# Patient Record
Sex: MALE | Race: WHITE | NOT HISPANIC OR LATINO | Employment: OTHER | URBAN - METROPOLITAN AREA
[De-identification: names, ages, dates, MRNs, and addresses within clinical notes are randomized per-mention and may not be internally consistent; named-entity substitution may affect disease eponyms.]

---

## 2017-01-02 ENCOUNTER — OFFICE VISIT (OUTPATIENT)
Dept: FAMILY MEDICINE | Facility: CLINIC | Age: 67
End: 2017-01-02
Payer: COMMERCIAL

## 2017-01-02 VITALS
TEMPERATURE: 98.1 F | BODY MASS INDEX: 29.3 KG/M2 | SYSTOLIC BLOOD PRESSURE: 122 MMHG | HEIGHT: 69 IN | HEART RATE: 80 BPM | WEIGHT: 197.8 LBS | DIASTOLIC BLOOD PRESSURE: 80 MMHG

## 2017-01-02 DIAGNOSIS — M25.562 ACUTE PAIN OF LEFT KNEE: Primary | ICD-10-CM

## 2017-01-02 PROCEDURE — 20610 DRAIN/INJ JOINT/BURSA W/O US: CPT | Mod: LT | Performed by: FAMILY MEDICINE

## 2017-01-02 RX ORDER — TRIAMCINOLONE ACETONIDE 40 MG/ML
40 INJECTION, SUSPENSION INTRA-ARTICULAR; INTRAMUSCULAR ONCE
Qty: 1 ML | Refills: 0 | COMMUNITY
Start: 2017-01-02 | End: 2017-10-18

## 2017-01-02 NOTE — PROGRESS NOTES
"  SUBJECTIVE:                                                    Pawan Bullard is a 66 year old male who presents to clinic today for the following health issues:  Chief Complaint   Patient presents with     Knee Pain        Musculoskeletal problem/pain      Duration: since last night    Description  Location: left knee    Intensity:  severe, 8/10    Accompanying signs and symptoms: swelling    History  Previous similar problem: YES, cortisone injection helped at that time  Previous evaluation:  x-ray 3 years ago    Precipitating or alleviating factors:  Trauma or overuse: no     Aggravating factors include: walking    Therapies tried and outcome: ice and acetaminophen       He had \"caldium deposits\" in his right knee in the past which seemed similar and came on suddenly.  A cortisone shot help a lot.    He has had cortisone at other times which did not help as much.   He has seen ortho and Rheumatology in the past.  Rheumatology recommended shots for knee flares.   He has had a couple surgeries on left knee.   No injury known.   No change in activity.   No swelling noticed.   No fever.   Location: medial knee.   Aggravating factors: walking.  Using cane to walk.   No history of gout.   No other joints are affected.     Patient Active Problem List   Diagnosis     Psoriatic arthropathy (H)     Esophageal reflux     Hypertension goal BP (blood pressure) < 140/90     FAMILY HISTORY OF GI NEOPLASM     FAMILY HISTORY OF DIABETES MELLITUS     Impotence of organic origin     Chondrocalcinosis     HYPERLIPIDEMIA LDL GOAL <130     Osteoarthritis     Advanced directives, counseling/discussion     High risk medications (not anticoagulants) long-term use      OBJECTIVE:Blood pressure 122/80, pulse 80, temperature 98.1  F (36.7  C), temperature source Tympanic, height 5' 9\" (1.753 m), weight 197 lb 12.8 oz (89.721 kg). BMI=Body mass index is 29.2 kg/(m^2).  GENERAL APPEARANCE ADULT: alert, uncomfortable.   MS: knee exam " He has normal knee appearance.  NO deformity, swelling or erythema.  There is tenderness medial knee and joint space.  He has full extension and limitation of flexion to moderate degree.  Negatoive Prema's test.      ASSESSMENT:   (M25.562) Acute pain of left knee  (primary encounter diagnosis)  Comment: He has had degenerative changes on knee xrays in the past and two previous surgeries.  Also chondrocalcinosis on xray in the past.   Plan: DRAIN/INJECT LARGE JOINT/BURSA          Possible risks of joint injection include; damage to tendons and ligaments, skin atrophy (thinning of skin and lighter skin color) and infection of the joint.     The benefit of injecting the joint is to relieve pain.   Procedure:  The joint was prepped with Betadyne and injected with 1 cc Kenalog (40 mg) and 2 cc 1% xylocaine.  I used sterile technique and anteromedial approach. No complications.    Bandaid dressing was applied.      Sometimes the injected joint feels worse in the first 24 hours, but if the injection is successful, the pain should be better within the next 3 days or so.  Go easy on the joint for the next several days after injection.    If injection is helpful, it could be repeated 3-4 times a year (at least 3 months apart).   Let me know if injection not helping, we can consider other options for treatment.       Ibuprofen 200mg OTC may be taken up to 4 pills 4 times a day to the maximum of 3200mg or 16 pills daily as needed for pain.   Naproxen (Aleve) OTC may be taken up to 2 pills 2 times a day to the maximum of 4 pills daily as needed for pain.   Aspirin may be taken up to 2-3 pills every 4 yours as needed for pain.   Take only one type of these at a time and take with food.   Acetaminophen (Tylenol) may be taken up to 4000mg daily which would be 350mg, 2 pills every 4 hours or 500mg (extra strength) 2 pills 4 times a day.  This could be taken with the antiinflammatory medications mentioned above.

## 2017-01-02 NOTE — MR AVS SNAPSHOT
After Visit Summary   1/2/2017    Pawan Bullard    MRN: [id number]           Patient Information     Date Of Birth          1950        Visit Information        Provider Department      1/2/2017 5:40 PM Armando Finley MD [facility]        Today's Diagnoses     Acute pain of left knee    -  1       Care Instructions    ASSESSMENT:   (M25.200) Acute pain of left knee  (primary encounter diagnosis)  Comment: He has had degenerative changes on knee xrays in the past and two previous surgeries.  Also chondrocalcinosis on xray in the past.   Plan: DRAIN/INJECT LARGE JOINT/BURSA          Possible risks of joint injection include; damage to tendons and ligaments, skin atrophy (thinning of skin and lighter skin color) and infection of the joint.     The benefit of injecting the joint is to relieve pain.   Procedure:  The joint was prepped with Betadyne and injected with 1 cc Kenalog (40 mg) and 2 cc 1% xylocaine.  I used sterile technique and anteromedial approach. No complications.    Bandaid dressing was applied.      Sometimes the injected joint feels worse in the first 24 hours, but if the injection is successful, the pain should be better within the next 3 days or so.  Go easy on the joint for the next several days after injection.    If injection is helpful, it could be repeated 3-4 times a year (at least 3 months apart).   Let me know if injection not helping, we can consider other options for treatment.       Ibuprofen 200mg OTC may be taken up to 4 pills 4 times a day to the maximum of 3200mg or 16 pills daily as needed for pain.   Naproxen (Aleve) OTC may be taken up to 2 pills 2 times a day to the maximum of 4 pills daily as needed for pain.   Aspirin may be taken up to 2-3 pills every 4 yours as needed for pain.   Take only one type of these at a time and take with food.   Acetaminophen (Tylenol) may be taken up to 4000mg daily which would be 350mg, 2 pills every 4  "hours or 500mg (extra strength) 2 pills 4 times a day.  This could be taken with the antiinflammatory medications mentioned above.         Follow-ups after your visit        Who to contact     If you have questions or need follow up information about today's clinic visit or your schedule please contact [facility] directly at [phone].  Normal or non-critical lab and imaging results will be communicated to you by Vyoptahart, letter or phone within 4 business days after the clinic has received the results. If you do not hear from us within 7 days, please contact the clinic through Vyoptahart or phone. If you have a critical or abnormal lab result, we will notify you by phone as soon as possible.  Submit refill requests through WrapMail or call your pharmacy and they will forward the refill request to us. Please allow 3 business days for your refill to be completed.          Additional Information About Your Visit        MyChart Information     WrapMail lets you send messages to your doctor, view your test results, renew your prescriptions, schedule appointments and more. To sign up, go to [email or web address] . Click on \"Log in\" on the left side of the screen, which will take you to the Welcome page. Then click on \"Sign up Now\" on the right side of the page.     You will be asked to enter the access code listed below, as well as some personal information. Please follow the directions to create your username and password.     Your access code is: [email or web address]  Expires: 2017  6:15 PM     Your access code will  in 90 days. If you need help or a new code, please call your [facility] or [phone].        Your Vitals Were     Pulse Temperature Height BMI (Body Mass Index)          80 98.1  F (36.7  C) (Tympanic) 5' 9\" (1.753 m) 29.20 kg/m2         Blood Pressure from Last 3 Encounters:   17 122/80   16 130/95   16 124/74    Weight from Last 3 Encounters:   17 197 " lb 12.8 oz (89.721 kg)   11/21/16 195 lb (88.451 kg)   08/26/16 196 lb 6.4 oz (89.086 kg)              We Performed the Following     DRAIN/INJECT LARGE JOINT/BURSA        Primary Care Provider Office Phone # Fax #    Armando Finley [phone]       [facility] [address] [facility] [address]        Thank you!     Thank you for choosing [facility]  for your care. Our goal is always to provide you with excellent care. Hearing back from our patients is one way we can continue to improve our services. Please take a few minutes to complete the written survey that you may receive in the mail after your visit with us. Thank you!             Your Updated Medication List - Protect others around you: Learn how to safely use, store and throw away your medicines at www.disposemymeds.org.          This list is accurate as of: 1/2/17  6:15 PM.  Always use your most recent med list.                   Brand Name Dispense Instructions for use    folic acid 1 MG tablet    FOLVITE    90 tablet    Take 1 tablet (1 mg) by mouth daily       GLUCOSAMINE CHONDR 1500 COMPLX PO      Take  by mouth.       IBU-200 PO      Take  by mouth. 400mg as needed       lisinopril 2.5 MG tablet    PRINIVIL/Zestril    90 tablet    Take 1 tablet (2.5 mg) by mouth daily       methotrexate 2.5 MG tablet CHEMO     105 tablet    Take 8 tablets (20 mg) by mouth once a week Overdue for lab test monitoring while on this medication       Multi-vitamin Tabs tablet   Generic drug:  multivitamin, therapeutic with minerals      1 tab daily       NUTRITIONAL SUPPLEMENTS PO      Super beta prostate       simvastatin 80 MG tablet    ZOCOR    45 tablet    Take 0.5 tablets (40 mg) by mouth At Bedtime       TYLENOL ARTHRITIS PAIN 650 MG CR tablet   Generic drug:  acetaminophen     60    2 TABLETS EVERY 8 HOURS AS NEEDED

## 2017-01-02 NOTE — NURSING NOTE
"Chief Complaint   Patient presents with     Knee Pain     /80 mmHg  Pulse 80  Temp(Src) 98.1  F (36.7  C) (Tympanic)  Ht 5' 9\" (1.753 m)  Wt 197 lb 12.8 oz (89.721 kg)  BMI 29.20 kg/m2 Estimated body mass index is 29.2 kg/(m^2) as calculated from the following:    Height as of this encounter: 5' 9\" (1.753 m).    Weight as of this encounter: 197 lb 12.8 oz (89.721 kg).  bp completed using cuff size: large            "

## 2017-01-03 NOTE — PATIENT INSTRUCTIONS
ASSESSMENT:   (M25.562) Acute pain of left knee  (primary encounter diagnosis)  Comment: He has had degenerative changes on knee xrays in the past and two previous surgeries.  Also chondrocalcinosis on xray in the past.   Plan: DRAIN/INJECT LARGE JOINT/BURSA          Possible risks of joint injection include; damage to tendons and ligaments, skin atrophy (thinning of skin and lighter skin color) and infection of the joint.     The benefit of injecting the joint is to relieve pain.   Procedure:  The joint was prepped with Betadyne and injected with 1 cc Kenalog (40 mg) and 2 cc 1% xylocaine.  I used sterile technique and anteromedial approach. No complications.    Bandaid dressing was applied.      Sometimes the injected joint feels worse in the first 24 hours, but if the injection is successful, the pain should be better within the next 3 days or so.  Go easy on the joint for the next several days after injection.    If injection is helpful, it could be repeated 3-4 times a year (at least 3 months apart).   Let me know if injection not helping, we can consider other options for treatment.       Ibuprofen 200mg OTC may be taken up to 4 pills 4 times a day to the maximum of 3200mg or 16 pills daily as needed for pain.   Naproxen (Aleve) OTC may be taken up to 2 pills 2 times a day to the maximum of 4 pills daily as needed for pain.   Aspirin may be taken up to 2-3 pills every 4 yours as needed for pain.   Take only one type of these at a time and take with food.   Acetaminophen (Tylenol) may be taken up to 4000mg daily which would be 350mg, 2 pills every 4 hours or 500mg (extra strength) 2 pills 4 times a day.  This could be taken with the antiinflammatory medications mentioned above.

## 2017-01-30 ENCOUNTER — OFFICE VISIT (OUTPATIENT)
Dept: RHEUMATOLOGY | Facility: CLINIC | Age: 67
End: 2017-01-30
Payer: COMMERCIAL

## 2017-01-30 VITALS
SYSTOLIC BLOOD PRESSURE: 135 MMHG | HEART RATE: 79 BPM | DIASTOLIC BLOOD PRESSURE: 82 MMHG | RESPIRATION RATE: 16 BRPM | WEIGHT: 198 LBS | HEIGHT: 69 IN | BODY MASS INDEX: 29.33 KG/M2

## 2017-01-30 DIAGNOSIS — Z79.899 HIGH RISK MEDICATIONS (NOT ANTICOAGULANTS) LONG-TERM USE: ICD-10-CM

## 2017-01-30 DIAGNOSIS — L40.50 PSORIATIC ARTHROPATHY (H): Primary | ICD-10-CM

## 2017-01-30 LAB
ALBUMIN SERPL-MCNC: 3.9 G/DL (ref 3.4–5)
ALP SERPL-CCNC: 56 U/L (ref 40–150)
ALT SERPL W P-5'-P-CCNC: 33 U/L (ref 0–70)
ANION GAP SERPL CALCULATED.3IONS-SCNC: 7 MMOL/L (ref 3–14)
AST SERPL W P-5'-P-CCNC: 25 U/L (ref 0–45)
BASOPHILS # BLD AUTO: 0 10E9/L (ref 0–0.2)
BASOPHILS NFR BLD AUTO: 0.4 %
BILIRUB SERPL-MCNC: 0.8 MG/DL (ref 0.2–1.3)
BUN SERPL-MCNC: 13 MG/DL (ref 7–30)
CALCIUM SERPL-MCNC: 9.3 MG/DL (ref 8.5–10.1)
CHLORIDE SERPL-SCNC: 101 MMOL/L (ref 94–109)
CO2 SERPL-SCNC: 29 MMOL/L (ref 20–32)
CREAT SERPL-MCNC: 0.95 MG/DL (ref 0.66–1.25)
DIFFERENTIAL METHOD BLD: NORMAL
EOSINOPHIL # BLD AUTO: 0.1 10E9/L (ref 0–0.7)
EOSINOPHIL NFR BLD AUTO: 2 %
ERYTHROCYTE [DISTWIDTH] IN BLOOD BY AUTOMATED COUNT: 12.7 % (ref 10–15)
GFR SERPL CREATININE-BSD FRML MDRD: 79 ML/MIN/1.7M2
GLUCOSE SERPL-MCNC: 90 MG/DL (ref 70–99)
HCT VFR BLD AUTO: 45.6 % (ref 40–53)
HGB BLD-MCNC: 15.6 G/DL (ref 13.3–17.7)
LYMPHOCYTES # BLD AUTO: 1.5 10E9/L (ref 0.8–5.3)
LYMPHOCYTES NFR BLD AUTO: 31 %
MCH RBC QN AUTO: 32.3 PG (ref 26.5–33)
MCHC RBC AUTO-ENTMCNC: 34.2 G/DL (ref 31.5–36.5)
MCV RBC AUTO: 94 FL (ref 78–100)
MONOCYTES # BLD AUTO: 0.5 10E9/L (ref 0–1.3)
MONOCYTES NFR BLD AUTO: 10.3 %
NEUTROPHILS # BLD AUTO: 2.8 10E9/L (ref 1.6–8.3)
NEUTROPHILS NFR BLD AUTO: 56.3 %
PLATELET # BLD AUTO: 160 10E9/L (ref 150–450)
POTASSIUM SERPL-SCNC: 3.9 MMOL/L (ref 3.4–5.3)
PROT SERPL-MCNC: 6.8 G/DL (ref 6.8–8.8)
RBC # BLD AUTO: 4.83 10E12/L (ref 4.4–5.9)
SODIUM SERPL-SCNC: 137 MMOL/L (ref 133–144)
WBC # BLD AUTO: 4.9 10E9/L (ref 4–11)

## 2017-01-30 PROCEDURE — 80053 COMPREHEN METABOLIC PANEL: CPT | Performed by: INTERNAL MEDICINE

## 2017-01-30 PROCEDURE — 99213 OFFICE O/P EST LOW 20 MIN: CPT | Performed by: INTERNAL MEDICINE

## 2017-01-30 PROCEDURE — 36415 COLL VENOUS BLD VENIPUNCTURE: CPT | Performed by: INTERNAL MEDICINE

## 2017-01-30 PROCEDURE — 85025 COMPLETE CBC W/AUTO DIFF WBC: CPT | Performed by: INTERNAL MEDICINE

## 2017-01-30 NOTE — PROGRESS NOTES
SUBJECTIVE:  Mr. Pawan Bullard is seen back after a long absence for psoriatic arthritis.  He is followed by Dr. Garcia, has been on methotrexate for years, generally takes between 15-20 mg weekly.  He has not taken anything for 3 weeks because he had run out.  He recently was seen by his primary, had an injection in his left knee because it was swollen and painful.  He has known osteoarthritis, chondromalacia and chondrocalcinosis in both knees.  The right knee is still somewhat painful.  He is wondering about an injection today.  Psoriasis is controlled.  He has no major outbreaks.  He does not have any other significant joint pain, swelling, or stiffness.  He denies any side effects of methotrexate such as nausea, vomiting, diarrhea, stomatitis.  He has not had any progressive dyspnea or chronic cough.      He does ask me about being on Otezla.  I discussed that it certainly has less monitoring than the methotrexate but is much more expensive and generally is not a first line agent or even second line agent per [employer] policy.      PHYSICAL EXAMINATION:   GENERAL:  Pleasant, no apparent distress.   VITAL SIGNS:  Blood pressure is 135/82, pulse 79, weight 198.   NECK:  Supple, without lymphadenopathy.   LUNGS:  Clear.   HEART:  Regular.   JOINT EXAM:  There is no obvious synovitis of the wrists, MCPs, or PIPs nor the elbows, shoulders, knees or ankles.   SKIN:  Without lesions.      IMPRESSION:   1.  Psoriatic arthritis.   2.  Osteoarthritis of the knees.      RECOMMENDATIONS:   1.  Continue methotrexate 15-20 mg weekly.   2.  Continue folic acid.   3.  Inject the right knee today.   4.  Reminded him he needs labs every 3 months.   5.  Follow up with me in 1 year or sooner if there are problems.      PROCEDURE NOTE:  After informed verbal consent was obtained, under sterile technique, after the use of ethyl chloride for anesthetic, I injected 40 mg of Kenalog with 2 cc of lidocaine into the right  knee without complications.  The patient tolerated the procedure well.         VASHTI BENNETT MD             D: 2017 12:40   T: 2017 14:38   MT:       Name:     LEROY SAN   MRN:      [id number]        Account:      [id number]   :      1950           Visit Date:   2017      Document: [id number]

## 2017-01-30 NOTE — NURSING NOTE
"Chief Complaint   Patient presents with     Consult     Psoriatic Arthritis        Initial /82 mmHg  Pulse 79  Resp 16  Ht 5' 9\" (1.753 m)  Wt 198 lb (89.812 kg)  BMI 29.23 kg/m2 Estimated body mass index is 29.23 kg/(m^2) as calculated from the following:    Height as of this encounter: 5' 9\" (1.753 m).    Weight as of this encounter: 198 lb (89.812 kg).  BP completed using cuff size: jasmin Bullard CMA     "

## 2017-01-30 NOTE — Clinical Note
[facility]  [address] [address]  [phone]      February 2, 2017      Pawan Bullard  [address] [address]        Dear Dr. Suri Villalta has reviewed the results of your labs of 1/30/17, and has made the following observations:      Labs are normal    Please don't hesitate to contact our office with any additional questions or concerns.           Sincerely,    CHARLIE GalvanA  For Frank Mccord MD

## 2017-02-13 ENCOUNTER — APPOINTMENT (OUTPATIENT)
Dept: GENERAL RADIOLOGY | Facility: CLINIC | Age: 67
End: 2017-02-13
Attending: PHYSICIAN ASSISTANT
Payer: COMMERCIAL

## 2017-02-13 ENCOUNTER — HOSPITAL ENCOUNTER (EMERGENCY)
Facility: CLINIC | Age: 67
Discharge: HOME | End: 2017-02-13
Attending: PHYSICIAN ASSISTANT | Admitting: PHYSICIAN ASSISTANT
Payer: COMMERCIAL

## 2017-02-13 VITALS — TEMPERATURE: 100.6 F | OXYGEN SATURATION: 96 % | SYSTOLIC BLOOD PRESSURE: 136 MMHG | DIASTOLIC BLOOD PRESSURE: 83 MMHG

## 2017-02-13 DIAGNOSIS — J11.1 INFLUENZA-LIKE ILLNESS: ICD-10-CM

## 2017-02-13 LAB
FLUAV+FLUBV AG SPEC QL: ABNORMAL
FLUAV+FLUBV AG SPEC QL: ABNORMAL
SPECIMEN SOURCE: ABNORMAL

## 2017-02-13 PROCEDURE — 71020 XR CHEST 2 VW: CPT

## 2017-02-13 PROCEDURE — 99213 OFFICE O/P EST LOW 20 MIN: CPT | Performed by: PHYSICIAN ASSISTANT

## 2017-02-13 PROCEDURE — 87804 INFLUENZA ASSAY W/OPTIC: CPT | Performed by: PHYSICIAN ASSISTANT

## 2017-02-13 PROCEDURE — 99214 OFFICE O/P EST MOD 30 MIN: CPT

## 2017-02-13 RX ORDER — CODEINE PHOSPHATE AND GUAIFENESIN 10; 100 MG/5ML; MG/5ML
1 SOLUTION ORAL EVERY 4 HOURS PRN
Qty: 120 ML | Refills: 0 | Status: SHIPPED | OUTPATIENT
Start: 2017-02-13 | End: 2017-10-18

## 2017-02-13 RX ORDER — OSELTAMIVIR PHOSPHATE 75 MG/1
75 CAPSULE ORAL 2 TIMES DAILY
Qty: 10 CAPSULE | Refills: 0 | Status: SHIPPED | OUTPATIENT
Start: 2017-02-13 | End: 2017-02-18

## 2017-02-13 NOTE — ED AVS SNAPSHOT
[facility]    [address] [address]    Phone:  [phone]    Fax:  [phone]                                       Pawan Bullard   MRN: [id number]    Department:  [facility]   Date of Visit:  2/13/2017           After Visit Summary Signature Page     I have received my discharge instructions, and my questions have been answered. I have discussed any challenges I see with this plan with the nurse or doctor.    ..........................................................................................................................................  Patient/Patient Representative Signature      ..........................................................................................................................................  Patient Representative Print Name and Relationship to Patient    ..................................................               ................................................  Date                                            Time    ..........................................................................................................................................  Reviewed by Signature/Title    ...................................................              ..............................................  Date                                                            Time

## 2017-02-13 NOTE — ED PROVIDER NOTES
History     Chief Complaint   Patient presents with     Influenza     started yesterday, fever/cough started more towards night, just not feeling well      HPI  Pawan Bullard is a 67 year old male  presenting with a chief complaint of cough for the last two days.  He additionally complains of fever up to 101.3 orally, chills, myalgias, rhinorrhea, mild sore throat.  He denies any shortness of breath, wheezing or vomiting, diarrhea or abdominal pain.  He has attempted to treat with an OTC cold medication, eldon seltzer, last dose of antipyretic was five hours prior to arrival.  He denies any close ill contacts with similar symptoms.  He denies any history of asthma, COPD or breathing related disorders.  He did receive a flu vaccine this year.  Patient does take methotrexate for psoriatic arthritis, however states he has not for approximately the last month due to illness.      Past Medical History   Diagnosis Date     Esophageal reflux      GERD     Hypertension      Psoriatic arthropathy (H)      Psoriatic arthritis        No current facility-administered medications on file prior to encounter.   Current Outpatient Prescriptions on File Prior to Encounter:  methotrexate 2.5 MG tablet CHEMO Take 8 tablets (20 mg) by mouth once a week Overdue for lab test monitoring while on this medication   NUTRITIONAL SUPPLEMENTS PO Super beta prostate   triamcinolone acetonide (KENALOG-40) 40 MG/ML injection 1 mL (40 mg) by INTRA-ARTICULAR route once   lisinopril (PRINIVIL,ZESTRIL) 2.5 MG tablet Take 1 tablet (2.5 mg) by mouth daily   simvastatin (ZOCOR) 80 MG tablet Take 0.5 tablets (40 mg) by mouth At Bedtime   folic acid (FOLVITE) 1 MG tablet Take 1 tablet (1 mg) by mouth daily   Ibuprofen (IBU-200 PO) Take  by mouth. 400mg as needed   Glucosamine-Chondroit-Vit C-Mn (GLUCOSAMINE CHONDR 1500 COMPLX PO) Take  by mouth.   MULTI-VITAMIN OR TABS 1 tab daily   TYLENOL ARTHRITIS PAIN 650 MG OR TBCR 2 TABLETS EVERY 8 HOURS AS  NEEDED      I have reviewed the Medications, Allergies, Past Medical and Surgical History, and Social History in the Epic system.    Review of Systems  CONSTITUTIONAL:POSITIVE  for fever up to 101.3, chills, myalgias   INTEGUMENTARY/SKIN: NEGATIVE for worrisome rashes, moles or lesions  EYES: NEGATIVE for vision changes or irritation  ENT/MOUTH: POSITIVE for rhinorrhea, sore throat and NEGATIVE for ear pain   RESP:POSITIVE for cough and NEGATIVE for SOB/dyspnea and wheezing  GI: NEGATIVE for abdominal pain, diarrhea, nausea and vomiting  Physical Exam   \/83  Temp 100.6  F (38.1  C) (Oral)  SpO2 96%  Physical Exam  GENERAL APPEARANCE: healthy, alert, non-toxic  EYES: EOMI,  PERRL, conjunctiva clear  HENT: ear canals and TM's normal.  Nasal mucosa edematous, posterior pharynx is nonerythematous that exudate.  NECK: supple, nontender, no lymphadenopathy  RESP: lungs clear to auscultation - no rales, rhonchi or wheezes  CV: regular rates and rhythm, normal S1 S2, no murmur noted  SKIN: no suspicious lesions or rashes  ED Course     ED Course     Procedures          Critical Care time:  none             Results for orders placed or performed during the hospital encounter of 02/13/17   Chest XR,  PA & LAT    Narrative    XR CHEST 2 VW 2/13/2017 2:15 PM    COMPARISON: 11/1/2011    HISTORY: Cough and fever.      Impression    IMPRESSION: Cardiac silhouette and pulmonary vasculature are within  normal limits. No focal airspace disease, pleural effusion or  pneumothorax.    BETHEL MCFARLANE   Influenza A/B antigen   Result Value Ref Range    Influenza A/B Agn Specimen Nasal     Influenza A (A) NEG     Positive   Test results must be correlated with clinical data. If necessary, results   should be confirmed by a molecular assay or viral culture.      Influenza B  NEG     Negative   Test results must be correlated with clinical data. If necessary, results   should be confirmed by a molecular assay or viral culture.        Assessments & Plan (with Medical Decision Making)     I have reviewed the nursing notes.    I have reviewed the findings, diagnosis, plan and need for follow up with the patient.  Discharge Medication List as of 2/13/2017  2:49 PM      START taking these medications    Details   oseltamivir (TAMIFLU) 75 MG capsule Take 1 capsule (75 mg) by mouth 2 times daily for 5 days, Disp-10 capsule, R-0, E-Prescribe      guaiFENesin-codeine (ROBITUSSIN AC) 100-10 MG/5ML SOLN solution Take 5 mLs by mouth every 4 hours as needed for cough, Disp-120 mL, R-0, Local Print           Final diagnoses:   Influenza-like illness     67-year-old male presents to urgent care concerns over a history of cough accompanied by fever up to 101.3, chills, myalgias, sore throat and rhinorrhea.  Patient had stable vital signs upon arrival.  Physical exam findings as described above.  As part of evaluation he did a chest x-ray which was negative for acute infiltrate, pneumothorax, pleural effusion or changing cardiopulmonary vasculature.  Patient did have testing for influenza A and B which was pending at time of discharge however patient's symptoms are classic for influenza and given concern over possible immunocompromise status secondary to methotrexate use a selected initiate Tamiflu.  After patient was discharged his influenza test did come back positive for influenza A.  Patient was contacted and instructed to begin Tamiflu as previously directed.  He is also given prescription for Robitussin with codeine to be used as needed for cough.  Follow-up with primary care provider if no resolution of fever within the next 48-72 hours.  Worrisome reasons to return to ER/UC sooner discussed.    Disclaimer: This note consists of symbols derived from keyboarding, dictation, and/or voice recognition software. As a result, there may be errors in the script that have gone undetected.  Please consider this when interpreting information found in the  chart.    2/13/2017   [facility] EMERGENCY DEPARTMENT     Mercy Reddy PA-C  02/19/17 141

## 2017-02-13 NOTE — LETTER
[facility] EMERGENCY DEPARTMENT  [address] [address]  Phone: [phone]  Fax: [phone]    February 13, 2017        Pawan Bullard  [address] [address]          To whom it may concern:    RE: Pawan Bullard was evaluated in the urgent care for an illness 2/13/17.  He should refrain from activity as long as he is febrile with a temp greater than 100.0 orally which should resolve within the next 3 days.    Please contact me for questions or concerns.      Sincerely,        Mercy Reddy PA-C

## 2017-02-13 NOTE — ED AVS SNAPSHOT
[facility] Emergency Department    [address] [address]    Phone:  [phone]    Fax:  [phone]                                       Pawan Bullard   MRN: [id number]    Department:  [facility] Emergency Department   Date of Visit:  2/13/2017           Patient Information     Date Of Birth          1950        Your diagnoses for this visit were:     Influenza-like illness        You were seen by Mercy Reddy PA-C.      Discharge References/Attachments     INFLUENZA (ADULT) (ENGLISH)      24 Hour Appointment Hotline       To make an appointment at any [facility], call [phone] ([phone]). If you don't have a family doctor or clinic, we will help you find one. [facility] clinics are conveniently located to serve the needs of you and your family.             Review of your medicines      START taking        Dose / Directions Last dose taken    guaiFENesin-codeine 100-10 MG/5ML Soln solution   Commonly known as:  ROBITUSSIN AC   Dose:  1 tsp.   Quantity:  120 mL        Take 5 mLs by mouth every 4 hours as needed for cough   Refills:  0        oseltamivir 75 MG capsule   Commonly known as:  TAMIFLU   Dose:  75 mg   Quantity:  10 capsule        Take 1 capsule (75 mg) by mouth 2 times daily for 5 days   Refills:  0          Our records show that you are taking the medicines listed below. If these are incorrect, please call your family doctor or clinic.        Dose / Directions Last dose taken    folic acid 1 MG tablet   Commonly known as:  FOLVITE   Dose:  1 mg   Quantity:  90 tablet        Take 1 tablet (1 mg) by mouth daily   Refills:  3        GLUCOSAMINE CHONDR 1500 COMPLX PO        Take  by mouth.   Refills:  0        IBU-200 PO        Take  by mouth. 400mg as needed   Refills:  0        lisinopril 2.5 MG tablet   Commonly known as:  PRINIVIL/Zestril   Dose:  2.5 mg   Quantity:  90 tablet        Take 1 tablet (2.5 mg) by mouth daily   Refills:  3         methotrexate 2.5 MG tablet CHEMO   Dose:  20 mg   Quantity:  105 tablet        Take 8 tablets (20 mg) by mouth once a week Overdue for lab test monitoring while on this medication   Refills:  1        Multi-vitamin Tabs tablet   Generic drug:  multivitamin, therapeutic with minerals        1 tab daily   Refills:  0        NUTRITIONAL SUPPLEMENTS PO        Super beta prostate   Refills:  0        simvastatin 80 MG tablet   Commonly known as:  ZOCOR   Dose:  40 mg   Quantity:  45 tablet        Take 0.5 tablets (40 mg) by mouth At Bedtime   Refills:  3        triamcinolone acetonide 40 MG/ML injection   Commonly known as:  KENALOG-40   Dose:  40 mg   Quantity:  1 mL        1 mL (40 mg) by INTRA-ARTICULAR route once   Refills:  0        TYLENOL ARTHRITIS PAIN 650 MG CR tablet   Quantity:  60   Generic drug:  acetaminophen        2 TABLETS EVERY 8 HOURS AS NEEDED   Refills:  0                Prescriptions were sent or printed at these locations (2 Prescriptions)                   [id number] [address] [address]    Telephone:  [phone]   Fax:  [phone]   Hours:  Closed 10-16-08 business to [facility]                E-Prescribed (1 of 2)         oseltamivir (TAMIFLU) 75 MG capsule                 Printed at Department/Unit printer (1 of 2)         guaiFENesin-codeine (ROBITUSSIN AC) 100-10 MG/5ML SOLN solution                Procedures and tests performed during your visit     Chest XR,  PA & LAT    Influenza A/B antigen      Orders Needing Specimen Collection     None      Pending Results     Date and Time Order Name Status Description    2/13/2017 1401 Influenza A/B antigen In process             Pending Culture Results     Date and Time Order Name Status Description    2/13/2017 1401 Influenza A/B antigen In process              Test Results from your hospital stay     2/13/2017  2:19 PM - Interface, Radiant Ib      Narrative     XR  "CHEST 2 VW 2017 2:15 PM    COMPARISON: 2011    HISTORY: Cough and fever.        Impression     IMPRESSION: Cardiac silhouette and pulmonary vasculature are within  normal limits. No focal airspace disease, pleural effusion or  pneumothorax.    BETHEL MCFARLANE         2017  2:09 PM - Interface, Flexilab Results                Thank you for choosing [facility]       Thank you for choosing [facility] for your care. Our goal is always to provide you with excellent care. Hearing back from our patients is one way we can continue to improve our services. Please take a few minutes to complete the written survey that you may receive in the mail after you visit with us. Thank you!        [employer] Information     [employer] lets you send messages to your doctor, view your test results, renew your prescriptions, schedule appointments and more. To sign up, go to [email or web address] . Click on \"Log in\" on the left side of the screen, which will take you to the Welcome page. Then click on \"Sign up Now\" on the right side of the page.     You will be asked to enter the access code listed below, as well as some personal information. Please follow the directions to create your username and password.     Your access code is: [email or web address]  Expires: 2017  6:15 PM     Your access code will  in 90 days. If you need help or a new code, please call your [facility] clinic or [phone].        Care EveryWhere ID     This is your Care EveryWhere ID. This could be used by other organizations to access your [facility] medical records  [id number]        After Visit Summary       This is your record. Keep this with you and show to your community pharmacist(s) and doctor(s) at your next visit.                  "

## 2017-05-10 DIAGNOSIS — L40.50 PSORIATIC ARTHROPATHY (H): ICD-10-CM

## 2017-05-10 LAB
ALBUMIN SERPL-MCNC: 3.9 G/DL (ref 3.4–5)
ALP SERPL-CCNC: 56 U/L (ref 40–150)
ALT SERPL W P-5'-P-CCNC: 37 U/L (ref 0–70)
ANION GAP SERPL CALCULATED.3IONS-SCNC: 10 MMOL/L (ref 3–14)
AST SERPL W P-5'-P-CCNC: 25 U/L (ref 0–45)
BASOPHILS # BLD AUTO: 0 10E9/L (ref 0–0.2)
BASOPHILS NFR BLD AUTO: 0.4 %
BILIRUB SERPL-MCNC: 1 MG/DL (ref 0.2–1.3)
BUN SERPL-MCNC: 16 MG/DL (ref 7–30)
CALCIUM SERPL-MCNC: 8.9 MG/DL (ref 8.5–10.1)
CHLORIDE SERPL-SCNC: 102 MMOL/L (ref 94–109)
CO2 SERPL-SCNC: 26 MMOL/L (ref 20–32)
CREAT SERPL-MCNC: 1 MG/DL (ref 0.66–1.25)
DIFFERENTIAL METHOD BLD: ABNORMAL
EOSINOPHIL # BLD AUTO: 0.1 10E9/L (ref 0–0.7)
EOSINOPHIL NFR BLD AUTO: 1.8 %
ERYTHROCYTE [DISTWIDTH] IN BLOOD BY AUTOMATED COUNT: 13.4 % (ref 10–15)
GFR SERPL CREATININE-BSD FRML MDRD: 75 ML/MIN/1.7M2
GLUCOSE SERPL-MCNC: 117 MG/DL (ref 70–99)
HCT VFR BLD AUTO: 43.3 % (ref 40–53)
HGB BLD-MCNC: 15.7 G/DL (ref 13.3–17.7)
LYMPHOCYTES # BLD AUTO: 1.3 10E9/L (ref 0.8–5.3)
LYMPHOCYTES NFR BLD AUTO: 23.5 %
MCH RBC QN AUTO: 35 PG (ref 26.5–33)
MCHC RBC AUTO-ENTMCNC: 36.3 G/DL (ref 31.5–36.5)
MCV RBC AUTO: 97 FL (ref 78–100)
MONOCYTES # BLD AUTO: 0.4 10E9/L (ref 0–1.3)
MONOCYTES NFR BLD AUTO: 7.7 %
NEUTROPHILS # BLD AUTO: 3.8 10E9/L (ref 1.6–8.3)
NEUTROPHILS NFR BLD AUTO: 66.6 %
PLATELET # BLD AUTO: 154 10E9/L (ref 150–450)
POTASSIUM SERPL-SCNC: 3.9 MMOL/L (ref 3.4–5.3)
PROT SERPL-MCNC: 7 G/DL (ref 6.8–8.8)
RBC # BLD AUTO: 4.48 10E12/L (ref 4.4–5.9)
SODIUM SERPL-SCNC: 138 MMOL/L (ref 133–144)
WBC # BLD AUTO: 5.7 10E9/L (ref 4–11)

## 2017-05-10 PROCEDURE — 80053 COMPREHEN METABOLIC PANEL: CPT | Performed by: INTERNAL MEDICINE

## 2017-05-10 PROCEDURE — 85025 COMPLETE CBC W/AUTO DIFF WBC: CPT | Performed by: INTERNAL MEDICINE

## 2017-05-10 PROCEDURE — 36415 COLL VENOUS BLD VENIPUNCTURE: CPT | Performed by: INTERNAL MEDICINE

## 2017-07-31 ENCOUNTER — TELEPHONE (OUTPATIENT)
Dept: FAMILY MEDICINE | Facility: CLINIC | Age: 67
End: 2017-07-31

## 2017-07-31 ENCOUNTER — HOSPITAL ENCOUNTER (EMERGENCY)
Facility: CLINIC | Age: 67
Discharge: HOME | End: 2017-07-31
Attending: NURSE PRACTITIONER | Admitting: NURSE PRACTITIONER
Payer: MEDICARE

## 2017-07-31 VITALS
WEIGHT: 200 LBS | OXYGEN SATURATION: 98 % | SYSTOLIC BLOOD PRESSURE: 164 MMHG | HEART RATE: 73 BPM | RESPIRATION RATE: 14 BRPM | DIASTOLIC BLOOD PRESSURE: 91 MMHG | BODY MASS INDEX: 29.53 KG/M2 | TEMPERATURE: 98 F

## 2017-07-31 DIAGNOSIS — S46.812A STRAIN OF TRAPEZIUS MUSCLE, LEFT, INITIAL ENCOUNTER: Primary | ICD-10-CM

## 2017-07-31 PROCEDURE — 99213 OFFICE O/P EST LOW 20 MIN: CPT | Performed by: NURSE PRACTITIONER

## 2017-07-31 PROCEDURE — 99212 OFFICE O/P EST SF 10 MIN: CPT

## 2017-07-31 NOTE — ED AVS SNAPSHOT
[facility] Emergency Department    [address] [address]    Phone:  [phone]    Fax:  [phone]                                       Pawan Bullard   MRN: [id number]    Department:  [facility] Emergency Department   Date of Visit:  7/31/2017           Patient Information     Date Of Birth          1950        Your diagnoses for this visit were:     Strain of trapezius muscle, left, initial encounter        You were seen by Anaya Baker APRN CNP.      Follow-up Information     Follow up with Armando Finley MD In 1 week.    Specialty:  Family Practice    Why:  As needed, If symptoms worsen    Contact information:    [facility]  [address] [facility] [address]  [phone]          Discharge Instructions         Consider rib head trouble -   Treating Strains and Sprains  Strains and sprains happen when muscles or other soft tissues near your bones stretch or tear. These injuries can cause bruising, swelling, and pain. To ease your discomfort and speed the healing of your strain or sprain, follow the tips below. Remember, a strain or sprain can take 6 to 8 weeks to heal.     Important Note: Do not give aspirin to children or teens without discussing it with your healthcare provider first.        Ice first, heat later    Use ice for the first 24 to 48 hours after injury. Ice helps prevent swelling and reduce pain. Ice the injury for no more than 20 minutes at a time and allow at least  20 minutes between icing sessions.    Apply heat after the first 72 hours, once the swelling has gone down. Heat relaxes muscles and increases blood flow. Soak the injured area in warm water or use a heating pad set on low for no more than 15 minutes at a time.  Wrap and elevate    Wrap an injured limb firmly with an elastic bandage. This provides support and helps prevent swelling. Don t wear an elastic bandage overnight. Watch for tingling, numbness, or  increased pain, and remove the bandage immediately if any of these occurs.    Elevate the injured area to help reduce swelling and throbbing. It s best to raise an injured limb above the level of your heart.     Medicines    Over-the-counter medicines such as acetaminophen or ibuprofen can help reduce pain. Some also help reduce swelling.    Take medicine only as directed.    Rest the area even if medicines are controlling the pain.  Rest    Rest the injured area by not using it for 24 hours.    When you re ready, return slowly to your normal activities. Rest the injured area often.    Don t use or walk on an injured limb if it hurts.  Date Last Reviewed: 9/3/2015    3506-3486 [employer]. [address] [place] [address], [place] [address]. All rights reserved. This information is not intended as a substitute for professional medical care. Always follow your healthcare professional's instructions.          24 Hour Appointment Hotline       To make an appointment at any [facility], call [phone] ([phone]). If you don't have a family doctor or clinic, we will help you find one. [facility] clinics are conveniently located to serve the needs of you and your family.             Review of your medicines      Our records show that you are taking the medicines listed below. If these are incorrect, please call your family doctor or clinic.        Dose / Directions Last dose taken    folic acid 1 MG tablet   Commonly known as:  FOLVITE   Dose:  1 mg   Quantity:  90 tablet        Take 1 tablet (1 mg) by mouth daily   Refills:  3        GLUCOSAMINE CHONDR 1500 COMPLX PO        Take  by mouth.   Refills:  0        guaiFENesin-codeine 100-10 MG/5ML Soln solution   Commonly known as:  ROBITUSSIN AC   Dose:  1 tsp.   Quantity:  120 mL        Take 5 mLs by mouth every 4 hours as needed for cough   Refills:  0        IBU-200 PO        Take  by mouth. 400mg as needed   Refills:  0        lisinopril 2.5 MG tablet    Commonly known as:  PRINIVIL/Zestril   Dose:  2.5 mg   Quantity:  90 tablet        Take 1 tablet (2.5 mg) by mouth daily   Refills:  3        methotrexate 2.5 MG tablet CHEMO   Dose:  20 mg   Quantity:  105 tablet        Take 8 tablets (20 mg) by mouth once a week Overdue for lab test monitoring while on this medication   Refills:  1        Multi-vitamin Tabs tablet   Generic drug:  multivitamin, therapeutic with minerals        1 tab daily   Refills:  0        NUTRITIONAL SUPPLEMENTS PO        Super beta prostate   Refills:  0        simvastatin 80 MG tablet   Commonly known as:  ZOCOR   Dose:  40 mg   Quantity:  45 tablet        Take 0.5 tablets (40 mg) by mouth At Bedtime   Refills:  3        triamcinolone acetonide 40 MG/ML injection   Commonly known as:  KENALOG-40   Dose:  40 mg   Quantity:  1 mL        1 mL (40 mg) by INTRA-ARTICULAR route once   Refills:  0        TYLENOL ARTHRITIS PAIN 650 MG CR tablet   Quantity:  60   Generic drug:  acetaminophen        2 TABLETS EVERY 8 HOURS AS NEEDED   Refills:  0                Orders Needing Specimen Collection     None      Pending Results     No orders found from 7/29/2017 to 8/1/2017.            Pending Culture Results     No orders found from 7/29/2017 to 8/1/2017.            Pending Results Instructions     If you had any lab results that were not finalized at the time of your Discharge, you can call the ED Lab Result RN at [phone]. You will be contacted by this team for any positive Lab results or changes in treatment. The nurses are available 7 days a week from 10A to 6:30P.  You can leave a message 24 hours per day and they will return your call.        Test Results From Your Hospital Stay               Thank you for choosing [facility]       Thank you for choosing [facility] for your care. Our goal is always to provide you with excellent care. Hearing back from our patients is one way we can continue to improve our services. Please take a few minutes to  "complete the written survey that you may receive in the mail after you visit with us. Thank you!        [employer] Information     [employer] lets you send messages to your doctor, view your test results, renew your prescriptions, schedule appointments and more. To sign up, go to [email or web address] . Click on \"Log in\" on the left side of the screen, which will take you to the Welcome page. Then click on \"Sign up Now\" on the right side of the page.     You will be asked to enter the access code listed below, as well as some personal information. Please follow the directions to create your username and password.     Your access code is: [email or web address]  Expires: 10/29/2017  1:21 PM     Your access code will  in 90 days. If you need help or a new code, please call your [facility] clinic or [phone].        Care EveryWhere ID     This is your Care EveryWhere ID. This could be used by other organizations to access your [facility] medical records  [id number]        Equal Access to Services     [facility] Center[facility] : Hadii amado barreto Somarie, waaxda luqadaha, qaybta kaalmaenio erickson, kathy finley . So [facility] [phone].    ATENCIÓN: Si habla español, tiene a haley disposición servicios gratuitos de asistencia lingüística. Llame al [phone].    We comply with applicable federal civil rights laws and [place] laws. We do not discriminate on the basis of race, color, national origin, age, disability sex, sexual orientation or gender identity.            After Visit Summary       This is your record. Keep this with you and show to your community pharmacist(s) and doctor(s) at your next visit.                  "

## 2017-07-31 NOTE — TELEPHONE ENCOUNTER
"S-(situation): chest pain in left upper back    B-(background): started last week    A-(assessment):   Patient is reporting \"tugging\" chest pain on the left upper back side that has been going on off and on for a week. Patient is stating shortness of breath, not dizzy, but is reporting a slight headache, denies weakness on one side of the body, able to speak.     R-(recommendations):Advised to seek the [place] [facility] immediately. Patient asked if he could drive, told the patient not to drive self, offered to call an ambulance, but patient will have his wife take him there now. Told will cancel his afternoon appointment today as he was scheduled to be seen today by same day provider. Patient agrees with the plan. WANDA Owen      "

## 2017-07-31 NOTE — ED AVS SNAPSHOT
[facility] Emergency Department    [address] [address]    Phone:  [phone]    Fax:  [phone]                                       Pawan Bullard   MRN: [id number]    Department:  [facility] Emergency Department   Date of Visit:  7/31/2017           After Visit Summary Signature Page     I have received my discharge instructions, and my questions have been answered. I have discussed any challenges I see with this plan with the nurse or doctor.    ..........................................................................................................................................  Patient/Patient Representative Signature      ..........................................................................................................................................  Patient Representative Print Name and Relationship to Patient    ..................................................               ................................................  Date                                            Time    ..........................................................................................................................................  Reviewed by Signature/Title    ...................................................              ..............................................  Date                                                            Time

## 2017-07-31 NOTE — ED NOTES
Left upper back pain worse with mvmt. Ongoing since last week. Comes and goes. Took tylenol once without relief. Denies cardiac hx.

## 2017-07-31 NOTE — ED PROVIDER NOTES
History     Chief Complaint   Patient presents with     Back Pain     Left upper back pain since late last week. COmes and goes. Worse with position. Tylenol without improvement. No known injury.      HPI  Pawan Bullard is a 67 year old male who presents with back/shoulder pain.  Pt reports the pain was first noticed on Friday and then resolved spontaneously and was gone Saturday and Sunday and returned this am.  Pt reports he took arthritis tylenol this past Friday and that is what seemed to help.  He reports today he has not taken anything.  He describes the pain as pressure, sharp, pulling.  Pt reports pain is 4-5 out of 10.  Pt reports twisting and lifting and walking aggravate it.  He reports rest and laying down helps relieve the symptoms.  He reports normal CMS to bilateral arms and legs.  He reports normal bowel and bladder function.      I have reviewed the Medications, Allergies, Past Medical and Surgical History, and Social History in the Epic system.    Review of Systems  10 point ROS of systems including Constitutional, Eyes, Respiratory, Cardiovascular, Gastroenterology, Genitourinary, Integumentary, Muscularskeletal, Psychiatric were all negative except for pertinent positives noted in my HPI.    Physical Exam   BP: (!) 164/91  Pulse: 73  Temp: 98  F (36.7  C)  Resp: 14  Weight: 90.7 kg (200 lb)  SpO2: 98 %  Physical Exam   Constitutional: He appears well-developed and well-nourished. No distress.   HENT:   Head: Normocephalic and atraumatic.   Eyes: Conjunctivae are normal. Right eye exhibits no discharge. Left eye exhibits no discharge.   Neck: Neck supple.   Cardiovascular: Normal rate, regular rhythm and normal heart sounds.  Exam reveals no gallop and no friction rub.    No murmur heard.  Pulmonary/Chest: Effort normal and breath sounds normal. No respiratory distress. He has no wheezes. He has no rales. He exhibits no tenderness.   Musculoskeletal:        Cervical back: He exhibits  tenderness (noted at midscapulary line at approximately T10 level without spasm) and pain (with deep palpation of Trapezius muscle). He exhibits normal range of motion, no bony tenderness, no swelling, no edema, no deformity, no laceration, no spasm and normal pulse.   Lymphadenopathy:     He has no cervical adenopathy.   Skin: He is not diaphoretic.   Nursing note and vitals reviewed.      ED Course     ED Course     Procedures    Labs Ordered and Resulted from Time of ED Arrival Up to the Time of Departure from the ED - No data to display    Assessments & Plan (with Medical Decision Making)     I have reviewed the nursing notes.    I have reviewed the findings, diagnosis, plan and need for follow up with the patient.  Pawan Bullard is a 67 year old male who presents with back/shoulder pain.  Pt reports the pain was first noticed on Friday and then resolved spontaneously and was gone Saturday and Sunday and returned this am.  Pt reports he took arthritis tylenol this past Friday and that is what seemed to help.  He reports today he has not taken anything.  He describes the pain as pressure, sharp, pulling.  Pt reports pain is 4-5 out of 10.  Pt reports twisting and lifting and walking aggravate it.  He reports rest and laying down helps relieve the symptoms.  He reports normal CMS to bilateral arms and legs.  He reports normal bowel and bladder function. Exam reveals tenderness at midscapulary line around T10 region without red flags to indicate imaging.  Conservative therapy discussed with patient of tylenol arthritis, acupuncture, massage, physical therapy, or chiropractic.  Pt to return if worsening symptoms.    DDx: acute muscle strain, muscle spasm, herniated disc, cauda equina, spinal fracture, spinal stenosis, sciatica, degenerative disease, ligamentous injury, spondylolisthesis, epidural abscess, osteomyelitis, AAA, abdominal etiologies     Discharge Medication List as of 7/31/2017  1:21 PM           Final diagnoses:   Strain of trapezius muscle, left, initial encounter       7/31/2017   [facility] EMERGENCY DEPARTMENT     Anaya Baker, RK CNP  07/31/17 1521

## 2017-07-31 NOTE — DISCHARGE INSTRUCTIONS
Consider rib head trouble -   Treating Strains and Sprains  Strains and sprains happen when muscles or other soft tissues near your bones stretch or tear. These injuries can cause bruising, swelling, and pain. To ease your discomfort and speed the healing of your strain or sprain, follow the tips below. Remember, a strain or sprain can take 6 to 8 weeks to heal.     Important Note: Do not give aspirin to children or teens without discussing it with your healthcare provider first.        Ice first, heat later    Use ice for the first 24 to 48 hours after injury. Ice helps prevent swelling and reduce pain. Ice the injury for no more than 20 minutes at a time and allow at least  20 minutes between icing sessions.    Apply heat after the first 72 hours, once the swelling has gone down. Heat relaxes muscles and increases blood flow. Soak the injured area in warm water or use a heating pad set on low for no more than 15 minutes at a time.  Wrap and elevate    Wrap an injured limb firmly with an elastic bandage. This provides support and helps prevent swelling. Don t wear an elastic bandage overnight. Watch for tingling, numbness, or increased pain, and remove the bandage immediately if any of these occurs.    Elevate the injured area to help reduce swelling and throbbing. It s best to raise an injured limb above the level of your heart.     Medicines    Over-the-counter medicines such as acetaminophen or ibuprofen can help reduce pain. Some also help reduce swelling.    Take medicine only as directed.    Rest the area even if medicines are controlling the pain.  Rest    Rest the injured area by not using it for 24 hours.    When you re ready, return slowly to your normal activities. Rest the injured area often.    Don t use or walk on an injured limb if it hurts.  Date Last Reviewed: 9/3/2015    3860-1545 [employer]. [address] [place] [address], [place] [address]. All rights reserved. This information is not  intended as a substitute for professional medical care. Always follow your healthcare professional's instructions.

## 2017-09-05 DIAGNOSIS — L40.50 PSORIATIC ARTHROPATHY (H): ICD-10-CM

## 2017-09-05 RX ORDER — FOLIC ACID 1 MG/1
TABLET ORAL
Qty: 90 TABLET | Refills: 0 | Status: SHIPPED | OUTPATIENT
Start: 2017-09-05 | End: 2017-10-18

## 2017-09-05 NOTE — TELEPHONE ENCOUNTER
Folic Acid      Last Written Prescription Date: 08/26/16  Last Fill Quantity: 90,  # refills: 3   Last Office Visit with [facility], [facility] or [facility] prescribing provider: 01/2/17

## 2017-09-06 DIAGNOSIS — L40.50 PSORIATIC ARTHROPATHY (H): ICD-10-CM

## 2017-09-06 LAB
ALBUMIN SERPL-MCNC: 4 G/DL (ref 3.4–5)
ALP SERPL-CCNC: 55 U/L (ref 40–150)
ALT SERPL W P-5'-P-CCNC: 46 U/L (ref 0–70)
ANION GAP SERPL CALCULATED.3IONS-SCNC: 3 MMOL/L (ref 3–14)
AST SERPL W P-5'-P-CCNC: 30 U/L (ref 0–45)
BASOPHILS # BLD AUTO: 0 10E9/L (ref 0–0.2)
BASOPHILS NFR BLD AUTO: 0.2 %
BILIRUB SERPL-MCNC: 1 MG/DL (ref 0.2–1.3)
BUN SERPL-MCNC: 12 MG/DL (ref 7–30)
CALCIUM SERPL-MCNC: 9.2 MG/DL (ref 8.5–10.1)
CHLORIDE SERPL-SCNC: 103 MMOL/L (ref 94–109)
CO2 SERPL-SCNC: 32 MMOL/L (ref 20–32)
CREAT SERPL-MCNC: 1.01 MG/DL (ref 0.66–1.25)
DIFFERENTIAL METHOD BLD: ABNORMAL
EOSINOPHIL # BLD AUTO: 0.1 10E9/L (ref 0–0.7)
EOSINOPHIL NFR BLD AUTO: 2.2 %
ERYTHROCYTE [DISTWIDTH] IN BLOOD BY AUTOMATED COUNT: 12.6 % (ref 10–15)
GFR SERPL CREATININE-BSD FRML MDRD: 74 ML/MIN/1.7M2
GLUCOSE SERPL-MCNC: 96 MG/DL (ref 70–99)
HCT VFR BLD AUTO: 44.2 % (ref 40–53)
HGB BLD-MCNC: 15.4 G/DL (ref 13.3–17.7)
LYMPHOCYTES # BLD AUTO: 1.8 10E9/L (ref 0.8–5.3)
LYMPHOCYTES NFR BLD AUTO: 34.3 %
MCH RBC QN AUTO: 33.1 PG (ref 26.5–33)
MCHC RBC AUTO-ENTMCNC: 34.8 G/DL (ref 31.5–36.5)
MCV RBC AUTO: 95 FL (ref 78–100)
MONOCYTES # BLD AUTO: 0.4 10E9/L (ref 0–1.3)
MONOCYTES NFR BLD AUTO: 7.6 %
NEUTROPHILS # BLD AUTO: 3 10E9/L (ref 1.6–8.3)
NEUTROPHILS NFR BLD AUTO: 55.7 %
PLATELET # BLD AUTO: 156 10E9/L (ref 150–450)
POTASSIUM SERPL-SCNC: 4.4 MMOL/L (ref 3.4–5.3)
PROT SERPL-MCNC: 7 G/DL (ref 6.8–8.8)
RBC # BLD AUTO: 4.65 10E12/L (ref 4.4–5.9)
SODIUM SERPL-SCNC: 138 MMOL/L (ref 133–144)
WBC # BLD AUTO: 5.4 10E9/L (ref 4–11)

## 2017-09-06 PROCEDURE — 85025 COMPLETE CBC W/AUTO DIFF WBC: CPT | Performed by: FAMILY MEDICINE

## 2017-09-06 PROCEDURE — 80053 COMPREHEN METABOLIC PANEL: CPT | Performed by: FAMILY MEDICINE

## 2017-09-06 PROCEDURE — 36415 COLL VENOUS BLD VENIPUNCTURE: CPT | Performed by: FAMILY MEDICINE

## 2017-09-26 DIAGNOSIS — I10 ESSENTIAL HYPERTENSION WITH GOAL BLOOD PRESSURE LESS THAN 140/90: ICD-10-CM

## 2017-09-26 DIAGNOSIS — E78.5 HYPERLIPIDEMIA LDL GOAL <130: ICD-10-CM

## 2017-09-26 NOTE — TELEPHONE ENCOUNTER
Simvastatin     Last Written Prescription Date: 08/26/16  Last Fill Quantity: 45, # refills: 3  Last Office Visit with [facility], [facility] or [facility] prescribing provider: 11/02/17       Lab Results   Component Value Date    CHOL 182 08/26/2016     Lab Results   Component Value Date    HDL 55 08/26/2016     Lab Results   Component Value Date    LDL 90 08/26/2016     Lab Results   Component Value Date    TRIG 185 08/26/2016     Lab Results   Component Value Date    CHOLHDLRATIO 3.1 05/29/2015     Lisinopril      Last Written Prescription Date: 08/26/16  Last Fill Quantity: 90,  # refills: 3   Last Office Visit with [facility], [facility] or [facility] prescribing provider: 11/02/17

## 2017-09-27 RX ORDER — LISINOPRIL 2.5 MG/1
TABLET ORAL
Qty: 90 TABLET | Refills: 0 | Status: SHIPPED | OUTPATIENT
Start: 2017-09-27 | End: 2017-10-18

## 2017-09-27 RX ORDER — SIMVASTATIN 80 MG
TABLET ORAL
Qty: 45 TABLET | Refills: 0 | Status: SHIPPED | OUTPATIENT
Start: 2017-09-27 | End: 2017-10-18

## 2017-10-05 ENCOUNTER — TELEPHONE (OUTPATIENT)
Dept: FAMILY MEDICINE | Facility: CLINIC | Age: 67
End: 2017-10-05

## 2017-10-05 NOTE — TELEPHONE ENCOUNTER
Pawan is looking for an order for his lipids so he can get this done prior to his physical later this month with Dr. Finley.  [facility]

## 2017-10-13 DIAGNOSIS — E78.5 HYPERLIPIDEMIA LDL GOAL <130: ICD-10-CM

## 2017-10-13 LAB
CHOLEST SERPL-MCNC: 183 MG/DL
HDLC SERPL-MCNC: 59 MG/DL
LDLC SERPL CALC-MCNC: 91 MG/DL
NONHDLC SERPL-MCNC: 124 MG/DL
TRIGL SERPL-MCNC: 167 MG/DL

## 2017-10-13 PROCEDURE — 36415 COLL VENOUS BLD VENIPUNCTURE: CPT | Performed by: FAMILY MEDICINE

## 2017-10-13 PROCEDURE — 80061 LIPID PANEL: CPT | Performed by: FAMILY MEDICINE

## 2017-10-17 NOTE — PROGRESS NOTES
Please call.  His triglycerides are high, other lipids OK.   Continue the simvastatin at 40mg daily.

## 2017-10-18 ENCOUNTER — OFFICE VISIT (OUTPATIENT)
Dept: FAMILY MEDICINE | Facility: CLINIC | Age: 67
End: 2017-10-18
Payer: COMMERCIAL

## 2017-10-18 VITALS
DIASTOLIC BLOOD PRESSURE: 78 MMHG | TEMPERATURE: 97.4 F | HEART RATE: 60 BPM | RESPIRATION RATE: 16 BRPM | BODY MASS INDEX: 29.92 KG/M2 | HEIGHT: 69 IN | WEIGHT: 202 LBS | SYSTOLIC BLOOD PRESSURE: 128 MMHG

## 2017-10-18 DIAGNOSIS — L40.50 PSORIATIC ARTHROPATHY (H): ICD-10-CM

## 2017-10-18 DIAGNOSIS — Z23 NEED FOR PROPHYLACTIC VACCINATION AND INOCULATION AGAINST INFLUENZA: ICD-10-CM

## 2017-10-18 DIAGNOSIS — I10 ESSENTIAL HYPERTENSION WITH GOAL BLOOD PRESSURE LESS THAN 140/90: ICD-10-CM

## 2017-10-18 DIAGNOSIS — N40.1 BENIGN PROSTATIC HYPERPLASIA WITH NOCTURIA: ICD-10-CM

## 2017-10-18 DIAGNOSIS — Z12.5 SCREENING FOR PROSTATE CANCER: ICD-10-CM

## 2017-10-18 DIAGNOSIS — R35.1 BENIGN PROSTATIC HYPERPLASIA WITH NOCTURIA: ICD-10-CM

## 2017-10-18 DIAGNOSIS — Z00.00 MEDICARE ANNUAL WELLNESS VISIT, INITIAL: Primary | ICD-10-CM

## 2017-10-18 DIAGNOSIS — E78.5 HYPERLIPIDEMIA LDL GOAL <130: ICD-10-CM

## 2017-10-18 PROCEDURE — G0008 ADMIN INFLUENZA VIRUS VAC: HCPCS | Performed by: FAMILY MEDICINE

## 2017-10-18 PROCEDURE — 90670 PCV13 VACCINE IM: CPT | Performed by: FAMILY MEDICINE

## 2017-10-18 PROCEDURE — G0402 INITIAL PREVENTIVE EXAM: HCPCS | Performed by: FAMILY MEDICINE

## 2017-10-18 PROCEDURE — G0103 PSA SCREENING: HCPCS | Performed by: FAMILY MEDICINE

## 2017-10-18 PROCEDURE — G0009 ADMIN PNEUMOCOCCAL VACCINE: HCPCS | Performed by: FAMILY MEDICINE

## 2017-10-18 PROCEDURE — 90662 IIV NO PRSV INCREASED AG IM: CPT | Performed by: FAMILY MEDICINE

## 2017-10-18 RX ORDER — SIMVASTATIN 80 MG
40 TABLET ORAL AT BEDTIME
Qty: 45 TABLET | Refills: 3 | Status: SHIPPED | OUTPATIENT
Start: 2017-10-18 | End: 2018-09-21

## 2017-10-18 RX ORDER — LISINOPRIL 2.5 MG/1
2.5 TABLET ORAL DAILY
Qty: 90 TABLET | Refills: 3 | Status: SHIPPED | OUTPATIENT
Start: 2017-10-18 | End: 2018-09-21

## 2017-10-18 RX ORDER — TAMSULOSIN HYDROCHLORIDE 0.4 MG/1
0.4 CAPSULE ORAL DAILY
Qty: 30 CAPSULE | Refills: 11 | Status: SHIPPED | OUTPATIENT
Start: 2017-10-18 | End: 2018-09-21

## 2017-10-18 RX ORDER — FOLIC ACID 1 MG/1
1000 TABLET ORAL DAILY
Qty: 90 TABLET | Refills: 3 | Status: SHIPPED | OUTPATIENT
Start: 2017-10-18 | End: 2018-01-22

## 2017-10-18 NOTE — NURSING NOTE
"Chief Complaint   Patient presents with     Medicare Visit       Initial /78  Pulse 60  Temp 97.4  F (36.3  C) (Tympanic)  Resp 16  Ht 5' 9\" (1.753 m)  Wt 202 lb (91.6 kg)  BMI 29.83 kg/m2 Estimated body mass index is 29.83 kg/(m^2) as calculated from the following:    Height as of this encounter: 5' 9\" (1.753 m).    Weight as of this encounter: 202 lb (91.6 kg).  Medication Reconciliation: complete    Health Maintenance that is potentially due pending provider review:          Is there anyone who you would like to be able to receive your results?   If yes have patient fill out CURT    "

## 2017-10-18 NOTE — PROGRESS NOTES
SUBJECTIVE:   Pawan Bullard is a 67 year old male who presents for Preventive Visit.  Chief Complaint   Patient presents with     Medicare Visit      Left ear pain and some ringing at times.  occasional tinnitus-ringing.  Can get sore on occasion.   Shingles vaccine ? Due to taking Methotrexate?  Dark spot on his back and growth on rt arm.    ? Check testosterone level states urination questions.  NocturiaX2 is typical.  Seems a little worse.  Some difficulty initiating urine.     Are you in the first 12 months of your Medicare Part B coverage?  Yes,  Visual Acuity:  Right Eye: 20/25   Left Eye: 20/20      Healthy Habits:    Do you get at least three servings of calcium containing foods daily (dairy, green leafy vegetables, etc.)? yes    Amount of exercise or daily activities, outside of work: Stays active-yard work.  Some golf.     Problems taking medications regularly No    Medication side effects: No    Have you had an eye exam in the past two years? no    Do you see a dentist twice per year? yes    Do you have sleep apnea, excessive snoring or daytime drowsiness?no    COGNITIVE SCREEN  1) Repeat 3 items (Banana, Sunrise, Chair)    2) Clock draw: NORMAL  3) 3 item recall: Recalls 2 objects   Results: NORMAL clock, 1-2 items recalled: COGNITIVE IMPAIRMENT LESS LIKELY    Mini-CogTM Copyright S Kylie. Licensed by the author for use in [facility]; reprinted with permission (santiago@.[facility]). All rights reserved.      Social History   Substance Use Topics     Smoking status: Never Smoker     Smokeless tobacco: Never Used     Alcohol use Yes      Comment:  0-2 beer's or mixed drink's monthly     The patient does not drink >3 drinks per day nor >7 drinks per week.    Today's PHQ-2 Score:   PHQ-2 ( 1999 Pfizer) 8/26/2016 10/22/2014   Q1: Little interest or pleasure in doing things 0 0   Q2: Feeling down, depressed or hopeless 0 0   PHQ-2 Score 0 0     Do you feel safe in your environment - No    Do  you have a Health Care Directive?: No: Advance care planning reviewed with patient; information given to patient to review.    Current providers sharing in care for this patient include: Patient Care Team:  Armando Finley MD as PCP - General (Family Practice)  Rheumatology.        Hearing impairment: No    Ability to successfully perform activities of daily living: Yes, no assistance needed     Fall risk:  Fallen 2 or more times in the past year?: No  Any fall with injury in the past year?: No    Home safety:  discussed    The following health maintenance items are reviewed in Epic and correct as of today:  Health Maintenance   Topic Date Due     FALL RISK ASSESSMENT  01/23/2017     PNEUMOCOCCAL (1 of 2 - PCV13) 01/23/2017     AORTIC ANEURYSM SCREENING (SYSTEM ASSIGNED)  01/23/2017     INFLUENZA VACCINE (SYSTEM ASSIGNED)  09/01/2017     ADVANCE DIRECTIVE PLANNING Q5 YRS  03/04/2018     TETANUS IMMUNIZATION (SYSTEM ASSIGNED)  04/22/2019     COLONOSCOPY Q5 YR  11/21/2021     LIPID SCREEN Q5 YR MALE (SYSTEM ASSIGNED)  10/13/2022     HEPATITIS C SCREENING  Completed     Patient Active Problem List    Diagnosis Date Noted     High risk medications (not anticoagulants) long-term use 03/09/2015     Priority: Medium     Osteoarthritis 06/09/2011     Priority: Medium     HYPERLIPIDEMIA LDL GOAL <130 10/31/2010     Priority: Medium     Chondrocalcinosis 02/10/2010     Priority: Medium     Impotence of organic origin 12/04/2006     Priority: Medium     Esophageal reflux 07/20/2005     Priority: Medium     GERD       Hypertension goal BP (blood pressure) < 140/90 07/20/2005     Priority: Medium     Psoriatic arthropathy (H) 06/14/2005     Priority: Medium     Advanced directives, counseling/discussion 03/04/2013     Priority: Low     advanced care discussed form given to pt.           FAMILY HISTORY OF DIABETES MELLITUS 12/04/2006     Priority: Low     mother       FAMILY HISTORY OF GI NEOPLASM 11/17/2005     Priority: Low  "    father - colon CA ,  at  67          Family history:  CV disease: no  Prostate cancer: no  Colon cancer: father    Multivitamin or Vit D use: see epic    Vaccines:current     Past Colon cancer screenin    ROS:  General: POSITIVE for:, weight gain, 6# in the past year.  Sometimes difficulty getting back to sleep after getting up for bathroom.   Eyes: Negative for vision changes or eye problems  ENT: POSITIVE for:, tinnitus left  Resp: No coughing, wheezing or shortness of breath  CV: No chest pains or palpitations  GI: No nausea, vomiting,  heartburn, abdominal pain, diarrhea, constipation or change in bowel habits  : POSITIVE for:, nocturia x 2, some slowed urine stream.   Musculoskeletal: POSITIVE  for:, arthritis.  Doing OK.  Some knee pain, neck pain.   Neurologic: No headaches, numbness, tingling, weakness, problems with balance or coordination  Psychiatric: No problems with anxiety, depression or mental health  Heme/immune/allergy: No history of bleeding or clotting problems or anemia.  No allergies or immune system problems  Endocrine: No history of thyroid disease, diabetes or other endocrine disorders  Skin: POSITIVE for:, skin spots, see above.     OBJECTIVE:                                                    OBJECTIVE:Blood pressure 128/78, pulse 60, temperature 97.4  F (36.3  C), temperature source Tympanic, resp. rate 16, height 5' 9\" (1.753 m), weight 202 lb (91.6 kg). BMI=Body mass index is 29.83 kg/(m^2).  GENERAL APPEARANCE ADULT: Alert, no acute distress  EYES: PERRL, EOM normal, conjunctiva and lids normal  HENT: Ears and TMs normal, oral mucosa and posterior oropharynx normal, hearing decreased bilateral with rubbing fingers.   NECK: No adenopathy,masses or thyromegaly  RESP: lungs clear to auscultation   CV: normal rate, regular rhythm, no murmur or gallop  ABDOMEN: soft, no organomegaly, masses or tenderness   (male): penis, scrotum, testes normal, no hernias, rectal exam " normal, prostate normal  MS: extremities normal, no peripheral edema  SKIN: no suspicious lesions or rashes    ASSESSMENT/PLAN:                                                    Lifestyle recommendations:regular exercise, at least 150 minutes per week (average 30 minutes 5 times a week)  weight loss recommended (ideal BMI-body mass index is <25)  always wear seat belt  The following exams/tests were recommended and discussed for health maintenance:  Colon cancer screening recommended 5-10 years.    Prostate cancer screening is optional starting at age 50 if normal risk, age 40 or 45 for high risk men (strong family history or blacks.)  Screening can include digital rectal exam and PSA blood test.     (Z00.00) Medicare annual wellness visit, initial  (primary encounter diagnosis)    (Z23) Need for prophylactic vaccination and inoculation against influenza  Comment:   Plan: FLU VACCINE, INCREASED ANTIGEN, PRESV FREE, AGE        65+ [95369], Pneumococcal vaccine 13 valent         PCV13 IM (Prevnar) [50750], ADMIN INFLUENZA         (For MEDICARE Patients ONLY) [], ADMIN         PNEUMOVAX VACCINE (For MEDICARE Patients ONLY)         []          (E78.5) Hyperlipidemia LDL goal <130  Comment: doing well  Plan: simvastatin (ZOCOR) 80 MG tablet        No change in current treatment plan.  Refills.     (I10) Essential hypertension with goal blood pressure less than 140/90  Comment: doing well.   Plan: lisinopril (PRINIVIL/ZESTRIL) 2.5 MG tablet        REfills.     (L40.50) Psoriatic arthropathy (H)  Comment: seeing Rheumatology   Plan: folic acid (FOLVITE) 1 MG tablet        follow-up with Rheumatology as planned.     (Z12.5) Screening for prostate cancer  Comment:   Plan: PSA, screen          (N40.1,  R35.1) Benign prostatic hyperplasia with nocturia  Comment: symptoms or enlarged prostate  Plan: tamsulosin (FLOMAX) 0.4 MG capsule        Check PSA again today.   Trial tamulosin daily at bedtime for a month.  If no  "difference, we could try increasing the dose in a month.     Rough raised slightly brown skin spots are seborrheic keratoses-benign, not cancer.  They can be left alone or treated to remove if desired.        End of Life Planning:  Patient currently has an advanced directive: No.  I have verified the patient's ablity to prepare an advanced directive/make health care decisions.  Literature was provided to assist patient in preparing an advanced directive.    COUNSELING:    Estimated body mass index is 29.83 kg/(m^2) as calculated from the following:    Height as of this encounter: 5' 9\" (1.753 m).    Weight as of this encounter: 202 lb (91.6 kg).  Weight management plan: Discussed healthy diet and exercise guidelines and patient will follow up in 12 months in clinic to re-evaluate.   reports that he has never smoked. He has never used smokeless tobacco.    Appropriate preventive services were discussed with this patient, including applicable screening as appropriate for cardiovascular disease, diabetes, osteopenia/osteoporosis, and glaucoma.  As appropriate for age/gender, discussed screening for colorectal cancer, prostate cancer, breast cancer, and cervical cancer. Checklist reviewing preventive services available has been given to the patient.    Reviewed patients plan of care and provided an AVS. The Basic Care Plan (routine screening as documented in Health Maintenance) for Pawan meets the Care Plan requirement. This Care Plan has been established and reviewed with the Patient.    Counseling Resources:  ATP IV Guidelines  Pooled Cohorts Equation Calculator  Breast Cancer Risk Calculator  FRAX Risk Assessment  ICSI Preventive Guidelines  Dietary Guidelines for Americans, 2010  USDA's MyPlate  ASA Prophylaxis  Lung CA Screening    Armando Finley MD  [facility] Influenza Immunization Documentation    1.  Is the person to be vaccinated sick today?   No    2. Does the person to be " vaccinated have an allergy to a component   of the vaccine?   No    3. Has the person to be vaccinated ever had a serious reaction   to influenza vaccine in the past?   No    4. Has the person to be vaccinated ever had Guillain-Barré syndrome?   No    Form completed by

## 2017-10-18 NOTE — MR AVS SNAPSHOT
After Visit Summary   10/18/2017    Pawan Bullard    MRN: [id number]           Patient Information     Date Of Birth          1950        Visit Information        Provider Department      10/18/2017 4:00 PM Armando Finley MD [facility]        Today's Diagnoses     Medicare annual wellness visit, initial    -  1    Need for prophylactic vaccination and inoculation against influenza        Hyperlipidemia LDL goal <130        Essential hypertension with goal blood pressure less than 140/90        Psoriatic arthropathy (H)        Screening for prostate cancer        Benign prostatic hyperplasia with nocturia          Care Instructions      Preventive Health Recommendations:       Male Ages 65 and over    Yearly exam:             See your health care provider every year in order to  o   Review health changes.   o   Discuss preventive care.    o   Review your medicines if your doctor has prescribed any.    Talk with your health care provider about whether you should have a test to screen for prostate cancer (PSA).    Every 3 years, have a diabetes test (fasting glucose). If you are at risk for diabetes, you should have this test more often.    Every 5 years, have a cholesterol test. Have this test more often if you are at risk for high cholesterol or heart disease.     Every 10 years, have a colonoscopy. Or, have a yearly FIT test (stool test). These exams will check for colon cancer.    Talk to with your health care provider about screening for Abdominal Aortic Aneurysm if you have a family history of AAA or have a history of smoking.  Shots:     Get a flu shot each year.     Get a tetanus shot every 10 years.     Talk to your doctor about your pneumonia vaccines. There are now two you should receive - Pneumovax (PPSV 23) and Prevnar (PCV 13).    Talk to your doctor about a shingles vaccine.     Talk to your doctor about the hepatitis B vaccine.  Nutrition:     Eat at  least 5 servings of fruits and vegetables each day.     Eat whole-grain bread, whole-wheat pasta and brown rice instead of white grains and rice.     Talk to your doctor about Calcium and Vitamin D.   Lifestyle    Exercise for at least 150 minutes a week (30 minutes a day, 5 days a week). This will help you control your weight and prevent disease.     Limit alcohol to one drink per day.     No smoking.     Wear sunscreen to prevent skin cancer.     See your dentist every six months for an exam and cleaning.     See your eye doctor every 1 to 2 years to screen for conditions such as glaucoma, macular degeneration and cataracts.    ASSESSMENT/PLAN:                                                    Lifestyle recommendations:regular exercise, at least 150 minutes per week (average 30 minutes 5 times a week)  weight loss recommended (ideal BMI-body mass index is <25)  always wear seat belt  The following exams/tests were recommended and discussed for health maintenance:  Colon cancer screening recommended 5-10 years.    Prostate cancer screening is optional starting at age 50 if normal risk, age 40 or 45 for high risk men (strong family history or blacks.)  Screening can include digital rectal exam and PSA blood test.     (Z00.00) Medicare annual wellness visit, initial  (primary encounter diagnosis)    (Z23) Need for prophylactic vaccination and inoculation against influenza  Comment:   Plan: FLU VACCINE, INCREASED ANTIGEN, PRESV FREE, AGE        65+ [44136], Pneumococcal vaccine 13 valent         PCV13 IM (Prevnar) [16611], ADMIN INFLUENZA         (For MEDICARE Patients ONLY) [], ADMIN         PNEUMOVAX VACCINE (For MEDICARE Patients ONLY)         []          (E78.5) Hyperlipidemia LDL goal <130  Comment: doing well  Plan: simvastatin (ZOCOR) 80 MG tablet        No change in current treatment plan.  Refills.     (I10) Essential hypertension with goal blood pressure less than 140/90  Comment: doing well.  "  Plan: lisinopril (PRINIVIL/ZESTRIL) 2.5 MG tablet        REfills.     (L40.50) Psoriatic arthropathy (H)  Comment: seeing Rheumatology   Plan: folic acid (FOLVITE) 1 MG tablet        follow-up with Rheumatology as planned.     (Z12.5) Screening for prostate cancer  Comment:   Plan: PSA, screen          (N40.1,  R35.1) Benign prostatic hyperplasia with nocturia  Comment: symptoms or enlarged prostate  Plan: tamsulosin (FLOMAX) 0.4 MG capsule        Check PSA again today.   Trial tamulosin daily at bedtime for a month.  If no difference, we could try increasing the dose in a month.     Rough raised slightly brown skin spots are seborrheic keratoses-benign, not cancer.  They can be left alone or treated to remove if desired.          Follow-ups after your visit        Who to contact     If you have questions or need follow up information about today's clinic visit or your schedule please contact [facility] directly at [phone].  Normal or non-critical lab and imaging results will be communicated to you by [employer], letter or phone within 4 business days after the clinic has received the results. If you do not hear from us within 7 days, please contact the clinic through [employer] or phone. If you have a critical or abnormal lab result, we will notify you by phone as soon as possible.  Submit refill requests through [employer] or call your pharmacy and they will forward the refill request to us. Please allow 3 business days for your refill to be completed.          Additional Information About Your Visit        [employer] Information     [employer] lets you send messages to your doctor, view your test results, renew your prescriptions, schedule appointments and more. To sign up, go to [email or web address] . Click on \"Log in\" on the left side of the screen, which will take you to the Welcome page. Then click on \"Sign up Now\" on the right side of the page.     You will be asked to enter the access code " "listed below, as well as some personal information. Please follow the directions to create your username and password.     Your access code is: [email or web address]  Expires: 10/29/2017  1:21 PM     Your access code will  in 90 days. If you need help or a new code, please call your [facility] clinic or [phone].        Care EveryWhere ID     This is your Care EveryWhere ID. This could be used by other organizations to access your [facility] medical records  [id number]        Your Vitals Were     Pulse Temperature Respirations Height BMI (Body Mass Index)       60 97.4  F (36.3  C) (Tympanic) 16 5' 9\" (1.753 m) 29.83 kg/m2        Blood Pressure from Last 3 Encounters:   10/18/17 128/78   17 (!) 164/91   17 136/83    Weight from Last 3 Encounters:   10/18/17 202 lb (91.6 kg)   17 200 lb (90.7 kg)   17 198 lb (89.8 kg)              We Performed the Following     ADMIN INFLUENZA (For MEDICARE Patients ONLY) []     ADMIN PNEUMOVAX VACCINE (For MEDICARE Patients ONLY) []     FLU VACCINE, INCREASED ANTIGEN, PRESV FREE, AGE 65+ [42955]     Pneumococcal vaccine 13 valent PCV13 IM (Prevnar) [71037]     PSA, screen          Today's Medication Changes          These changes are accurate as of: 10/18/17  5:08 PM.  If you have any questions, ask your nurse or doctor.               Start taking these medicines.        Dose/Directions    tamsulosin 0.4 MG capsule   Commonly known as:  FLOMAX   Used for:  Benign prostatic hyperplasia with nocturia   Started by:  Armando Finley MD        Dose:  0.4 mg   Take 1 capsule (0.4 mg) by mouth daily   Quantity:  30 capsule   Refills:  11         These medicines have changed or have updated prescriptions.        Dose/Directions    folic acid 1 MG tablet   Commonly known as:  FOLVITE   This may have changed:  See the new instructions.   Used for:  Psoriatic arthropathy (H)   Changed by:  Armando Finley MD        Dose:  1000 mcg   Take 1 tablet (1,000 " mcg) by mouth daily   Quantity:  90 tablet   Refills:  3       lisinopril 2.5 MG tablet   Commonly known as:  PRINIVIL/Zestril   This may have changed:  See the new instructions.   Used for:  Essential hypertension with goal blood pressure less than 140/90   Changed by:  Armando Finley MD        Dose:  2.5 mg   Take 1 tablet (2.5 mg) by mouth daily   Quantity:  90 tablet   Refills:  3       simvastatin 80 MG tablet   Commonly known as:  ZOCOR   This may have changed:  See the new instructions.   Used for:  Hyperlipidemia LDL goal <130   Changed by:  Armando Finley MD        Dose:  40 mg   Take 0.5 tablets (40 mg) by mouth At Bedtime   Quantity:  45 tablet   Refills:  3            Where to get your medicines      These medications were sent to [id number] - [facility] [address]  [address] [address]    Hours:  Closed 10-16-08 business to [facility] Phone:  [phone]     folic acid 1 MG tablet    lisinopril 2.5 MG tablet    simvastatin 80 MG tablet    tamsulosin 0.4 MG capsule                Primary Care Provider Office Phone # Fax #    Armando Finley [phone] [address] [facility] [address]        Equal Access to Services     NINA OLMSTEAD AH: Hadii amado ku hadasho Soomaali, waaxda luqadaha, qaybta kaalmada adeegyada, waxay idiin hayseymour gamez. So [facility] [phone].    ATENCIÓN: Si habla español, tiene a haley disposición servicios gratuitos de asistencia lingüística. Llame al [phone].    We comply with applicable federal civil rights laws and [place] laws. We do not discriminate on the basis of race, color, national origin, age, disability, sex, sexual orientation, or gender identity.            Thank you!     Thank you for choosing [facility]  for your care. Our goal is always to provide you with excellent care. Hearing back from our patients is one way we can continue to improve our  services. Please take a few minutes to complete the written survey that you may receive in the mail after your visit with us. Thank you!             Your Updated Medication List - Protect others around you: Learn how to safely use, store and throw away your medicines at www.disposemymeds.org.          This list is accurate as of: 10/18/17  5:08 PM.  Always use your most recent med list.                   Brand Name Dispense Instructions for use Diagnosis    folic acid 1 MG tablet    FOLVITE    90 tablet    Take 1 tablet (1,000 mcg) by mouth daily    Psoriatic arthropathy (H)       IBU-200 PO      Take  by mouth. 400mg as needed        lisinopril 2.5 MG tablet    PRINIVIL/Zestril    90 tablet    Take 1 tablet (2.5 mg) by mouth daily    Essential hypertension with goal blood pressure less than 140/90       methotrexate 2.5 MG tablet CHEMO     105 tablet    Take 8 tablets (20 mg) by mouth once a week Overdue for lab test monitoring while on this medication    Psoriatic arthropathy (H), High risk medications (not anticoagulants) long-term use       Multi-vitamin Tabs tablet   Generic drug:  multivitamin, therapeutic with minerals      1 tab daily        NUTRITIONAL SUPPLEMENTS PO      Super beta prostate        simvastatin 80 MG tablet    ZOCOR    45 tablet    Take 0.5 tablets (40 mg) by mouth At Bedtime    Hyperlipidemia LDL goal <130       tamsulosin 0.4 MG capsule    FLOMAX    30 capsule    Take 1 capsule (0.4 mg) by mouth daily    Benign prostatic hyperplasia with nocturia       TYLENOL ARTHRITIS PAIN 650 MG CR tablet   Generic drug:  acetaminophen     60    2 TABLETS EVERY 8 HOURS AS NEEDED

## 2017-10-18 NOTE — PATIENT INSTRUCTIONS
Preventive Health Recommendations:       Male Ages 65 and over    Yearly exam:             See your health care provider every year in order to  o   Review health changes.   o   Discuss preventive care.    o   Review your medicines if your doctor has prescribed any.    Talk with your health care provider about whether you should have a test to screen for prostate cancer (PSA).    Every 3 years, have a diabetes test (fasting glucose). If you are at risk for diabetes, you should have this test more often.    Every 5 years, have a cholesterol test. Have this test more often if you are at risk for high cholesterol or heart disease.     Every 10 years, have a colonoscopy. Or, have a yearly FIT test (stool test). These exams will check for colon cancer.    Talk to with your health care provider about screening for Abdominal Aortic Aneurysm if you have a family history of AAA or have a history of smoking.  Shots:     Get a flu shot each year.     Get a tetanus shot every 10 years.     Talk to your doctor about your pneumonia vaccines. There are now two you should receive - Pneumovax (PPSV 23) and Prevnar (PCV 13).    Talk to your doctor about a shingles vaccine.     Talk to your doctor about the hepatitis B vaccine.  Nutrition:     Eat at least 5 servings of fruits and vegetables each day.     Eat whole-grain bread, whole-wheat pasta and brown rice instead of white grains and rice.     Talk to your doctor about Calcium and Vitamin D.   Lifestyle    Exercise for at least 150 minutes a week (30 minutes a day, 5 days a week). This will help you control your weight and prevent disease.     Limit alcohol to one drink per day.     No smoking.     Wear sunscreen to prevent skin cancer.     See your dentist every six months for an exam and cleaning.     See your eye doctor every 1 to 2 years to screen for conditions such as glaucoma, macular degeneration and cataracts.    ASSESSMENT/PLAN:                                                     Lifestyle recommendations:regular exercise, at least 150 minutes per week (average 30 minutes 5 times a week)  weight loss recommended (ideal BMI-body mass index is <25)  always wear seat belt  The following exams/tests were recommended and discussed for health maintenance:  Colon cancer screening recommended 5-10 years.    Prostate cancer screening is optional starting at age 50 if normal risk, age 40 or 45 for high risk men (strong family history or blacks.)  Screening can include digital rectal exam and PSA blood test.     (Z00.00) Medicare annual wellness visit, initial  (primary encounter diagnosis)    (Z23) Need for prophylactic vaccination and inoculation against influenza  Comment:   Plan: FLU VACCINE, INCREASED ANTIGEN, PRESV FREE, AGE        65+ [92303], Pneumococcal vaccine 13 valent         PCV13 IM (Prevnar) [52066], ADMIN INFLUENZA         (For MEDICARE Patients ONLY) [], ADMIN         PNEUMOVAX VACCINE (For MEDICARE Patients ONLY)         []          (E78.5) Hyperlipidemia LDL goal <130  Comment: doing well  Plan: simvastatin (ZOCOR) 80 MG tablet        No change in current treatment plan.  Refills.     (I10) Essential hypertension with goal blood pressure less than 140/90  Comment: doing well.   Plan: lisinopril (PRINIVIL/ZESTRIL) 2.5 MG tablet        REfills.     (L40.50) Psoriatic arthropathy (H)  Comment: seeing Rheumatology   Plan: folic acid (FOLVITE) 1 MG tablet        follow-up with Rheumatology as planned.     (Z12.5) Screening for prostate cancer  Comment:   Plan: PSA, screen          (N40.1,  R35.1) Benign prostatic hyperplasia with nocturia  Comment: symptoms or enlarged prostate  Plan: tamsulosin (FLOMAX) 0.4 MG capsule        Check PSA again today.   Trial tamulosin daily at bedtime for a month.  If no difference, we could try increasing the dose in a month.     Rough raised slightly brown skin spots are seborrheic keratoses-benign, not cancer.  They can be left alone  or treated to remove if desired.

## 2017-10-19 LAB — PSA SERPL-ACNC: 1.16 UG/L (ref 0–4)

## 2017-12-06 ENCOUNTER — HOSPITAL ENCOUNTER (OUTPATIENT)
Dept: ULTRASOUND IMAGING | Facility: CLINIC | Age: 67
Discharge: HOME | End: 2017-12-06
Attending: FAMILY MEDICINE | Admitting: FAMILY MEDICINE
Payer: MEDICARE

## 2017-12-06 ENCOUNTER — OFFICE VISIT (OUTPATIENT)
Dept: FAMILY MEDICINE | Facility: CLINIC | Age: 67
End: 2017-12-06
Payer: COMMERCIAL

## 2017-12-06 VITALS — RESPIRATION RATE: 16 BRPM | BODY MASS INDEX: 31.57 KG/M2 | WEIGHT: 213.8 LBS

## 2017-12-06 DIAGNOSIS — M79.604 PAIN OF RIGHT LOWER EXTREMITY: ICD-10-CM

## 2017-12-06 DIAGNOSIS — M79.604 PAIN OF RIGHT LOWER EXTREMITY: Primary | ICD-10-CM

## 2017-12-06 PROCEDURE — 93971 EXTREMITY STUDY: CPT | Mod: RT

## 2017-12-06 PROCEDURE — 99214 OFFICE O/P EST MOD 30 MIN: CPT | Performed by: FAMILY MEDICINE

## 2017-12-06 NOTE — MR AVS SNAPSHOT
After Visit Summary   12/6/2017    Pawan Bullard    MRN: [id number]           Patient Information     Date Of Birth          1950        Visit Information        Provider Department      12/6/2017 1:40 PM Armando Hooks MD [facility]        Today's Diagnoses     Pain of right lower extremity    -  1       Follow-ups after your visit        Your next 10 appointments already scheduled     Dec 06, 2017  2:40 PM CST   US LOWER EXTREMITY VENOUS DUPLEX BILATERAL with [id number]   [facility] ([facility])    [address] [id number] [phone]           Please bring a list of your medicines (including vitamins, minerals and over-the-counter drugs). Also, tell your doctor about any allergies you may have. Wear comfortable clothes and leave your valuables at home.  You do not need to do anything special to prepare for your exam.  Please call the Imaging Department at your exam site with any questions.              Future tests that were ordered for you today     Open Future Orders        Priority Expected Expires Ordered    US Lower Extremity Venous Duplex Bilateral STAT  12/6/2018 12/6/2017            Who to contact     If you have questions or need follow up information about today's clinic visit or your schedule please contact [facility] directly at [phone].  Normal or non-critical lab and imaging results will be communicated to you by MyChart, letter or phone within 4 business days after the clinic has received the results. If you do not hear from us within 7 days, please contact the clinic through MyChart or phone. If you have a critical or abnormal lab result, we will notify you by phone as soon as possible.  Submit refill requests through [facility] or call your pharmacy and they will forward the refill request to us. Please allow 3 business days for your refill to be completed.           "Additional Information About Your Visit        MyChart Information     NOZA lets you send messages to your doctor, view your test results, renew your prescriptions, schedule appointments and more. To sign up, go to [email or web address] . Click on \"Log in\" on the left side of the screen, which will take you to the Welcome page. Then click on \"Sign up Now\" on the right side of the page.     You will be asked to enter the access code listed below, as well as some personal information. Please follow the directions to create your username and password.     Your access code is: [email or web address]  Expires: 3/6/2018  2:11 PM     Your access code will  in 90 days. If you need help or a new code, please call your [facility] clinic or [phone].        Care EveryWhere ID     This is your Care EveryWhere ID. This could be used by other organizations to access your [facility] medical records  [id number]        Your Vitals Were     Respirations BMI (Body Mass Index)                16 31.57 kg/m2           Blood Pressure from Last 3 Encounters:   10/18/17 128/78   17 (!) 164/91   17 136/83    Weight from Last 3 Encounters:   17 213 lb 12.8 oz (97 kg)   10/18/17 202 lb (91.6 kg)   17 200 lb (90.7 kg)                 Today's Medication Changes          These changes are accurate as of: 17  2:11 PM.  If you have any questions, ask your nurse or doctor.               Stop taking these medicines if you haven't already. Please contact your care team if you have questions.     NUTRITIONAL SUPPLEMENTS PO   Stopped by:  Armando Hooks MD                    Primary Care Provider Office Phone # Fax #    Armando Finley [phone] [address] [place] [address]        Equal Access to Services     [facility] : Chanda David, wajoaquin daviesqrut, qaybta kathy randall. [facility] [phone].    ATENCIÓN: " Si habla ulysses, tiene a haley disposición servicios gratuitos de asistencia lingüística. Hilario bonilla [phone].    We comply with applicable federal civil rights laws and [place] laws. We do not discriminate on the basis of race, color, national origin, age, disability, sex, sexual orientation, or gender identity.            Thank you!     Thank you for choosing [facility]  for your care. Our goal is always to provide you with excellent care. Hearing back from our patients is one way we can continue to improve our services. Please take a few minutes to complete the written survey that you may receive in the mail after your visit with us. Thank you!             Your Updated Medication List - Protect others around you: Learn how to safely use, store and throw away your medicines at www.disposemymeds.org.          This list is accurate as of: 12/6/17  2:11 PM.  Always use your most recent med list.                   Brand Name Dispense Instructions for use Diagnosis    folic acid 1 MG tablet    FOLVITE    90 tablet    Take 1 tablet (1,000 mcg) by mouth daily    Psoriatic arthropathy (H)       IBU-200 PO      Take  by mouth. 400mg as needed        lisinopril 2.5 MG tablet    PRINIVIL/Zestril    90 tablet    Take 1 tablet (2.5 mg) by mouth daily    Essential hypertension with goal blood pressure less than 140/90       methotrexate 2.5 MG tablet CHEMO     105 tablet    Take 8 tablets (20 mg) by mouth once a week Overdue for lab test monitoring while on this medication    Psoriatic arthropathy (H), High risk medications (not anticoagulants) long-term use       Multi-vitamin Tabs tablet   Generic drug:  multivitamin, therapeutic with minerals      1 tab daily        simvastatin 80 MG tablet    ZOCOR    45 tablet    Take 0.5 tablets (40 mg) by mouth At Bedtime    Hyperlipidemia LDL goal <130       tamsulosin 0.4 MG capsule    FLOMAX    30 capsule    Take 1 capsule (0.4 mg) by mouth daily    Benign  prostatic hyperplasia with nocturia       TYLENOL ARTHRITIS PAIN 650 MG CR tablet   Generic drug:  acetaminophen     60    2 TABLETS EVERY 8 HOURS AS NEEDED

## 2017-12-06 NOTE — PROGRESS NOTES
SUBJECTIVE:   Pawan Bullard is a 67 year old male who presents to clinic today for the following health issues:  Comes with calf pain on the right side.  Thinks it might be swollen.  No history of either trauma or DVT.     Musculoskeletal problem/pain      Duration: Has been ongoing since 12/4/17    Description  Location: Right knee and right calf of leg    Intensity:  moderate    Accompanying signs and symptoms: none    History  Previous similar problem: no   Previous evaluation:  none    Precipitating or alleviating factors:  Trauma or overuse: no   Aggravating factors include: Sitting for long periods then getting up to walk    Therapies tried and outcome: ice and tylenol arthritis            Problem list and histories reviewed & adjusted, as indicated.  Additional history: as documented    Patient Active Problem List   Diagnosis     Psoriatic arthropathy (H)     Esophageal reflux     Hypertension goal BP (blood pressure) < 140/90     FAMILY HISTORY OF GI NEOPLASM     FAMILY HISTORY OF DIABETES MELLITUS     Impotence of organic origin     Chondrocalcinosis     HYPERLIPIDEMIA LDL GOAL <130     Osteoarthritis     Advanced directives, counseling/discussion     High risk medications (not anticoagulants) long-term use     Past Surgical History:   Procedure Laterality Date     ARTHROSCOPY KNEE RT/LT  April 2009    left knee     ARTHROSCOPY KNEE WITH MEDIAL MENISCECTOMY  7/9/2013    Procedure: ARTHROSCOPY KNEE WITH MEDIAL MENISCECTOMY;  Left Knee Arthroscopic and Open Repair of Patellar Tendon with Platelet Rich Plasma;  Surgeon: Ley, Jeffrey Duane, MD;  Location: [facility]     COLONOSCOPY  Jan. 2006    diverticuli. Father had colon CA     COLONOSCOPY  2001     COLONOSCOPY  4/11/2011    Procedure:COLONOSCOPY; Surgeon:JENNIFER JEAN; Location:[facility]     COLONOSCOPY N/A 11/21/2016    Procedure: COLONOSCOPY;  Surgeon: Elias Santamaria MD;  Location: [facility]     REPAIR TENDON PATELLA  7/9/2013    Procedure: REPAIR  TENDON PATELLA;;  Surgeon: Ley, Jeffrey Duane, MD;  Location: [facility]     SURGICAL HISTORY OF -   1999    (L) Herniorrhaphy     SURGICAL HISTORY OF -   2003    (R) Herniorrhaphy     SURGICAL HISTORY OF -   1981    Hemorrohid       Social History   Substance Use Topics     Smoking status: Never Smoker     Smokeless tobacco: Never Used     Alcohol use Yes      Comment:  0-2 beer's or mixed drink's monthly     Family History   Problem Relation Age of Onset     Hypertension Mother      DIABETES Mother      CEREBROVASCULAR DISEASE Mother      in her 70s     Arthritis Mother      Cancer - colorectal Father 67     Arthritis Paternal Grandmother      Prostate Cancer No family hx of      Coronary Artery Disease No family hx of          Current Outpatient Prescriptions   Medication Sig Dispense Refill     simvastatin (ZOCOR) 80 MG tablet Take 0.5 tablets (40 mg) by mouth At Bedtime 45 tablet 3     lisinopril (PRINIVIL/ZESTRIL) 2.5 MG tablet Take 1 tablet (2.5 mg) by mouth daily 90 tablet 3     folic acid (FOLVITE) 1 MG tablet Take 1 tablet (1,000 mcg) by mouth daily 90 tablet 3     tamsulosin (FLOMAX) 0.4 MG capsule Take 1 capsule (0.4 mg) by mouth daily 30 capsule 11     methotrexate 2.5 MG tablet CHEMO Take 8 tablets (20 mg) by mouth once a week Overdue for lab test monitoring while on this medication 105 tablet 1     Ibuprofen (IBU-200 PO) Take  by mouth. 400mg as needed       MULTI-VITAMIN OR TABS 1 tab daily       TYLENOL ARTHRITIS PAIN 650 MG OR TBCR 2 TABLETS EVERY 8 HOURS AS NEEDED 60 0         Reviewed and updated as needed this visit by clinical staffTobacco  Allergies  Meds  Med Hx  Surg Hx  Fam Hx  Soc Hx      Reviewed and updated as needed this visit by Provider         ROS:  C: NEGATIVE for fever, chills, change in weight  E/M: NEGATIVE for ear, mouth and throat problems  R: NEGATIVE for significant cough or SOB  CV: NEGATIVE for chest pain, palpitations or peripheral edema    OBJECTIVE:     Resp 16  Wt 213  lb 12.8 oz (97 kg)  BMI 31.57 kg/m2  Body mass index is 31.57 kg/(m^2).  GENERAL: healthy, alert and no distress  NECK: no adenopathy, no asymmetry, masses, or scars and thyroid normal to palpation  RESP: lungs clear to auscultation - no rales, rhonchi or wheezes  CV: regular rate and rhythm, normal S1 S2, no S3 or S4, no murmur, click or rub, no peripheral edema and peripheral pulses strong  ABDOMEN: soft, nontender, no hepatosplenomegaly, no masses and bowel sounds normal  MS: RIGHT LEG IS 1 CM LARGER AND HAS SOME DEEP TENDERNESS OVER MID CALF.          ASSESSMENT/PLAN:             1. Pain of right lower extremity  RULE OUT DVT.   - US Lower Extremity Venous Duplex Bilateral; Future    PLAN: DUPLEX ULTRASOUND AND CALL.     Armando Hooks MD  [facility]

## 2017-12-06 NOTE — NURSING NOTE
"Chief Complaint   Patient presents with     Musculoskeletal Problem     Leg pain started 12/4/17, getting worse with time. right calf area, and the knee       Initial Resp 16  Wt 213 lb 12.8 oz (97 kg)  BMI 31.57 kg/m2 Estimated body mass index is 31.57 kg/(m^2) as calculated from the following:    Height as of 10/18/17: 5' 9\" (1.753 m).    Weight as of this encounter: 213 lb 12.8 oz (97 kg).  Medication Reconciliation: complete    "

## 2018-01-05 DIAGNOSIS — L40.50 PSORIATIC ARTHROPATHY (H): ICD-10-CM

## 2018-01-05 LAB
ALBUMIN SERPL-MCNC: 4.1 G/DL (ref 3.4–5)
ALP SERPL-CCNC: 55 U/L (ref 40–150)
ALT SERPL W P-5'-P-CCNC: 41 U/L (ref 0–70)
ANION GAP SERPL CALCULATED.3IONS-SCNC: 6 MMOL/L (ref 3–14)
AST SERPL W P-5'-P-CCNC: 35 U/L (ref 0–45)
BASOPHILS # BLD AUTO: 0 10E9/L (ref 0–0.2)
BASOPHILS NFR BLD AUTO: 0.2 %
BILIRUB SERPL-MCNC: 0.9 MG/DL (ref 0.2–1.3)
BUN SERPL-MCNC: 13 MG/DL (ref 7–30)
CALCIUM SERPL-MCNC: 9.3 MG/DL (ref 8.5–10.1)
CHLORIDE SERPL-SCNC: 103 MMOL/L (ref 94–109)
CO2 SERPL-SCNC: 26 MMOL/L (ref 20–32)
CREAT SERPL-MCNC: 0.92 MG/DL (ref 0.66–1.25)
DIFFERENTIAL METHOD BLD: ABNORMAL
EOSINOPHIL # BLD AUTO: 0.1 10E9/L (ref 0–0.7)
EOSINOPHIL NFR BLD AUTO: 1 %
ERYTHROCYTE [DISTWIDTH] IN BLOOD BY AUTOMATED COUNT: 13.1 % (ref 10–15)
GFR SERPL CREATININE-BSD FRML MDRD: 82 ML/MIN/1.7M2
GLUCOSE SERPL-MCNC: 94 MG/DL (ref 70–99)
HCT VFR BLD AUTO: 44.9 % (ref 40–53)
HGB BLD-MCNC: 15.7 G/DL (ref 13.3–17.7)
LYMPHOCYTES # BLD AUTO: 1.1 10E9/L (ref 0.8–5.3)
LYMPHOCYTES NFR BLD AUTO: 21 %
MCH RBC QN AUTO: 33.8 PG (ref 26.5–33)
MCHC RBC AUTO-ENTMCNC: 35 G/DL (ref 31.5–36.5)
MCV RBC AUTO: 97 FL (ref 78–100)
MONOCYTES # BLD AUTO: 0.4 10E9/L (ref 0–1.3)
MONOCYTES NFR BLD AUTO: 7.2 %
NEUTROPHILS # BLD AUTO: 3.5 10E9/L (ref 1.6–8.3)
NEUTROPHILS NFR BLD AUTO: 70.6 %
PLATELET # BLD AUTO: 150 10E9/L (ref 150–450)
POTASSIUM SERPL-SCNC: 4.2 MMOL/L (ref 3.4–5.3)
PROT SERPL-MCNC: 7.1 G/DL (ref 6.8–8.8)
RBC # BLD AUTO: 4.65 10E12/L (ref 4.4–5.9)
SODIUM SERPL-SCNC: 135 MMOL/L (ref 133–144)
WBC # BLD AUTO: 5 10E9/L (ref 4–11)

## 2018-01-05 PROCEDURE — 80053 COMPREHEN METABOLIC PANEL: CPT | Performed by: INTERNAL MEDICINE

## 2018-01-05 PROCEDURE — 36415 COLL VENOUS BLD VENIPUNCTURE: CPT | Performed by: INTERNAL MEDICINE

## 2018-01-05 PROCEDURE — 85025 COMPLETE CBC W/AUTO DIFF WBC: CPT | Performed by: INTERNAL MEDICINE

## 2018-01-08 DIAGNOSIS — Z79.899 HIGH RISK MEDICATIONS (NOT ANTICOAGULANTS) LONG-TERM USE: ICD-10-CM

## 2018-01-08 DIAGNOSIS — L40.50 PSORIATIC ARTHROPATHY (H): ICD-10-CM

## 2018-01-08 NOTE — TELEPHONE ENCOUNTER
Spoke with patient notified of medication refill and directions to the clinic.   Roxy Chambers CMA 1/8/2018 1:32 PM

## 2018-01-08 NOTE — TELEPHONE ENCOUNTER
Rheumatology team: Please call to notify Mr. Bullard that methotrexate has been refilled.     Adria Lopez MD  1/8/2018 12:39 PM

## 2018-01-08 NOTE — PROGRESS NOTES
Rheumatology team: Please call to notify Mr. Bullard that his labs were normal.  Also notify him that Dr. Mccord is no longer at [facility] and that if he would like he can schedule f/u with me or another rheumatologist for continued care.  If f/u with me, okay to schedule a 20 minute appointment.      Adria Lopez MD  1/7/2018 11:04 PM

## 2018-01-22 ENCOUNTER — OFFICE VISIT (OUTPATIENT)
Dept: RHEUMATOLOGY | Facility: CLINIC | Age: 68
End: 2018-01-22
Payer: COMMERCIAL

## 2018-01-22 VITALS
BODY MASS INDEX: 30.57 KG/M2 | HEART RATE: 73 BPM | HEIGHT: 69 IN | OXYGEN SATURATION: 97 % | DIASTOLIC BLOOD PRESSURE: 86 MMHG | SYSTOLIC BLOOD PRESSURE: 142 MMHG | WEIGHT: 206.4 LBS

## 2018-01-22 DIAGNOSIS — Z79.899 HIGH RISK MEDICATION USE: ICD-10-CM

## 2018-01-22 DIAGNOSIS — Z23 NEED FOR PROPHYLACTIC VACCINATION AGAINST STREPTOCOCCUS PNEUMONIAE (PNEUMOCOCCUS): ICD-10-CM

## 2018-01-22 DIAGNOSIS — L40.50 PSORIATIC ARTHROPATHY (H): Primary | ICD-10-CM

## 2018-01-22 PROCEDURE — 99213 OFFICE O/P EST LOW 20 MIN: CPT | Mod: 25 | Performed by: INTERNAL MEDICINE

## 2018-01-22 PROCEDURE — 90732 PPSV23 VACC 2 YRS+ SUBQ/IM: CPT | Performed by: INTERNAL MEDICINE

## 2018-01-22 PROCEDURE — G0009 ADMIN PNEUMOCOCCAL VACCINE: HCPCS | Performed by: INTERNAL MEDICINE

## 2018-01-22 RX ORDER — FOLIC ACID 1 MG/1
1 TABLET ORAL DAILY
Qty: 100 TABLET | Refills: 3 | Status: SHIPPED | OUTPATIENT
Start: 2018-01-22 | End: 2018-11-26

## 2018-01-22 RX ORDER — METHOTREXATE 2.5 MG/1
17.5 TABLET ORAL WEEKLY
Qty: 84 TABLET | Refills: 0 | Status: SHIPPED | OUTPATIENT
Start: 2018-01-22 | End: 2018-05-21

## 2018-01-22 NOTE — NURSING NOTE
Blood pressure rechecked after visit      142/86    Screening Questionnaire for Adult Immunization    Are you sick today?   No   Do you have allergies to medications, food, a vaccine component or latex?   No   Have you ever had a serious reaction after receiving a vaccination?   No   Do you have a long-term health problem with heart disease, lung disease, asthma, kidney disease, metabolic disease (e.g. diabetes), anemia, or other blood disorder?   No   Do you have cancer, leukemia, HIV/AIDS, or any other immune system problem?   No   In the past 3 months, have you taken medications that affect  your immune system, such as prednisone, other steroids, or anticancer drugs; drugs for the treatment of rheumatoid arthritis, Crohn s disease, or psoriasis; or have you had radiation treatments?   No   Have you had a seizure, or a brain or other nervous system problem?   No   During the past year, have you received a transfusion of blood or blood     products, or been given immune (gamma) globulin or antiviral drug?   No   For women: Are you pregnant or is there a chance you could become        pregnant during the next month?   No   Have you received any vaccinations in the past 4 weeks?   No     Immunization questionnaire answers were all negative.        Per orders of Dr. Lopez, injection of Pneumovax 23 given by Francie Cuellar. Patient instructed to remain in clinic for 15 minutes afterwards, and to report any adverse reaction to me immediately.       Screening performed by Francie Cuellar on 1/22/2018 at 4:24 PM.

## 2018-01-22 NOTE — PROGRESS NOTES
Rheumatology Clinic Visit      Pawan Bullard MRN# [id number]   YOB: 1950 Age: 67 year old      Date of visit: 1/22/18   PCP: Dr. Armando Finley    Chief Complaint   Patient presents with:  Consult: Patient states stiffness in hands, sore in knees      Assessment and Plan     1.  Psoriatic arthritis: Previously doing well with methotrexate 15 mg once weekly but now having more pain consistent with active inflammatory arthritis of the PIPs.  Therefore, increase methotrexate to 17.5mg once weekly.  - Increase methotrexate to 17.5 mg once weekly  - Continue folic acid 1 mg daily  - Labs 2-3 days prior to the next rheumatology clinic visit: CBC, Creatinine, Hepatic Panel, ESR, CRP, Hepatitis B core ab and surface ag    # Methotrexate Risks and Benefits: The risks and benefits of methotrexate were discussed in detail and the patient verbalized understanding.  The risks discussed include, but are not limited to, the risk for hypersensitivity, anaphylaxis, anaphylactoid reactions, infections, bone marrow suppression, renal toxicity, hepatotoxicity, pulmonary toxicity, malignancy, impaired fertility, GI upset, alopecia, and oral and nasal sores.  Folic acid supplementation is recommended during methotrexate therapy to help prevent some of the side effects. Pregnancy prevention and planning was discussed; it is recommended that women of childbearing potential use reliable contraception during therapy.  The risks of taking both methotrexate and alcohol were reviewed; complete alcohol avoidance was discussed.  Routine laboratory monitoring is required during methotrexate therapy. Taking MTX once weekly, all within a 24 hour period was stressed and the patient verbalized this instruction back to me.  I encouraged reviewing the package insert and asking any questions about the medication.    2. Vaccinations: Vaccinations reviewed with Mr. Bullard.  Risks and benefits of vaccinations were discussed.  -  [id number]: will receive today    Mr. Bullard verbalized agreement with and understanding of the rational for the diagnosis and treatment plan.  All questions were answered to best of my ability and the patient's satisfaction. Mr. Bullard was advised to contact the clinic with any questions that may arise after the clinic visit.      Thank you for involving me in the care of the patient    Return to clinic: 3 months      HPI   Pawan Bullard is a 67 year old male with a past medical history significant for hypertension, hyperlipidemia, chondrocalcinosis, GERD, osteoarthritis, and psoriatic arthritis who presents for follow-up of psoriatic arthritis.  Note that this is his first clinic visit with me.    1/30/2017 rheumatology clinic note by Dr. Mccord documents psoriatic arthritis and osteoarthritis of the knees.  Using methotrexate 15-20 mg once weekly.    Today, Mr. Bullard reports that he is doing okay.  Still with some pain in his fingers though.  Using methotrexate 6 tablets once weekly.  Pain in his fingers is primarily in the PIPs.  Pain is worse in the morning and improves with activity.  Morning stiffness for at least one hour that improves with activity.  Positive gelling phenomenon.    Denies fevers, chills, nausea, vomiting, constipation, diarrhea. No abdominal pain. No chest pain/pressure, palpitations, or shortness of breath. No LE swelling. No neck pain. No oral or nasal sores.  No sicca symptoms.  No history of inflammatory eye disease.  No history of inflammatory bowel disease.       Tobacco: None  EtOH: 0-2 beers or mixed drinks monthly  Drugs: None    ROS   GEN: No fevers, chills, night sweats, or weight change  SKIN: No itching, rashes, sores  HEENT: No oral or nasal ulcers.  CV: No chest pain, pressure, palpitations, or dyspnea on exertion.  PULM: No SOB, wheeze, cough.  GI: No nausea, vomiting, constipation, diarrhea. No blood in stool. No abdominal pain.  : No blood in urine.  MSK: See  HPI.  NEURO: No numbness, tingling, or weakness.  EXT: No LE swelling  PSYCH: Negative    Active Problem List     Patient Active Problem List   Diagnosis     Psoriatic arthropathy (H)     Esophageal reflux     Hypertension goal BP (blood pressure) < 140/90     FAMILY HISTORY OF GI NEOPLASM     FAMILY HISTORY OF DIABETES MELLITUS     Impotence of organic origin     Chondrocalcinosis     HYPERLIPIDEMIA LDL GOAL <130     Osteoarthritis     Advanced directives, counseling/discussion     High risk medications (not anticoagulants) long-term use     Past Medical History     Past Medical History:   Diagnosis Date     Esophageal reflux     GERD     Hypertension      Psoriatic arthropathy (H)     Psoriatic arthritis     Past Surgical History     Past Surgical History:   Procedure Laterality Date     ARTHROSCOPY KNEE RT/LT  April 2009    left knee     ARTHROSCOPY KNEE WITH MEDIAL MENISCECTOMY  7/9/2013    Procedure: ARTHROSCOPY KNEE WITH MEDIAL MENISCECTOMY;  Left Knee Arthroscopic and Open Repair of Patellar Tendon with Platelet Rich Plasma;  Surgeon: Ley, Jeffrey Duane, MD;  Location: [facility]     COLONOSCOPY  Jan. 2006    diverticuli. Father had colon CA     COLONOSCOPY  2001     COLONOSCOPY  4/11/2011    Procedure:COLONOSCOPY; Surgeon:JENNIFER JEAN; Location:[facility]     COLONOSCOPY N/A 11/21/2016    Procedure: COLONOSCOPY;  Surgeon: Elias Santamaria MD;  Location: [facility]     REPAIR TENDON PATELLA  7/9/2013    Procedure: REPAIR TENDON PATELLA;;  Surgeon: Ley, Jeffrey Duane, MD;  Location: [facility]     SURGICAL HISTORY OF -   1999    (L) Herniorrhaphy     SURGICAL HISTORY OF -   2003    (R) Herniorrhaphy     SURGICAL HISTORY OF -   1981    Hemorrohid     Allergy     Allergies   Allergen Reactions     Acetaminophen W/Codeine Nausea     Current Medication List     Current Outpatient Prescriptions   Medication Sig     methotrexate 2.5 MG tablet CHEMO Take 8 tablets (20 mg) by mouth once a week     simvastatin (ZOCOR) 80 MG tablet  "Take 0.5 tablets (40 mg) by mouth At Bedtime     lisinopril (PRINIVIL/ZESTRIL) 2.5 MG tablet Take 1 tablet (2.5 mg) by mouth daily     folic acid (FOLVITE) 1 MG tablet Take 1 tablet (1,000 mcg) by mouth daily     tamsulosin (FLOMAX) 0.4 MG capsule Take 1 capsule (0.4 mg) by mouth daily     Ibuprofen (IBU-200 PO) Take  by mouth. 400mg as needed     MULTI-VITAMIN OR TABS 1 tab daily     TYLENOL ARTHRITIS PAIN 650 MG OR TBCR 2 TABLETS EVERY 8 HOURS AS NEEDED     No current facility-administered medications for this visit.          Social History   See HPI    Family History     Family History   Problem Relation Age of Onset     Hypertension Mother      DIABETES Mother      CEREBROVASCULAR DISEASE Mother      in her 70s     Arthritis Mother      Cancer - colorectal Father 67     Arthritis Paternal Grandmother      Prostate Cancer No family hx of      Coronary Artery Disease No family hx of        Physical Exam     Temp Readings from Last 3 Encounters:   10/18/17 97.4  F (36.3  C) (Tympanic)   07/31/17 98  F (36.7  C) (Temporal)   02/13/17 100.6  F (38.1  C) (Oral)     BP Readings from Last 5 Encounters:   01/22/18 142/87   10/18/17 128/78   07/31/17 (!) 164/91   02/13/17 136/83   01/30/17 135/82     Pulse Readings from Last 1 Encounters:   01/22/18 73     Resp Readings from Last 1 Encounters:   12/06/17 16     Estimated body mass index is 30.48 kg/(m^2) as calculated from the following:    Height as of this encounter: 1.753 m (5' 9\").    Weight as of this encounter: 93.6 kg (206 lb 6.4 oz).    GEN: NAD  HEENT: MMM. No oral lesions.  Anicteric, noninjected sclera  CV: S1, S2. RRR. No m/r/g.  PULM: CTA bilaterally. No w/c.  ABD: +BS.  Tenderness palpation of the  MSK: Right second-fourth PIPs and left second PIP that were without swelling.  Wrists, elbows, shoulders, knees, ankles, and MTPs without swelling or tenderness to palpation.  No dactylitis.  Hips nontender to direct palpation.   NEURO: UE and LE strengths 5/5 and " equal bilaterally.   SKIN: No rash  EXT: No LE edema  PSYCH: Alert. Appropriate.    Labs / Imaging (select studies)     CBC  Recent Labs   Lab Test  01/05/18   1252  09/06/17   1119  05/10/17   1245   WBC  5.0  5.4  5.7   RBC  4.65  4.65  4.48   HGB  15.7  15.4  15.7   HCT  44.9  44.2  43.3   MCV  97  95  97   RDW  13.1  12.6  13.4   PLT  150  156  154   MCH  33.8*  33.1*  35.0*   MCHC  35.0  34.8  36.3   NEUTROPHIL  70.6  55.7  66.6   LYMPH  21.0  34.3  23.5   MONOCYTE  7.2  7.6  7.7   EOSINOPHIL  1.0  2.2  1.8   BASOPHIL  0.2  0.2  0.4   ANEU  3.5  3.0  3.8   ALYM  1.1  1.8  1.3   ERENDIRA  0.4  0.4  0.4   AEOS  0.1  0.1  0.1   ABAS  0.0  0.0  0.0     CMP  Recent Labs   Lab Test  01/05/18   1252  09/06/17   1119  05/10/17   1245  01/30/17   0911  08/26/16   1213   10/22/14   1108   NA  135  138  138  137  139   --   139   POTASSIUM  4.2  4.4  3.9  3.9  4.3   --   4.4   CHLORIDE  103  103  102  101  105   --   106   CO2  26  32  26  29  28   --   30   ANIONGAP  6  3  10  7  6   --   3   GLC  94  96  117*  90  93   --   81   BUN  13  12  16  13  12   --   18   CR  0.92  1.01  1.00  0.95  0.97   < >  1.00   GFRESTIMATED  82  74  75  79  78   < >  76   GFRESTBLACK  >90  90  >90  African American GFR Calc    >90   GFR Calc    >90   GFR Calc     < >  >90   GFR Calc     JOHN  9.3  9.2  8.9  9.3  9.0   --   9.0   BILITOTAL  0.9  1.0  1.0  0.8   --    < >   --    ALBUMIN  4.1  4.0  3.9  3.9   --    < >   --    PROTTOTAL  7.1  7.0  7.0  6.8   --    < >   --    ALKPHOS  55  55  56  56   --    < >   --    AST  35  30  25  25   --    < >   --    ALT  41  46  37  33  31   < >   --     < > = values in this interval not displayed.     Calcium/VitaminD  Recent Labs   Lab Test  01/05/18   1252  09/06/17   1119  05/10/17   1245   JOHN  9.3  9.2  8.9     ESR/CRP  Recent Labs   Lab Test  06/11/15   1220   SED  4   CRP  <2.9     Lipid Panel  Recent Labs   Lab Test  10/13/17   0809  08/26/16    1213  05/29/15   1204  04/29/14   1102  06/30/12   0849   CHOL  183  182  168  175  182   TRIG  167*  185*  293*  165*  117   HDL  59  55  54  47  62   LDL  91  90  55  95  96   VLDL   --    --   59*  33*  23   CHOLHDLRATIO   --    --   3.1  4.0  3.0   NHDL  124  127   --    --    --      Hepatitis C  Recent Labs   Lab Test  08/26/16   1213   HCVAB  Nonreactive   Assay performance characteristics have not been established for newborns,   infants, and children       Immunization History     Immunization History   Administered Date(s) Administered     Influenza (High Dose) 3 valent vaccine 10/18/2017     Influenza (IIV3) PF 11/17/2005, 12/04/2006, 11/22/2010, 09/26/2012, 10/01/2014     Influenza Vaccine IM 3yrs+ 4 Valent IIV4 10/15/2013, 12/17/2015, 08/26/2016     Pneumo Conj 13-V (2010&after) 10/18/2017     Pneumococcal 23 valent 01/22/2018     TDAP Vaccine (Adacel) 04/22/2009          Chart documentation done in part with Dragon Voice recognition Software. Although reviewed after completion, some word and grammatical error may remain.    Adria Lopez MD

## 2018-01-22 NOTE — MR AVS SNAPSHOT
After Visit Summary   2018    Pawan Bullard    MRN: [id number]           Patient Information     Date Of Birth          1950        Visit Information        Provider Department      2018 3:40 PM Adria Lopez MD [facility] [place]        Today's Diagnoses     Psoriatic arthropathy (H)    -  1    High risk medication use          Care Instructions    Dr. Lopez s Rheumatology Clinics  Locations Clinic Hours Telephone Number     [facility]  [address]  [place] [address]     Wednesday: 7:20AM - 4:00PM  Thursday:     7:20AM - 4:00PM     Friday:          7:20AM - 11:00AM       To schedule an appointment with  Dr. Lopez,  please contact  Specialty [phone]       [facility] [place]  [address]  [place] [address]       Monday:       7:20AM - 4:00PM        [facility]  [address] [place] [address]       Tuesday:      7:20AM - 4:00PM          Thank you!    Francie Cuellar CMA              Follow-ups after your visit        Your next 10 appointments already scheduled     May 16, 2018 10:00 AM CDT   [facility]    [address] [place] [address] [phone]           Please do not eat 10-12 hours before your appointment if you are coming in fasting for labs on lipids, cholesterol, or glucose (sugar). This does not apply to pregnant women. Water, hot tea and black coffee (with nothing added) are okay. Do not drink other fluids, diet soda or chew gum.            May 21, 2018  1:20 PM CDT   Return Visit with Adria Lopez MD   [place] [facility] [place] ([facility] [place])    [address]  [place] [id number] [phone]              Future tests that were ordered for you today     Open Future Orders        Priority Expected Expires Ordered    Hepatitis B core antibody Routine 2018  "   Hepatitis B surface antigen Routine 2018    CBC with platelets differential Routine 2018    Creatinine Routine 2018    Erythrocyte sedimentation rate auto Routine 2018    CRP inflammation Routine 2018    Hepatic panel Routine 2018            Who to contact     If you have questions or need follow up information about today's clinic visit or your schedule please contact [facility] directly at [phone].  Normal or non-critical lab and imaging results will be communicated to you by MyChart, letter or phone within 4 business days after the clinic has received the results. If you do not hear from us within 7 days, please contact the clinic through [facility] or phone. If you have a critical or abnormal lab result, we will notify you by phone as soon as possible.  Submit refill requests through [employer] or call your pharmacy and they will forward the refill request to us. Please allow 3 business days for your refill to be completed.          Additional Information About Your Visit        [facility] Information     [employer] lets you send messages to your doctor, view your test results, renew your prescriptions, schedule appointments and more. To sign up, go to [email or web address] . Click on \"Log in\" on the left side of the screen, which will take you to the Welcome page. Then click on \"Sign up Now\" on the right side of the page.     You will be asked to enter the access code listed below, as well as some personal information. Please follow the directions to create your username and password.     Your access code is: [email or web address]  Expires: 3/6/2018  2:11 PM     Your access code will  in 90 days. If you need help or a new code, please call your [place] clinic or [phone].        Care EveryWhere ID     This is your Care EveryWhere ID. This could be " "used by other organizations to access your [place] medical records  [id number]        Your Vitals Were     Pulse Height Pulse Oximetry BMI (Body Mass Index)          73 1.753 m (5' 9\") 97% 30.48 kg/m2         Blood Pressure from Last 3 Encounters:   01/22/18 142/86   10/18/17 128/78   07/31/17 (!) 164/91    Weight from Last 3 Encounters:   01/22/18 93.6 kg (206 lb 6.4 oz)   12/06/17 97 kg (213 lb 12.8 oz)   10/18/17 91.6 kg (202 lb)                 Today's Medication Changes          These changes are accurate as of: 1/22/18  4:18 PM.  If you have any questions, ask your nurse or doctor.               These medicines have changed or have updated prescriptions.        Dose/Directions    methotrexate sodium 2.5 MG Tabs   This may have changed:    - how much to take  - additional instructions   Used for:  Psoriatic arthropathy (H)   Changed by:  Adria Lopez MD        Dose:  17.5 mg   Take 17.5 mg by mouth once a week . Take all 7 tablets on the same day of each week.   Quantity:  84 tablet   Refills:  0            Where to get your medicines      These medications were sent to [id number] [address] [place] [address]  [address] [place] [id number]    Hours:  Closed 10-16-08 business to [facility] Phone:  [phone]     folic acid 1 MG tablet    methotrexate sodium 2.5 MG Tabs                Primary Care Provider Office Phone # Fax #    Armando Finley [phone] [address] [facility] [address]        Equal Access to Services     [facility]: Hadkarlene David, jesu johns, qakathy nugent. So [facility] [phone].    ATENCIÓN: Si habla español, tiene a haley disposición servicios gratuitos de asistencia lingüística. Llame al [phone].    We comply with applicable federal civil rights laws and [place] laws. We do not discriminate on the basis of race, color, national origin, " age, disability, sex, sexual orientation, or gender identity.            Thank you!     Thank you for choosing [facility]  for your care. Our goal is always to provide you with excellent care. Hearing back from our patients is one way we can continue to improve our services. Please take a few minutes to complete the written survey that you may receive in the mail after your visit with us. Thank you!             Your Updated Medication List - Protect others around you: Learn how to safely use, store and throw away your medicines at www.disposemymeds.org.          This list is accurate as of: 1/22/18  4:18 PM.  Always use your most recent med list.                   Brand Name Dispense Instructions for use Diagnosis    folic acid 1 MG tablet    FOLVITE    100 tablet    Take 1 tablet (1 mg) by mouth daily    Psoriatic arthropathy (H)       IBU-200 PO      Take  by mouth. 400mg as needed        lisinopril 2.5 MG tablet    PRINIVIL/Zestril    90 tablet    Take 1 tablet (2.5 mg) by mouth daily    Essential hypertension with goal blood pressure less than 140/90       methotrexate sodium 2.5 MG Tabs     84 tablet    Take 17.5 mg by mouth once a week . Take all 7 tablets on the same day of each week.    Psoriatic arthropathy (H)       Multi-vitamin Tabs tablet   Generic drug:  multivitamin, therapeutic with minerals      1 tab daily        simvastatin 80 MG tablet    ZOCOR    45 tablet    Take 0.5 tablets (40 mg) by mouth At Bedtime    Hyperlipidemia LDL goal <130       tamsulosin 0.4 MG capsule    FLOMAX    30 capsule    Take 1 capsule (0.4 mg) by mouth daily    Benign prostatic hyperplasia with nocturia       TYLENOL ARTHRITIS PAIN 650 MG CR tablet   Generic drug:  acetaminophen     60    2 TABLETS EVERY 8 HOURS AS NEEDED

## 2018-01-22 NOTE — PATIENT INSTRUCTIONS
Dr. Lopez s Rheumatology Clinics  Locations Clinic Hours Telephone Number     [facility] [place]  [address]  [place] [address]     Wednesday: 7:20AM - 4:00PM  Thursday:     7:20AM - 4:00PM     Friday:          7:20AM - 11:00AM       To schedule an appointment with  Dr. Lopez,  please contact  Specialty [phone]       [facility] [place]  [address]  [place] [address]       Monday:       7:20AM - 4:00PM        [facility] [place]  [address]  [place] [address]       Tuesday:      7:20AM - 4:00PM          Thank you!    Francie Cuellar CMA

## 2018-01-22 NOTE — NURSING NOTE
"Chief Complaint   Patient presents with     Arthritis     Patient states stiffness in hands, sore in knees       Initial /87 (BP Location: Left arm, Patient Position: Chair, Cuff Size: Adult Regular)  Pulse 73  Ht 1.753 m (5' 9\")  Wt 93.6 kg (206 lb 6.4 oz)  SpO2 97%  BMI 30.48 kg/m2 Estimated body mass index is 30.48 kg/(m^2) as calculated from the following:    Height as of this encounter: 1.753 m (5' 9\").    Weight as of this encounter: 93.6 kg (206 lb 6.4 oz).  BP completed using cuff size: regular         RAPID3 (0-30) Cumulative Score  1.7          RAPID3 Weighted Score (divide #4 by 3 and that is the weighted score)  0.57         "

## 2018-03-08 NOTE — TELEPHONE ENCOUNTER
Psychiatry Initial Visit (PhD/LCSW)  Diagnostic Interview - CPT 68093    Date: 3/7/2018    Site: [facility]    Referral source: gyn    Clinical status of patient: Outpatient    Betsy Kidd, a 52 y.o. female, for initial evaluation visit.  Met with patient.    Chief complaint/reason for encounter: depression and anxiety    History of present illness: Pt states she has had a rough year with hormonal fluctuations.  She has been hu and irritable with much less energy.  She has recently started to feel a little better.  She was ambivalent about coming here today, not sure she needs this.  She does state though that she is very tired of doing everything for herself and her .  He has not worked since they moved here 2 years ago and has had long periods of not working throughout their marriage.  When he does work he doesn't stick with jobs.  This limits their finances and makes it difficult to do things that are fun.  She feels like her life is go to work, come home, go to sleep and little else.  She is not sure how not to be the only responsible one.  She grew up in [place] on the  as an only child.  Parents are , last one in .  She cut off all contact with her aunts and uncles after that because they reminded her too much of her parents and this was upsetting to her.  She feels she and her  are a lot alike in that they are shy and don't have a lot of friends.  She feels she is struggling with getting older.  She sleeps excessively to avoid feelings and drinks daily, trying recently to cut down on the amount of wine she drinks.  She states she feels more angry then depressed as she never expected her life to be like this.    Pain: 0    Symptoms:   · Mood: depressed mood, diminished interest, hypersomnia, tearfulness and social isolation  · Anxiety: restlessness/keyed up and irritability  · Substance abuse: denied  · Cognitive functioning: denied  · Health behaviors:  Spoke with patient regarding lab results and patient is scheduled for an appointment with Dr. Lopez on 1/22/18 at 3:40 pm. Patient is also requesting a refill on methotrexate he only has enough for one more week. Medication pending with preferred pharmacy please adviseKenneth Cowan to leave detailed message on wife's phone.  Roxy Chambers CMA 1/8/2018 12:18 PM     noncontributory    Psychiatric history: has participated in counseling/psychotherapy on an outpatient basis in the past    Medical history: recent menopause, dealing with hormonal regulation    Family history of psychiatric illness: none    Social history (marriage, employment, etc.): Pt has been  for 27 years, no children.  She was an only child, parents  as they were older when she was born. In her 20s she worked as a .  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  [place] 2 years ago and first went to [place] where they are from and then moved to [place].  Pt now works as an .   does not work.  Pt graduated from college in [place] and later got a Master's Degree in [employer] [facility] [employer] [facility] [employer].  She has a small support system of friends here.    Substance use:   Alcohol: Pt states she was drinking a bottle of wine a day, but has made a conscious effort to cut back, now a half bottle a day.   Drugs: none   Tobacco: smoked in her 20s   Caffeine: 2 cups of coffee a day    Current medications and drug reactions (include OTC, herbal): see medication list: no psychotropic medications, does not want any    Strengths and liabilities: Strength: Patient accepts guidance/feedback, Strength: Patient is expressive/articulate., Strength: Patient is intelligent., Strength: Patient is physically healthy., Strength: Patient has positive support network., Strength: Patient has reasonable judgment., Strength: Patient is stable., Liability: Patient lacks coping skills.    Current Evaluation:     Mental Status Exam:  General Appearance:  unremarkable, age appropriate   Speech: normal tone, normal rate, normal pitch, normal volume      Level of Cooperation: cooperative, guarded      Thought Processes: normal and logical   Mood: dysthymic      Thought Content: normal, no suicidality, no homicidality, delusions, or paranoia    Affect: congruent and appropriate   Orientation: Oriented x3   Memory: intact   Attention Span & Concentration: intact   Fund of General Knowledge: intact and appropriate to age and level of education   Abstract Reasoning: did not assess   Judgment & Insight: good     Language  intact     Diagnostic Impression - Plan:       ICD-10-CM ICD-9-CM   1. Adjustment disorder with mixed anxiety and depressed mood F43.23 309.28       Plan:individual psychotherapy and family psychotherapy    Return to Clinic: as scheduled    Length of Service (minutes): 45

## 2018-05-16 DIAGNOSIS — Z79.899 HIGH RISK MEDICATION USE: ICD-10-CM

## 2018-05-16 DIAGNOSIS — L40.50 PSORIATIC ARTHROPATHY (H): ICD-10-CM

## 2018-05-16 LAB
ALBUMIN SERPL-MCNC: 3.7 G/DL (ref 3.4–5)
ALP SERPL-CCNC: 48 U/L (ref 40–150)
ALT SERPL W P-5'-P-CCNC: 38 U/L (ref 0–70)
AST SERPL W P-5'-P-CCNC: 22 U/L (ref 0–45)
BASOPHILS # BLD AUTO: 0 10E9/L (ref 0–0.2)
BASOPHILS NFR BLD AUTO: 0.2 %
BILIRUB DIRECT SERPL-MCNC: 0.2 MG/DL (ref 0–0.2)
BILIRUB SERPL-MCNC: 1 MG/DL (ref 0.2–1.3)
CREAT SERPL-MCNC: 0.9 MG/DL (ref 0.66–1.25)
CRP SERPL-MCNC: <2.9 MG/L (ref 0–8)
DIFFERENTIAL METHOD BLD: ABNORMAL
EOSINOPHIL # BLD AUTO: 0.1 10E9/L (ref 0–0.7)
EOSINOPHIL NFR BLD AUTO: 2.1 %
ERYTHROCYTE [DISTWIDTH] IN BLOOD BY AUTOMATED COUNT: 12.9 % (ref 10–15)
ERYTHROCYTE [SEDIMENTATION RATE] IN BLOOD BY WESTERGREN METHOD: 6 MM/H (ref 0–20)
GFR SERPL CREATININE-BSD FRML MDRD: 84 ML/MIN/1.7M2
HCT VFR BLD AUTO: 40.5 % (ref 40–53)
HGB BLD-MCNC: 14.4 G/DL (ref 13.3–17.7)
LYMPHOCYTES # BLD AUTO: 1.3 10E9/L (ref 0.8–5.3)
LYMPHOCYTES NFR BLD AUTO: 27 %
MCH RBC QN AUTO: 33.6 PG (ref 26.5–33)
MCHC RBC AUTO-ENTMCNC: 35.6 G/DL (ref 31.5–36.5)
MCV RBC AUTO: 94 FL (ref 78–100)
MONOCYTES # BLD AUTO: 0.3 10E9/L (ref 0–1.3)
MONOCYTES NFR BLD AUTO: 6.8 %
NEUTROPHILS # BLD AUTO: 3.1 10E9/L (ref 1.6–8.3)
NEUTROPHILS NFR BLD AUTO: 63.9 %
PLATELET # BLD AUTO: 141 10E9/L (ref 150–450)
PROT SERPL-MCNC: 6.5 G/DL (ref 6.8–8.8)
RBC # BLD AUTO: 4.29 10E12/L (ref 4.4–5.9)
WBC # BLD AUTO: 4.9 10E9/L (ref 4–11)

## 2018-05-16 PROCEDURE — 86704 HEP B CORE ANTIBODY TOTAL: CPT | Performed by: INTERNAL MEDICINE

## 2018-05-16 PROCEDURE — 82565 ASSAY OF CREATININE: CPT | Performed by: INTERNAL MEDICINE

## 2018-05-16 PROCEDURE — 85025 COMPLETE CBC W/AUTO DIFF WBC: CPT | Performed by: INTERNAL MEDICINE

## 2018-05-16 PROCEDURE — 85652 RBC SED RATE AUTOMATED: CPT | Performed by: INTERNAL MEDICINE

## 2018-05-16 PROCEDURE — 86140 C-REACTIVE PROTEIN: CPT | Performed by: INTERNAL MEDICINE

## 2018-05-16 PROCEDURE — 36415 COLL VENOUS BLD VENIPUNCTURE: CPT | Performed by: INTERNAL MEDICINE

## 2018-05-16 PROCEDURE — 87340 HEPATITIS B SURFACE AG IA: CPT | Performed by: INTERNAL MEDICINE

## 2018-05-16 PROCEDURE — 80076 HEPATIC FUNCTION PANEL: CPT | Performed by: INTERNAL MEDICINE

## 2018-05-17 LAB
HBV CORE AB SERPL QL IA: NONREACTIVE
HBV SURFACE AG SERPL QL IA: NONREACTIVE

## 2018-05-20 ENCOUNTER — OFFICE VISIT (OUTPATIENT)
Dept: URGENT CARE | Facility: URGENT CARE | Age: 68
End: 2018-05-20
Payer: COMMERCIAL

## 2018-05-20 VITALS — WEIGHT: 207.8 LBS | BODY MASS INDEX: 30.69 KG/M2

## 2018-05-20 DIAGNOSIS — S61.011A LACERATION OF RIGHT THUMB WITHOUT FOREIGN BODY WITHOUT DAMAGE TO NAIL, INITIAL ENCOUNTER: Primary | ICD-10-CM

## 2018-05-20 DIAGNOSIS — Z23 NEED FOR TDAP VACCINATION: ICD-10-CM

## 2018-05-20 PROCEDURE — 90471 IMMUNIZATION ADMIN: CPT | Performed by: NURSE PRACTITIONER

## 2018-05-20 PROCEDURE — 12001 RPR S/N/AX/GEN/TRNK 2.5CM/<: CPT | Performed by: NURSE PRACTITIONER

## 2018-05-20 PROCEDURE — 90715 TDAP VACCINE 7 YRS/> IM: CPT | Performed by: NURSE PRACTITIONER

## 2018-05-20 RX ORDER — CEPHALEXIN 500 MG/1
500 CAPSULE ORAL 4 TIMES DAILY
Qty: 40 CAPSULE | Refills: 0 | Status: SHIPPED | OUTPATIENT
Start: 2018-05-20 | End: 2018-09-21

## 2018-05-20 NOTE — PROGRESS NOTES
SUBJECTIVE:  Pawan Bullard is a 68 year old male who presents to the clinic with a laceration on the thumb sustained 2 hours ago.  This is a non-work related injury.  Mechanism of injury: knife.    Associated symptoms: Denies numbness, weakness, or loss of function  Last tetanus booster within 5 years: no given today     Past Medical History:   Diagnosis Date     Esophageal reflux     GERD     Hypertension      Psoriatic arthropathy (H)     Psoriatic arthritis     Current Outpatient Prescriptions   Medication Sig Dispense Refill     folic acid (FOLVITE) 1 MG tablet Take 1 tablet (1 mg) by mouth daily 100 tablet 3     Ibuprofen (IBU-200 PO) Take  by mouth. 400mg as needed       lisinopril (PRINIVIL/ZESTRIL) 2.5 MG tablet Take 1 tablet (2.5 mg) by mouth daily 90 tablet 3     methotrexate sodium 2.5 MG TABS Take 17.5 mg by mouth once a week . Take all 7 tablets on the same day of each week. 84 tablet 0     MULTI-VITAMIN OR TABS 1 tab daily       simvastatin (ZOCOR) 80 MG tablet Take 0.5 tablets (40 mg) by mouth At Bedtime 45 tablet 3     tamsulosin (FLOMAX) 0.4 MG capsule Take 1 capsule (0.4 mg) by mouth daily 30 capsule 11     TYLENOL ARTHRITIS PAIN 650 MG OR TBCR 2 TABLETS EVERY 8 HOURS AS NEEDED 60 0         OBJECTIVE:  There were no vitals taken for this visit.  The patient appears today in alert and in no apparent distress distress  Size of laceration: 1 centimeters  Characteristics of the laceration: bleeding- mild and clean  Tendon function intact: yes  Sensation to light touch intact: no  Pulses intact: no    Assessment:    ICD-10-CM    1. Laceration of right thumb without foreign body without damage to nail, initial encounter S61.011A cephALEXin (KEFLEX) 500 MG capsule         PLAN:  Wound was locally injected with 2 cc's of Lidocaine 1% plain  Prepped and draped in the usual sterile fashion  Wound soaked  Wound irrigated  Laceration was closed using 3 4-0 nylon interrupted sutures  After care  instructions:  Keep wound clean and dry for the next 24-48 hours  Sutures out in 10 days  Signs of infection discussed today  May return to work as long as wound is kept clean and dry  Discussed the probability of scarring  Active range of motion exercises encouraged    RK Collier CNP

## 2018-05-20 NOTE — MR AVS SNAPSHOT
After Visit Summary   5/20/2018    Pawan Bullard    MRN: [id number]           Patient Information     Date Of Birth          1950        Visit Information        Provider Department      5/20/2018 12:25 PM Deb Madrigal APRN [facility]        Care Instructions    After care instructions:  Keep wound clean and dry for the next 24-48 hours  Sutures out in 10 days  Signs of infection discussed today  May return to work as long as wound is kept clean and dry  Discussed the probability of scarring  Active range of motion exercises encouraged      Extremity Laceration: Suture or Tape (Child)  A laceration is a cut through the skin. If it is large or deep, it may need stitches or staples to close the wound so it can heal. Minor cuts may be closed with surgical tape.   X-rays may be done if something may have entered the skin through the cut, such as glass or rocks. Your child may also need a tetanus shot if he or she is not up-to-date on this vaccination.  Home care  Your child s healthcare provider may prescribe an antibiotic to help prevent infection. Follow all instructions for giving this medicine to your child. Make sure your child takes the medicine every day until it is gone or told to stop.  If your child has pain, you can give him or her pain medicine as advised by the healthcare provider. Don't give your child aspirin.  In rare cases, it can cause serious problems in children 15 years of age and younger.  Don t give your child any other medicine without asking the healthcare provider first.    General care    Follow the healthcare provider s instructions on how to care for the cut.    Wash your hands with soap and warm water before and after caring for your child's cut. This is to help prevent infection.    Leave the original bandage in place for 24 hours. Replace it if it becomes wet or dirty. After 24 hours, change it once a day or as  directed.    Clean the wound daily. First, remove the bandage. Then wash the area gently with soap and warm water, or as directed by your child s healthcare provider. Use a wet cotton swab to loosen and remove any blood or crust that forms. After cleaning, apply a thin layer of antibiotic ointment, if advised. Then put on a new bandage.    Caring for sutures or staples: Clean the wound daily. First, remove the bandage. Then wash the area gently with soap and warm water, or as directed by your child s provider. Use a wet cotton swab to loosen and remove any blood or crust that forms. After cleaning, apply a thin layer of antibiotic ointment, if advised. Then put on a new bandage.    Caring for surgical tape: Keep the area dry. If it gets wet, blot it dry with a clean towel. Surgical tape usually falls off within 7 to 10 days. If it has not fallen off after 10 days, you can take it off yourself. Put mineral oil or petroleum jelly on a cotton ball and gently rub the tape until it is removed.    Explain to your child in an age appropriate way what you are doing as you care for the wound. Let your child help when possible. For example, have him or her hand you the towel or pat the area dry.    Make sure your child does not scratch, rub, or pick at the area. A baby may need to wear scratch mittens.    Don't soak the cut in water. Have your child shower or take sponge baths instead of tub baths. Don t let your child go swimming.     If the area gets wet, gently pat it dry with a clean cloth. Replace the wet bandage with a dry one.  Follow-up care  Follow up with your child s healthcare provider. Make a follow-up appointment to have the stitches or staples removed, if directed.  Special note to parents  Healthcare providers are trained to see injuries such as this in young children as a sign of possible abuse. You may be asked questions about how your child was injured. Health care providers are required by law to ask you  these questions. This is done to protect your child. Please try to be patient.  When to seek medical advice  Call the child's healthcare provider for any of the following:    Wound bleeding is not controlled by direct pressure    Signs of infection develop, including increasing pain in the wound, increasing wound redness or swelling, or pus or bad odor coming from the wound    Fever of 100.4 F (38 C) or higher, or as directed by the child's healthcare provider     Stitches or staples come apart or fall out or surgical tape falls off before 7 days    Wound edges re-open    Wound changes colors    Numbness occurs around the wound     Decreased movement occurs around the injured area  Date Last Reviewed: 8/1/2017 2000-2017 [employer]. [address] [place] [address], [place] [id number]. All rights reserved. This information is not intended as a substitute for professional medical care. Always follow your healthcare professional's instructions.                Follow-ups after your visit        Your next 10 appointments already scheduled     May 21, 2018  1:20 PM CDT   [facility] Adria Lopez MD   [facility]    [address] [facility] [address] [phone]              Who to contact     If you have questions or need follow up information about today's clinic visit or your schedule please contact [facility] directly at [phone].  Normal or non-critical lab and imaging results will be communicated to you by MyChart, letter or phone within 4 business days after the clinic has received the results. If you do not hear from us within 7 days, please contact the clinic through MyChart or phone. If you have a critical or abnormal lab result, we will notify you by phone as soon as possible.  Submit refill requests through [employer] or call your pharmacy and they will forward the refill request to us. Please allow 3  "business days for your refill to be completed.          Additional Information About Your Visit        MyChart Information     [employer] lets you send messages to your doctor, view your test results, renew your prescriptions, schedule appointments and more. To sign up, go to [email or web address] . Click on \"Log in\" on the left side of the screen, which will take you to the Welcome page. Then click on \"Sign up Now\" on the right side of the page.     You will be asked to enter the access code listed below, as well as some personal information. Please follow the directions to create your username and password.     Your access code is: [email or web address]  Expires: 2018  1:03 PM     Your access code will  in 90 days. If you need help or a new code, please call your [place] clinic or [phone].        Care EveryWhere ID     This is your Care EveryWhere ID. This could be used by other organizations to access your [place] medical records  [id number]        Your Vitals Were     BMI (Body Mass Index)                   30.69 kg/m2            Blood Pressure from Last 3 Encounters:   18 142/86   10/18/17 128/78   17 (!) 164/91    Weight from Last 3 Encounters:   18 207 lb 12.8 oz (94.3 kg)   18 206 lb 6.4 oz (93.6 kg)   17 213 lb 12.8 oz (97 kg)              Today, you had the following     No orders found for display       Primary Care Provider Office Phone # Fax #    Armando Finley [phone] [address] [place] [id number]        Equal Access to Services     NIKHIL OLMSTEAD : Hadkarlene David, jesu johns, kathy crawley. So [facility] [phone].    ATENCIÓN: Si habla español, tiene a haley disposición servicios gratuitos de asistencia lingüística. Llame al [phone].    We comply with applicable federal civil rights laws and [place] laws. We do not discriminate on the basis of race, " color, national origin, age, disability, sex, sexual orientation, or gender identity.            Thank you!     Thank you for choosing [facility]  for your care. Our goal is always to provide you with excellent care. Hearing back from our patients is one way we can continue to improve our services. Please take a few minutes to complete the written survey that you may receive in the mail after your visit with us. Thank you!             Your Updated Medication List - Protect others around you: Learn how to safely use, store and throw away your medicines at www.disposemymeds.org.          This list is accurate as of 5/20/18  1:03 PM.  Always use your most recent med list.                   Brand Name Dispense Instructions for use Diagnosis    folic acid 1 MG tablet    FOLVITE    100 tablet    Take 1 tablet (1 mg) by mouth daily    Psoriatic arthropathy (H)       IBU-200 PO      Take  by mouth. 400mg as needed        lisinopril 2.5 MG tablet    PRINIVIL/Zestril    90 tablet    Take 1 tablet (2.5 mg) by mouth daily    Essential hypertension with goal blood pressure less than 140/90       methotrexate sodium 2.5 MG Tabs     84 tablet    Take 17.5 mg by mouth once a week . Take all 7 tablets on the same day of each week.    Psoriatic arthropathy (H)       Multi-vitamin Tabs tablet   Generic drug:  multivitamin, therapeutic with minerals      1 tab daily        simvastatin 80 MG tablet    ZOCOR    45 tablet    Take 0.5 tablets (40 mg) by mouth At Bedtime    Hyperlipidemia LDL goal <130       tamsulosin 0.4 MG capsule    FLOMAX    30 capsule    Take 1 capsule (0.4 mg) by mouth daily    Benign prostatic hyperplasia with nocturia       TYLENOL ARTHRITIS PAIN 650 MG CR tablet   Generic drug:  acetaminophen     60    2 TABLETS EVERY 8 HOURS AS NEEDED

## 2018-05-20 NOTE — PATIENT INSTRUCTIONS
After care instructions:  Keep wound clean and dry for the next 24-48 hours  Sutures out in 10 days  Signs of infection discussed today  May return to work as long as wound is kept clean and dry  Discussed the probability of scarring  Active range of motion exercises encouraged      Extremity Laceration: Suture or Tape (Child)  A laceration is a cut through the skin. If it is large or deep, it may need stitches or staples to close the wound so it can heal. Minor cuts may be closed with surgical tape.   X-rays may be done if something may have entered the skin through the cut, such as glass or rocks. Your child may also need a tetanus shot if he or she is not up-to-date on this vaccination.  Home care  Your child s healthcare provider may prescribe an antibiotic to help prevent infection. Follow all instructions for giving this medicine to your child. Make sure your child takes the medicine every day until it is gone or told to stop.  If your child has pain, you can give him or her pain medicine as advised by the healthcare provider. Don't give your child aspirin.  In rare cases, it can cause serious problems in children 15 years of age and younger.  Don t give your child any other medicine without asking the healthcare provider first.    General care    Follow the healthcare provider s instructions on how to care for the cut.    Wash your hands with soap and warm water before and after caring for your child's cut. This is to help prevent infection.    Leave the original bandage in place for 24 hours. Replace it if it becomes wet or dirty. After 24 hours, change it once a day or as directed.    Clean the wound daily. First, remove the bandage. Then wash the area gently with soap and warm water, or as directed by your child s healthcare provider. Use a wet cotton swab to loosen and remove any blood or crust that forms. After cleaning, apply a thin layer of antibiotic ointment, if advised. Then put on a new  bandage.    Caring for sutures or staples: Clean the wound daily. First, remove the bandage. Then wash the area gently with soap and warm water, or as directed by your child s provider. Use a wet cotton swab to loosen and remove any blood or crust that forms. After cleaning, apply a thin layer of antibiotic ointment, if advised. Then put on a new bandage.    Caring for surgical tape: Keep the area dry. If it gets wet, blot it dry with a clean towel. Surgical tape usually falls off within 7 to 10 days. If it has not fallen off after 10 days, you can take it off yourself. Put mineral oil or petroleum jelly on a cotton ball and gently rub the tape until it is removed.    Explain to your child in an age appropriate way what you are doing as you care for the wound. Let your child help when possible. For example, have him or her hand you the towel or pat the area dry.    Make sure your child does not scratch, rub, or pick at the area. A baby may need to wear scratch mittens.    Don't soak the cut in water. Have your child shower or take sponge baths instead of tub baths. Don t let your child go swimming.     If the area gets wet, gently pat it dry with a clean cloth. Replace the wet bandage with a dry one.  Follow-up care  Follow up with your child s healthcare provider. Make a follow-up appointment to have the stitches or staples removed, if directed.  Special note to parents  Healthcare providers are trained to see injuries such as this in young children as a sign of possible abuse. You may be asked questions about how your child was injured. Health care providers are required by law to ask you these questions. This is done to protect your child. Please try to be patient.  When to seek medical advice  Call the child's healthcare provider for any of the following:    Wound bleeding is not controlled by direct pressure    Signs of infection develop, including increasing pain in the wound, increasing wound redness or  swelling, or pus or bad odor coming from the wound    Fever of 100.4 F (38 C) or higher, or as directed by the child's healthcare provider     Stitches or staples come apart or fall out or surgical tape falls off before 7 days    Wound edges re-open    Wound changes colors    Numbness occurs around the wound     Decreased movement occurs around the injured area  Date Last Reviewed: 8/1/2017 2000-2017 [employer]. [address] [place] [address], [place] [id number]. All rights reserved. This information is not intended as a substitute for professional medical care. Always follow your healthcare professional's instructions.

## 2018-05-21 ENCOUNTER — OFFICE VISIT (OUTPATIENT)
Dept: RHEUMATOLOGY | Facility: CLINIC | Age: 68
End: 2018-05-21
Payer: COMMERCIAL

## 2018-05-21 VITALS
SYSTOLIC BLOOD PRESSURE: 126 MMHG | HEIGHT: 69 IN | HEART RATE: 78 BPM | DIASTOLIC BLOOD PRESSURE: 80 MMHG | WEIGHT: 206.6 LBS | OXYGEN SATURATION: 99 % | RESPIRATION RATE: 14 BRPM | BODY MASS INDEX: 30.6 KG/M2

## 2018-05-21 DIAGNOSIS — L40.50 PSORIATIC ARTHROPATHY (H): Primary | ICD-10-CM

## 2018-05-21 DIAGNOSIS — Z79.899 HIGH RISK MEDICATIONS (NOT ANTICOAGULANTS) LONG-TERM USE: ICD-10-CM

## 2018-05-21 PROCEDURE — 99213 OFFICE O/P EST LOW 20 MIN: CPT | Performed by: INTERNAL MEDICINE

## 2018-05-21 RX ORDER — METHOTREXATE 2.5 MG/1
17.5 TABLET ORAL WEEKLY
Qty: 84 TABLET | Refills: 1 | Status: SHIPPED | OUTPATIENT
Start: 2018-05-21 | End: 2018-11-26

## 2018-05-21 NOTE — NURSING NOTE
"Chief Complaint   Patient presents with     Arthritis     Psoriatic, patient has pain in his fingers, not every day but once in awhile. Knees are daily pain and neck sometimes gets sore.       Initial /75  Pulse 78  Resp 14  Ht 1.753 m (5' 9\")  Wt 93.7 kg (206 lb 9.6 oz)  SpO2 99%  BMI 30.51 kg/m2 Estimated body mass index is 30.51 kg/(m^2) as calculated from the following:    Height as of this encounter: 1.753 m (5' 9\").    Weight as of this encounter: 93.7 kg (206 lb 9.6 oz).  BP completed using cuff size: large         RAPID3 (0-30) Cumulative Score  7.0          RAPID3 Weighted Score (divide #4 by 3 and that is the weighted score)  2.3         "

## 2018-05-21 NOTE — MR AVS SNAPSHOT
After Visit Summary   5/21/2018    Pawan Bullard    MRN: [id number]           Patient Information     Date Of Birth          1950        Visit Information        Provider Department      5/21/2018 1:20 PM Adria Lopez MD [facility]        Today's Diagnoses     Psoriatic arthropathy (H)    -  1    High risk medications (not anticoagulants) long-term use           Follow-ups after your visit        Follow-up notes from your care team     Return in about 6 months (around 11/21/2018).      Your next 10 appointments already scheduled     May 30, 2018 10:00 AM CDT   Nurse Only with [place] NB CMA/LPN   [facility]    [address] [place] [address]   [phone]            Aug 20, 2018  1:30 PM CDT   LAB with NB LAB   [facility] ([facility]    [address] [place] [address]   [phone]           Please do not eat 10-12 hours before your appointment if you are coming in fasting for labs on lipids, cholesterol, or glucose (sugar). This does not apply to pregnant women. Water, hot tea and black coffee (with nothing added) are okay. Do not drink other fluids, diet soda or chew gum.            Nov 19, 2018  1:15 PM CST   LAB with NB LAB   [facility] ([facility])    [address] [place] [phone]   [phone]           Please do not eat 10-12 hours before your appointment if you are coming in fasting for labs on lipids, cholesterol, or glucose (sugar). This does not apply to pregnant women. Water, hot tea and black coffee (with nothing added) are okay. Do not drink other fluids, diet soda or chew gum.            Nov 26, 2018  1:20 PM CST   Return Visit with Adria Lopez MD   [facility] ([facility] [place])    [address] [facility]  [place] [id number] [phone]              Future tests  "that were ordered for you today     Open Future Orders        Priority Expected Expires Ordered    CRP inflammation Routine 11/12/2018 12/2/2018 5/21/2018    Hepatic panel Routine 11/12/2018 12/2/2018 5/21/2018    CBC with platelets differential Routine 8/15/2018 9/18/2018 5/21/2018    Creatinine Routine 8/15/2018 9/18/2018 5/21/2018    Hepatic panel Routine 8/15/2018 9/18/2018 5/21/2018    CBC with platelets differential Routine 11/12/2018 12/2/2018 5/21/2018    Creatinine Routine 11/12/2018 12/2/2018 5/21/2018    Erythrocyte sedimentation rate auto Routine 11/12/2018 12/2/2018 5/21/2018            Who to contact     If you have questions or need follow up information about today's clinic visit or your schedule please contact [facility] directly at [phone].  Normal or non-critical lab and imaging results will be communicated to you by SpotHerohart, letter or phone within 4 business days after the clinic has received the results. If you do not hear from us within 7 days, please contact the clinic through SpotHerohart or phone. If you have a critical or abnormal lab result, we will notify you by phone as soon as possible.  Submit refill requests through Sportgenic or call your pharmacy and they will forward the refill request to us. Please allow 3 business days for your refill to be completed.          Additional Information About Your Visit        Sportgenic Information     Sportgenic lets you send messages to your doctor, view your test results, renew your prescriptions, schedule appointments and more. To sign up, go to [email or web address] . Click on \"Log in\" on the left side of the screen, which will take you to the Welcome page. Then click on \"Sign up Now\" on the right side of the page.     You will be asked to enter the access code listed below, as well as some personal information. Please follow the directions to create your username and password.     Your access code is: [email or web address]  Expires: 8/18/2018  " "1:03 PM     Your access code will  in 90 days. If you need help or a new code, please call your [facility] clinic or [phone].        Care EveryWhere ID     This is your Care EveryWhere ID. This could be used by other organizations to access your [facility] medical records  [id number]        Your Vitals Were     Pulse Respirations Height Pulse Oximetry BMI (Body Mass Index)       78 14 1.753 m (5' 9\") 99% 30.51 kg/m2        Blood Pressure from Last 3 Encounters:   18 126/80   18 142/86   10/18/17 128/78    Weight from Last 3 Encounters:   18 93.7 kg (206 lb 9.6 oz)   18 94.3 kg (207 lb 12.8 oz)   18 93.6 kg (206 lb 6.4 oz)                 Where to get your medicines      These medications were sent to [id number] [address] [facility]  [address] [facility] [address]    Hours:  Closed 10-16-08 business to [facility] Phone:  [phone]     methotrexate sodium 2.5 MG Tabs          Primary Care Provider Office Phone # Fax #    Armando Finley [phone] [address] [facility] [address]        Equal Access to Services     NINA OLMSTEAD AH: Hadii amado juarez hadasho Sovimalali, waaxda luqadaha, qaybta kaalmada georgina, kathy gamez. So [facility] [phone].    ATENCIÓN: Si habla español, tiene a haley disposición servicios gratuitos de asistencia lingüística. Llame al [phone].    We comply with applicable federal civil rights laws and [place] laws. We do not discriminate on the basis of race, color, national origin, age, disability, sex, sexual orientation, or gender identity.            Thank you!     Thank you for choosing [facility]  for your care. Our goal is always to provide you with excellent care. Hearing back from our patients is one way we can continue to improve our services. Please take a few minutes to complete the written survey that you may receive in the mail " after your visit with us. Thank you!             Your Updated Medication List - Protect others around you: Learn how to safely use, store and throw away your medicines at www.disposemymeds.org.          This list is accurate as of 5/21/18  2:00 PM.  Always use your most recent med list.                   Brand Name Dispense Instructions for use Diagnosis    cephALEXin 500 MG capsule    KEFLEX    40 capsule    Take 1 capsule (500 mg) by mouth 4 times daily    Laceration of right thumb without foreign body without damage to nail, initial encounter       folic acid 1 MG tablet    FOLVITE    100 tablet    Take 1 tablet (1 mg) by mouth daily    Psoriatic arthropathy (H)       IBU-200 PO      Take  by mouth. 400mg as needed        lisinopril 2.5 MG tablet    PRINIVIL/Zestril    90 tablet    Take 1 tablet (2.5 mg) by mouth daily    Essential hypertension with goal blood pressure less than 140/90       methotrexate sodium 2.5 MG Tabs     84 tablet    Take 17.5 mg by mouth once a week . Take all 7 tablets on the same day of each week.    Psoriatic arthropathy (H)       Multi-vitamin Tabs tablet   Generic drug:  multivitamin, therapeutic with minerals      1 tab daily        PROBIOTIC DAILY PO           simvastatin 80 MG tablet    ZOCOR    45 tablet    Take 0.5 tablets (40 mg) by mouth At Bedtime    Hyperlipidemia LDL goal <130       tamsulosin 0.4 MG capsule    FLOMAX    30 capsule    Take 1 capsule (0.4 mg) by mouth daily    Benign prostatic hyperplasia with nocturia       TYLENOL ARTHRITIS PAIN 650 MG CR tablet   Generic drug:  acetaminophen     60    2 TABLETS EVERY 8 HOURS AS NEEDED

## 2018-05-21 NOTE — PROGRESS NOTES
Rheumatology Clinic Visit      Pawan Bullard MRN# [id number]   YOB: 1950 Age: 68 year old      Date of visit: 5/21/18   PCP: Dr. Armando Finley    Chief Complaint   Patient presents with:  Arthritis: Psoriatic, patient has pain in his fingers, not every day but once in awhile. Knees are daily pain and neck sometimes gets sore.    Assessment and Plan     1.  Psoriatic arthritis: Previously doing well with methotrexate 15 mg once weekly but was having more pain that was consistent with an inflammatory arthritis of the PIPs and therefore methotrexate was increased to 17.5 mg once weekly with improvement of this pain.  Now with mechanical joint pain only, and no synovitis on exam; current pain is osteoarthritis related and we reviewed this today.    - Continue methotrexate 17.5 mg once weekly  - Continue folic acid 1 mg daily  - Labs in 3 months: CBC, Creatinine, Hepatic Panel  - Labs in 6 months: CBC, Creatinine, Hepatic Panel, ESR, CRP    2. Osteoarthritis of both hands: The diagnosis and treatment options were reviewed today.  Symptoms are mild and not affecting his daily activities so will observe for now.    3.  Left second digit trigger finger: Not actively triggering.  If symptoms worsen or persist then could consider steroid injection.     4. Hypertension: Patient to follow up with Primary Care provider regarding elevated blood pressure.     Mr. Bullard verbalized agreement with and understanding of the rational for the diagnosis and treatment plan.  All questions were answered to best of my ability and the patient's satisfaction. Mr. Bullard was advised to contact the clinic with any questions that may arise after the clinic visit.      Thank you for involving me in the care of the patient    Return to clinic: 3 months      HPI   Pawan Bullard is a 68 year old male with a past medical history significant for hypertension, hyperlipidemia, chondrocalcinosis, GERD, osteoarthritis, and  psoriatic arthritis who presents for follow-up of psoriatic arthritis.      Today, Mr. Bullard reports that he is doing okay.  Some pain in his right thumb after he accidentally slipped with his knife and cut himself; he says that he got 3 stitches for this.  Some pain at his PIPs and DIPs that is brief, sharp, and resolves spontaneously within minutes; often worse with more activity.  Bilateral knee pain that is worse with activity; has had multiple knee surgeries in the past.  Chronic neck pain that is worse with activity; mild intensity and does not affect his daily activities.  He noticed some improvement with the higher methotrexate dose.  Morning stiffness for no more than 20 minutes.    Denies fevers, chills, nausea, vomiting, constipation, diarrhea. No abdominal pain. No chest pain/pressure, palpitations, or shortness of breath. No LE swelling. No oral or nasal sores.  No sicca symptoms.  No history of inflammatory eye disease.  No history of inflammatory bowel disease.       Tobacco: None  EtOH: 0-2 beers or mixed drinks monthly  Drugs: None    ROS   GEN: No fevers, chills, night sweats, or weight change  SKIN: See HPI  HEENT: No oral or nasal ulcers.  CV: No chest pain, pressure, palpitations, or dyspnea on exertion.  PULM: No SOB, wheeze, cough.  GI: No nausea, vomiting, constipation, diarrhea. No blood in stool. No abdominal pain.  : No blood in urine.  MSK: See HPI.  NEURO: No numbness, tingling, or weakness.  EXT: No LE swelling  PSYCH: Negative    Active Problem List     Patient Active Problem List   Diagnosis     Psoriatic arthropathy (H)     Esophageal reflux     Hypertension goal BP (blood pressure) < 140/90     FAMILY HISTORY OF GI NEOPLASM     FAMILY HISTORY OF DIABETES MELLITUS     Impotence of organic origin     Chondrocalcinosis     HYPERLIPIDEMIA LDL GOAL <130     Osteoarthritis     Advanced directives, counseling/discussion     High risk medications (not anticoagulants) long-term use      Past Medical History     Past Medical History:   Diagnosis Date     Esophageal reflux     GERD     Hypertension      Psoriatic arthropathy (H)     Psoriatic arthritis     Past Surgical History     Past Surgical History:   Procedure Laterality Date     ARTHROSCOPY KNEE RT/LT  April 2009    left knee     ARTHROSCOPY KNEE WITH MEDIAL MENISCECTOMY  7/9/2013    Procedure: ARTHROSCOPY KNEE WITH MEDIAL MENISCECTOMY;  Left Knee Arthroscopic and Open Repair of Patellar Tendon with Platelet Rich Plasma;  Surgeon: Ley, Jeffrey Duane, MD;  Location: [facility]     COLONOSCOPY  Jan. 2006    diverticuli. Father had colon CA     COLONOSCOPY  2001     COLONOSCOPY  4/11/2011    Procedure:COLONOSCOPY; Surgeon:JENNIFER JEAN; Location:[facility]     COLONOSCOPY N/A 11/21/2016    Procedure: COLONOSCOPY;  Surgeon: Elias Santamaria MD;  Location: [facility]     REPAIR TENDON PATELLA  7/9/2013    Procedure: REPAIR TENDON PATELLA;;  Surgeon: Ley, Jeffrey Duane, MD;  Location: [facility]     SURGICAL HISTORY OF -   1999    (L) Herniorrhaphy     SURGICAL HISTORY OF -   2003    (R) Herniorrhaphy     SURGICAL HISTORY OF -   1981    Hemorrohid     Allergy     Allergies   Allergen Reactions     Acetaminophen W/Codeine Nausea     Current Medication List     Current Outpatient Prescriptions   Medication Sig     cephALEXin (KEFLEX) 500 MG capsule Take 1 capsule (500 mg) by mouth 4 times daily     folic acid (FOLVITE) 1 MG tablet Take 1 tablet (1 mg) by mouth daily     Ibuprofen (IBU-200 PO) Take  by mouth. 400mg as needed     lisinopril (PRINIVIL/ZESTRIL) 2.5 MG tablet Take 1 tablet (2.5 mg) by mouth daily     methotrexate sodium 2.5 MG TABS Take 17.5 mg by mouth once a week . Take all 7 tablets on the same day of each week.     MULTI-VITAMIN OR TABS 1 tab daily     simvastatin (ZOCOR) 80 MG tablet Take 0.5 tablets (40 mg) by mouth At Bedtime     tamsulosin (FLOMAX) 0.4 MG capsule Take 1 capsule (0.4 mg) by mouth daily     TYLENOL ARTHRITIS PAIN 650 MG OR  "TBCR 2 TABLETS EVERY 8 HOURS AS NEEDED     No current facility-administered medications for this visit.          Social History   See HPI    Family History     Family History   Problem Relation Age of Onset     Hypertension Mother      DIABETES Mother      CEREBROVASCULAR DISEASE Mother      in her 70s     Arthritis Mother      Cancer - colorectal Father 67     Arthritis Paternal Grandmother      Prostate Cancer No family hx of      Coronary Artery Disease No family hx of        Physical Exam     Temp Readings from Last 3 Encounters:   10/18/17 97.4  F (36.3  C) (Tympanic)   07/31/17 98  F (36.7  C) (Temporal)   02/13/17 100.6  F (38.1  C) (Oral)     BP Readings from Last 5 Encounters:   01/22/18 142/86   10/18/17 128/78   07/31/17 (!) 164/91   02/13/17 136/83   01/30/17 135/82     Pulse Readings from Last 1 Encounters:   01/22/18 73     Resp Readings from Last 1 Encounters:   12/06/17 16     Estimated body mass index is 30.69 kg/(m^2) as calculated from the following:    Height as of 1/22/18: 1.753 m (5' 9\").    Weight as of 5/20/18: 94.3 kg (207 lb 12.8 oz).    GEN: NAD  HEENT: MMM. No oral lesions.  Anicteric, noninjected sclera  CV: S1, S2. RRR. No m/r/g.  PULM: CTA bilaterally. No w/c.  MSK: MCPs, PIPs, wrists, elbows, shoulders, knees, and ankles without swelling or tenderness to palpation.  Negative MCP and MTP squeeze.  Hips nontender to palpation.  Heberden's and Portia's nodes present.  Palpable bump at the A1 pulley of the left second finger but no active triggering of this digit.  Bandage over the ulnar aspect of the right thumb just distal to the first CMC joint.  NEURO: UE and LE strengths 5/5 and equal bilaterally.   SKIN: No rash  EXT: No LE edema  PSYCH: Alert. Appropriate.    Labs / Imaging (select studies)     CBC  Recent Labs   Lab Test  05/16/18   1016  01/05/18   1252  09/06/17   1119   WBC  4.9  5.0  5.4   RBC  4.29*  4.65  4.65   HGB  14.4  15.7  15.4   HCT  40.5  44.9  44.2   MCV  94  97  " 95   RDW  12.9  13.1  12.6   PLT  141*  150  156   MCH  33.6*  33.8*  33.1*   MCHC  35.6  35.0  34.8   NEUTROPHIL  63.9  70.6  55.7   LYMPH  27.0  21.0  34.3   MONOCYTE  6.8  7.2  7.6   EOSINOPHIL  2.1  1.0  2.2   BASOPHIL  0.2  0.2  0.2   ANEU  3.1  3.5  3.0   ALYM  1.3  1.1  1.8   ERENDIRA  0.3  0.4  0.4   AEOS  0.1  0.1  0.1   ABAS  0.0  0.0  0.0     CMP  Recent Labs   Lab Test  05/16/18   1016  01/05/18   1252  09/06/17   1119  05/10/17   1245   NA   --   135  138  138   POTASSIUM   --   4.2  4.4  3.9   CHLORIDE   --   103  103  102   CO2   --   26  32  26   ANIONGAP   --   6  3  10   GLC   --   94  96  117*   BUN   --   13  12  16   CR  0.90  0.92  1.01  1.00   GFRESTIMATED  84  82  74  75   GFRESTBLACK  >90  >90  90  >90  African American GFR Calc     JOHN   --   9.3  9.2  8.9   BILITOTAL  1.0  0.9  1.0  1.0   ALBUMIN  3.7  4.1  4.0  3.9   PROTTOTAL  6.5*  7.1  7.0  7.0   ALKPHOS  48  55  55  56   AST  22  35  30  25   ALT  38  41  46  37     Calcium/VitaminD  Recent Labs   Lab Test  01/05/18   1252  09/06/17   1119  05/10/17   1245   JOHN  9.3  9.2  8.9     ESR/CRP  Recent Labs   Lab Test  05/16/18   1016  06/11/15   1220   SED  6  4   CRP  <2.9  <2.9     Hepatitis B  Recent Labs   Lab Test  05/16/18   1016   HBCAB  Nonreactive   HEPBANG  Nonreactive     Hepatitis C  Recent Labs   Lab Test  08/26/16   1213   HCVAB  Nonreactive   Assay performance characteristics have not been established for newborns,   infants, and children       Immunization History     Immunization History   Administered Date(s) Administered     Influenza (High Dose) 3 valent vaccine 10/18/2017     Influenza (IIV3) PF 11/17/2005, 12/04/2006, 11/22/2010, 09/26/2012, 10/01/2014     Influenza Vaccine IM 3yrs+ 4 Valent IIV4 10/15/2013, 12/17/2015, 08/26/2016     Pneumo Conj 13-V (2010&after) 10/18/2017     Pneumococcal 23 valent 01/22/2018     TDAP Vaccine (Adacel) 04/22/2009, 05/20/2018          Chart documentation done in part with Dragon Voice  recognition Software. Although reviewed after completion, some word and grammatical error may remain.    Adria Lopez MD

## 2018-05-30 ENCOUNTER — ALLIED HEALTH/NURSE VISIT (OUTPATIENT)
Dept: FAMILY MEDICINE | Facility: CLINIC | Age: 68
End: 2018-05-30
Payer: COMMERCIAL

## 2018-05-30 DIAGNOSIS — Z48.02 ENCOUNTER FOR REMOVAL OF SUTURES: Primary | ICD-10-CM

## 2018-05-30 PROCEDURE — 99207 ZZC NO CHARGE NURSE ONLY: CPT

## 2018-05-30 NOTE — MR AVS SNAPSHOT
After Visit Summary   5/30/2018    Pawan Bullard    MRN: [id number]           Patient Information     Date Of Birth          1950        Visit Information        Provider Department      5/30/2018 10:00 AM FL NB RN [facility]        Today's Diagnoses     Encounter for removal of sutures    -  1       Follow-ups after your visit        Your next 10 appointments already scheduled     Aug 20, 2018  1:30 PM CDT   LAB with NB LAB   [facility] ([facility])    [address] [place] [id number]   [phone]           Please do not eat 10-12 hours before your appointment if you are coming in fasting for labs on lipids, cholesterol, or glucose (sugar). This does not apply to pregnant women. Water, hot tea and black coffee (with nothing added) are okay. Do not drink other fluids, diet soda or chew gum.            Nov 19, 2018  1:15 PM CST   LAB with NB LAB   [facility] ([facility])    [address] [place] [phone]   [phone]           Please do not eat 10-12 hours before your appointment if you are coming in fasting for labs on lipids, cholesterol, or glucose (sugar). This does not apply to pregnant women. Water, hot tea and black coffee (with nothing added) are okay. Do not drink other fluids, diet soda or chew gum.            Nov 26, 2018  1:20 PM CST   Return Visit with Adria Lopez MD   [facility] [place] ([facility])    [address] [facility]  [place] [address] [phone]              Who to contact     If you have questions or need follow up information about today's clinic visit or your schedule please contact [facility] directly at [phone].  Normal or non-critical lab and imaging results will be communicated to you by MyChart, letter or phone within 4 business days after the clinic has received the results. If  "you do not hear from us within 7 days, please contact the clinic through Pocket Gems or phone. If you have a critical or abnormal lab result, we will notify you by phone as soon as possible.  Submit refill requests through Pocket Gems or call your pharmacy and they will forward the refill request to us. Please allow 3 business days for your refill to be completed.          Additional Information About Your Visit        Top Prospect[employer] Proof Information     Pocket Gems lets you send messages to your doctor, view your test results, renew your prescriptions, schedule appointments and more. To sign up, go to [email or web address] . Click on \"Log in\" on the left side of the screen, which will take you to the Welcome page. Then click on \"Sign up Now\" on the right side of the page.     You will be asked to enter the access code listed below, as well as some personal information. Please follow the directions to create your username and password.     Your access code is: [email or web address]  Expires: 2018  1:03 PM     Your access code will  in 90 days. If you need help or a new code, please call your [place] clinic or [phone].        Care EveryWhere ID     This is your Care EveryWhere ID. This could be used by other organizations to access your [place] medical records  [id number]         Blood Pressure from Last 3 Encounters:   18 126/80   18 142/86   10/18/17 128/78    Weight from Last 3 Encounters:   18 206 lb 9.6 oz (93.7 kg)   18 207 lb 12.8 oz (94.3 kg)   18 206 lb 6.4 oz (93.6 kg)              Today, you had the following     No orders found for display       Primary Care Provider Office Phone # Fax #    Armando Finley [phone] [address] [place] [address]        Equal Access to Services     [facility] ISHAN : Chanda David, wajoaquin johns, qaybta faisalalkathy gong . [facility] [phone].    ATENCIÓN: Si " saumya arora, tiene a haley disposición servicios gratuitos de asistencia lingüística. Hilario bonilla [phone].    We comply with applicable federal civil rights laws and [place] laws. We do not discriminate on the basis of race, color, national origin, age, disability, sex, sexual orientation, or gender identity.            Thank you!     Thank you for choosing [facility]  for your care. Our goal is always to provide you with excellent care. Hearing back from our patients is one way we can continue to improve our services. Please take a few minutes to complete the written survey that you may receive in the mail after your visit with us. Thank you!             Your Updated Medication List - Protect others around you: Learn how to safely use, store and throw away your medicines at www.disposemymeds.org.          This list is accurate as of 5/30/18  3:20 PM.  Always use your most recent med list.                   Brand Name Dispense Instructions for use Diagnosis    cephALEXin 500 MG capsule    KEFLEX    40 capsule    Take 1 capsule (500 mg) by mouth 4 times daily    Laceration of right thumb without foreign body without damage to nail, initial encounter       folic acid 1 MG tablet    FOLVITE    100 tablet    Take 1 tablet (1 mg) by mouth daily    Psoriatic arthropathy (H)       IBU-200 PO      Take  by mouth. 400mg as needed        lisinopril 2.5 MG tablet    PRINIVIL/Zestril    90 tablet    Take 1 tablet (2.5 mg) by mouth daily    Essential hypertension with goal blood pressure less than 140/90       methotrexate sodium 2.5 MG Tabs     84 tablet    Take 17.5 mg by mouth once a week . Take all 7 tablets on the same day of each week.    Psoriatic arthropathy (H)       Multi-vitamin Tabs tablet   Generic drug:  multivitamin, therapeutic with minerals      1 tab daily        PROBIOTIC DAILY PO           simvastatin 80 MG tablet    ZOCOR    45 tablet    Take 0.5 tablets (40 mg) by mouth At Bedtime     Hyperlipidemia LDL goal <130       tamsulosin 0.4 MG capsule    FLOMAX    30 capsule    Take 1 capsule (0.4 mg) by mouth daily    Benign prostatic hyperplasia with nocturia       TYLENOL ARTHRITIS PAIN 650 MG CR tablet   Generic drug:  acetaminophen     60    2 TABLETS EVERY 8 HOURS AS NEEDED

## 2018-05-30 NOTE — PROGRESS NOTES
Pawan Bullard presents to the clinic for removal of sutures. The patient has had sutures in place for 10 days. There has been no patient reported signs or symptoms of infection or drainage. 3  sutures   are seen and located on the right thumb area. Tetanus status is up to date. All sutures and sutures,staples, steri strips were easily removed today. Routine wound care discussed by the RN . The patient will follow up as needed.  Ciara Mendez RN

## 2018-08-20 DIAGNOSIS — L40.50 PSORIATIC ARTHROPATHY (H): ICD-10-CM

## 2018-08-20 LAB
ALBUMIN SERPL-MCNC: 3.9 G/DL (ref 3.4–5)
ALP SERPL-CCNC: 55 U/L (ref 40–150)
ALT SERPL W P-5'-P-CCNC: 55 U/L (ref 0–70)
AST SERPL W P-5'-P-CCNC: 33 U/L (ref 0–45)
BASOPHILS # BLD AUTO: 0 10E9/L (ref 0–0.2)
BASOPHILS NFR BLD AUTO: 0.2 %
BILIRUB DIRECT SERPL-MCNC: 0.2 MG/DL (ref 0–0.2)
BILIRUB SERPL-MCNC: 0.9 MG/DL (ref 0.2–1.3)
CREAT SERPL-MCNC: 1.05 MG/DL (ref 0.66–1.25)
DIFFERENTIAL METHOD BLD: ABNORMAL
EOSINOPHIL # BLD AUTO: 0.1 10E9/L (ref 0–0.7)
EOSINOPHIL NFR BLD AUTO: 2.2 %
ERYTHROCYTE [DISTWIDTH] IN BLOOD BY AUTOMATED COUNT: 13.2 % (ref 10–15)
GFR SERPL CREATININE-BSD FRML MDRD: 71 ML/MIN/1.7M2
HCT VFR BLD AUTO: 44.2 % (ref 40–53)
HGB BLD-MCNC: 15.5 G/DL (ref 13.3–17.7)
LYMPHOCYTES # BLD AUTO: 1.2 10E9/L (ref 0.8–5.3)
LYMPHOCYTES NFR BLD AUTO: 24.4 %
MCH RBC QN AUTO: 33.9 PG (ref 26.5–33)
MCHC RBC AUTO-ENTMCNC: 35.1 G/DL (ref 31.5–36.5)
MCV RBC AUTO: 97 FL (ref 78–100)
MONOCYTES # BLD AUTO: 0.4 10E9/L (ref 0–1.3)
MONOCYTES NFR BLD AUTO: 7.8 %
NEUTROPHILS # BLD AUTO: 3.3 10E9/L (ref 1.6–8.3)
NEUTROPHILS NFR BLD AUTO: 65.4 %
PLATELET # BLD AUTO: 138 10E9/L (ref 150–450)
PROT SERPL-MCNC: 6.6 G/DL (ref 6.8–8.8)
RBC # BLD AUTO: 4.57 10E12/L (ref 4.4–5.9)
WBC # BLD AUTO: 5 10E9/L (ref 4–11)

## 2018-08-20 PROCEDURE — 82565 ASSAY OF CREATININE: CPT | Performed by: INTERNAL MEDICINE

## 2018-08-20 PROCEDURE — 80076 HEPATIC FUNCTION PANEL: CPT | Performed by: INTERNAL MEDICINE

## 2018-08-20 PROCEDURE — 36415 COLL VENOUS BLD VENIPUNCTURE: CPT | Performed by: INTERNAL MEDICINE

## 2018-08-20 PROCEDURE — 85025 COMPLETE CBC W/AUTO DIFF WBC: CPT | Performed by: INTERNAL MEDICINE

## 2018-09-10 ENCOUNTER — TELEPHONE (OUTPATIENT)
Dept: RHEUMATOLOGY | Facility: CLINIC | Age: 68
End: 2018-09-10

## 2018-09-10 NOTE — TELEPHONE ENCOUNTER
Reason for Call:  Request for results:    Name of test or procedure: Blood work    Date of test of procedure: 08/20/18    Location of the test or procedure: [place]    OK to leave the result message on voice mail or with a family member? YES    Phone number Patient can be reached at:  Home number on file [phone] (home)    Additional comments: ASAP    Call taken on 9/10/2018 at 10:29 AM by Virginie Ford

## 2018-09-12 NOTE — TELEPHONE ENCOUNTER
Rheumatology team: Please call to notify Mr. Bullard that labs were normal except for a reduced platelet count of 138 that is stable compared to the last labs.     Adria Lopez MD  9/12/2018 6:23 AM

## 2018-09-12 NOTE — TELEPHONE ENCOUNTER
Contacted Pt, reviewed lab work.  Pt had no questions or concerns, agrees and understands.  Lab work mailed to Pt per his request.    Haleigh Sanderson CMA  9/12/2018  11:13 AM

## 2018-09-21 ENCOUNTER — OFFICE VISIT (OUTPATIENT)
Dept: FAMILY MEDICINE | Facility: CLINIC | Age: 68
End: 2018-09-21
Payer: COMMERCIAL

## 2018-09-21 ENCOUNTER — RADIANT APPOINTMENT (OUTPATIENT)
Dept: GENERAL RADIOLOGY | Facility: CLINIC | Age: 68
End: 2018-09-21
Attending: FAMILY MEDICINE
Payer: COMMERCIAL

## 2018-09-21 VITALS
RESPIRATION RATE: 14 BRPM | SYSTOLIC BLOOD PRESSURE: 138 MMHG | HEART RATE: 66 BPM | DIASTOLIC BLOOD PRESSURE: 78 MMHG | WEIGHT: 206.4 LBS | BODY MASS INDEX: 30.48 KG/M2 | TEMPERATURE: 98.3 F

## 2018-09-21 DIAGNOSIS — R07.81 RIB PAIN ON LEFT SIDE: Primary | ICD-10-CM

## 2018-09-21 DIAGNOSIS — Z23 NEED FOR PROPHYLACTIC VACCINATION AND INOCULATION AGAINST INFLUENZA: ICD-10-CM

## 2018-09-21 DIAGNOSIS — E78.5 HYPERLIPIDEMIA LDL GOAL <130: ICD-10-CM

## 2018-09-21 DIAGNOSIS — I10 ESSENTIAL HYPERTENSION WITH GOAL BLOOD PRESSURE LESS THAN 140/90: ICD-10-CM

## 2018-09-21 DIAGNOSIS — R35.1 BENIGN PROSTATIC HYPERPLASIA WITH NOCTURIA: ICD-10-CM

## 2018-09-21 DIAGNOSIS — N40.1 BENIGN PROSTATIC HYPERPLASIA WITH NOCTURIA: ICD-10-CM

## 2018-09-21 DIAGNOSIS — R07.81 RIB PAIN ON LEFT SIDE: ICD-10-CM

## 2018-09-21 PROCEDURE — 90662 IIV NO PRSV INCREASED AG IM: CPT | Performed by: FAMILY MEDICINE

## 2018-09-21 PROCEDURE — G0008 ADMIN INFLUENZA VIRUS VAC: HCPCS | Performed by: FAMILY MEDICINE

## 2018-09-21 PROCEDURE — 71101 X-RAY EXAM UNILAT RIBS/CHEST: CPT | Mod: LT

## 2018-09-21 PROCEDURE — 99213 OFFICE O/P EST LOW 20 MIN: CPT | Mod: 25 | Performed by: FAMILY MEDICINE

## 2018-09-21 RX ORDER — SIMVASTATIN 80 MG
40 TABLET ORAL AT BEDTIME
Qty: 45 TABLET | Refills: 3 | Status: SHIPPED | OUTPATIENT
Start: 2018-09-21 | End: 2019-09-26

## 2018-09-21 RX ORDER — TAMSULOSIN HYDROCHLORIDE 0.4 MG/1
0.4 CAPSULE ORAL DAILY
Qty: 90 CAPSULE | Refills: 3 | Status: SHIPPED | OUTPATIENT
Start: 2018-09-21 | End: 2019-10-07

## 2018-09-21 RX ORDER — LISINOPRIL 2.5 MG/1
2.5 TABLET ORAL DAILY
Qty: 90 TABLET | Refills: 3 | Status: SHIPPED | OUTPATIENT
Start: 2018-09-21 | End: 2019-09-26

## 2018-09-21 ASSESSMENT — PAIN SCALES - GENERAL: PAINLEVEL: MILD PAIN (2)

## 2018-09-21 NOTE — NURSING NOTE
"Chief Complaint   Patient presents with     Chest Pain     Left side Rib Pain       Initial /78 (BP Location: Right arm, Patient Position: Sitting, Cuff Size: Adult Large)  Pulse 66  Temp 98.3  F (36.8  C) (Tympanic)  Resp 14  Wt 206 lb 6.4 oz (93.6 kg)  BMI 30.48 kg/m2 Estimated body mass index is 30.48 kg/(m^2) as calculated from the following:    Height as of 5/21/18: 5' 9\" (1.753 m).    Weight as of this encounter: 206 lb 6.4 oz (93.6 kg).    Patient presents to the clinic using No DME    Health Maintenance that is potentially due pending provider review:  NONE    n/a    Is there anyone who you would like to be able to receive your results? No  If yes have patient fill out CURT  Tara Workman CMA      "

## 2018-09-21 NOTE — PROGRESS NOTES
Please call.  Ribs all look normal.  No bony abnormality. Again, I think the pain is in muscles and ligaments and not the bones or internal organs.

## 2018-09-21 NOTE — MR AVS SNAPSHOT
After Visit Summary   9/21/2018    Pawan Bullard    MRN: [id number]           Patient Information     Date Of Birth          1950        Visit Information        Provider Department      9/21/2018 10:40 AM Armando Finley MD [facility]        Today's Diagnoses     Rib pain on left side    -  1    Need for prophylactic vaccination and inoculation against influenza        Benign prostatic hyperplasia with nocturia        Hyperlipidemia LDL goal <130        Essential hypertension with goal blood pressure less than 140/90          Care Instructions    ASSESSMENT:       ICD-10-CM    1. Rib pain on left side R07.81 XR Ribs & Chest Lt 3v   2. Need for prophylactic vaccination and inoculation against influenza Z23 FLU VACCINE, INCREASED ANTIGEN, PRESV FREE, AGE 65+ [10091]     Vaccine Administration, Initial [75267]      I do not think this pain is from internal organs but from rib area-likely muscles around ribs.    PLAN: Check chest x-ray and rib xrays.   OK to take ibuprofen or Aleve as needed.   Sometimes we can do physical therapy referral for some exercises to strengthen muscles in that area for pain.   Medications refilled.           Follow-ups after your visit        Your next 10 appointments already scheduled     Nov 19, 2018  1:15 PM CST   LAB with [facility]   [facility]    [address] [place] [id number] [phone]           Please do not eat 10-12 hours before your appointment if you are coming in fasting for labs on lipids, cholesterol, or glucose (sugar). This does not apply to pregnant women. Water, hot tea and black coffee (with nothing added) are okay. Do not drink other fluids, diet soda or chew gum.            Nov 26, 2018  1:20 PM CST   Return Visit with Adria Lopez MD   [facility]    [address]  [place] [id number] [phone]               Future tests that were ordered for you today     Open Future Orders        Priority Expected Expires Ordered    XR Ribs & Chest Lt 3v Routine 9/21/2018 9/21/2019 9/21/2018            Who to contact     If you have questions or need follow up information about today's clinic visit or your schedule please contact [facility] directly at [phone].  Normal or non-critical lab and imaging results will be communicated to you by MyChart, letter or phone within 4 business days after the clinic has received the results. If you do not hear from us within 7 days, please contact the clinic through MyChart or phone. If you have a critical or abnormal lab result, we will notify you by phone as soon as possible.  Submit refill requests through [employer] or call your pharmacy and they will forward the refill request to us. Please allow 3 business days for your refill to be completed.          Additional Information About Your Visit        Care EveryWhere ID     This is your Care EveryWhere ID. This could be used by other organizations to access your [place] medical records  [id number]        Your Vitals Were     Pulse Temperature Respirations BMI (Body Mass Index)          66 98.3  F (36.8  C) (Tympanic) 14 30.48 kg/m2         Blood Pressure from Last 3 Encounters:   09/21/18 138/78   05/21/18 126/80   01/22/18 142/86    Weight from Last 3 Encounters:   09/21/18 206 lb 6.4 oz (93.6 kg)   05/21/18 206 lb 9.6 oz (93.7 kg)   05/20/18 207 lb 12.8 oz (94.3 kg)              We Performed the Following     FLU VACCINE, INCREASED ANTIGEN, PRESV FREE, AGE 65+ [99819]     Vaccine Administration, Initial [31664]          Where to get your medicines      These medications were sent to [id number] - [place], [place] - [address]  [address] [facility] [address]    Hours:  Closed 10-16-08 business to [facility] Phone:  [phone]     lisinopril 2.5 MG tablet    simvastatin 80 MG  tablet    tamsulosin 0.4 MG capsule          Primary Care Provider Office Phone # Fax #    Armando Finley [phone] [address] [place] [id number]        Equal Access to Services     NINA OLMSTEAD : Hadkarlene amado juarez mary lou David, waaxda luqadaha, qaybta kaalmada adelenada, kathy acevedo malia gamez. So [facility] [phone].    ATENCIÓN: Si habla español, tiene a haley disposición servicios gratuitos de asistencia lingüística. Llame al [phone].    We comply with applicable federal civil rights laws and [place] laws. We do not discriminate on the basis of race, color, national origin, age, disability, sex, sexual orientation, or gender identity.            Thank you!     Thank you for choosing [facility]  for your care. Our goal is always to provide you with excellent care. Hearing back from our patients is one way we can continue to improve our services. Please take a few minutes to complete the written survey that you may receive in the mail after your visit with us. Thank you!             Your Updated Medication List - Protect others around you: Learn how to safely use, store and throw away your medicines at www.disposemymeds.org.          This list is accurate as of 9/21/18 11:50 AM.  Always use your most recent med list.                   Brand Name Dispense Instructions for use Diagnosis    folic acid 1 MG tablet    FOLVITE    100 tablet    Take 1 tablet (1 mg) by mouth daily    Psoriatic arthropathy (H)       IBU-200 PO      Take  by mouth. 400mg as needed        lisinopril 2.5 MG tablet    PRINIVIL/Zestril    90 tablet    Take 1 tablet (2.5 mg) by mouth daily    Essential hypertension with goal blood pressure less than 140/90       methotrexate sodium 2.5 MG Tabs     84 tablet    Take 17.5 mg by mouth once a week . Take all 7 tablets on the same day of each week.    Psoriatic arthropathy (H)       Multi-vitamin Tabs tablet   Generic drug:   multivitamin, therapeutic with minerals      1 tab daily        PROBIOTIC DAILY PO           simvastatin 80 MG tablet    ZOCOR    45 tablet    Take 0.5 tablets (40 mg) by mouth At Bedtime    Hyperlipidemia LDL goal <130       tamsulosin 0.4 MG capsule    FLOMAX    90 capsule    Take 1 capsule (0.4 mg) by mouth daily    Benign prostatic hyperplasia with nocturia       TYLENOL ARTHRITIS PAIN 650 MG CR tablet   Generic drug:  acetaminophen     60    2 TABLETS EVERY 8 HOURS AS NEEDED

## 2018-09-21 NOTE — PROGRESS NOTES
SUBJECTIVE:   Pawan Bullard is a 68 year old male who presents to clinic today for the following health issues:  Chief Complaint   Patient presents with     Chest Pain     Left side Rib Pain     Flu Shot        Rib Pain on Left side      Duration: 1 month, but has happened in the past and has gone away eventually so its reoccuring. Patient wondering if it is related to the Methotrexate??  Seemed to correlate with increasing the dose.   Now on 7 pills once weekly.     Description (location/character/radiation): Starts out with a stabbing pain today its a slight pain under rib cage.    Intensity:  mild    Accompanying signs and symptoms: NA    History (similar episodes/previous evaluation): Has had this pain in the past    Precipitating or alleviating factors: Not move around alot    Therapies tried and outcome: None     Location: under ribs left side axillary line.    Quality: Sharp pain  Triggering factors: sitting drinking coffee in the AM watching the news.  Sometimes when lying down at night.  Notes only a little during the day.  Not every day.  NOt much with much movement.   Duration: a few minutes.   Onset of symptoms: Has had intermittently in the past perhaps a year ago.   Alleviating factors: has not taken medication lying on opposite side.     Patient Active Problem List   Diagnosis     Psoriatic arthropathy (H)     Esophageal reflux     Hypertension goal BP (blood pressure) < 140/90     FAMILY HISTORY OF GI NEOPLASM     FAMILY HISTORY OF DIABETES MELLITUS     Impotence of organic origin     Chondrocalcinosis     HYPERLIPIDEMIA LDL GOAL <130     Osteoarthritis     Advanced directives, counseling/discussion     High risk medications (not anticoagulants) long-term use      ROS:General: No change in weight, sleep or appetite.  Normal energy.  No fever or chills  Resp: No coughing, wheezing or shortness of breath  CV: No chest pains or palpitations  GI: No nausea, vomiting,  heartburn, abdominal pain,  diarrhea, constipation or change in bowel habits  : No urinary frequency or dysuria, bladder or kidney problems  Musculoskeletal: POSITIVE  for:, history of rheumatoid arthritis.  Some pain in knees.  Has rheumatoid arthritis in hands.     OBJECTIVE:Blood pressure 138/78, pulse 66, temperature 98.3  F (36.8  C), temperature source Tympanic, resp. rate 14, weight 206 lb 6.4 oz (93.6 kg). BMI=Body mass index is 30.48 kg/(m^2).  GENERAL APPEARANCE ADULT: Alert, no acute distress  NECK: No adenopathy,masses or thyromegaly  RESP: lungs clear to auscultation   CV: normal rate, regular rhythm, no murmur or gallop  ABDOMEN: soft, no organomegaly, masses or tenderness  MS: extremities normal, no peripheral edema  Tender rib margin lower left side.  He cannot reproduce with various movements.      ASSESSMENT:       ICD-10-CM    1. Rib pain on left side R07.81 XR Ribs & Chest Lt 3v   2. Need for prophylactic vaccination and inoculation against influenza Z23 FLU VACCINE, INCREASED ANTIGEN, PRESV FREE, AGE 65+ [01379]     Vaccine Administration, Initial [38639]      I do not think this pain is from internal organs but from rib area-likely muscles around ribs.    PLAN: Check chest x-ray and rib xrays.   OK to take ibuprofen or Aleve as needed.   Sometimes we can do physical therapy referral for some exercises to strengthen muscles in that area for pain.   Medications refilled.   ========================================================  Injectable Influenza Immunization Documentation    1.  Is the person to be vaccinated sick today?   No    2. Does the person to be vaccinated have an allergy to a component   of the vaccine?   No  Egg Allergy Algorithm Link    3. Has the person to be vaccinated ever had a serious reaction   to influenza vaccine in the past?   No    4. Has the person to be vaccinated ever had Guillain-Barré syndrome?   No    Form completed by Tara Workman CMA    Prior to injection verified patient identity  using patient's name and date of birth.  Due to injection administration, patient instructed to remain in clinic for 15 minutes  afterwards, and to report any adverse reaction to me immediately.    Tara Workman, CMA

## 2018-09-21 NOTE — PATIENT INSTRUCTIONS
ASSESSMENT:       ICD-10-CM    1. Rib pain on left side R07.81 XR Ribs & Chest Lt 3v   2. Need for prophylactic vaccination and inoculation against influenza Z23 FLU VACCINE, INCREASED ANTIGEN, PRESV FREE, AGE 65+ [46378]     Vaccine Administration, Initial [04594]      I do not think this pain is from internal organs but from rib area-likely muscles around ribs.    PLAN: Check chest x-ray and rib xrays.   OK to take ibuprofen or Aleve as needed.   Sometimes we can do physical therapy referral for some exercises to strengthen muscles in that area for pain.   Medications refilled.

## 2018-09-29 ENCOUNTER — HOSPITAL ENCOUNTER (EMERGENCY)
Facility: CLINIC | Age: 68
Discharge: HOME | End: 2018-09-29
Attending: FAMILY MEDICINE | Admitting: FAMILY MEDICINE
Payer: MEDICARE

## 2018-09-29 VITALS
DIASTOLIC BLOOD PRESSURE: 95 MMHG | TEMPERATURE: 97.8 F | BODY MASS INDEX: 29.53 KG/M2 | OXYGEN SATURATION: 95 % | WEIGHT: 200 LBS | RESPIRATION RATE: 8 BRPM | HEART RATE: 72 BPM | SYSTOLIC BLOOD PRESSURE: 146 MMHG

## 2018-09-29 DIAGNOSIS — M94.0 COSTOCHONDRITIS: ICD-10-CM

## 2018-09-29 LAB
ANION GAP SERPL CALCULATED.3IONS-SCNC: 6 MMOL/L (ref 3–14)
BASOPHILS # BLD AUTO: 0 10E9/L (ref 0–0.2)
BASOPHILS NFR BLD AUTO: 0.2 %
BUN SERPL-MCNC: 13 MG/DL (ref 7–30)
CALCIUM SERPL-MCNC: 8.8 MG/DL (ref 8.5–10.1)
CHLORIDE SERPL-SCNC: 104 MMOL/L (ref 94–109)
CO2 SERPL-SCNC: 29 MMOL/L (ref 20–32)
CREAT SERPL-MCNC: 1.07 MG/DL (ref 0.66–1.25)
D DIMER PPP FEU-MCNC: 0.6 UG/ML FEU (ref 0–0.5)
DIFFERENTIAL METHOD BLD: ABNORMAL
EOSINOPHIL # BLD AUTO: 0.1 10E9/L (ref 0–0.7)
EOSINOPHIL NFR BLD AUTO: 2.5 %
ERYTHROCYTE [DISTWIDTH] IN BLOOD BY AUTOMATED COUNT: 12.4 % (ref 10–15)
GFR SERPL CREATININE-BSD FRML MDRD: 69 ML/MIN/1.7M2
GLUCOSE SERPL-MCNC: 96 MG/DL (ref 70–99)
HCT VFR BLD AUTO: 45.7 % (ref 40–53)
HGB BLD-MCNC: 16 G/DL (ref 13.3–17.7)
IMM GRANULOCYTES # BLD: 0 10E9/L (ref 0–0.4)
IMM GRANULOCYTES NFR BLD: 0.4 %
LYMPHOCYTES # BLD AUTO: 1.2 10E9/L (ref 0.8–5.3)
LYMPHOCYTES NFR BLD AUTO: 24.9 %
MCH RBC QN AUTO: 33.5 PG (ref 26.5–33)
MCHC RBC AUTO-ENTMCNC: 35 G/DL (ref 31.5–36.5)
MCV RBC AUTO: 96 FL (ref 78–100)
MONOCYTES # BLD AUTO: 0.4 10E9/L (ref 0–1.3)
MONOCYTES NFR BLD AUTO: 7.4 %
NEUTROPHILS # BLD AUTO: 3.2 10E9/L (ref 1.6–8.3)
NEUTROPHILS NFR BLD AUTO: 64.6 %
NRBC # BLD AUTO: 0 10*3/UL
NRBC BLD AUTO-RTO: 0 /100
PLATELET # BLD AUTO: 151 10E9/L (ref 150–450)
POTASSIUM SERPL-SCNC: 3.7 MMOL/L (ref 3.4–5.3)
RBC # BLD AUTO: 4.77 10E12/L (ref 4.4–5.9)
SODIUM SERPL-SCNC: 139 MMOL/L (ref 133–144)
TROPONIN I SERPL-MCNC: <0.015 UG/L (ref 0–0.04)
WBC # BLD AUTO: 4.9 10E9/L (ref 4–11)

## 2018-09-29 PROCEDURE — 80048 BASIC METABOLIC PNL TOTAL CA: CPT | Performed by: FAMILY MEDICINE

## 2018-09-29 PROCEDURE — 85379 FIBRIN DEGRADATION QUANT: CPT | Performed by: FAMILY MEDICINE

## 2018-09-29 PROCEDURE — 93010 ELECTROCARDIOGRAM REPORT: CPT | Mod: Z6 | Performed by: FAMILY MEDICINE

## 2018-09-29 PROCEDURE — 85025 COMPLETE CBC W/AUTO DIFF WBC: CPT | Performed by: FAMILY MEDICINE

## 2018-09-29 PROCEDURE — 99285 EMERGENCY DEPT VISIT HI MDM: CPT | Mod: 25 | Performed by: FAMILY MEDICINE

## 2018-09-29 PROCEDURE — 99284 EMERGENCY DEPT VISIT MOD MDM: CPT | Performed by: FAMILY MEDICINE

## 2018-09-29 PROCEDURE — 93005 ELECTROCARDIOGRAM TRACING: CPT | Performed by: FAMILY MEDICINE

## 2018-09-29 PROCEDURE — 25000132 ZZH RX MED GY IP 250 OP 250 PS 637: Mod: GY | Performed by: FAMILY MEDICINE

## 2018-09-29 PROCEDURE — A9270 NON-COVERED ITEM OR SERVICE: HCPCS | Mod: GY | Performed by: FAMILY MEDICINE

## 2018-09-29 PROCEDURE — 84484 ASSAY OF TROPONIN QUANT: CPT | Performed by: FAMILY MEDICINE

## 2018-09-29 RX ADMIN — IBUPROFEN 600 MG: 400 TABLET ORAL at 11:31

## 2018-09-29 NOTE — ED AVS SNAPSHOT
[facility] Department    [address] [facility] [address]    Phone:  [phone]    Fax:  [phone]                                       Pawan Bullard   MRN: [id number]    Department:  [facility] Emergency Department   Date of Visit:  9/29/2018           After Visit Summary Signature Page     I have received my discharge instructions, and my questions have been answered. I have discussed any challenges I see with this plan with the nurse or doctor.    ..........................................................................................................................................  Patient/Patient Representative Signature      ..........................................................................................................................................  Patient Representative Print Name and Relationship to Patient    ..................................................               ................................................  Date                                   Time    ..........................................................................................................................................  Reviewed by Signature/Title    ...................................................              ..............................................  Date                                               Time          22EPIC Rev 08/18

## 2018-09-29 NOTE — ED PROVIDER NOTES
History     Chief Complaint   Patient presents with     Chest Pain     1 wk worsening after straining today     HPI    Pawan Bullard is a 68 year old male with history of hypertension, psoriatic arthropathy, osteoarthritis, hyperlipemia, and chondrocalcinosis who presents to the ED with chest pain. The patient reports the last few nights he has had chest tightness waxing and waning in the evening. The patient reports when taking a deep breath, it is difficult to breath properly. He reports the pain is in the center of the chest. He reports he first felt the chest pain when pushing during a bowel movement about one week ago. The patient reports walking up stairs can aggravate the pain, but the pain does not make him feel nauseated, sweaty or lightheaded and may occur at rest, particularly at bedtime when laying down to sleep.  He reports the only lifting he has recently done was lifting his grandchildren. The patient was seen in the clinic by Dr. Finley eight days ago with pain under ribs left side. During this visit he has a chest x-ray done, impression below. The patient denies problems urinating, abdominal pain, a fever, and a cough. The patient reports he has not taken anything for pain. The patient reports he sometimes had trouble with bowel movements, but other days he has multiple bowel movements throughout the day. The patient does not smoke. The patient takes lisinopril, zocor, and flomax.     XR Ribs and Chest 9/21/2018  IMPRESSION: A single view the chest shows no acute or active  cardiopulmonary disease. Two views of the left ribs show no fracture  or other significant osseous finding.     Problem List:    Patient Active Problem List    Diagnosis Date Noted     High risk medications (not anticoagulants) long-term use 03/09/2015     Priority: Medium     Advanced directives, counseling/discussion 03/04/2013     Priority: Medium     advanced care discussed form given to pt.           Osteoarthritis  2011     Priority: Medium     HYPERLIPIDEMIA LDL GOAL <130 10/31/2010     Priority: Medium     Chondrocalcinosis 02/10/2010     Priority: Medium     FAMILY HISTORY OF DIABETES MELLITUS 2006     Priority: Medium     mother       Impotence of organic origin 2006     Priority: Medium     FAMILY HISTORY OF GI NEOPLASM 2005     Priority: Medium     father - colon CA ,  at  67       Esophageal reflux 2005     Priority: Medium     GERD       Hypertension goal BP (blood pressure) < 140/90 2005     Priority: Medium     Psoriatic arthropathy (H) 2005     Priority: Medium        Past Medical History:    Past Medical History:   Diagnosis Date     Esophageal reflux      Hypertension      Psoriatic arthropathy (H)        Past Surgical History:    Past Surgical History:   Procedure Laterality Date     ARTHROSCOPY KNEE RT/LT  2009    left knee     ARTHROSCOPY KNEE WITH MEDIAL MENISCECTOMY  2013    Procedure: ARTHROSCOPY KNEE WITH MEDIAL MENISCECTOMY;  Left Knee Arthroscopic and Open Repair of Patellar Tendon with Platelet Rich Plasma;  Surgeon: Ley, Jeffrey Duane, MD;  Location: [facility]     COLONOSCOPY  2006    diverticuli. Father had colon CA     COLONOSCOPY       COLONOSCOPY  2011    Procedure:COLONOSCOPY; Surgeon:JENNIFER JEAN; Location:[facility]     COLONOSCOPY N/A 2016    Procedure: COLONOSCOPY;  Surgeon: Elias Santamaria MD;  Location: [facility]     REPAIR TENDON PATELLA  2013    Procedure: REPAIR TENDON PATELLA;;  Surgeon: Ley, Jeffrey Duane, MD;  Location: [facility]     SURGICAL HISTORY OF -       (L) Herniorrhaphy     SURGICAL HISTORY OF -       (R) Herniorrhaphy     SURGICAL HISTORY OF -       Hemorrohid       Family History:    Family History   Problem Relation Age of Onset     Hypertension Mother      Diabetes Mother      Cerebrovascular Disease Mother      in her 70s     Arthritis Mother      Cancer - colorectal Father 67     Arthritis  Paternal Grandmother      Prostate Cancer No family hx of      Coronary Artery Disease No family hx of        Social History:  Marital Status:   [2]  Social History   Substance Use Topics     Smoking status: Never Smoker     Smokeless tobacco: Never Used     Alcohol use Yes      Comment:  0-2 beer's or mixed drink's monthly        Medications:      folic acid (FOLVITE) 1 MG tablet   Ibuprofen (IBU-200 PO)   lisinopril (PRINIVIL/ZESTRIL) 2.5 MG tablet   MULTI-VITAMIN OR TABS   Probiotic Product (PROBIOTIC DAILY PO)   simvastatin (ZOCOR) 80 MG tablet   tamsulosin (FLOMAX) 0.4 MG capsule   TYLENOL ARTHRITIS PAIN 650 MG OR TBCR   methotrexate sodium 2.5 MG TABS         Review of Systems  All other systems are reviewed and are negative    Physical Exam   BP: (!) 149/92  Pulse: 72  Heart Rate: 70  Temp: 97.8  F (36.6  C)  Resp: 20  Weight: 90.7 kg (200 lb)  SpO2: 99 %      Physical Exam     Nursing note and vitals were reviewed.  Constitutional: Awake and alert, adequately nourished and developed appearing 68-year-old in no apparent discomfort, who does not appear acutely ill, and who answers questions appropriately and cooperates with examination.  HEENT: EACs clear.  TMs normal.  Oropharynx unremarkable.  EOMI.   Neck: Freely mobile.  Cardiovascular: Cardiac examination reveals normal heart rate and regular rhythm without murmur.  Pulmonary/Chest: Breathing is unlabored.  Breath sounds are clear and equal bilaterally.  There no retractions, tachypnea, rales, wheezes, or rhonchi.  Chest wall is tender to palpation in the left costochondral junctions in the parasternal region, reproducing his symptoms.  No underlying erythema, swelling, joint effusions.  Abdomen: Soft, nontender, no HSM or masses rebound or guarding.  Musculoskeletal: Extremities are warm and well-perfused and without edema  Neurological: Alert, oriented, thought content logical, coherent   Skin: Warm, dry, no rashes.  Psychiatric: Affect broad and  appropriate.    ED Course     ED Course     Procedures               EKG Interpretation:      Interpreted by Nixon Soto  Time reviewed: 10:38  Symptoms at time of EKG: chest pain   Rhythm: normal sinus   Rate: normal  Axis: normal  Ectopy: none  Conduction: normal  ST Segments/ T Waves: No ST-T wave changes  Q Waves: none  Comparison to prior: Compared to the EKG from 7/1/13, first-degree AV block has resolved, otherwise no changes.    Clinical Impression: normal EKG          Critical Care time:  none               Results for orders placed or performed during the hospital encounter of 09/29/18 (from the past 24 hour(s))   CBC with platelets differential   Result Value Ref Range    WBC 4.9 4.0 - 11.0 10e9/L    RBC Count 4.77 4.4 - 5.9 10e12/L    Hemoglobin 16.0 13.3 - 17.7 g/dL    Hematocrit 45.7 40.0 - 53.0 %    MCV 96 78 - 100 fl    MCH 33.5 (H) 26.5 - 33.0 pg    MCHC 35.0 31.5 - 36.5 g/dL    RDW 12.4 10.0 - 15.0 %    Platelet Count 151 150 - 450 10e9/L    Diff Method Automated Method     % Neutrophils 64.6 %    % Lymphocytes 24.9 %    % Monocytes 7.4 %    % Eosinophils 2.5 %    % Basophils 0.2 %    % Immature Granulocytes 0.4 %    Nucleated RBCs 0 0 /100    Absolute Neutrophil 3.2 1.6 - 8.3 10e9/L    Absolute Lymphocytes 1.2 0.8 - 5.3 10e9/L    Absolute Monocytes 0.4 0.0 - 1.3 10e9/L    Absolute Eosinophils 0.1 0.0 - 0.7 10e9/L    Absolute Basophils 0.0 0.0 - 0.2 10e9/L    Abs Immature Granulocytes 0.0 0 - 0.4 10e9/L    Absolute Nucleated RBC 0.0    Basic metabolic panel   Result Value Ref Range    Sodium 139 133 - 144 mmol/L    Potassium 3.7 3.4 - 5.3 mmol/L    Chloride 104 94 - 109 mmol/L    Carbon Dioxide 29 20 - 32 mmol/L    Anion Gap 6 3 - 14 mmol/L    Glucose 96 70 - 99 mg/dL    Urea Nitrogen 13 7 - 30 mg/dL    Creatinine 1.07 0.66 - 1.25 mg/dL    GFR Estimate 69 >60 mL/min/1.7m2    GFR Estimate If Black 83 >60 mL/min/1.7m2    Calcium 8.8 8.5 - 10.1 mg/dL   Troponin I   Result Value Ref Range    Troponin  I ES <0.015 0.000 - 0.045 ug/L   D dimer quantitative   Result Value Ref Range    D Dimer 0.6 (H) 0.0 - 0.50 ug/ml FEU       Medications   ibuprofen (ADVIL/MOTRIN) tablet 600 mg (600 mg Oral Given 9/29/18 1131)       11:04 AM Patient Assessed. Patient care outlined.     Assessments & Plan (with Medical Decision Making)     68-year-old male comes in with chest pain that does not sound suspicious for ACS or angina.  Pain is reproducible on physical examination and aggravated by certain movements and most likely represents costochondritis.  No evidence of psoriatic arthritis involvement of the joint this time.  Physical examination and laboratory evaluation are reassuring.  His EKG is normal.  Chest x-ray done last week in the clinic was normal and there is no indication to repeat it at this time.  His age-adjusted d-dimer is normal at 0.6 and so I have low suspicion for PE.  Troponin is also normal as a CBC and blood chemistry.  I discussed my impressions and recommended treatment with relative rest and anti-inflammatories.  We discussed signs and symptoms that should prompt follow-up including worsening pain, change in the character of the pain, breathing difficulty, fevers, or other concerning symptoms.    I have reviewed the nursing notes.    I have reviewed the findings, diagnosis, plan and need for follow up with the patient.       Discharge Medication List as of 9/29/2018 12:49 PM          Final diagnoses:   Costochondritis     This document serves as a record of the services and decisions personally performed and made by Nixon Soto MD. It was created on HIS/HER behalf by   Una Cantu, a trained medical scribe. The creation of this document is based the provider's statements to the medical scribe.  Una Cantu 11:04 AM 9/29/2018    Provider:   The information in this document, created by the medical scribe for me, accurately reflects the services I personally performed and the decisions made by  me. I have reviewed and approved this document for accuracy prior to leaving the patient care area.  Nixon Soto MD 11:04 AM 9/29/2018 9/29/2018   [facility] EMERGENCY DEPARTMENT     Nixon Soto MD  09/29/18 1413

## 2018-09-29 NOTE — ED NOTES
Pt here with sternal chest pain, started last week while straining to have a BM. Worse today after straining again, also states that has been lifting grandchildren. Feels SOA occasionally, no nausea, dizziness or diaphoresis. Feels worse in the evenings and at night when he lays down. No personal or family cardiac history.

## 2018-09-29 NOTE — DISCHARGE INSTRUCTIONS
You may use ibuprofen 400 mg 4 times per day if needed for pain.  Try to avoid the activities that aggravate the pain.  Be seen if new concerning symptoms develop such as increased pain, breathing difficulty, or fevers.    Chest Wall Pain: Costochondritis    The chest pain that you have had today is caused by costochondritis. This condition is caused by an inflammation of the cartilage joining your ribs to your breastbone. It is not caused by heart or lung problems. Your healthcare team has made sure that the chest pain you feel is not from a life threatening cause of chest pain such as heart attack, collapsed lung, blood clot in the lung, tear in the aorta, or esophageal rupture. The inflammation may have been brought on by a blow to the chest, lifting heavy objects, intense exercise, or an illness that made you cough and sneeze a lot. It often occurs during times of emotional stress. It can be painful, but it is not dangerous. It usually goes away in 1 to 2 weeks. But it may happen again. Rarely, a more serious condition may cause symptoms similar to costochondritis. That s why it s important to watch for the warning signs listed below.  Home care  Follow these guidelines when caring for yourself at home:    If you feel that emotional stress is a cause of your condition, try to figure out the sources of that stress. It may not be obvious. Learn ways to deal with the stress in your life. This can include regular exercise, muscle relaxation, meditation, or simply taking time out for yourself.    You may use acetaminophen, ibuprofen, or naproxen to control pain, unless another pain medicine was prescribed. If you have liver or kidney disease or ever had a stomach ulcer, talk with your healthcare provider before using these medicines.    You can also help ease pain by using a hot, wet compress or heating pad. Use this with or without a medicated skin cream that helps relieves pain.    Do stretching exercise as advised  by your provider.    Take any prescribed medicines as directed.  Follow-up care  Follow up with your healthcare provider, or as advised, if you do not start to get better in the next 2 days.  When to seek medical advice  Call your healthcare provider right away if any of these occur:    A change in the type of pain. Call if it feels different, becomes more serious, lasts longer, or spreads into your shoulder, arm, neck, jaw, or back.    Shortness of breath or pain gets worse when you breathe    Weakness, dizziness, or fainting    Cough with dark-colored sputum (phlegm) or blood    Abdominal pain    Dark red or black stools    Fever of 100.4 F (38 C) or higher, or as directed by your healthcare provider  Date Last Reviewed: 12/1/2016 2000-2017 [employer]. [address] [place] [address], [place] [address]. All rights reserved. This information is not intended as a substitute for professional medical care. Always follow your healthcare professional's instructions.

## 2018-09-29 NOTE — ED AVS SNAPSHOT
[facility] Emergency Department    [address] [facility] [address]    Phone:  [phone]    Fax:  [phone]                                       Pawan Bullard   MRN: [id number]    Department:  [facility] Emergency Department   Date of Visit:  9/29/2018           Patient Information     Date Of Birth          1950        Your diagnoses for this visit were:     Costochondritis        You were seen by Nixon Soto MD.        Discharge Instructions       You may use ibuprofen 400 mg 4 times per day if needed for pain.  Try to avoid the activities that aggravate the pain.  Be seen if new concerning symptoms develop such as increased pain, breathing difficulty, or fevers.    Chest Wall Pain: Costochondritis    The chest pain that you have had today is caused by costochondritis. This condition is caused by an inflammation of the cartilage joining your ribs to your breastbone. It is not caused by heart or lung problems. Your healthcare team has made sure that the chest pain you feel is not from a life threatening cause of chest pain such as heart attack, collapsed lung, blood clot in the lung, tear in the aorta, or esophageal rupture. The inflammation may have been brought on by a blow to the chest, lifting heavy objects, intense exercise, or an illness that made you cough and sneeze a lot. It often occurs during times of emotional stress. It can be painful, but it is not dangerous. It usually goes away in 1 to 2 weeks. But it may happen again. Rarely, a more serious condition may cause symptoms similar to costochondritis. That s why it s important to watch for the warning signs listed below.  Home care  Follow these guidelines when caring for yourself at home:    If you feel that emotional stress is a cause of your condition, try to figure out the sources of that stress. It may not be obvious. Learn ways to deal with the stress in your life. This can include regular exercise,  muscle relaxation, meditation, or simply taking time out for yourself.    You may use acetaminophen, ibuprofen, or naproxen to control pain, unless another pain medicine was prescribed. If you have liver or kidney disease or ever had a stomach ulcer, talk with your healthcare provider before using these medicines.    You can also help ease pain by using a hot, wet compress or heating pad. Use this with or without a medicated skin cream that helps relieves pain.    Do stretching exercise as advised by your provider.    Take any prescribed medicines as directed.  Follow-up care  Follow up with your healthcare provider, or as advised, if you do not start to get better in the next 2 days.  When to seek medical advice  Call your healthcare provider right away if any of these occur:    A change in the type of pain. Call if it feels different, becomes more serious, lasts longer, or spreads into your shoulder, arm, neck, jaw, or back.    Shortness of breath or pain gets worse when you breathe    Weakness, dizziness, or fainting    Cough with dark-colored sputum (phlegm) or blood    Abdominal pain    Dark red or black stools    Fever of 100.4 F (38 C) or higher, or as directed by your healthcare provider  Date Last Reviewed: 12/1/2016 2000-2017 [employer]. [address] [place] [address], [place] [address]. All rights reserved. This information is not intended as a substitute for professional medical care. Always follow your healthcare professional's instructions.          Your next 10 appointments already scheduled     Nov 19, 2018  1:15 PM CST   [facility]    [address] [place] [id number] [phone]           Please do not eat 10-12 hours before your appointment if you are coming in fasting for labs on lipids, cholesterol, or glucose (sugar). This does not apply to pregnant women. Water, hot tea and black coffee (with nothing  added) are okay. Do not drink other fluids, diet soda or chew gum.            Nov 26, 2018  1:20 PM CST   Return Visit with Adria Lopez MD   [facility] [place] ([facility]    [address]  [place] [id number] [phone]              24 Hour Appointment Hotline       To make an appointment at any [facility], call [phone] ([phone]). If you don't have a family doctor or clinic, we will help you find one. [facility] are conveniently located to serve the needs of you and your family.             Review of your medicines      Our records show that you are taking the medicines listed below. If these are incorrect, please call your family doctor or clinic.        Dose / Directions Last dose taken    folic acid 1 MG tablet   Commonly known as:  FOLVITE   Dose:  1 mg   Quantity:  100 tablet        Take 1 tablet (1 mg) by mouth daily   Refills:  3        IBU-200 PO   Dose:  400 mg        Take 400 mg by mouth every 6 hours as needed   Refills:  0        lisinopril 2.5 MG tablet   Commonly known as:  PRINIVIL/Zestril   Dose:  2.5 mg   Quantity:  90 tablet        Take 1 tablet (2.5 mg) by mouth daily   Refills:  3        methotrexate sodium 2.5 MG Tabs   Dose:  17.5 mg   Quantity:  84 tablet        Take 17.5 mg by mouth once a week . Take all 7 tablets on the same day of each week.   Refills:  1        Multi-vitamin Tabs tablet   Generic drug:  multivitamin, therapeutic with minerals        1 tab daily   Refills:  0        PROBIOTIC DAILY PO   Dose:  1 capsule        Take 1 capsule by mouth daily   Refills:  0        simvastatin 80 MG tablet   Commonly known as:  ZOCOR   Dose:  40 mg   Quantity:  45 tablet        Take 0.5 tablets (40 mg) by mouth At Bedtime   Refills:  3        tamsulosin 0.4 MG capsule   Commonly known as:  FLOMAX   Dose:  0.4 mg   Quantity:  90 capsule        Take 1 capsule (0.4 mg) by mouth daily   Refills:  3        TYLENOL ARTHRITIS PAIN 650  MG CR tablet   Quantity:  60   Generic drug:  acetaminophen        2 TABLETS EVERY 8 HOURS AS NEEDED   Refills:  0                Procedures and tests performed during your visit     Basic metabolic panel    CBC with platelets differential    D dimer quantitative    EKG 12 lead    Peripheral IV catheter    Troponin I      Orders Needing Specimen Collection     None      Pending Results     No orders found from 9/27/2018 to 9/30/2018.            Pending Culture Results     No orders found from 9/27/2018 to 9/30/2018.            Pending Results Instructions     If you had any lab results that were not finalized at the time of your Discharge, you can call the ED Lab Result RN at [phone]. You will be contacted by this team for any positive Lab results or changes in treatment. The nurses are available 7 days a week from 10A to 6:30P.  You can leave a message 24 hours per day and they will return your call.        Test Results From Your Hospital Stay        9/29/2018 10:57 AM      Component Results     Component Value Ref Range & Units Status    WBC 4.9 4.0 - 11.0 10e9/L Final    RBC Count 4.77 4.4 - 5.9 10e12/L Final    Hemoglobin 16.0 13.3 - 17.7 g/dL Final    Hematocrit 45.7 40.0 - 53.0 % Final    MCV 96 78 - 100 fl Final    MCH 33.5 (H) 26.5 - 33.0 pg Final    MCHC 35.0 31.5 - 36.5 g/dL Final    RDW 12.4 10.0 - 15.0 % Final    Platelet Count 151 150 - 450 10e9/L Final    Diff Method Automated Method  Final    % Neutrophils 64.6 % Final    % Lymphocytes 24.9 % Final    % Monocytes 7.4 % Final    % Eosinophils 2.5 % Final    % Basophils 0.2 % Final    % Immature Granulocytes 0.4 % Final    Nucleated RBCs 0 0 /100 Final    Absolute Neutrophil 3.2 1.6 - 8.3 10e9/L Final    Absolute Lymphocytes 1.2 0.8 - 5.3 10e9/L Final    Absolute Monocytes 0.4 0.0 - 1.3 10e9/L Final    Absolute Eosinophils 0.1 0.0 - 0.7 10e9/L Final    Absolute Basophils 0.0 0.0 - 0.2 10e9/L Final    Abs Immature Granulocytes 0.0 0 - 0.4 10e9/L  Final    Absolute Nucleated RBC 0.0  Final         9/29/2018 11:15 AM      Component Results     Component Value Ref Range & Units Status    Sodium 139 133 - 144 mmol/L Final    Potassium 3.7 3.4 - 5.3 mmol/L Final    Chloride 104 94 - 109 mmol/L Final    Carbon Dioxide 29 20 - 32 mmol/L Final    Anion Gap 6 3 - 14 mmol/L Final    Glucose 96 70 - 99 mg/dL Final    Urea Nitrogen 13 7 - 30 mg/dL Final    Creatinine 1.07 0.66 - 1.25 mg/dL Final    GFR Estimate 69 >60 mL/min/1.7m2 Final    Non  GFR Calc    GFR Estimate If Black 83 >60 mL/min/1.7m2 Final    African American GFR Calc    Calcium 8.8 8.5 - 10.1 mg/dL Final         9/29/2018 11:15 AM      Component Results     Component Value Ref Range & Units Status    Troponin I ES <0.015 0.000 - 0.045 ug/L Final    The 99th percentile for upper reference range is 0.045 ug/L.  Troponin values   in the range of 0.045 - 0.120 ug/L may be associated with risks of adverse   clinical events.           9/29/2018 11:31 AM      Component Results     Component Value Ref Range & Units Status    D Dimer 0.6 (H) 0.0 - 0.50 ug/ml FEU Final    This D-dimer assay is intended for use in conjunction with a clinical pretest   probability assessment model to exclude pulmonary embolism (PE) and deep   venous thrombosis (DVT) in outpatients suspected of PE or DVT. The cut-off   value is 0.5 ug/mL FEU.                  Thank you for choosing [facility]       Thank you for choosing [facility] for your care. Our goal is always to provide you with excellent care. Hearing back from our patients is one way we can continue to improve our services. Please take a few minutes to complete the written survey that you may receive in the mail after you visit with us. Thank you!        Care EveryWhere ID     This is your Care EveryWhere ID. This could be used by other organizations to access your [facility] medical records  [id number]        Equal Access to Services     NINA MONTSE: Chanda fischer  ann David, jesu johns, avila erickson, kathy gamez. So [facility] [phone].    ATENCIÓN: Si habla español, tiene a haley disposición servicios gratuitos de asistencia lingüística. Llame al [phone].    We comply with applicable federal civil rights laws and [place] laws. We do not discriminate on the basis of race, color, national origin, age, disability, sex, sexual orientation, or gender identity.            After Visit Summary       This is your record. Keep this with you and show to your community pharmacist(s) and doctor(s) at your next visit.

## 2018-11-12 ENCOUNTER — OFFICE VISIT (OUTPATIENT)
Dept: FAMILY MEDICINE | Facility: CLINIC | Age: 68
End: 2018-11-12
Payer: COMMERCIAL

## 2018-11-12 VITALS
RESPIRATION RATE: 16 BRPM | SYSTOLIC BLOOD PRESSURE: 130 MMHG | WEIGHT: 203 LBS | BODY MASS INDEX: 30.07 KG/M2 | DIASTOLIC BLOOD PRESSURE: 70 MMHG | HEIGHT: 69 IN | OXYGEN SATURATION: 99 % | HEART RATE: 78 BPM | TEMPERATURE: 97.6 F

## 2018-11-12 DIAGNOSIS — Z00.00 MEDICARE ANNUAL WELLNESS VISIT, SUBSEQUENT: Primary | ICD-10-CM

## 2018-11-12 DIAGNOSIS — L40.50 PSORIATIC ARTHROPATHY (H): ICD-10-CM

## 2018-11-12 DIAGNOSIS — I10 HYPERTENSION GOAL BP (BLOOD PRESSURE) < 140/90: ICD-10-CM

## 2018-11-12 DIAGNOSIS — Z12.5 SCREENING FOR PROSTATE CANCER: ICD-10-CM

## 2018-11-12 DIAGNOSIS — R10.32 LEFT INGUINAL PAIN: ICD-10-CM

## 2018-11-12 DIAGNOSIS — E78.5 HYPERLIPIDEMIA LDL GOAL <130: ICD-10-CM

## 2018-11-12 PROCEDURE — G0438 PPPS, INITIAL VISIT: HCPCS | Performed by: FAMILY MEDICINE

## 2018-11-12 PROCEDURE — 99213 OFFICE O/P EST LOW 20 MIN: CPT | Mod: 25 | Performed by: FAMILY MEDICINE

## 2018-11-12 ASSESSMENT — ENCOUNTER SYMPTOMS
DIARRHEA: 0
PALPITATIONS: 0
COUGH: 0
HEMATURIA: 0
SHORTNESS OF BREATH: 0
ARTHRALGIAS: 1
DYSURIA: 0
CONSTIPATION: 0
HEADACHES: 0
JOINT SWELLING: 0
EYE PAIN: 0
WEAKNESS: 0
DIZZINESS: 0
ABDOMINAL PAIN: 0
HEMATOCHEZIA: 0
FREQUENCY: 0
CHILLS: 0
HEARTBURN: 0
PARESTHESIAS: 0
SORE THROAT: 0
MYALGIAS: 0
NAUSEA: 0

## 2018-11-12 ASSESSMENT — ACTIVITIES OF DAILY LIVING (ADL): CURRENT_FUNCTION: NO ASSISTANCE NEEDED

## 2018-11-12 ASSESSMENT — PAIN SCALES - GENERAL: PAINLEVEL: MILD PAIN (2)

## 2018-11-12 NOTE — MR AVS SNAPSHOT
After Visit Summary   11/12/2018    Pawan Bullard    MRN: [id number]           Patient Information     Date Of Birth          1950        Visit Information        Provider Department      11/12/2018 6:20 PM Armando Finley MD [facility]        Today's Diagnoses     Medicare annual wellness visit, subsequent    -  1    Hypertension goal BP (blood pressure) < 140/90        Hyperlipidemia LDL goal <130        Psoriatic arthropathy (H)        Screening for prostate cancer        Left inguinal pain          Care Instructions      Preventive Health Recommendations:     See your health care provider every year to    Review health changes.     Discuss preventive care.      Review your medicines if your doctor has prescribed any.    Talk with your health care provider about whether you should have a test to screen for prostate cancer (PSA).    Every 3 years, have a diabetes test (fasting glucose). If you are at risk for diabetes, you should have this test more often.    Every 5 years, have a cholesterol test. Have this test more often if you are at risk for high cholesterol or heart disease.     Every 10 years, have a colonoscopy. Or, have a yearly FIT test (stool test). These exams will check for colon cancer.    Talk to with your health care provider about screening for Abdominal Aortic Aneurysm if you have a family history of AAA or have a history of smoking.  Shots:     Get a flu shot each year.     Get a tetanus shot every 10 years.     Talk to your doctor about your pneumonia vaccines. There are now two you should receive - Pneumovax (PPSV 23) and Prevnar (PCV 13).    Talk to your pharmacist about a shingles vaccine.     Talk to your doctor about the hepatitis B vaccine.  Nutrition:     Eat at least 5 servings of fruits and vegetables each day.     Eat whole-grain bread, whole-wheat pasta and brown rice instead of white grains and rice.     Get adequate Calcium and  Vitamin D.   Lifestyle    Exercise for at least 150 minutes a week (30 minutes a day, 5 days a week). This will help you control your weight and prevent disease.     Limit alcohol to one drink per day.     No smoking.     Wear sunscreen to prevent skin cancer.     See your dentist every six months for an exam and cleaning.     See your eye doctor every 1 to 2 years to screen for conditions such as glaucoma, macular degeneration and cataracts.    Personalized Prevention Plan  You are due for the preventive services outlined below.  Your care team is available to assist you in scheduling these services.  If you have already completed any of these items, please share that information with your care team to update in your medical record.    Health Maintenance Due   Topic Date Due     AORTIC ANEURYSM SCREENING (SYSTEM ASSIGNED)  01/23/2017     Depression Assessment 2 - yearly  08/26/2017     Discuss Advance Directive Planning  03/04/2018     FALL RISK ASSESSMENT  10/18/2018     ASSESSMENT/PLAN:                                                    Lifestyle recommendations:regular exercise, at least 150 minutes per week (average 30 minutes 5 times a week)  weight loss recommended (ideal BMI-body mass index is <25)  The following exams/tests were recommended and discussed for health maintenance:  Colon cancer screening recommended 10 years after you last one.   Prostate cancer screening is optional starting at age 50 if normal risk, age 40 or 45 for high risk men (strong family history or blacks.)  Screening can include digital rectal exam and PSA blood test.    (Z00.00) Medicare annual wellness visit, subsequent  (primary encounter diagnosis)    (I10) Hypertension goal BP (blood pressure) < 140/90  Comment: doing well  Plan: No change in current treatment plan.   REcheck in a year.     (E78.5) Hyperlipidemia LDL goal <130  Comment: due for recheck  Plan: Lipid panel reflex to direct LDL Fasting        Labs done at next  blood draw.     (L40.50) Psoriatic arthropathy (H)  Comment: stable  Plan: follow-up with Rheumatology as you have been.     (Z12.5) Screening for prostate cancer  Comment:   Plan: PSA, screen        Blood test with blood draw nest week.     (R10.32) Left inguinal pain  Comment: I can't feel a hernia today but sounds like one from your history.   Plan: GENERAL SURG ADULT REFERRAL        Schedule an appointment with surgeon.          Follow-ups after your visit        Additional Services     GENERAL SURG ADULT REFERRAL       Your provider has referred you to: [facility]: [facility] [phone]   [email or web address]    Groin pain R/O hernia.  Has had previous hernia repair.     Please be aware that coverage of these services is subject to the terms and limitations of your health insurance plan.  Call member services at your health plan with any benefit or coverage questions.      Please bring the following with you to your appointment:    (1) Any X-Rays, CTs or MRIs which have been performed.  Contact the facility where they were done to arrange for  prior to your scheduled appointment.   (2) List of current medications   (3) This referral request   (4) Any documents/labs given to you for this referral                  Follow-up notes from your care team     Return in about 1 year (around 11/12/2019).      Your next 10 appointments already scheduled     Nov 19, 2018  1:15 PM CST   LAB with [facility]   [facility]    [address] [place] [id number] [phone]           Please do not eat 10-12 hours before your appointment if you are coming in fasting for labs on lipids, cholesterol, or glucose (sugar). This does not apply to pregnant women. Water, hot tea and black coffee (with nothing added) are okay. Do not drink other fluids, diet soda or chew gum.            Nov 26, 2018  1:20 PM CST   Return Visit  "with Adria Lopez MD   [facility]    [address]  [place] [address] [phone]              Future tests that were ordered for you today     Open Future Orders        Priority Expected Expires Ordered    Lipid panel reflex to direct LDL Fasting Routine 11/19/2018 10/12/2019 11/12/2018    PSA, screen Routine 11/19/2018 10/12/2019 11/12/2018            Who to contact     If you have questions or need follow up information about today's clinic visit or your schedule please contact [facility] directly at [phone].  Normal or non-critical lab and imaging results will be communicated to you by MyChart, letter or phone within 4 business days after the clinic has received the results. If you do not hear from us within 7 days, please contact the clinic through MyChart or phone. If you have a critical or abnormal lab result, we will notify you by phone as soon as possible.  Submit refill requests through [employer] or call your pharmacy and they will forward the refill request to us. Please allow 3 business days for your refill to be completed.          Additional Information About Your Visit        Care EveryWhere ID     This is your Care EveryWhere ID. This could be used by other organizations to access your [facility] medical records  [id number]        Your Vitals Were     Pulse Temperature Respirations Height Pulse Oximetry BMI (Body Mass Index)    78 97.6  F (36.4  C) (Tympanic) 16 5' 9.25\" (1.759 m) 99% 29.76 kg/m2       Blood Pressure from Last 3 Encounters:   11/12/18 130/70   09/29/18 (!) 146/95   09/21/18 138/78    Weight from Last 3 Encounters:   11/12/18 203 lb (92.1 kg)   09/29/18 200 lb (90.7 kg)   09/21/18 206 lb 6.4 oz (93.6 kg)              We Performed the Following     GENERAL SURG ADULT REFERRAL        Primary Care Provider Office Phone # Fax #    Armando Finley [phone] [address] [place] [address]" [facility] [address]        Equal Access to Services     [facility] ISHAN : Hadii aad ku hadkristinebuzz Dianaali, waaxda luqadaha, qaybta kaalmada georgina, kathy gamez. So [facility] [phone].    ATENCIÓN: Si habla español, tiene a haley disposición servicios gratuitos de asistencia lingüística. Hilario al [phone].    We comply with applicable federal civil rights laws and [place] laws. We do not discriminate on the basis of race, color, national origin, age, disability, sex, sexual orientation, or gender identity.            Thank you!     Thank you for choosing [facility]  for your care. Our goal is always to provide you with excellent care. Hearing back from our patients is one way we can continue to improve our services. Please take a few minutes to complete the written survey that you may receive in the mail after your visit with us. Thank you!             Your Updated Medication List - Protect others around you: Learn how to safely use, store and throw away your medicines at www.disposemymeds.org.          This list is accurate as of 11/12/18  7:35 PM.  Always use your most recent med list.                   Brand Name Dispense Instructions for use Diagnosis    folic acid 1 MG tablet    FOLVITE    100 tablet    Take 1 tablet (1 mg) by mouth daily    Psoriatic arthropathy (H)       IBU-200 PO      Take 400 mg by mouth every 6 hours as needed        lisinopril 2.5 MG tablet    PRINIVIL/Zestril    90 tablet    Take 1 tablet (2.5 mg) by mouth daily    Essential hypertension with goal blood pressure less than 140/90       methotrexate sodium 2.5 MG Tabs     84 tablet    Take 17.5 mg by mouth once a week . Take all 7 tablets on the same day of each week.    Psoriatic arthropathy (H)       Multi-vitamin Tabs tablet   Generic drug:  multivitamin, therapeutic with minerals      1 tab daily        PROBIOTIC DAILY PO      Take 1 capsule by mouth daily        simvastatin 80 MG tablet     ZOCOR    45 tablet    Take 0.5 tablets (40 mg) by mouth At Bedtime    Hyperlipidemia LDL goal <130       tamsulosin 0.4 MG capsule    FLOMAX    90 capsule    Take 1 capsule (0.4 mg) by mouth daily    Benign prostatic hyperplasia with nocturia       TYLENOL ARTHRITIS PAIN 650 MG CR tablet   Generic drug:  acetaminophen     60    2 TABLETS EVERY 8 HOURS AS NEEDED

## 2018-11-13 NOTE — PATIENT INSTRUCTIONS
Preventive Health Recommendations:     See your health care provider every year to    Review health changes.     Discuss preventive care.      Review your medicines if your doctor has prescribed any.    Talk with your health care provider about whether you should have a test to screen for prostate cancer (PSA).    Every 3 years, have a diabetes test (fasting glucose). If you are at risk for diabetes, you should have this test more often.    Every 5 years, have a cholesterol test. Have this test more often if you are at risk for high cholesterol or heart disease.     Every 10 years, have a colonoscopy. Or, have a yearly FIT test (stool test). These exams will check for colon cancer.    Talk to with your health care provider about screening for Abdominal Aortic Aneurysm if you have a family history of AAA or have a history of smoking.  Shots:     Get a flu shot each year.     Get a tetanus shot every 10 years.     Talk to your doctor about your pneumonia vaccines. There are now two you should receive - Pneumovax (PPSV 23) and Prevnar (PCV 13).    Talk to your pharmacist about a shingles vaccine.     Talk to your doctor about the hepatitis B vaccine.  Nutrition:     Eat at least 5 servings of fruits and vegetables each day.     Eat whole-grain bread, whole-wheat pasta and brown rice instead of white grains and rice.     Get adequate Calcium and Vitamin D.   Lifestyle    Exercise for at least 150 minutes a week (30 minutes a day, 5 days a week). This will help you control your weight and prevent disease.     Limit alcohol to one drink per day.     No smoking.     Wear sunscreen to prevent skin cancer.     See your dentist every six months for an exam and cleaning.     See your eye doctor every 1 to 2 years to screen for conditions such as glaucoma, macular degeneration and cataracts.    Personalized Prevention Plan  You are due for the preventive services outlined below.  Your care team is available to assist you in  scheduling these services.  If you have already completed any of these items, please share that information with your care team to update in your medical record.    Health Maintenance Due   Topic Date Due     AORTIC ANEURYSM SCREENING (SYSTEM ASSIGNED)  01/23/2017     Depression Assessment 2 - yearly  08/26/2017     Discuss Advance Directive Planning  03/04/2018     FALL RISK ASSESSMENT  10/18/2018     ASSESSMENT/PLAN:                                                    Lifestyle recommendations:regular exercise, at least 150 minutes per week (average 30 minutes 5 times a week)  weight loss recommended (ideal BMI-body mass index is <25)  The following exams/tests were recommended and discussed for health maintenance:  Colon cancer screening recommended 10 years after you last one.   Prostate cancer screening is optional starting at age 50 if normal risk, age 40 or 45 for high risk men (strong family history or blacks.)  Screening can include digital rectal exam and PSA blood test.    (Z00.00) Medicare annual wellness visit, subsequent  (primary encounter diagnosis)    (I10) Hypertension goal BP (blood pressure) < 140/90  Comment: doing well  Plan: No change in current treatment plan.   REcheck in a year.     (E78.5) Hyperlipidemia LDL goal <130  Comment: due for recheck  Plan: Lipid panel reflex to direct LDL Fasting        Labs done at next blood draw.     (L40.50) Psoriatic arthropathy (H)  Comment: stable  Plan: follow-up with Rheumatology as you have been.     (Z12.5) Screening for prostate cancer  Comment:   Plan: PSA, screen        Blood test with blood draw nest week.     (R10.32) Left inguinal pain  Comment: I can't feel a hernia today but sounds like one from your history.   Plan: GENERAL SURG ADULT REFERRAL        Schedule an appointment with surgeon.

## 2018-11-13 NOTE — NURSING NOTE
"Chief Complaint   Patient presents with     Wellness Visit       Initial /70 (BP Location: Right arm, Patient Position: Chair, Cuff Size: Adult Large)  Pulse 78  Temp 97.6  F (36.4  C) (Tympanic)  Resp 16  Ht 5' 9.25\" (1.759 m)  Wt 203 lb (92.1 kg)  SpO2 99%  BMI 29.76 kg/m2 Estimated body mass index is 29.76 kg/(m^2) as calculated from the following:    Height as of this encounter: 5' 9.25\" (1.759 m).    Weight as of this encounter: 203 lb (92.1 kg).    Patient presents to the clinic using No DME    Health Maintenance that is potentially due pending provider review:  NONE will check on health care directive.    n/a    Is there anyone who you would like to be able to receive your results? Yes Parris- wife  If yes have patient fill out CURT  Ciara Rizvi CMA    "

## 2018-11-13 NOTE — PROGRESS NOTES
"SUBJECTIVE:   Pawan Bullard is a 68 year old male who presents for Preventive Visit.  Chief Complaint   Patient presents with     Wellness Visit      C/o left groin discomfort x 3-4 weeks.  Past hx of hernia-has had repair of bilateral groing hernias in the past.  Intermittent pain.  Can be worse with sneeze or straining.   No bulge.      Are you in the first 12 months of your Medicare coverage?  No    Annual Wellness Visit     In general, how would you rate your overall health?  Good    Frequency of exercise:  None    Do you usually eat at least 4 servings of fruit and vegetables a day, include whole grains    & fiber and avoid regularly eating high fat or \"junk\" foods?  No    Taking medications regularly:  Yes    Medication side effects:  None    Ability to successfully perform activities of daily living:  No assistance needed    Home Safety:  No safety concerns identified    Hearing Impairment:  No hearing concerns    In the past 6 months, have you been bothered by leaking of urine?  No    In general, how would you rate your overall mental or emotional health?  Good    PHQ-2 Total Score: 0    Additional concerns today:  No    Fall risk:  Fallen 2 or more times in the past year?: No  Any fall with injury in the past year?: No    COGNITIVE SCREEN  1) Repeat 3 items (Leader, Season, Table)   2) Clock draw: NORMAL  3) 3 item recall: Recalls 3 objects  Results: 3 items recalled: COGNITIVE IMPAIRMENT LESS LIKELY    Mini-CogTM Copyright S Kylie. Licensed by the author for use in [facility]; reprinted with permission (santiago@.[facility]). All rights reserved.      Social History   Substance Use Topics     Smoking status: Never Smoker     Smokeless tobacco: Never Used     Alcohol use Yes      Comment:  0-2 beer's or mixed drink's monthly       Alcohol Use 11/12/2018   If you drink alcohol do you typically have greater than 3 drinks per day OR greater than 7 drinks per week? No     Do you feel safe in your " environment - Yes    Do you have a Health Care Directive?: No: Advance care planning reviewed with patient; information given to patient to review. Has information already at home.    Current providers sharing in care for this patient include:   Patient Care Team:  Armando Finley MD as PCP - General (Family Practice)  Rheumatology     The following health maintenance items are reviewed in Epic and correct as of today:  Health Maintenance   Topic Date Due     AORTIC ANEURYSM SCREENING (SYSTEM ASSIGNED)  2017     PHQ-2 Q1 YR  2017     ADVANCE DIRECTIVE PLANNING Q5 YRS  2018     FALL RISK ASSESSMENT  10/18/2018     COLONOSCOPY Q5 YR  2021     LIPID SCREEN Q5 YR MALE (SYSTEM ASSIGNED)  10/13/2022     TETANUS IMMUNIZATION (SYSTEM ASSIGNED)  2028     PNEUMOCOCCAL  Completed     INFLUENZA VACCINE  Completed     HEPATITIS C SCREENING  Completed     Patient Active Problem List    Diagnosis Date Noted     High risk medications (not anticoagulants) long-term use 2015     Priority: Medium     Osteoarthritis 2011     Priority: Medium     HYPERLIPIDEMIA LDL GOAL <130 10/31/2010     Priority: Medium     Chondrocalcinosis 02/10/2010     Priority: Medium     Impotence of organic origin 2006     Priority: Medium     Esophageal reflux 2005     Priority: Medium     GERD       Hypertension goal BP (blood pressure) < 140/90 2005     Priority: Medium     Psoriatic arthropathy (H) 2005     Priority: Medium     Advanced directives, counseling/discussion 2013     Priority: Low     advanced care discussed form given to pt.           FAMILY HISTORY OF DIABETES MELLITUS 2006     Priority: Low     mother       FAMILY HISTORY OF GI NEOPLASM 2005     Priority: Low     father - colon CA ,  at  67        Family history:  CV disease: no  Prostate cancer: no  Colon cancer: father    Multivitamin or Vit D use: folic acid.     Vaccines:current     Past Colon  "cancer screenin    Review of Systems   Constitutional: Negative for chills.   HENT: Negative for congestion, ear pain, hearing loss and sore throat.    Eyes: Negative for pain and visual disturbance.   Respiratory: Negative for cough and shortness of breath.    Cardiovascular: Negative for chest pain, palpitations and peripheral edema.   Gastrointestinal: Negative for abdominal pain, constipation, diarrhea, heartburn, hematochezia and nausea.   Genitourinary: Negative for discharge, dysuria, frequency, genital sores, hematuria, impotence and urgency.   Musculoskeletal: Positive for arthralgias. Negative for joint swelling and myalgias.   Skin: Negative for rash.   Neurological: Negative for dizziness, weakness, headaches and paresthesias.   Psychiatric/Behavioral: Negative for mood changes.   Physical Exam      OBJECTIVE:                                                    OBJECTIVE:Blood pressure 130/70, pulse 78, temperature 97.6  F (36.4  C), temperature source Tympanic, resp. rate 16, height 5' 9.25\" (1.759 m), weight 203 lb (92.1 kg), SpO2 99 %. BMI=Body mass index is 29.76 kg/(m^2).  GENERAL APPEARANCE ADULT: Alert, no acute distress  EYES: PERRL, EOM normal, conjunctiva and lids normal  HENT: Ears and TMs normal, oral mucosa and posterior oropharynx normal  NECK: No adenopathy,masses or thyromegaly  RESP: lungs clear to auscultation   CV: normal rate, regular rhythm, no murmur or gallop  ABDOMEN: soft, no organomegaly, masses or tenderness  MS: extremities normal, no peripheral edema    ASSESSMENT/PLAN:                                                    Lifestyle recommendations:regular exercise, at least 150 minutes per week (average 30 minutes 5 times a week)  weight loss recommended (ideal BMI-body mass index is <25)  The following exams/tests were recommended and discussed for health maintenance:  Colon cancer screening recommended 10 years after you last one.   Prostate cancer screening is optional " "starting at age 50 if normal risk, age 40 or 45 for high risk men (strong family history or blacks.)  Screening can include digital rectal exam and PSA blood test.    (Z00.00) Medicare annual wellness visit, subsequent  (primary encounter diagnosis)    (I10) Hypertension goal BP (blood pressure) < 140/90  Comment: doing well  Plan: No change in current treatment plan.   REcheck in a year.     (E78.5) Hyperlipidemia LDL goal <130  Comment: due for recheck  Plan: Lipid panel reflex to direct LDL Fasting        Labs done at next blood draw.     (L40.50) Psoriatic arthropathy (H)  Comment: stable  Plan: follow-up with Rheumatology as you have been.     (Z12.5) Screening for prostate cancer  Comment:   Plan: PSA, screen        Blood test with blood draw nest week.     (R10.32) Left inguinal pain  Comment: I can't feel a hernia today but sounds like one from your history.   Plan: GENERAL SURG ADULT REFERRAL        Schedule an appointment with surgeon.      End of Life Planning:  Patient currently has an advanced directive: No.  I have verified the patient's ablity to prepare an advanced directive/make health care decisions.  Literature was provided to assist patient in preparing an advanced directive.    COUNSELING:  Reviewed preventive health counseling, as reflected in patient instructions  Special attention given to:       Regular exercise    BP Readings from Last 1 Encounters:   11/12/18 130/70     Estimated body mass index is 29.76 kg/(m^2) as calculated from the following:    Height as of this encounter: 5' 9.25\" (1.759 m).    Weight as of this encounter: 203 lb (92.1 kg).           reports that he has never smoked. He has never used smokeless tobacco.      Appropriate preventive services were discussed with this patient, including applicable screening as appropriate for cardiovascular disease, diabetes, osteopenia/osteoporosis, and glaucoma.  As appropriate for age/gender, discussed screening for colorectal cancer, " prostate cancer, breast cancer, and cervical cancer. Checklist reviewing preventive services available has been given to the patient.    Reviewed patients plan of care and provided an AVS. The Basic Care Plan (routine screening as documented in Health Maintenance) for Pawan meets the Care Plan requirement. This Care Plan has been established and reviewed with the Patient.    Counseling Resources:  ATP IV Guidelines  Pooled Cohorts Equation Calculator  Breast Cancer Risk Calculator  FRAX Risk Assessment  ICSI Preventive Guidelines  Dietary Guidelines for Americans, 2010  USDA's MyPlate  ASA Prophylaxis  Lung CA Screening    Armando Finley MD  [facility]

## 2018-11-15 ENCOUNTER — OFFICE VISIT (OUTPATIENT)
Dept: SURGERY | Facility: CLINIC | Age: 68
End: 2018-11-15
Payer: COMMERCIAL

## 2018-11-15 VITALS
BODY MASS INDEX: 30.07 KG/M2 | SYSTOLIC BLOOD PRESSURE: 129 MMHG | RESPIRATION RATE: 18 BRPM | HEART RATE: 84 BPM | HEIGHT: 69 IN | WEIGHT: 203 LBS | TEMPERATURE: 97.4 F | DIASTOLIC BLOOD PRESSURE: 79 MMHG

## 2018-11-15 DIAGNOSIS — R10.32 INGUINODYNIA, LEFT: Primary | ICD-10-CM

## 2018-11-15 PROCEDURE — 99213 OFFICE O/P EST LOW 20 MIN: CPT | Performed by: SURGERY

## 2018-11-15 NOTE — PATIENT INSTRUCTIONS
Per Physician's instructions  1. Rest area  2. Apply ice, 20 minutes on, 20 minutes off  3. Motrin over the counter as needed  4. Obtain ultrasound of the area to rule out hernia

## 2018-11-15 NOTE — LETTER
"    11/15/2018         RE: Pawan Bullard  [address] [address]        Dear Colleague,    Thank you for referring your patient, Pawan Bullard, to the [facility]. Please see a copy of my visit note below.    Surgical Consultation/History and Physical  [facility] General Surgery    Pawan is seen in consultation for left groin pain, at the request of Armando Finley MD.    Chief Complaint:  Left groin pain    History of Present Illness: Pawan Bullard is a 68 year old male presents with left groin pain. He notes it has been present over the last 3 weeks.  It is worse with movement and walking.  He notes no bulge in the area.  Denies any trauma to the area.  He has some radiation of pain to his testicle and feels it \"pulsate.\"  He has a previous history of bilateral inguinal hernia repair, right was laparoscopic, left was open.  Denies any changes in bowel habits.  His pain has improved with Motrin.    Patient Active Problem List   Diagnosis     Psoriatic arthropathy (H)     Esophageal reflux     Hypertension goal BP (blood pressure) < 140/90     FAMILY HISTORY OF GI NEOPLASM     FAMILY HISTORY OF DIABETES MELLITUS     Impotence of organic origin     Chondrocalcinosis     HYPERLIPIDEMIA LDL GOAL <130     Osteoarthritis     Advanced directives, counseling/discussion     High risk medications (not anticoagulants) long-term use       Past Medical History:   Diagnosis Date     Esophageal reflux     GERD     Hypertension      Psoriatic arthropathy (H)     Psoriatic arthritis       Past Surgical History:   Procedure Laterality Date     ARTHROSCOPY KNEE RT/LT  April 2009    left knee     ARTHROSCOPY KNEE WITH MEDIAL MENISCECTOMY  7/9/2013    Procedure: ARTHROSCOPY KNEE WITH MEDIAL MENISCECTOMY;  Left Knee Arthroscopic and Open Repair of Patellar Tendon with Platelet Rich Plasma;  Surgeon: Ley, Jeffrey Duane, MD;  Location: [facility]     COLONOSCOPY  Jan. 2006    " diverticuli. Father had colon CA     COLONOSCOPY  2001     COLONOSCOPY  4/11/2011    Procedure:COLONOSCOPY; Surgeon:JENNIFER JEAN; Location:[facility]     COLONOSCOPY N/A 11/21/2016    Procedure: COLONOSCOPY;  Surgeon: Elias Santamaria MD;  Location: [facility]     REPAIR TENDON PATELLA  7/9/2013    Procedure: REPAIR TENDON PATELLA;;  Surgeon: Ley, Jeffrey Duane, MD;  Location: [facility]     SURGICAL HISTORY OF -   1999    (L) Herniorrhaphy     SURGICAL HISTORY OF -   2003    (R) Herniorrhaphy     SURGICAL HISTORY OF -   1981    Hemorrohid       Family History   Problem Relation Age of Onset     Hypertension Mother      Diabetes Mother      Cerebrovascular Disease Mother      in her 70s     Arthritis Mother      Cancer - colorectal Father 67     Arthritis Paternal Grandmother      Prostate Cancer No family hx of      Coronary Artery Disease No family hx of        Social History   Substance Use Topics     Smoking status: Never Smoker     Smokeless tobacco: Never Used     Alcohol use Yes      Comment:  0-2 beer's or mixed drink's monthly        History   Drug Use No       Current Outpatient Prescriptions   Medication Sig Dispense Refill     folic acid (FOLVITE) 1 MG tablet Take 1 tablet (1 mg) by mouth daily 100 tablet 3     Ibuprofen (IBU-200 PO) Take 400 mg by mouth every 6 hours as needed        lisinopril (PRINIVIL/ZESTRIL) 2.5 MG tablet Take 1 tablet (2.5 mg) by mouth daily 90 tablet 3     methotrexate sodium 2.5 MG TABS Take 17.5 mg by mouth once a week . Take all 7 tablets on the same day of each week. 84 tablet 1     MULTI-VITAMIN OR TABS 1 tab daily       Probiotic Product (PROBIOTIC DAILY PO) Take 1 capsule by mouth daily        simvastatin (ZOCOR) 80 MG tablet Take 0.5 tablets (40 mg) by mouth At Bedtime 45 tablet 3     tamsulosin (FLOMAX) 0.4 MG capsule Take 1 capsule (0.4 mg) by mouth daily 90 capsule 3     TYLENOL ARTHRITIS PAIN 650 MG OR TBCR 2 TABLETS EVERY 8 HOURS AS NEEDED 60 0       Allergies   Allergen  "Reactions     Acetaminophen W/Codeine Nausea       Review of Systems:   Constitutional - Denies fevers, weight loss, malaise, lethargy  Neuro - Denies tremors or seizures  Pulmon - Denies SOB, dyspnea, hemoptysis, chronic cough or use of an inhaler  CV - Denies CP, SOB, lower extremity edema, difficulty w/ stairs, has never used NTG  GI - Denies hematemesis, BRBPR, melena, chronic diarrhea or epigastric pain, + left groin pain   - Denies hematuria, difficulty voiding, denies history of nephrolithiasis  Hematology - Denies blood clotting disorders, chronic anemias  Dermatology - No melanomas or skin cancers  Rheumatology - No h/o RA  Pysch - Denies depression, bipolar d/o or schizophrenia    Physical Exam:  /79 (BP Location: Right arm, Patient Position: Chair, Cuff Size: Adult Regular)  Pulse 84  Temp 97.4  F (36.3  C) (Tympanic)  Resp 18  Ht 1.759 m (5' 9.25\")  Wt 92.1 kg (203 lb)  BMI 29.76 kg/m2    Constitutional- No acute distress, well nourished, non-toxic  Eyes: Anicteric, no injection.  PERRL  ENT:  Normocephalic, atraumatic, Nose midline, moist mucus membranes  Neck - supple, no LAD  Respiratory- Clear to auscultation bilaterally, good inspiratory effort  Cardiovascular - Heart RRR, no lift's, thrills, murmurs, rubs, or gallop.  No peripheral edema.  No clubbing.  Abdomen - Soft, non-tender, +BS, no hepatosplenomegaly, no palpable masses.  Well healed laparoscopic incision sites.  Groin: No obvious hernia bilaterally.  Circumcised.  No testicular mass or tenderness  Neuro - No focal neuro deficits, Alert and oriented x 3  Psych: Appropriate mood and affect  Musculoskeletal: Normal gait, symmetric strength.  FROM upper and lower extremities.  Skin: Warm, Dry    Assessment:  1. Inguinodynia, left      Plan:   Mr. Bullard was seen in clinic today.  He has a previous history of bilateral inguinal hernia repair and has 3 weeks of left inguinodynia.  He has minimal pain with palpation and no obvious " hernia.  We discussed the nature of hernias versus groin strains and need for ultrasound to rule out hernia.  He will obtain this and continue symptomatic management of his pain with Motrin, Ice, and rest as needed.  I will call him with the results to discuss possible surgery should a hernia be evident.  His previous scars appear consistent with a LISSETT approach for his hernia.  Should intervention be needed, a TEPP may be pursued.    Mukul Vieyra DO on 11/15/2018 at 12:44 PM      Again, thank you for allowing me to participate in the care of your patient.        Sincerely,        Mukul Vieyra DO

## 2018-11-15 NOTE — PROGRESS NOTES
"Surgical Consultation/History and Physical  [facility] General Surgery    Pawan is seen in consultation for left groin pain, at the request of Armando Finley MD.    Chief Complaint:  Left groin pain    History of Present Illness: Pawan Bullard is a 68 year old male presents with left groin pain. He notes it has been present over the last 3 weeks.  It is worse with movement and walking.  He notes no bulge in the area.  Denies any trauma to the area.  He has some radiation of pain to his testicle and feels it \"pulsate.\"  He has a previous history of bilateral inguinal hernia repair, right was laparoscopic, left was open.  Denies any changes in bowel habits.  His pain has improved with Motrin.    Patient Active Problem List   Diagnosis     Psoriatic arthropathy (H)     Esophageal reflux     Hypertension goal BP (blood pressure) < 140/90     FAMILY HISTORY OF GI NEOPLASM     FAMILY HISTORY OF DIABETES MELLITUS     Impotence of organic origin     Chondrocalcinosis     HYPERLIPIDEMIA LDL GOAL <130     Osteoarthritis     Advanced directives, counseling/discussion     High risk medications (not anticoagulants) long-term use       Past Medical History:   Diagnosis Date     Esophageal reflux     GERD     Hypertension      Psoriatic arthropathy (H)     Psoriatic arthritis       Past Surgical History:   Procedure Laterality Date     ARTHROSCOPY KNEE RT/LT  April 2009    left knee     ARTHROSCOPY KNEE WITH MEDIAL MENISCECTOMY  7/9/2013    Procedure: ARTHROSCOPY KNEE WITH MEDIAL MENISCECTOMY;  Left Knee Arthroscopic and Open Repair of Patellar Tendon with Platelet Rich Plasma;  Surgeon: Ley, Jeffrey Duane, MD;  Location: [facility]     COLONOSCOPY  Jan. 2006    diverticuli. Father had colon CA     COLONOSCOPY  2001     COLONOSCOPY  4/11/2011    Procedure:COLONOSCOPY; Surgeon:JENNIFER JEAN; [facility]     COLONOSCOPY N/A 11/21/2016    Procedure: COLONOSCOPY;  Surgeon: Elias Santamaria MD;  Location: [facility]     " REPAIR TENDON PATELLA  7/9/2013    Procedure: REPAIR TENDON PATELLA;;  Surgeon: Ley, Jeffrey Duane, MD;  Location: [facility]     SURGICAL HISTORY OF -   1999    (L) Herniorrhaphy     SURGICAL HISTORY OF -   2003    (R) Herniorrhaphy     SURGICAL HISTORY OF -   1981    Hemorrohid       Family History   Problem Relation Age of Onset     Hypertension Mother      Diabetes Mother      Cerebrovascular Disease Mother      in her 70s     Arthritis Mother      Cancer - colorectal Father 67     Arthritis Paternal Grandmother      Prostate Cancer No family hx of      Coronary Artery Disease No family hx of        Social History   Substance Use Topics     Smoking status: Never Smoker     Smokeless tobacco: Never Used     Alcohol use Yes      Comment:  0-2 beer's or mixed drink's monthly        History   Drug Use No       Current Outpatient Prescriptions   Medication Sig Dispense Refill     folic acid (FOLVITE) 1 MG tablet Take 1 tablet (1 mg) by mouth daily 100 tablet 3     Ibuprofen (IBU-200 PO) Take 400 mg by mouth every 6 hours as needed        lisinopril (PRINIVIL/ZESTRIL) 2.5 MG tablet Take 1 tablet (2.5 mg) by mouth daily 90 tablet 3     methotrexate sodium 2.5 MG TABS Take 17.5 mg by mouth once a week . Take all 7 tablets on the same day of each week. 84 tablet 1     MULTI-VITAMIN OR TABS 1 tab daily       Probiotic Product (PROBIOTIC DAILY PO) Take 1 capsule by mouth daily        simvastatin (ZOCOR) 80 MG tablet Take 0.5 tablets (40 mg) by mouth At Bedtime 45 tablet 3     tamsulosin (FLOMAX) 0.4 MG capsule Take 1 capsule (0.4 mg) by mouth daily 90 capsule 3     TYLENOL ARTHRITIS PAIN 650 MG OR TBCR 2 TABLETS EVERY 8 HOURS AS NEEDED 60 0       Allergies   Allergen Reactions     Acetaminophen W/Codeine Nausea       Review of Systems:   Constitutional - Denies fevers, weight loss, malaise, lethargy  Neuro - Denies tremors or seizures  Pulmon - Denies SOB, dyspnea, hemoptysis, chronic cough or use of an inhaler  CV - Denies CP,  "SOB, lower extremity edema, difficulty w/ stairs, has never used NTG  GI - Denies hematemesis, BRBPR, melena, chronic diarrhea or epigastric pain, + left groin pain   - Denies hematuria, difficulty voiding, denies history of nephrolithiasis  Hematology - Denies blood clotting disorders, chronic anemias  Dermatology - No melanomas or skin cancers  Rheumatology - No h/o RA  Pysch - Denies depression, bipolar d/o or schizophrenia    Physical Exam:  /79 (BP Location: Right arm, Patient Position: Chair, Cuff Size: Adult Regular)  Pulse 84  Temp 97.4  F (36.3  C) (Tympanic)  Resp 18  Ht 1.759 m (5' 9.25\")  Wt 92.1 kg (203 lb)  BMI 29.76 kg/m2    Constitutional- No acute distress, well nourished, non-toxic  Eyes: Anicteric, no injection.  PERRL  ENT:  Normocephalic, atraumatic, Nose midline, moist mucus membranes  Neck - supple, no LAD  Respiratory- Clear to auscultation bilaterally, good inspiratory effort  Cardiovascular - Heart RRR, no lift's, thrills, murmurs, rubs, or gallop.  No peripheral edema.  No clubbing.  Abdomen - Soft, non-tender, +BS, no hepatosplenomegaly, no palpable masses.  Well healed laparoscopic incision sites.  Groin: No obvious hernia bilaterally.  Circumcised.  No testicular mass or tenderness  Neuro - No focal neuro deficits, Alert and oriented x 3  Psych: Appropriate mood and affect  Musculoskeletal: Normal gait, symmetric strength.  FROM upper and lower extremities.  Skin: Warm, Dry    Assessment:  1. Inguinodynia, left      Plan:   Mr. Bullard was seen in clinic today.  He has a previous history of bilateral inguinal hernia repair and has 3 weeks of left inguinodynia.  He has minimal pain with palpation and no obvious hernia.  We discussed the nature of hernias versus groin strains and need for ultrasound to rule out hernia.  He will obtain this and continue symptomatic management of his pain with Motrin, Ice, and rest as needed.  I will call him with the results to discuss " possible surgery should a hernia be evident.  His previous scars appear consistent with a LISSETT approach for his hernia.  Should intervention be needed, a TEPP may be pursued.    Mukul Vieyra, DO on 11/15/2018 at 12:44 PM

## 2018-11-15 NOTE — NURSING NOTE
"Chief Complaint   Patient presents with     Consult     Left Inguinal Pain        Initial /88 (BP Location: Right arm, Patient Position: Chair, Cuff Size: Adult Regular)  Pulse 84  Temp 97.4  F (36.3  C) (Tympanic)  Resp 18  Ht 1.759 m (5' 9.25\")  Wt 92.1 kg (203 lb)  BMI 29.76 kg/m2 Estimated body mass index is 29.76 kg/(m^2) as calculated from the following:    Height as of this encounter: 1.759 m (5' 9.25\").    Weight as of this encounter: 92.1 kg (203 lb).  BP completed using cuff size: regular  Medications and allergies reviewed.      Sheron SAUCEDO CMA     "

## 2018-11-15 NOTE — MR AVS SNAPSHOT
After Visit Summary   11/15/2018    Pawan Bullard    MRN: [id number]           Patient Information     Date Of Birth          1950        Visit Information        Provider Department      11/15/2018 2:00 PM Mukul Vieyra,  [facility]        Today's Diagnoses     Inguinodynia, left    -  1      Care Instructions    Per Physician's instructions  1. Rest area  2. Apply ice, 20 minutes on, 20 minutes off  3. Motrin over the counter as needed  4. Obtain ultrasound of the area to rule out hernia          Follow-ups after your visit        Follow-up notes from your care team     Return if symptoms worsen or fail to improve.      Your next 10 appointments already scheduled     Nov 19, 2018  1:15 PM CST   LAB with [facility]   [facility]    [address] [place] [id number] [phone]           Please do not eat 10-12 hours before your appointment if you are coming in fasting for labs on lipids, cholesterol, or glucose (sugar). This does not apply to pregnant women. Water, hot tea and black coffee (with nothing added) are okay. Do not drink other fluids, diet soda or chew gum.            Nov 26, 2018  1:20 PM CST   Return Visit with Adria Lopez MD   [facility] [place] ([facility])    [address]  [place] [address] [phone]              Future tests that were ordered for you today     Open Future Orders        Priority Expected Expires Ordered    US Abdomen Limited Routine  11/15/2019 11/15/2018            Who to contact     If you have questions or need follow up information about today's clinic visit or your schedule please contact [facility] directly at [phone].  Normal or non-critical lab and imaging results will be communicated to you by MyChart, letter or phone within 4 business days after the clinic has received the results. If you do not hear  "from us within 7 days, please contact the clinic through [employer] or phone. If you have a critical or abnormal lab result, we will notify you by phone as soon as possible.  Submit refill requests through [employer] or call your pharmacy and they will forward the refill request to us. Please allow 3 business days for your refill to be completed.          Additional Information About Your Visit        Care EveryWhere ID     This is your Care EveryWhere ID. This could be used by other organizations to access your [place] medical records  [id number]        Your Vitals Were     Pulse Temperature Respirations Height BMI (Body Mass Index)       84 97.4  F (36.3  C) (Tympanic) 18 1.759 m (5' 9.25\") 29.76 kg/m2        Blood Pressure from Last 3 Encounters:   11/15/18 129/79   11/12/18 130/70   09/29/18 (!) 146/95    Weight from Last 3 Encounters:   11/15/18 92.1 kg (203 lb)   11/12/18 92.1 kg (203 lb)   09/29/18 90.7 kg (200 lb)               Primary Care Provider Office Phone # Fax #    Armando Finley [phone] [address] [place], [place] [id number]        Equal Access to Services     NINA OLMSTEAD : Hadii amado ku hadasho Soomaali, waaxda luqadaha, qaybta kaalmada adeegyada, waxay edouard gamez. So [facility] [phone].    ATENCIÓN: Si habla español, tiene a haley disposición servicios gratuitos de asistencia lingüística. Llame al [phone].    We comply with applicable federal civil rights laws and [place] laws. We do not discriminate on the basis of race, color, national origin, age, disability, sex, sexual orientation, or gender identity.            Thank you!     Thank you for choosing [facility]  for your care. Our goal is always to provide you with excellent care. Hearing back from our patients is one way we can continue to improve our services. Please take a few minutes to complete the written survey that you may receive in the mail after your visit with us. " Thank you!             Your Updated Medication List - Protect others around you: Learn how to safely use, store and throw away your medicines at www.disposemymeds.org.          This list is accurate as of 11/15/18  2:13 PM.  Always use your most recent med list.                   Brand Name Dispense Instructions for use Diagnosis    folic acid 1 MG tablet    FOLVITE    100 tablet    Take 1 tablet (1 mg) by mouth daily    Psoriatic arthropathy (H)       IBU-200 PO      Take 400 mg by mouth every 6 hours as needed        lisinopril 2.5 MG tablet    PRINIVIL/Zestril    90 tablet    Take 1 tablet (2.5 mg) by mouth daily    Essential hypertension with goal blood pressure less than 140/90       methotrexate sodium 2.5 MG Tabs     84 tablet    Take 17.5 mg by mouth once a week . Take all 7 tablets on the same day of each week.    Psoriatic arthropathy (H)       Multi-vitamin Tabs tablet   Generic drug:  multivitamin, therapeutic with minerals      1 tab daily        PROBIOTIC DAILY PO      Take 1 capsule by mouth daily        simvastatin 80 MG tablet    ZOCOR    45 tablet    Take 0.5 tablets (40 mg) by mouth At Bedtime    Hyperlipidemia LDL goal <130       tamsulosin 0.4 MG capsule    FLOMAX    90 capsule    Take 1 capsule (0.4 mg) by mouth daily    Benign prostatic hyperplasia with nocturia       TYLENOL ARTHRITIS PAIN 650 MG CR tablet   Generic drug:  acetaminophen     60    2 TABLETS EVERY 8 HOURS AS NEEDED

## 2018-11-19 DIAGNOSIS — Z12.5 SCREENING FOR PROSTATE CANCER: ICD-10-CM

## 2018-11-19 DIAGNOSIS — E78.5 HYPERLIPIDEMIA LDL GOAL <130: ICD-10-CM

## 2018-11-19 DIAGNOSIS — L40.50 PSORIATIC ARTHROPATHY (H): ICD-10-CM

## 2018-11-19 LAB
ALBUMIN SERPL-MCNC: 3.9 G/DL (ref 3.4–5)
ALP SERPL-CCNC: 54 U/L (ref 40–150)
ALT SERPL W P-5'-P-CCNC: 54 U/L (ref 0–70)
AST SERPL W P-5'-P-CCNC: 43 U/L (ref 0–45)
BASOPHILS # BLD AUTO: 0 10E9/L (ref 0–0.2)
BASOPHILS NFR BLD AUTO: 0.2 %
BILIRUB DIRECT SERPL-MCNC: 0.2 MG/DL (ref 0–0.2)
BILIRUB SERPL-MCNC: 0.9 MG/DL (ref 0.2–1.3)
CHOLEST SERPL-MCNC: 207 MG/DL
CREAT SERPL-MCNC: 1.03 MG/DL (ref 0.66–1.25)
CRP SERPL-MCNC: <2.9 MG/L (ref 0–8)
DIFFERENTIAL METHOD BLD: NORMAL
EOSINOPHIL # BLD AUTO: 0.1 10E9/L (ref 0–0.7)
EOSINOPHIL NFR BLD AUTO: 2.5 %
ERYTHROCYTE [DISTWIDTH] IN BLOOD BY AUTOMATED COUNT: 12.7 % (ref 10–15)
ERYTHROCYTE [SEDIMENTATION RATE] IN BLOOD BY WESTERGREN METHOD: 5 MM/H (ref 0–20)
GFR SERPL CREATININE-BSD FRML MDRD: 72 ML/MIN/1.7M2
HCT VFR BLD AUTO: 45.8 % (ref 40–53)
HDLC SERPL-MCNC: 50 MG/DL
HGB BLD-MCNC: 15.7 G/DL (ref 13.3–17.7)
LDLC SERPL CALC-MCNC: 84 MG/DL
LYMPHOCYTES # BLD AUTO: 1.2 10E9/L (ref 0.8–5.3)
LYMPHOCYTES NFR BLD AUTO: 27 %
MCH RBC QN AUTO: 32.9 PG (ref 26.5–33)
MCHC RBC AUTO-ENTMCNC: 34.3 G/DL (ref 31.5–36.5)
MCV RBC AUTO: 96 FL (ref 78–100)
MONOCYTES # BLD AUTO: 0.3 10E9/L (ref 0–1.3)
MONOCYTES NFR BLD AUTO: 6.5 %
NEUTROPHILS # BLD AUTO: 2.9 10E9/L (ref 1.6–8.3)
NEUTROPHILS NFR BLD AUTO: 63.8 %
NONHDLC SERPL-MCNC: 157 MG/DL
PLATELET # BLD AUTO: 153 10E9/L (ref 150–450)
PROT SERPL-MCNC: 6.8 G/DL (ref 6.8–8.8)
RBC # BLD AUTO: 4.77 10E12/L (ref 4.4–5.9)
TRIGL SERPL-MCNC: 363 MG/DL
WBC # BLD AUTO: 4.5 10E9/L (ref 4–11)

## 2018-11-19 PROCEDURE — 85652 RBC SED RATE AUTOMATED: CPT | Performed by: INTERNAL MEDICINE

## 2018-11-19 PROCEDURE — 86140 C-REACTIVE PROTEIN: CPT | Performed by: INTERNAL MEDICINE

## 2018-11-19 PROCEDURE — G0103 PSA SCREENING: HCPCS | Performed by: FAMILY MEDICINE

## 2018-11-19 PROCEDURE — 80076 HEPATIC FUNCTION PANEL: CPT | Performed by: INTERNAL MEDICINE

## 2018-11-19 PROCEDURE — 36415 COLL VENOUS BLD VENIPUNCTURE: CPT | Performed by: INTERNAL MEDICINE

## 2018-11-19 PROCEDURE — 82565 ASSAY OF CREATININE: CPT | Performed by: INTERNAL MEDICINE

## 2018-11-19 PROCEDURE — 80061 LIPID PANEL: CPT | Performed by: FAMILY MEDICINE

## 2018-11-19 PROCEDURE — 85025 COMPLETE CBC W/AUTO DIFF WBC: CPT | Performed by: INTERNAL MEDICINE

## 2018-11-21 LAB — PSA SERPL-ACNC: 1.17 UG/L (ref 0–4)

## 2018-11-23 NOTE — PROGRESS NOTES
Please call.  PSA test (for prostate cancer screening) is normal.   triglycerides and total .chj are high but LDL and HDL are normal.   Other blood counts, tests for inflammation and liver tests all OK.   PLAN: No new changes in treatment recommended.

## 2018-11-26 ENCOUNTER — OFFICE VISIT (OUTPATIENT)
Dept: RHEUMATOLOGY | Facility: CLINIC | Age: 68
End: 2018-11-26
Payer: COMMERCIAL

## 2018-11-26 DIAGNOSIS — L40.50 PSORIATIC ARTHROPATHY (H): Primary | ICD-10-CM

## 2018-11-26 DIAGNOSIS — Z79.899 HIGH RISK MEDICATIONS (NOT ANTICOAGULANTS) LONG-TERM USE: ICD-10-CM

## 2018-11-26 PROCEDURE — 99213 OFFICE O/P EST LOW 20 MIN: CPT | Performed by: INTERNAL MEDICINE

## 2018-11-26 RX ORDER — METHOTREXATE 2.5 MG/1
17.5 TABLET ORAL WEEKLY
Qty: 84 TABLET | Refills: 2 | Status: SHIPPED | OUTPATIENT
Start: 2018-11-26 | End: 2019-03-04

## 2018-11-26 RX ORDER — FOLIC ACID 1 MG/1
1 TABLET ORAL DAILY
Qty: 100 TABLET | Refills: 3 | Status: SHIPPED | OUTPATIENT
Start: 2018-11-26 | End: 2019-05-20

## 2018-11-26 NOTE — PROGRESS NOTES
Rheumatology Clinic Visit      Pawan Bullard MRN# [id number]   YOB: 1950 Age: 68 year old      Date of visit: 11/26/18   PCP: Dr. Armando Finley    Chief Complaint   Patient presents with:  Follow Up For: Psoriatic arthropathy     Assessment and Plan     1.  Psoriatic arthritis: Previously doing well with methotrexate 15 mg once weekly but was having more pain that was consistent with an inflammatory arthritis of the PIPs and therefore methotrexate was increased to 17.5 mg once weekly with improvement of this pain.  No synovitis on exam.  Psoriatic arthritis is doing well.  - Continue methotrexate 17.5 mg once weekly  - Continue folic acid 1 mg daily  - Labs in 3 months: CBC, Creatinine, Hepatic Panel  - Labs in 6 months: CBC, Creatinine, Hepatic Panel, ESR, CRP              Rapid 3, cumulative scores                      11/26/2018:  5.2 (MTX 17.5mg wkly)    2. Osteoarthritis of both hands: The diagnosis and treatment options were reviewed today.  Symptoms are mild and not affecting his daily activities so will observe for now.    3. Left second digit trigger finger, history: Not actively triggering. Resolved with previous MTX increase (see #1) per patient    4. Hypertension: Patient to follow up with Primary Care provider regarding elevated blood pressure.     5.  Vaccinations: Vaccinations reviewed with Mr. Bullard.  Risks and benefits of vaccinations were discussed.   - Influenza: up to date  - [id number]: up to date  - [id number]: up to date  - Shingrix: He is going to check on insurance coverage before receiving; he says that he will look into getting it at the pharmacy    Mr. Bullard verbalized agreement with and understanding of the rational for the diagnosis and treatment plan.  All questions were answered to best of my ability and the patient's satisfaction. Mr. Bullard was advised to contact the clinic with any questions that may arise after the clinic visit.      Thank you for involving  me in the care of the patient    Return to clinic: 6 months      HPI   Pawan Bullard is a 68 year old male with a past medical history significant for hypertension, hyperlipidemia, chondrocalcinosis, GERD, osteoarthritis, and psoriatic arthritis who presents for follow-up of psoriatic arthritis.      Today, Mr. Bullard reports that he is doing okay.  Occasional stiffness in his fingers, generally when the barometric pressure is changing, when the weather is getting more damp.  No joint swelling.  No joint pain.  Tolerating medications well.  Morning stiffness for approximately 10-20 minutes.  Trigger finger completely resolved after methotrexate dose was increased in the past.    Denies fevers, chills, nausea, vomiting, constipation, diarrhea. No abdominal pain. No chest pain/pressure, palpitations, or shortness of breath. No LE swelling. No oral or nasal sores.  No sicca symptoms.  No history of inflammatory eye disease.  No history of inflammatory bowel disease.       Tobacco: None  EtOH: 0-2 beers or mixed drinks monthly  Drugs: None    ROS   GEN: No fevers, chills, night sweats, or weight change  SKIN: See HPI  HEENT: No oral or nasal ulcers.  CV: No chest pain, pressure, palpitations, or dyspnea on exertion.  PULM: No SOB, wheeze, cough.  GI: No nausea, vomiting, constipation, diarrhea. No blood in stool. No abdominal pain.  : No blood in urine.  MSK: See HPI.  NEURO: No numbness, tingling, or weakness.  EXT: No LE swelling  PSYCH: Negative    Active Problem List     Patient Active Problem List   Diagnosis     Psoriatic arthropathy (H)     Esophageal reflux     Hypertension goal BP (blood pressure) < 140/90     FAMILY HISTORY OF GI NEOPLASM     FAMILY HISTORY OF DIABETES MELLITUS     Impotence of organic origin     Chondrocalcinosis     HYPERLIPIDEMIA LDL GOAL <130     Osteoarthritis     Advanced directives, counseling/discussion     High risk medications (not anticoagulants) long-term use     Past  Medical History     Past Medical History:   Diagnosis Date     Esophageal reflux     GERD     Hypertension      Psoriatic arthropathy (H)     Psoriatic arthritis     Past Surgical History     Past Surgical History:   Procedure Laterality Date     ARTHROSCOPY KNEE RT/LT  April 2009    left knee     ARTHROSCOPY KNEE WITH MEDIAL MENISCECTOMY  7/9/2013    Procedure: ARTHROSCOPY KNEE WITH MEDIAL MENISCECTOMY;  Left Knee Arthroscopic and Open Repair of Patellar Tendon with Platelet Rich Plasma;  Surgeon: Ley, Jeffrey Duane, MD;  Location: [facility]     COLONOSCOPY  Jan. 2006    diverticuli. Father had colon CA     COLONOSCOPY  2001     COLONOSCOPY  4/11/2011    Procedure:COLONOSCOPY; Surgeon:JENNIFER JEAN; Location:[facility]     COLONOSCOPY N/A 11/21/2016    Procedure: COLONOSCOPY;  Surgeon: Elias Santamaria MD;  Location: [facility]     REPAIR TENDON PATELLA  7/9/2013    Procedure: REPAIR TENDON PATELLA;;  Surgeon: Ley, Jeffrey Duane, MD;  Location: [facility]     SURGICAL HISTORY OF -   1999    (L) Herniorrhaphy     SURGICAL HISTORY OF -   2003    (R) Herniorrhaphy     SURGICAL HISTORY OF -   1981    Hemorrohid     Allergy     Allergies   Allergen Reactions     Acetaminophen W/Codeine Nausea     Current Medication List     Current Outpatient Prescriptions   Medication Sig     folic acid (FOLVITE) 1 MG tablet Take 1 tablet (1 mg) by mouth daily     Ibuprofen (IBU-200 PO) Take 400 mg by mouth every 6 hours as needed      lisinopril (PRINIVIL/ZESTRIL) 2.5 MG tablet Take 1 tablet (2.5 mg) by mouth daily     methotrexate sodium 2.5 MG TABS Take 7 tablets (17.5 mg) by mouth once a week . Take all 7 tablets on the same day of each week.     MULTI-VITAMIN OR TABS 1 tab daily     Probiotic Product (PROBIOTIC DAILY PO) Take 1 capsule by mouth daily      simvastatin (ZOCOR) 80 MG tablet Take 0.5 tablets (40 mg) by mouth At Bedtime     tamsulosin (FLOMAX) 0.4 MG capsule Take 1 capsule (0.4 mg) by mouth daily     TYLENOL ARTHRITIS PAIN 650 MG  "OR TBCR 2 TABLETS EVERY 8 HOURS AS NEEDED     [DISCONTINUED] methotrexate sodium 2.5 MG TABS Take 17.5 mg by mouth once a week . Take all 7 tablets on the same day of each week.     No current facility-administered medications for this visit.          Social History   See HPI    Family History     Family History   Problem Relation Age of Onset     Hypertension Mother      Diabetes Mother      Cerebrovascular Disease Mother      in her 70s     Arthritis Mother      Cancer - colorectal Father 67     Arthritis Paternal Grandmother      Prostate Cancer No family hx of      Coronary Artery Disease No family hx of        Physical Exam     Temp Readings from Last 3 Encounters:   11/15/18 97.4  F (36.3  C) (Tympanic)   11/12/18 97.6  F (36.4  C) (Tympanic)   09/29/18 97.8  F (36.6  C) (Temporal)     BP Readings from Last 5 Encounters:   11/15/18 129/79   11/12/18 130/70   09/29/18 (!) 146/95   09/21/18 138/78   05/21/18 126/80     Pulse Readings from Last 1 Encounters:   11/15/18 84     Resp Readings from Last 1 Encounters:   11/15/18 18     Estimated body mass index is 29.76 kg/(m^2) as calculated from the following:    Height as of 11/15/18: 1.759 m (5' 9.25\").    Weight as of 11/15/18: 92.1 kg (203 lb).    GEN: NAD  HEENT: MMM. No oral lesions.  Anicteric, noninjected sclera  CV: S1, S2. RRR. No m/r/g.  PULM: CTA bilaterally. No w/c.  MSK: MCPs, PIPs, wrists, elbows, shoulders, knees, ankles, and MTPs without swelling or tenderness to palpation.  Negative MCP and MTP squeeze.  Hips nontender to palpation.  NEURO: UE and LE strengths 5/5 and equal bilaterally.   SKIN: No rash  EXT: No LE edema  PSYCH: Alert. Appropriate.    Labs / Imaging (select studies)     CBC  Recent Labs   Lab Test  11/19/18   1318  09/29/18   1040  08/20/18   1333   WBC  4.5  4.9  5.0   RBC  4.77  4.77  4.57   HGB  15.7  16.0  15.5   HCT  45.8  45.7  44.2   MCV  96  96  97   RDW  12.7  12.4  13.2   PLT  153  151  138*   MCH  32.9  33.5*  33.9* "   MCHC  34.3  35.0  35.1   NEUTROPHIL  63.8  64.6  65.4   LYMPH  27.0  24.9  24.4   MONOCYTE  6.5  7.4  7.8   EOSINOPHIL  2.5  2.5  2.2   BASOPHIL  0.2  0.2  0.2   ANEU  2.9  3.2  3.3   ALYM  1.2  1.2  1.2   ERENDIRA  0.3  0.4  0.4   AEOS  0.1  0.1  0.1   ABAS  0.0  0.0  0.0     CMP  Recent Labs   Lab Test  11/19/18   1318  09/29/18   1040  08/20/18   1333  05/16/18   1016  01/05/18   1252  09/06/17   1119   NA   --   139   --    --   135  138   POTASSIUM   --   3.7   --    --   4.2  4.4   CHLORIDE   --   104   --    --   103  103   CO2   --   29   --    --   26  32   ANIONGAP   --   6   --    --   6  3   GLC   --   96   --    --   94  96   BUN   --   13   --    --   13  12   CR  1.03  1.07  1.05  0.90  0.92  1.01   GFRESTIMATED  72  69  71  84  82  74   GFRESTBLACK  87  83  85  >90  >90  90   JOHN   --   8.8   --    --   9.3  9.2   BILITOTAL  0.9   --   0.9  1.0  0.9  1.0   ALBUMIN  3.9   --   3.9  3.7  4.1  4.0   PROTTOTAL  6.8   --   6.6*  6.5*  7.1  7.0   ALKPHOS  54   --   55  48  55  55   AST  43   --   33  22  35  30   ALT  54   --   55  38  41  46     Calcium/VitaminD  Recent Labs   Lab Test  09/29/18   1040  01/05/18   1252  09/06/17   1119   JOHN  8.8  9.3  9.2     ESR/CRP  Recent Labs   Lab Test  11/19/18   1318  05/16/18   1016  06/11/15   1220   SED  5  6  4   CRP  <2.9  <2.9  <2.9     Hepatitis B  Recent Labs   Lab Test  05/16/18   1016   HBCAB  Nonreactive   HEPBANG  Nonreactive     Hepatitis C  Recent Labs   Lab Test  08/26/16   1213   HCVAB  Nonreactive   Assay performance characteristics have not been established for newborns,   infants, and children       Immunization History     Immunization History   Administered Date(s) Administered     Influenza (High Dose) 3 valent vaccine 10/18/2017, 09/21/2018     Influenza (IIV3) PF 11/17/2005, 12/04/2006, 11/22/2010, 09/26/2012, 10/01/2014     Influenza Vaccine IM 3yrs+ 4 Valent IIV4 10/15/2013, 12/17/2015, 08/26/2016     Pneumo Conj 13-V (2010&after)  10/18/2017     Pneumococcal 23 valent 01/22/2018     TDAP Vaccine (Adacel) 04/22/2009, 05/20/2018          Chart documentation done in part with Dragon Voice recognition Software. Although reviewed after completion, some word and grammatical error may remain.    Adria Lopez MD

## 2018-11-26 NOTE — PROGRESS NOTES
"No chief complaint on file.      Initial There were no vitals taken for this visit. Estimated body mass index is 29.76 kg/(m^2) as calculated from the following:    Height as of 11/15/18: 1.759 m (5' 9.25\").    Weight as of 11/15/18: 92.1 kg (203 lb).  BP completed using cuff size: regular         RAPID3 (0-30) Cumulative Score  5.2          RAPID3 Weighted Score (divide #4 by 3 and that is the weighted score)  1.73           "

## 2018-11-26 NOTE — MR AVS SNAPSHOT
After Visit Summary   11/26/2018    Pawan Bullard    MRN: [id number]           Patient Information     Date Of Birth          1950        Visit Information        Provider Department      11/26/2018 1:20 PM Adria Lopez MD [facility]        Today's Diagnoses     Psoriatic arthropathy (H)           Follow-ups after your visit        Your next 10 appointments already scheduled     Nov 29, 2018  9:45 AM CST   (Arrive by 9:30 AM)   US ABDOMEN LIMITED with [facility]   [facility] ([facility])    [address] [facility] [place] [address]   [phone]           How do I prepare for my exam? (Food and drink instructions) Adults: No eating, smoking, gum chewing or drinking for 8 hours before the exam. You may take medicine with a small sip of water.  Children: * Infants, breast-fed: may have breast milk up to 2 hours before exam. * Infants, formula: may have bottle until 4 hours before exam. * Children 1-5 years: No food or drink for 4 hours before exam. * Children 6 -12 years: No food or drink for 6 hours before exam. * Children over 12 years: No food or drink for 8 hours before exam.  * J Tube Fed: No need to stop feedings.  What should I wear: Wear comfortable clothes.  How long does the exam take: Most ultrasounds take 30 to 60 minutes.  What should I bring: Bring a list of your medicines, including vitamins, minerals and over-the-counter drugs. It is safest to leave personal items at home.  Do I need a :  No  is needed.  What do I need to tell my doctor: Tell your doctor about any allergies you may have.  What should I do after the exam: No restrictions, You may resume normal activities.  What is this test: An ultrasound uses sound waves to make pictures of the body. Sound waves do not cause pain. The only discomfort may be the pressure of the wand against your skin or full bladder.  Who should I call with questions: If you have any  questions, please call the Imaging Department where you will have your exam. Directions, parking instructions, and other information is available on our website, [email or web address].            Feb 25, 2019 10:00 AM CST   LAB with [facility] LAB   [facility]    [address] [place] [address]   [phone]           Please do not eat 10-12 hours before your appointment if you are coming in fasting for labs on lipids, cholesterol, or glucose (sugar). This does not apply to pregnant women. Water, hot tea and black coffee (with nothing added) are okay. Do not drink other fluids, diet soda or chew gum.            May 15, 2019 10:00 AM CDT   LAB with NB LAB   [facility]    [address] [place] [address]   [phone]           Please do not eat 10-12 hours before your appointment if you are coming in fasting for labs on lipids, cholesterol, or glucose (sugar). This does not apply to pregnant women. Water, hot tea and black coffee (with nothing added) are okay. Do not drink other fluids, diet soda or chew gum.            May 20, 2019  1:00 PM CDT   Return Visit with Adria Lopez MD   [facility] ([facility])    [address] [facility] [address]   [phone]              Future tests that were ordered for you today     Open Future Orders        Priority Expected Expires Ordered    CBC with platelets differential Routine 2/20/2019 3/26/2019 11/26/2018    Creatinine Routine 2/20/2019 3/26/2019 11/26/2018    Hepatic panel Routine 2/20/2019 3/26/2019 11/26/2018    Hepatic panel Routine 5/20/2019 6/9/2019 11/26/2018    CRP inflammation Routine 5/20/2019 6/9/2019 11/26/2018    Erythrocyte sedimentation rate auto Routine 5/20/2019 6/9/2019 11/26/2018    Creatinine Routine 5/20/2019 6/9/2019 11/26/2018    CBC with platelets differential Routine 5/20/2019 6/9/2019  11/26/2018            Who to contact     If you have questions or need follow up information about today's clinic visit or your schedule please contact [facility] directly at [phone].  Normal or non-critical lab and imaging results will be communicated to you by MyChart, letter or phone within 4 business days after the clinic has received the results. If you do not hear from us within 7 days, please contact the clinic through MyChart or phone. If you have a critical or abnormal lab result, we will notify you by phone as soon as possible.  Submit refill requests through [employer] or call your pharmacy and they will forward the refill request to us. Please allow 3 business days for your refill to be completed.          Additional Information About Your Visit        Care EveryWhere ID     This is your Care EveryWhere ID. This could be used by other organizations to access your [facility] medical records  [id number]         Blood Pressure from Last 3 Encounters:   11/15/18 129/79   11/12/18 130/70   09/29/18 (!) 146/95    Weight from Last 3 Encounters:   11/15/18 92.1 kg (203 lb)   11/12/18 92.1 kg (203 lb)   09/29/18 90.7 kg (200 lb)                 Where to get your medicines      These medications were sent to [id number] [place] [id number] [facility]  [address] [place] [address]    Hours:  Closed 10-16-08 business to [facility] Phone:  [phone]     folic acid 1 MG tablet    methotrexate sodium 2.5 MG Tabs          Primary Care Provider Office Phone # Fax #    Armando Finley [phone] [address] [facility] [address]        Equal Access to Services     NIKHIL OLMSTEAD AH: Hadii amado juarez hadasho Sovimalali, waaxda luqadaha, qaybta kaalmada adedexyaenio, kathy gamez. So [facility] [place] [facility] [phone].    ATENCIÓN: Si habla español, tiene a haley disposición servicios gratuitos de asistencia lingüística. Llame al [phone].    We  comply with applicable federal civil rights laws and [place] laws. We do not discriminate on the basis of race, color, national origin, age, disability, sex, sexual orientation, or gender identity.            Thank you!     Thank you for choosing [facility]  for your care. Our goal is always to provide you with excellent care. Hearing back from our patients is one way we can continue to improve our services. Please take a few minutes to complete the written survey that you may receive in the mail after your visit with us. Thank you!             Your Updated Medication List - Protect others around you: Learn how to safely use, store and throw away your medicines at www.disposemymeds.org.          This list is accurate as of 11/26/18  1:49 PM.  Always use your most recent med list.                   Brand Name Dispense Instructions for use Diagnosis    folic acid 1 MG tablet    FOLVITE    100 tablet    Take 1 tablet (1 mg) by mouth daily    Psoriatic arthropathy (H)       IBU-200 PO      Take 400 mg by mouth every 6 hours as needed        lisinopril 2.5 MG tablet    PRINIVIL/Zestril    90 tablet    Take 1 tablet (2.5 mg) by mouth daily    Essential hypertension with goal blood pressure less than 140/90       methotrexate sodium 2.5 MG Tabs     84 tablet    Take 7 tablets (17.5 mg) by mouth once a week . Take all 7 tablets on the same day of each week.    Psoriatic arthropathy (H)       Multi-vitamin Tabs tablet   Generic drug:  multivitamin, therapeutic with minerals      1 tab daily        PROBIOTIC DAILY PO      Take 1 capsule by mouth daily        simvastatin 80 MG tablet    ZOCOR    45 tablet    Take 0.5 tablets (40 mg) by mouth At Bedtime    Hyperlipidemia LDL goal <130       tamsulosin 0.4 MG capsule    FLOMAX    90 capsule    Take 1 capsule (0.4 mg) by mouth daily    Benign prostatic hyperplasia with nocturia       TYLENOL ARTHRITIS PAIN 650 MG CR tablet   Generic drug:  acetaminophen     60    2  TABLETS EVERY 8 HOURS AS NEEDED

## 2018-11-29 ENCOUNTER — HOSPITAL ENCOUNTER (OUTPATIENT)
Dept: ULTRASOUND IMAGING | Facility: CLINIC | Age: 68
Discharge: HOME | End: 2018-11-29
Attending: SURGERY | Admitting: SURGERY
Payer: MEDICARE

## 2018-11-29 DIAGNOSIS — R10.32 INGUINODYNIA, LEFT: ICD-10-CM

## 2018-11-29 PROCEDURE — 76705 ECHO EXAM OF ABDOMEN: CPT

## 2019-02-25 DIAGNOSIS — L40.50 PSORIATIC ARTHROPATHY (H): ICD-10-CM

## 2019-02-25 LAB
ALBUMIN SERPL-MCNC: 3.7 G/DL (ref 3.4–5)
ALP SERPL-CCNC: 50 U/L (ref 40–150)
ALT SERPL W P-5'-P-CCNC: 33 U/L (ref 0–70)
AST SERPL W P-5'-P-CCNC: 29 U/L (ref 0–45)
BASOPHILS # BLD AUTO: 0 10E9/L (ref 0–0.2)
BASOPHILS NFR BLD AUTO: 0.2 %
BILIRUB DIRECT SERPL-MCNC: 0.2 MG/DL (ref 0–0.2)
BILIRUB SERPL-MCNC: 0.9 MG/DL (ref 0.2–1.3)
CREAT SERPL-MCNC: 1 MG/DL (ref 0.66–1.25)
DIFFERENTIAL METHOD BLD: ABNORMAL
EOSINOPHIL # BLD AUTO: 0.1 10E9/L (ref 0–0.7)
EOSINOPHIL NFR BLD AUTO: 2.4 %
ERYTHROCYTE [DISTWIDTH] IN BLOOD BY AUTOMATED COUNT: 13 % (ref 10–15)
GFR SERPL CREATININE-BSD FRML MDRD: 77 ML/MIN/{1.73_M2}
HCT VFR BLD AUTO: 44 % (ref 40–53)
HGB BLD-MCNC: 15.2 G/DL (ref 13.3–17.7)
LYMPHOCYTES # BLD AUTO: 1.4 10E9/L (ref 0.8–5.3)
LYMPHOCYTES NFR BLD AUTO: 31.4 %
MCH RBC QN AUTO: 33 PG (ref 26.5–33)
MCHC RBC AUTO-ENTMCNC: 34.5 G/DL (ref 31.5–36.5)
MCV RBC AUTO: 96 FL (ref 78–100)
MONOCYTES # BLD AUTO: 0.3 10E9/L (ref 0–1.3)
MONOCYTES NFR BLD AUTO: 7.3 %
NEUTROPHILS # BLD AUTO: 2.6 10E9/L (ref 1.6–8.3)
NEUTROPHILS NFR BLD AUTO: 58.7 %
PLATELET # BLD AUTO: 144 10E9/L (ref 150–450)
PROT SERPL-MCNC: 6.6 G/DL (ref 6.8–8.8)
RBC # BLD AUTO: 4.6 10E12/L (ref 4.4–5.9)
WBC # BLD AUTO: 4.5 10E9/L (ref 4–11)

## 2019-02-25 PROCEDURE — 85025 COMPLETE CBC W/AUTO DIFF WBC: CPT | Performed by: INTERNAL MEDICINE

## 2019-02-25 PROCEDURE — 82565 ASSAY OF CREATININE: CPT | Performed by: INTERNAL MEDICINE

## 2019-02-25 PROCEDURE — 36415 COLL VENOUS BLD VENIPUNCTURE: CPT | Performed by: INTERNAL MEDICINE

## 2019-02-25 PROCEDURE — 80076 HEPATIC FUNCTION PANEL: CPT | Performed by: INTERNAL MEDICINE

## 2019-02-25 NOTE — LETTER
[address] [place] [address]    March 11, 2019    Pawan Bullard  [address] [facility] [place] [address]          Dear Mr. Bullard,     Your labs show a slightly reduced platelet count, similar to previous labs.     Enclosed is a copy of your results.     Results for orders placed or performed in visit on 02/25/19   Hepatic panel   Result Value Ref Range    Bilirubin Direct 0.2 0.0 - 0.2 mg/dL    Bilirubin Total 0.9 0.2 - 1.3 mg/dL    Albumin 3.7 3.4 - 5.0 g/dL    Protein Total 6.6 (L) 6.8 - 8.8 g/dL    Alkaline Phosphatase 50 40 - 150 U/L    ALT 33 0 - 70 U/L    AST 29 0 - 45 U/L   Creatinine   Result Value Ref Range    Creatinine 1.00 0.66 - 1.25 mg/dL    GFR Estimate 77 >60 mL/min/[1.73_m2]    GFR Estimate If Black 90 >60 mL/min/[1.73_m2]   CBC with platelets differential   Result Value Ref Range    WBC 4.5 4.0 - 11.0 10e9/L    RBC Count 4.60 4.4 - 5.9 10e12/L    Hemoglobin 15.2 13.3 - 17.7 g/dL    Hematocrit 44.0 40.0 - 53.0 %    MCV 96 78 - 100 fl    MCH 33.0 26.5 - 33.0 pg    MCHC 34.5 31.5 - 36.5 g/dL    RDW 13.0 10.0 - 15.0 %    Platelet Count 144 (L) 150 - 450 10e9/L    % Neutrophils 58.7 %    % Lymphocytes 31.4 %    % Monocytes 7.3 %    % Eosinophils 2.4 %    % Basophils 0.2 %    Absolute Neutrophil 2.6 1.6 - 8.3 10e9/L    Absolute Lymphocytes 1.4 0.8 - 5.3 10e9/L    Absolute Monocytes 0.3 0.0 - 1.3 10e9/L    Absolute Eosinophils 0.1 0.0 - 0.7 10e9/L    Absolute Basophils 0.0 0.0 - 0.2 10e9/L    Diff Method Automated Method        If you have any questions or concerns, please call myself or my nurse at [phone].      Sincerely,        Adria Lopez MD /pb

## 2019-02-27 ENCOUNTER — TELEPHONE (OUTPATIENT)
Dept: RHEUMATOLOGY | Facility: CLINIC | Age: 69
End: 2019-02-27

## 2019-02-27 DIAGNOSIS — L40.50 PSORIATIC ARTHROPATHY (H): ICD-10-CM

## 2019-02-27 NOTE — TELEPHONE ENCOUNTER
Patient states he would like a refill on Methotrexate.  Also would like this to be a 3 month supply.  Thank You.

## 2019-02-27 NOTE — TELEPHONE ENCOUNTER
Rheumatology team: Please call to notify Mr. Bullard that methotrexate was last prescribed on 11/26/2018 for a 3 months supply with 2 refills.  He should be receiving a 3 month supply unless his pharmacy or insurance have changed it.     Adria Lopez MD  2/27/2019 12:27 PM

## 2019-03-04 RX ORDER — METHOTREXATE 2.5 MG/1
17.5 TABLET ORAL WEEKLY
Qty: 84 TABLET | Refills: 1 | Status: SHIPPED | OUTPATIENT
Start: 2019-03-04 | End: 2019-05-20

## 2019-03-04 NOTE — TELEPHONE ENCOUNTER
Rheumatology team: Please call to notify Mr. Bullard that methotrexate has been refilled to the [facility].    Adria Lopez MD  3/4/2019 7:21 AM

## 2019-03-10 NOTE — RESULT ENCOUNTER NOTE
"Please mail Mr. Bullard his results with the following message.    \". Juan Miguel,    Your labs show a slightly reduced platelet count, similar to previous labs.    Please let me know if you have any questions.    Sincerely,  Adria Lopez MD  3/10/2019 4:13 PM\"  "

## 2019-05-15 DIAGNOSIS — L40.50 PSORIATIC ARTHROPATHY (H): ICD-10-CM

## 2019-05-15 LAB
ALBUMIN SERPL-MCNC: 3.9 G/DL (ref 3.4–5)
ALP SERPL-CCNC: 51 U/L (ref 40–150)
ALT SERPL W P-5'-P-CCNC: 45 U/L (ref 0–70)
AST SERPL W P-5'-P-CCNC: 31 U/L (ref 0–45)
BASOPHILS # BLD AUTO: 0 10E9/L (ref 0–0.2)
BASOPHILS NFR BLD AUTO: 0.2 %
BILIRUB DIRECT SERPL-MCNC: 0.3 MG/DL (ref 0–0.2)
BILIRUB SERPL-MCNC: 1.3 MG/DL (ref 0.2–1.3)
CREAT SERPL-MCNC: 0.99 MG/DL (ref 0.66–1.25)
CRP SERPL-MCNC: 3.2 MG/L (ref 0–8)
DIFFERENTIAL METHOD BLD: NORMAL
EOSINOPHIL # BLD AUTO: 0.2 10E9/L (ref 0–0.7)
EOSINOPHIL NFR BLD AUTO: 3.3 %
ERYTHROCYTE [DISTWIDTH] IN BLOOD BY AUTOMATED COUNT: 13.5 % (ref 10–15)
ERYTHROCYTE [SEDIMENTATION RATE] IN BLOOD BY WESTERGREN METHOD: 6 MM/H (ref 0–20)
GFR SERPL CREATININE-BSD FRML MDRD: 79 ML/MIN/{1.73_M2}
HCT VFR BLD AUTO: 43.3 % (ref 40–53)
HGB BLD-MCNC: 15 G/DL (ref 13.3–17.7)
LYMPHOCYTES # BLD AUTO: 1.5 10E9/L (ref 0.8–5.3)
LYMPHOCYTES NFR BLD AUTO: 32.8 %
MCH RBC QN AUTO: 32.8 PG (ref 26.5–33)
MCHC RBC AUTO-ENTMCNC: 34.6 G/DL (ref 31.5–36.5)
MCV RBC AUTO: 95 FL (ref 78–100)
MONOCYTES # BLD AUTO: 0.3 10E9/L (ref 0–1.3)
MONOCYTES NFR BLD AUTO: 6.9 %
NEUTROPHILS # BLD AUTO: 2.6 10E9/L (ref 1.6–8.3)
NEUTROPHILS NFR BLD AUTO: 56.8 %
PLATELET # BLD AUTO: 150 10E9/L (ref 150–450)
PROT SERPL-MCNC: 6.7 G/DL (ref 6.8–8.8)
RBC # BLD AUTO: 4.57 10E12/L (ref 4.4–5.9)
WBC # BLD AUTO: 4.6 10E9/L (ref 4–11)

## 2019-05-15 PROCEDURE — 86140 C-REACTIVE PROTEIN: CPT | Performed by: INTERNAL MEDICINE

## 2019-05-15 PROCEDURE — 36415 COLL VENOUS BLD VENIPUNCTURE: CPT | Performed by: INTERNAL MEDICINE

## 2019-05-15 PROCEDURE — 85025 COMPLETE CBC W/AUTO DIFF WBC: CPT | Performed by: INTERNAL MEDICINE

## 2019-05-15 PROCEDURE — 82565 ASSAY OF CREATININE: CPT | Performed by: INTERNAL MEDICINE

## 2019-05-15 PROCEDURE — 80076 HEPATIC FUNCTION PANEL: CPT | Performed by: INTERNAL MEDICINE

## 2019-05-15 PROCEDURE — 85652 RBC SED RATE AUTOMATED: CPT | Performed by: INTERNAL MEDICINE

## 2019-05-20 ENCOUNTER — OFFICE VISIT (OUTPATIENT)
Dept: RHEUMATOLOGY | Facility: CLINIC | Age: 69
End: 2019-05-20
Payer: COMMERCIAL

## 2019-05-20 VITALS
HEIGHT: 69 IN | DIASTOLIC BLOOD PRESSURE: 84 MMHG | WEIGHT: 207.2 LBS | SYSTOLIC BLOOD PRESSURE: 135 MMHG | HEART RATE: 75 BPM | OXYGEN SATURATION: 97 % | BODY MASS INDEX: 30.69 KG/M2

## 2019-05-20 DIAGNOSIS — L40.50 PSORIATIC ARTHROPATHY (H): Primary | ICD-10-CM

## 2019-05-20 DIAGNOSIS — Z79.899 HIGH RISK MEDICATIONS (NOT ANTICOAGULANTS) LONG-TERM USE: ICD-10-CM

## 2019-05-20 PROCEDURE — 99213 OFFICE O/P EST LOW 20 MIN: CPT | Performed by: INTERNAL MEDICINE

## 2019-05-20 RX ORDER — FOLIC ACID 1 MG/1
1 TABLET ORAL DAILY
Qty: 100 TABLET | Refills: 3 | Status: SHIPPED | OUTPATIENT
Start: 2019-05-20 | End: 2019-11-18

## 2019-05-20 RX ORDER — METHOTREXATE 2.5 MG/1
20 TABLET ORAL WEEKLY
Qty: 96 TABLET | Refills: 1 | Status: SHIPPED | OUTPATIENT
Start: 2019-05-20 | End: 2019-11-18

## 2019-05-20 ASSESSMENT — MIFFLIN-ST. JEOR: SCORE: 1709.19

## 2019-05-20 NOTE — PATIENT INSTRUCTIONS
Rheumatology    Dr. Adria Lopez         [facility]   (Monday)  [address]  [place] [address]       [facility]   (Tuesday)  [address]  [place] [address]    [facility]   (Wed., Thurs., and Friday)  [address] [place] [address]    Phone number: [phone]  Thank you for choosing [facility].  Francie Cuellar CMA

## 2019-05-20 NOTE — PROGRESS NOTES
Rheumatology Clinic Visit      Pawan Bullard MRN# [id number]   YOB: 1950 Age: 69 year old      Date of visit: 5/20/19   PCP: Dr. Armando Finley    Chief Complaint   Patient presents with:  Arthritis: hands and knees have been hurting.    Assessment and Plan     1.  Psoriatic arthritis: Previously doing well with methotrexate 15 mg once weekly but was having more pain that was consistent with an inflammatory arthritis of the PIPs and therefore methotrexate was increased to 17.5 mg once weekly with improvement of this pain.  Did well for a while but now more pain in the hands and knees that is inflammatory in nature, and subtle synovitis of the right 2nd-3rd MCPs.  - Increase methotrexate from 17.5 mg once weekly; to 20mg once weekly  - Continue folic acid 1 mg daily  - Labs monthly [id number]: CBC, Cr, Hepatic Panel  - Labs in 3 months: CBC, Creatinine, Hepatic Panel  - Labs in 6 months: CBC, Creatinine, Hepatic Panel, ESR, CRP              Rapid 3, cumulative scores                      05/20/2019:  Refused by patient                      11/26/2018:  5.2 (MTX 17.5mg wkly)    2.  Vaccinations: Vaccinations reviewed with Mr. Bullard.  Risks and benefits of vaccinations were discussed.   CDC stance on shingrix when on moderate to high immunosuppression was reviewed.   - Influenza: encouraged yearly vaccination  - [id number]: up to date  - [id number]: up to date  - Shingrix: Patient plans to receive at the pharmacy    Mr. Bullard verbalized agreement with and understanding of the rational for the diagnosis and treatment plan.  All questions were answered to best of my ability and the patient's satisfaction. Mr. Bullard was advised to contact the clinic with any questions that may arise after the clinic visit.      Thank you for involving me in the care of the patient    Return to clinic: 6 months (patient preference)      HPI   Pawan Bullard is a 69 year old male with a past medical history  significant for hypertension, hyperlipidemia, chondrocalcinosis, GERD, osteoarthritis, and psoriatic arthritis who presents for follow-up of psoriatic arthritis.      Today, Mr. Bullard reports that he is doing okay.  He has stiffness in the morning that lasts for about 30-60 minutes.  Stiffness improves with activity.  Positive gelling phenomenon.  Dull ache in the morning at his knees and MCPs that improves with time, nearly resolved after about 1.5 hours.  No difficulty with performing daily activities.      Denies fevers, chills, nausea, vomiting, constipation, diarrhea. No abdominal pain. No chest pain/pressure, palpitations, or shortness of breath. No LE swelling. No oral or nasal sores.  No sicca symptoms.  No history of inflammatory eye disease.  No history of inflammatory bowel disease.       Tobacco: None  EtOH: 0-2 beers or mixed drinks monthly  Drugs: None    ROS   GEN: No fevers, chills, night sweats, or weight change  SKIN: See HPI  HEENT: No oral or nasal ulcers.  CV: No chest pain, pressure, palpitations, or dyspnea on exertion.  PULM: No SOB, wheeze, cough.  GI: No nausea, vomiting, constipation, diarrhea. No blood in stool. No abdominal pain.  : No blood in urine.  MSK: See HPI.  NEURO: No numbness, tingling, or weakness.  EXT: No LE swelling  PSYCH: Negative    Active Problem List     Patient Active Problem List   Diagnosis     Psoriatic arthropathy (H)     Esophageal reflux     Hypertension goal BP (blood pressure) < 140/90     FAMILY HISTORY OF GI NEOPLASM     FAMILY HISTORY OF DIABETES MELLITUS     Impotence of organic origin     Chondrocalcinosis     HYPERLIPIDEMIA LDL GOAL <130     Osteoarthritis     Advanced directives, counseling/discussion     High risk medications (not anticoagulants) long-term use     Past Medical History     Past Medical History:   Diagnosis Date     Esophageal reflux     GERD     Hypertension      Psoriatic arthropathy (H)     Psoriatic arthritis     Past Surgical  History     Past Surgical History:   Procedure Laterality Date     ARTHROSCOPY KNEE RT/LT  April 2009    left knee     ARTHROSCOPY KNEE WITH MEDIAL MENISCECTOMY  7/9/2013    Procedure: ARTHROSCOPY KNEE WITH MEDIAL MENISCECTOMY;  Left Knee Arthroscopic and Open Repair of Patellar Tendon with Platelet Rich Plasma;  Surgeon: Ley, Jeffrey Duane, MD;  Location: [facility]     COLONOSCOPY  Jan. 2006    diverticuli. Father had colon CA     COLONOSCOPY  2001     COLONOSCOPY  4/11/2011    Procedure:COLONOSCOPY; Surgeon:JENNIFER JEAN; Location:[facility]     COLONOSCOPY N/A 11/21/2016    Procedure: COLONOSCOPY;  Surgeon: Elias Santamaria MD;  Location: [facility]     REPAIR TENDON PATELLA  7/9/2013    Procedure: REPAIR TENDON PATELLA;;  Surgeon: Ley, Jeffrey Duane, MD;  Location: [facility]     SURGICAL HISTORY OF -   1999    (L) Herniorrhaphy     SURGICAL HISTORY OF -   2003    (R) Herniorrhaphy     SURGICAL HISTORY OF -   1981    Hemorrohid     Allergy     Allergies   Allergen Reactions     Acetaminophen W/Codeine Nausea     Current Medication List     Current Outpatient Medications   Medication Sig     folic acid (FOLVITE) 1 MG tablet Take 1 tablet (1 mg) by mouth daily     Ibuprofen (IBU-200 PO) Take 400 mg by mouth every 6 hours as needed      lisinopril (PRINIVIL/ZESTRIL) 2.5 MG tablet Take 1 tablet (2.5 mg) by mouth daily     methotrexate sodium 2.5 MG TABS Take 7 tablets (17.5 mg) by mouth once a week . Take all 7 tablets on the same day of each week.     MULTI-VITAMIN OR TABS 1 tab daily     Probiotic Product (PROBIOTIC DAILY PO) Take 1 capsule by mouth daily      simvastatin (ZOCOR) 80 MG tablet Take 0.5 tablets (40 mg) by mouth At Bedtime     tamsulosin (FLOMAX) 0.4 MG capsule Take 1 capsule (0.4 mg) by mouth daily     TYLENOL ARTHRITIS PAIN 650 MG OR TBCR 2 TABLETS EVERY 8 HOURS AS NEEDED     No current facility-administered medications for this visit.          Social History   See HPI    Family History     Family History  "  Problem Relation Age of Onset     Hypertension Mother      Diabetes Mother      Cerebrovascular Disease Mother         in her 70s     Arthritis Mother      Cancer - colorectal Father 67     Arthritis Paternal Grandmother      Prostate Cancer No family hx of      Coronary Artery Disease No family hx of        Physical Exam     Temp Readings from Last 3 Encounters:   11/15/18 97.4  F (36.3  C) (Tympanic)   11/12/18 97.6  F (36.4  C) (Tympanic)   09/29/18 97.8  F (36.6  C) (Temporal)     BP Readings from Last 5 Encounters:   05/20/19 135/84   11/15/18 129/79   11/12/18 130/70   09/29/18 (!) 146/95   09/21/18 138/78     Pulse Readings from Last 1 Encounters:   05/20/19 75     Resp Readings from Last 1 Encounters:   11/15/18 18     Estimated body mass index is 30.38 kg/m  as calculated from the following:    Height as of this encounter: 1.759 m (5' 9.25\").    Weight as of this encounter: 94 kg (207 lb 3.2 oz).    GEN: NAD  HEENT: MMM. No oral lesions.  Anicteric, noninjected sclera  CV: S1, S2. RRR. No m/r/g.  PULM: CTA bilaterally. No w/c.  MSK: Subtle synovial swelling and tenderness palpation of the right second and third MCPs.  Right second and third PIPs tender to palpation but without swelling.  Other MCPs and PIPs without swelling or tenderness to palpation.  Wrists, elbows, shoulders, ankles, and MTPs without swelling or tenderness to palpation.  Hips nontender to palpation.  Knees without swelling or tenderness to palpation; no increased warmth or overlying erythema.    NEURO: UE and LE strengths 5/5 and equal bilaterally.   SKIN: No rash  EXT: No LE edema  PSYCH: Alert. Appropriate.    Labs / Imaging (select studies)     CBC  Recent Labs   Lab Test 05/15/19  0959 02/25/19  1012 11/19/18  1318   WBC 4.6 4.5 4.5   RBC 4.57 4.60 4.77   HGB 15.0 15.2 15.7   HCT 43.3 44.0 45.8   MCV 95 96 96   RDW 13.5 13.0 12.7    144* 153   MCH 32.8 33.0 32.9   MCHC 34.6 34.5 34.3   NEUTROPHIL 56.8 58.7 63.8   LYMPH 32.8 " 31.4 27.0   MONOCYTE 6.9 7.3 6.5   EOSINOPHIL 3.3 2.4 2.5   BASOPHIL 0.2 0.2 0.2   ANEU 2.6 2.6 2.9   ALYM 1.5 1.4 1.2   ERENDIRA 0.3 0.3 0.3   AEOS 0.2 0.1 0.1   ABAS 0.0 0.0 0.0     CMP  Recent Labs   Lab Test 05/15/19  0959 02/25/19  1012 11/19/18  1318 09/29/18  1040  01/05/18  1252 09/06/17  1119   NA  --   --   --  139  --  135 138   POTASSIUM  --   --   --  3.7  --  4.2 4.4   CHLORIDE  --   --   --  104  --  103 103   CO2  --   --   --  29  --  26 32   ANIONGAP  --   --   --  6  --  6 3   GLC  --   --   --  96  --  94 96   BUN  --   --   --  13  --  13 12   CR 0.99 1.00 1.03 1.07   < > 0.92 1.01   GFRESTIMATED 79 77 72 69   < > 82 74   GFRESTBLACK >90 90 87 83   < > >90 90   JOHN  --   --   --  8.8  --  9.3 9.2   BILITOTAL 1.3 0.9 0.9  --    < > 0.9 1.0   ALBUMIN 3.9 3.7 3.9  --    < > 4.1 4.0   PROTTOTAL 6.7* 6.6* 6.8  --    < > 7.1 7.0   ALKPHOS 51 50 54  --    < > 55 55   AST 31 29 43  --    < > 35 30   ALT 45 33 54  --    < > 41 46    < > = values in this interval not displayed.     Calcium/VitaminD  Recent Labs   Lab Test 09/29/18  1040 01/05/18  1252 09/06/17  1119   JOHN 8.8 9.3 9.2     ESR/CRP  Recent Labs   Lab Test 05/15/19  0959 11/19/18  1318 05/16/18  1016   SED 6 5 6   CRP 3.2 <2.9 <2.9     Hepatitis B  Recent Labs   Lab Test 05/16/18  1016   HBCAB Nonreactive   HEPBANG Nonreactive     Hepatitis C  Recent Labs   Lab Test 08/26/16  1213   HCVAB Nonreactive   Assay performance characteristics have not been established for newborns,   infants, and children       Immunization History     Immunization History   Administered Date(s) Administered     Influenza (High Dose) 3 valent vaccine 10/18/2017, 09/21/2018     Influenza (IIV3) PF 11/17/2005, 12/04/2006, 11/22/2010, 09/26/2012, 10/01/2014     Influenza Vaccine IM 3yrs+ 4 Valent IIV4 10/15/2013, 12/17/2015, 08/26/2016     Pneumo Conj 13-V (2010&after) 10/18/2017     Pneumococcal 23 valent 01/22/2018     TDAP Vaccine (Adacel) 04/22/2009, 05/20/2018           Chart documentation done in part with Dragon Voice recognition Software. Although reviewed after completion, some word and grammatical error may remain.    Adria Lopez MD

## 2019-06-17 DIAGNOSIS — L40.50 PSORIATIC ARTHROPATHY (H): ICD-10-CM

## 2019-06-17 LAB
ALBUMIN SERPL-MCNC: 3.8 G/DL (ref 3.4–5)
ALP SERPL-CCNC: 56 U/L (ref 40–150)
ALT SERPL W P-5'-P-CCNC: 32 U/L (ref 0–70)
AST SERPL W P-5'-P-CCNC: 20 U/L (ref 0–45)
BASOPHILS # BLD AUTO: 0 10E9/L (ref 0–0.2)
BASOPHILS NFR BLD AUTO: 0.2 %
BILIRUB DIRECT SERPL-MCNC: 0.2 MG/DL (ref 0–0.2)
BILIRUB SERPL-MCNC: 0.9 MG/DL (ref 0.2–1.3)
CREAT SERPL-MCNC: 1.05 MG/DL (ref 0.66–1.25)
DIFFERENTIAL METHOD BLD: ABNORMAL
EOSINOPHIL # BLD AUTO: 0.1 10E9/L (ref 0–0.7)
EOSINOPHIL NFR BLD AUTO: 2.9 %
ERYTHROCYTE [DISTWIDTH] IN BLOOD BY AUTOMATED COUNT: 13.4 % (ref 10–15)
GFR SERPL CREATININE-BSD FRML MDRD: 73 ML/MIN/{1.73_M2}
HCT VFR BLD AUTO: 44.9 % (ref 40–53)
HGB BLD-MCNC: 15.5 G/DL (ref 13.3–17.7)
LYMPHOCYTES # BLD AUTO: 1.1 10E9/L (ref 0.8–5.3)
LYMPHOCYTES NFR BLD AUTO: 23.1 %
MCH RBC QN AUTO: 33.4 PG (ref 26.5–33)
MCHC RBC AUTO-ENTMCNC: 34.5 G/DL (ref 31.5–36.5)
MCV RBC AUTO: 97 FL (ref 78–100)
MONOCYTES # BLD AUTO: 0.4 10E9/L (ref 0–1.3)
MONOCYTES NFR BLD AUTO: 7.4 %
NEUTROPHILS # BLD AUTO: 3.2 10E9/L (ref 1.6–8.3)
NEUTROPHILS NFR BLD AUTO: 66.4 %
PLATELET # BLD AUTO: 139 10E9/L (ref 150–450)
PROT SERPL-MCNC: 6.7 G/DL (ref 6.8–8.8)
RBC # BLD AUTO: 4.64 10E12/L (ref 4.4–5.9)
WBC # BLD AUTO: 4.9 10E9/L (ref 4–11)

## 2019-06-17 PROCEDURE — 85025 COMPLETE CBC W/AUTO DIFF WBC: CPT | Performed by: INTERNAL MEDICINE

## 2019-06-17 PROCEDURE — 82565 ASSAY OF CREATININE: CPT | Performed by: INTERNAL MEDICINE

## 2019-06-17 PROCEDURE — 80076 HEPATIC FUNCTION PANEL: CPT | Performed by: INTERNAL MEDICINE

## 2019-06-17 PROCEDURE — 36415 COLL VENOUS BLD VENIPUNCTURE: CPT | Performed by: INTERNAL MEDICINE

## 2019-06-19 ENCOUNTER — TELEPHONE (OUTPATIENT)
Dept: RHEUMATOLOGY | Facility: CLINIC | Age: 69
End: 2019-06-19

## 2019-06-19 NOTE — TELEPHONE ENCOUNTER
Contacted Pt, reviewed lab work re: labs show a reduced platelet count that is similar to previous labs; other labs are okay..  Pt had no questions or concerns, agrees and understands.    Haleigh Sanderson CMA  6/19/2019  10:28 AM

## 2019-06-19 NOTE — RESULT ENCOUNTER NOTE
Rheumatology team: Please call to notify Mr. Bullard that labs show a reduced platelet count that is similar to previous labs; other labs are okay.    Adria Lopez MD  6/18/2019 11:55 PM

## 2019-07-16 DIAGNOSIS — L40.50 PSORIATIC ARTHROPATHY (H): ICD-10-CM

## 2019-07-16 LAB
ALBUMIN SERPL-MCNC: 3.9 G/DL (ref 3.4–5)
ALP SERPL-CCNC: 48 U/L (ref 40–150)
ALT SERPL W P-5'-P-CCNC: 45 U/L (ref 0–70)
AST SERPL W P-5'-P-CCNC: 32 U/L (ref 0–45)
BASOPHILS # BLD AUTO: 0 10E9/L (ref 0–0.2)
BASOPHILS NFR BLD AUTO: 0.2 %
BILIRUB DIRECT SERPL-MCNC: 0.2 MG/DL (ref 0–0.2)
BILIRUB SERPL-MCNC: 1.2 MG/DL (ref 0.2–1.3)
CREAT SERPL-MCNC: 1.01 MG/DL (ref 0.66–1.25)
DIFFERENTIAL METHOD BLD: ABNORMAL
EOSINOPHIL # BLD AUTO: 0.1 10E9/L (ref 0–0.7)
EOSINOPHIL NFR BLD AUTO: 1.8 %
ERYTHROCYTE [DISTWIDTH] IN BLOOD BY AUTOMATED COUNT: 13.2 % (ref 10–15)
GFR SERPL CREATININE-BSD FRML MDRD: 76 ML/MIN/{1.73_M2}
HCT VFR BLD AUTO: 43.6 % (ref 40–53)
HGB BLD-MCNC: 15 G/DL (ref 13.3–17.7)
LYMPHOCYTES # BLD AUTO: 1.1 10E9/L (ref 0.8–5.3)
LYMPHOCYTES NFR BLD AUTO: 21.1 %
MCH RBC QN AUTO: 33.9 PG (ref 26.5–33)
MCHC RBC AUTO-ENTMCNC: 34.4 G/DL (ref 31.5–36.5)
MCV RBC AUTO: 98 FL (ref 78–100)
MONOCYTES # BLD AUTO: 0.4 10E9/L (ref 0–1.3)
MONOCYTES NFR BLD AUTO: 7.4 %
NEUTROPHILS # BLD AUTO: 3.6 10E9/L (ref 1.6–8.3)
NEUTROPHILS NFR BLD AUTO: 69.5 %
PLATELET # BLD AUTO: 142 10E9/L (ref 150–450)
PROT SERPL-MCNC: 6.4 G/DL (ref 6.8–8.8)
RBC # BLD AUTO: 4.43 10E12/L (ref 4.4–5.9)
WBC # BLD AUTO: 5.1 10E9/L (ref 4–11)

## 2019-07-16 PROCEDURE — 82565 ASSAY OF CREATININE: CPT | Performed by: INTERNAL MEDICINE

## 2019-07-16 PROCEDURE — 80076 HEPATIC FUNCTION PANEL: CPT | Performed by: INTERNAL MEDICINE

## 2019-07-16 PROCEDURE — 85025 COMPLETE CBC W/AUTO DIFF WBC: CPT | Performed by: INTERNAL MEDICINE

## 2019-07-16 PROCEDURE — 36415 COLL VENOUS BLD VENIPUNCTURE: CPT | Performed by: INTERNAL MEDICINE

## 2019-08-12 DIAGNOSIS — L40.50 PSORIATIC ARTHROPATHY (H): ICD-10-CM

## 2019-08-12 LAB
ALBUMIN SERPL-MCNC: 3.8 G/DL (ref 3.4–5)
ALP SERPL-CCNC: 50 U/L (ref 40–150)
ALT SERPL W P-5'-P-CCNC: 35 U/L (ref 0–70)
AST SERPL W P-5'-P-CCNC: 26 U/L (ref 0–45)
BASOPHILS # BLD AUTO: 0 10E9/L (ref 0–0.2)
BASOPHILS NFR BLD AUTO: 0.2 %
BILIRUB DIRECT SERPL-MCNC: 0.2 MG/DL (ref 0–0.2)
BILIRUB SERPL-MCNC: 0.8 MG/DL (ref 0.2–1.3)
CREAT SERPL-MCNC: 0.99 MG/DL (ref 0.66–1.25)
CRP SERPL-MCNC: <2.9 MG/L (ref 0–8)
DIFFERENTIAL METHOD BLD: ABNORMAL
EOSINOPHIL # BLD AUTO: 0.1 10E9/L (ref 0–0.7)
EOSINOPHIL NFR BLD AUTO: 1.9 %
ERYTHROCYTE [DISTWIDTH] IN BLOOD BY AUTOMATED COUNT: 13.1 % (ref 10–15)
ERYTHROCYTE [SEDIMENTATION RATE] IN BLOOD BY WESTERGREN METHOD: 5 MM/H (ref 0–20)
GFR SERPL CREATININE-BSD FRML MDRD: 78 ML/MIN/{1.73_M2}
HCT VFR BLD AUTO: 44.3 % (ref 40–53)
HGB BLD-MCNC: 15.3 G/DL (ref 13.3–17.7)
LYMPHOCYTES # BLD AUTO: 1.2 10E9/L (ref 0.8–5.3)
LYMPHOCYTES NFR BLD AUTO: 22.8 %
MCH RBC QN AUTO: 33.8 PG (ref 26.5–33)
MCHC RBC AUTO-ENTMCNC: 34.5 G/DL (ref 31.5–36.5)
MCV RBC AUTO: 98 FL (ref 78–100)
MONOCYTES # BLD AUTO: 0.5 10E9/L (ref 0–1.3)
MONOCYTES NFR BLD AUTO: 10.5 %
NEUTROPHILS # BLD AUTO: 3.3 10E9/L (ref 1.6–8.3)
NEUTROPHILS NFR BLD AUTO: 64.6 %
PLATELET # BLD AUTO: 142 10E9/L (ref 150–450)
PROT SERPL-MCNC: 6.8 G/DL (ref 6.8–8.8)
RBC # BLD AUTO: 4.52 10E12/L (ref 4.4–5.9)
WBC # BLD AUTO: 5.1 10E9/L (ref 4–11)

## 2019-08-12 PROCEDURE — 82565 ASSAY OF CREATININE: CPT | Performed by: INTERNAL MEDICINE

## 2019-08-12 PROCEDURE — 86140 C-REACTIVE PROTEIN: CPT | Performed by: INTERNAL MEDICINE

## 2019-08-12 PROCEDURE — 85652 RBC SED RATE AUTOMATED: CPT | Performed by: INTERNAL MEDICINE

## 2019-08-12 PROCEDURE — 36415 COLL VENOUS BLD VENIPUNCTURE: CPT | Performed by: INTERNAL MEDICINE

## 2019-08-12 PROCEDURE — 85025 COMPLETE CBC W/AUTO DIFF WBC: CPT | Performed by: INTERNAL MEDICINE

## 2019-08-12 PROCEDURE — 80076 HEPATIC FUNCTION PANEL: CPT | Performed by: INTERNAL MEDICINE

## 2019-08-12 NOTE — RESULT ENCOUNTER NOTE
"Please mail Mr. Bullard his results with the following message.    \"Mr. Bullard,    Your labs showed a reduced platelet count similar to previous labs.  Other labs are okay.     Please let me know if you have any questions.    Sincerely,  Adria Lopez MD  8/12/2019 5:14 PM\"  "

## 2019-08-12 NOTE — LETTER
[address] [place] [address]    August 13, 2019    Pawan Bullard  [address] [facility] [place] [address]          Dear Pawan,    Your labs showed a reduced platelet count similar to previous labs.  Other labs are okay.     Please let me know if you have any questions.     Enclosed is a copy of your results.     Results for orders placed or performed in visit on 08/12/19   Hepatic panel   Result Value Ref Range    Bilirubin Direct 0.2 0.0 - 0.2 mg/dL    Bilirubin Total 0.8 0.2 - 1.3 mg/dL    Albumin 3.8 3.4 - 5.0 g/dL    Protein Total 6.8 6.8 - 8.8 g/dL    Alkaline Phosphatase 50 40 - 150 U/L    ALT 35 0 - 70 U/L    AST 26 0 - 45 U/L   CRP inflammation   Result Value Ref Range    CRP Inflammation <2.9 0.0 - 8.0 mg/L   Erythrocyte sedimentation rate auto   Result Value Ref Range    Sed Rate 5 0 - 20 mm/h   Creatinine   Result Value Ref Range    Creatinine 0.99 0.66 - 1.25 mg/dL    GFR Estimate 78 >60 mL/min/[1.73_m2]    GFR Estimate If Black >90 >60 mL/min/[1.73_m2]   CBC with platelets differential   Result Value Ref Range    WBC 5.1 4.0 - 11.0 10e9/L    RBC Count 4.52 4.4 - 5.9 10e12/L    Hemoglobin 15.3 13.3 - 17.7 g/dL    Hematocrit 44.3 40.0 - 53.0 %    MCV 98 78 - 100 fl    MCH 33.8 (H) 26.5 - 33.0 pg    MCHC 34.5 31.5 - 36.5 g/dL    RDW 13.1 10.0 - 15.0 %    Platelet Count 142 (L) 150 - 450 10e9/L    % Neutrophils 64.6 %    % Lymphocytes 22.8 %    % Monocytes 10.5 %    % Eosinophils 1.9 %    % Basophils 0.2 %    Absolute Neutrophil 3.3 1.6 - 8.3 10e9/L    Absolute Lymphocytes 1.2 0.8 - 5.3 10e9/L    Absolute Monocytes 0.5 0.0 - 1.3 10e9/L    Absolute Eosinophils 0.1 0.0 - 0.7 10e9/L    Absolute Basophils 0.0 0.0 - 0.2 10e9/L    Diff Method Automated Method        If you have any questions or concerns, please call myself or my nurse at [phone].      Sincerely,        Adria Lopez MD/jay

## 2019-09-26 DIAGNOSIS — I10 ESSENTIAL HYPERTENSION WITH GOAL BLOOD PRESSURE LESS THAN 140/90: ICD-10-CM

## 2019-09-26 DIAGNOSIS — E78.5 HYPERLIPIDEMIA LDL GOAL <130: ICD-10-CM

## 2019-09-26 RX ORDER — SIMVASTATIN 80 MG
40 TABLET ORAL AT BEDTIME
Qty: 45 TABLET | Refills: 0 | Status: SHIPPED | OUTPATIENT
Start: 2019-09-26 | End: 2019-09-27

## 2019-09-26 RX ORDER — LISINOPRIL 2.5 MG/1
2.5 TABLET ORAL DAILY
Qty: 90 TABLET | Refills: 0 | Status: SHIPPED | OUTPATIENT
Start: 2019-09-26 | End: 2019-11-13

## 2019-09-26 NOTE — TELEPHONE ENCOUNTER
"Requested Prescriptions   Pending Prescriptions Disp Refills     lisinopril (PRINIVIL/ZESTRIL) 2.5 MG tablet 90 tablet 3     Sig: Take 1 tablet (2.5 mg) by mouth daily       ACE Inhibitors (Including Combos) Protocol Passed - 9/26/2019  1:37 PM        Passed - Blood pressure under 140/90 in past 12 months     BP Readings from Last 3 Encounters:   05/20/19 135/84   11/15/18 129/79   11/12/18 130/70                 Passed - Recent (12 mo) or future (30 days) visit within the authorizing provider's specialty     Patient had office visit in the last 12 months or has a visit in the next 30 days with authorizing provider or within the authorizing provider's specialty.  See \"Patient Info\" tab in inbasket, or \"Choose Columns\" in Meds & Orders section of the refill encounter.              Passed - Medication is active on med list        Passed - Patient is age 18 or older        Passed - Normal serum creatinine on file in past 12 months     Recent Labs   Lab Test 08/12/19  1117   CR 0.99             Passed - Normal serum potassium on file in past 12 months     Recent Labs   Lab Test 09/29/18  1040   POTASSIUM 3.7             simvastatin (ZOCOR) 80 MG tablet 45 tablet 3     Sig: Take 0.5 tablets (40 mg) by mouth At Bedtime       Statins Protocol Passed - 9/26/2019  1:37 PM        Passed - LDL on file in past 12 months     Recent Labs   Lab Test 11/19/18  1318   LDL 84             Passed - No abnormal creatine kinase in past 12 months     No lab results found.             Passed - Recent (12 mo) or future (30 days) visit within the authorizing provider's specialty     Patient had office visit in the last 12 months or has a visit in the next 30 days with authorizing provider or within the authorizing provider's specialty.  See \"Patient Info\" tab in inbasket, or \"Choose Columns\" in Meds & Orders section of the refill encounter.              Passed - Medication is active on med list        Passed - Patient is age 18 or older    "     Lisinopril 2.5 mg tab      Last Written Prescription Date:  9/21/18  Last Fill Quantity: 90,   # refills: 3  Last Office Visit: 11/12/18  [facility]  Future Office visit:    Next 5 appointments (look out 90 days)    Nov 18, 2019  1:00 PM CST  Return Visit with Adria Lopez MD  [facility] [place] ([facility] [place]) [address] [facility]  [place] [address]  [phone]         Simvastatin 80 mg      Last Written Prescription Date:  9/21/18  Last Fill Quantity: 45,   # refills: 3  Last Office Visit: 11/12/18  [facility]  Future Office visit:    Next 5 appointments (look out 90 days)    Nov 18, 2019  1:00 PM CST  Return Visit with Adria Lopez MD  [facility] [place] ([facility] [place]) [address] [facility]  [place] [address]  [phone]

## 2019-09-26 NOTE — TELEPHONE ENCOUNTER
"Requested Prescriptions   Pending Prescriptions Disp Refills     simvastatin (ZOCOR) 80 MG tablet 45 tablet 0     Sig: Take 0.5 tablets (40 mg) by mouth At Bedtime       Statins Protocol Passed - 9/26/2019  5:25 PM        Passed - LDL on file in past 12 months     Recent Labs   Lab Test 11/19/18  1318   LDL 84             Passed - No abnormal creatine kinase in past 12 months     No lab results found.             Passed - Recent (12 mo) or future (30 days) visit within the authorizing provider's specialty     Patient had office visit in the last 12 months or has a visit in the next 30 days with authorizing provider or within the authorizing provider's specialty.  See \"Patient Info\" tab in inbasket, or \"Choose Columns\" in Meds & Orders section of the refill encounter.              Passed - Medication is active on med list        Passed - Patient is age 18 or older      simvastatin (ZOCOR) 80 MG tablet  Last Written Prescription Date:  09/26/2019  Last Fill Quantity: 45 tablet,  # refills: 0   Last office visit: 11/12/2018 with prescribing provider:  SERAFIN Finley   Future Office Visit:   Next 5 appointments (look out 90 days)    Nov 18, 2019  1:00 PM CST  Return Visit with Adria Lopez MD  [facility] [place] ([facility] [place]) [address] [facility] [facility]  [place] [address] [phone]         Rosaline CAPUTO (R) (M)      "

## 2019-09-27 RX ORDER — SIMVASTATIN 80 MG
40 TABLET ORAL AT BEDTIME
Qty: 45 TABLET | Refills: 0 | Status: SHIPPED | OUTPATIENT
Start: 2019-09-27 | End: 2019-11-13

## 2019-10-07 DIAGNOSIS — N40.1 BENIGN PROSTATIC HYPERPLASIA WITH NOCTURIA: ICD-10-CM

## 2019-10-07 DIAGNOSIS — R35.1 BENIGN PROSTATIC HYPERPLASIA WITH NOCTURIA: ICD-10-CM

## 2019-10-07 RX ORDER — TAMSULOSIN HYDROCHLORIDE 0.4 MG/1
0.4 CAPSULE ORAL DAILY
Qty: 90 CAPSULE | Refills: 1 | Status: SHIPPED | OUTPATIENT
Start: 2019-10-07 | End: 2019-11-13

## 2019-11-13 ENCOUNTER — OFFICE VISIT (OUTPATIENT)
Dept: FAMILY MEDICINE | Facility: CLINIC | Age: 69
End: 2019-11-13
Payer: COMMERCIAL

## 2019-11-13 VITALS
SYSTOLIC BLOOD PRESSURE: 148 MMHG | OXYGEN SATURATION: 97 % | HEART RATE: 80 BPM | TEMPERATURE: 97.9 F | BODY MASS INDEX: 30.54 KG/M2 | RESPIRATION RATE: 16 BRPM | WEIGHT: 206.2 LBS | HEIGHT: 69 IN | DIASTOLIC BLOOD PRESSURE: 78 MMHG

## 2019-11-13 DIAGNOSIS — L40.50 PSORIATIC ARTHROPATHY (H): ICD-10-CM

## 2019-11-13 DIAGNOSIS — Z12.5 SCREENING FOR PROSTATE CANCER: ICD-10-CM

## 2019-11-13 DIAGNOSIS — I10 ESSENTIAL HYPERTENSION WITH GOAL BLOOD PRESSURE LESS THAN 140/90: ICD-10-CM

## 2019-11-13 DIAGNOSIS — Z00.00 ENCOUNTER FOR MEDICARE ANNUAL WELLNESS EXAM: Primary | ICD-10-CM

## 2019-11-13 DIAGNOSIS — E78.5 HYPERLIPIDEMIA LDL GOAL <130: ICD-10-CM

## 2019-11-13 DIAGNOSIS — K21.9 GASTROESOPHAGEAL REFLUX DISEASE WITHOUT ESOPHAGITIS: ICD-10-CM

## 2019-11-13 DIAGNOSIS — Z23 NEED FOR PROPHYLACTIC VACCINATION AND INOCULATION AGAINST INFLUENZA: ICD-10-CM

## 2019-11-13 DIAGNOSIS — M25.512 LEFT SHOULDER PAIN, UNSPECIFIED CHRONICITY: ICD-10-CM

## 2019-11-13 DIAGNOSIS — R35.1 BENIGN PROSTATIC HYPERPLASIA WITH NOCTURIA: ICD-10-CM

## 2019-11-13 DIAGNOSIS — N40.1 BENIGN PROSTATIC HYPERPLASIA WITH NOCTURIA: ICD-10-CM

## 2019-11-13 DIAGNOSIS — N52.9 IMPOTENCE OF ORGANIC ORIGIN: ICD-10-CM

## 2019-11-13 LAB
ANION GAP SERPL CALCULATED.3IONS-SCNC: <1 MMOL/L (ref 3–14)
BUN SERPL-MCNC: 11 MG/DL (ref 7–30)
CALCIUM SERPL-MCNC: 9.1 MG/DL (ref 8.5–10.1)
CHLORIDE SERPL-SCNC: 103 MMOL/L (ref 94–109)
CHOLEST SERPL-MCNC: 161 MG/DL
CO2 SERPL-SCNC: 32 MMOL/L (ref 20–32)
CREAT SERPL-MCNC: 1.02 MG/DL (ref 0.66–1.25)
GFR SERPL CREATININE-BSD FRML MDRD: 75 ML/MIN/{1.73_M2}
GLUCOSE SERPL-MCNC: 98 MG/DL (ref 70–99)
HDLC SERPL-MCNC: 58 MG/DL
LDLC SERPL CALC-MCNC: 72 MG/DL
NONHDLC SERPL-MCNC: 103 MG/DL
POTASSIUM SERPL-SCNC: 4.3 MMOL/L (ref 3.4–5.3)
PSA SERPL-ACNC: 1.38 UG/L (ref 0–4)
SODIUM SERPL-SCNC: 135 MMOL/L (ref 133–144)
TRIGL SERPL-MCNC: 153 MG/DL

## 2019-11-13 PROCEDURE — 99397 PER PM REEVAL EST PAT 65+ YR: CPT | Mod: 25 | Performed by: FAMILY MEDICINE

## 2019-11-13 PROCEDURE — G0103 PSA SCREENING: HCPCS | Performed by: FAMILY MEDICINE

## 2019-11-13 PROCEDURE — 90662 IIV NO PRSV INCREASED AG IM: CPT | Performed by: FAMILY MEDICINE

## 2019-11-13 PROCEDURE — G0008 ADMIN INFLUENZA VIRUS VAC: HCPCS | Performed by: FAMILY MEDICINE

## 2019-11-13 PROCEDURE — 80061 LIPID PANEL: CPT | Performed by: FAMILY MEDICINE

## 2019-11-13 PROCEDURE — 99213 OFFICE O/P EST LOW 20 MIN: CPT | Mod: 25 | Performed by: FAMILY MEDICINE

## 2019-11-13 PROCEDURE — 36415 COLL VENOUS BLD VENIPUNCTURE: CPT | Performed by: FAMILY MEDICINE

## 2019-11-13 PROCEDURE — 80048 BASIC METABOLIC PNL TOTAL CA: CPT | Performed by: FAMILY MEDICINE

## 2019-11-13 PROCEDURE — 92551 PURE TONE HEARING TEST AIR: CPT | Performed by: FAMILY MEDICINE

## 2019-11-13 RX ORDER — TAMSULOSIN HYDROCHLORIDE 0.4 MG/1
0.4 CAPSULE ORAL DAILY
Qty: 90 CAPSULE | Refills: 3 | Status: SHIPPED | OUTPATIENT
Start: 2019-11-13 | End: 2020-11-16

## 2019-11-13 RX ORDER — SILDENAFIL CITRATE 20 MG/1
TABLET ORAL
Qty: 50 TABLET | Refills: 11 | Status: SHIPPED | OUTPATIENT
Start: 2019-11-13 | End: 2020-11-16

## 2019-11-13 RX ORDER — LISINOPRIL 5 MG/1
5 TABLET ORAL DAILY
Qty: 90 TABLET | Refills: 3 | Status: SHIPPED | OUTPATIENT
Start: 2019-11-13 | End: 2020-11-16

## 2019-11-13 RX ORDER — SIMVASTATIN 40 MG
40 TABLET ORAL AT BEDTIME
Qty: 90 TABLET | Refills: 3 | Status: SHIPPED | OUTPATIENT
Start: 2019-11-13 | End: 2020-11-16

## 2019-11-13 ASSESSMENT — ENCOUNTER SYMPTOMS
FEVER: 0
DIZZINESS: 0
FREQUENCY: 0
DIARRHEA: 0
EYE PAIN: 0
HEMATURIA: 0
CONSTIPATION: 0
ABDOMINAL PAIN: 0
CHILLS: 0
HEMATOCHEZIA: 0
NERVOUS/ANXIOUS: 0
COUGH: 0

## 2019-11-13 ASSESSMENT — MIFFLIN-ST. JEOR: SCORE: 1700.7

## 2019-11-13 ASSESSMENT — ACTIVITIES OF DAILY LIVING (ADL): CURRENT_FUNCTION: NO ASSISTANCE NEEDED

## 2019-11-13 NOTE — RESULT ENCOUNTER NOTE
Please call.   PSA test (for prostate cancer screening) is normal. The blood chemistries (Basic metabolic panel) are all normal including electrolytes (salt balances in the blood), blood glucose and kidney tests.   triglycerides borderline, other lipids are normal.   PLAN: No new changes in treatment recommended.

## 2019-11-13 NOTE — PROGRESS NOTES
"SUBJECTIVE:   Pawan Bullard is a 69 year old male who presents for Preventive Visit.  Chief Complaint   Patient presents with     Medicare Visit     Imm/Inj     Flu Shot      Would like to discuss left shoulder problem   He has had left shoulder pain off and on.   Onset of symptoms: Past 1-2 months.    Aggravating factors: getting coat on, reaching up.    No specific injury.  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     Problem 2: Erectile dysfunction.  Has erections which do not last long enough or firm enough.  He has tried a couple medications in the past. Viagra has helped in the past.     Checks blood pressure at home once in a while.  It has been oK.   130s/80s    Are you in the first 12 months of your Medicare coverage?  No    Healthy Habits:     In general, how would you rate your overall health?  Good    Frequency of exercise:  1 day/week    Duration of exercise:  15-30 minutes    Do you usually eat at least 4 servings of fruit and vegetables a day, include whole grains    & fiber and avoid regularly eating high fat or \"junk\" foods?  No    Taking medications regularly:  Yes    Medication side effects:  None    Ability to successfully perform activities of daily living:  No assistance needed    Home Safety:  No safety concerns identified    Hearing Impairment:  No hearing concerns    In the past 6 months, have you been bothered by leaking of urine?  No    In general, how would you rate your overall mental or emotional health?  Good      PHQ-2 Total Score: 0    Additional concerns today:  No  Exercise:yard work.     Do you feel safe in your environment? Yes    Have you ever done Advance Care Planning? (For example, a Health Directive, POLST, or a discussion with a medical provider or your loved ones about your wishes): No, advance care planning information given to patient to review.  Patient declined advance care planning discussion at this time.    Right Ear:      1000 Hz RESPONSE- " on Level: 40 db (Conditioning sound)   1000 Hz: RESPONSE- on Level:   20 db    2000 Hz: RESPONSE- on Level:   20 db    4000 Hz: RESPONSE- on Level: tone not heard    Left Ear:      4000 Hz: RESPONSE- on Level: tone not heard   2000 Hz: RESPONSE- on Level:   20 db    1000 Hz: RESPONSE- on Level:   20 db     500 Hz: RESPONSE- on Level: 25 db    Right Ear:    500 Hz: RESPONSE- on Level: 25 db    Hearing Acuity: REFER    Hearing Assessment: abnormal--HE does not feel hearing is an issue.   Fall risk  Fallen 2 or more times in the past year?: No  Any fall with injury in the past year?: No    Cognitive Screening   1) Repeat 3 items (Leader, Season, Table)    2) Clock draw: NORMAL  3) 3 item recall: Recalls 3 objects  Results: 3 items recalled: COGNITIVE IMPAIRMENT LESS LIKELY    Mini-CogTM Copyright S Kylie. Licensed by the author for use in [facility]; reprinted with permission (santiago@[facility]). All rights reserved.      Do you have sleep apnea, excessive snoring or daytime drowsiness?: daytime drowsiness       Social History     Tobacco Use     Smoking status: Never Smoker     Smokeless tobacco: Never Used   Substance Use Topics     Alcohol use: Yes     Comment:  0-2 beer's or mixed drink's monthly     If you drink alcohol do you typically have >3 drinks per day or >7 drinks per week? No    Alcohol Use 11/13/2019   Prescreen: >3 drinks/day or >7 drinks/week? No   Prescreen: >3 drinks/day or >7 drinks/week? -       Current providers sharing in care for this patient include:   Patient Care Team:  Armando Finley MD as PCP - General (Family Practice)  Armando Finley MD as Assigned PCP   Rheumatology     The following health maintenance items are reviewed in Epic and correct as of today:  Health Maintenance   Topic Date Due     ZOSTER IMMUNIZATION (2 of 3) 01/05/2015     AORTIC ANEURYSM SCREENING (SYSTEM ASSIGNED)  01/23/2017     INFLUENZA VACCINE (1) 09/01/2019     FALL RISK ASSESSMENT  11/12/2019      MEDICARE ANNUAL WELLNESS VISIT  2019     COLONOSCOPY  2021     LIPID  2023     ADVANCE CARE PLANNING  2024     DTAP/TDAP/TD IMMUNIZATION (3 - Td) 2028     HEPATITIS C SCREENING  Completed     PHQ-2  Completed     PNEUMOCOCCAL IMMUNIZATION 65+ LOW/MEDIUM RISK  Completed     IPV IMMUNIZATION  Aged Out     MENINGITIS IMMUNIZATION  Aged Out     Patient Active Problem List    Diagnosis Date Noted     High risk medications (not anticoagulants) long-term use 2015     Priority: Medium     Osteoarthritis 2011     Priority: Medium     HYPERLIPIDEMIA LDL GOAL <130 10/31/2010     Priority: Medium     Chondrocalcinosis 02/10/2010     Priority: Medium     Impotence of organic origin 2006     Priority: Medium     Esophageal reflux 2005     Priority: Medium     GERD       Hypertension goal BP (blood pressure) < 140/90 2005     Priority: Medium     Psoriatic arthropathy (H) 2005     Priority: Medium     Advanced directives, counseling/discussion 2013     Priority: Low     advanced care discussed form given to pt.           FAMILY HISTORY OF DIABETES MELLITUS 2006     Priority: Low     mother       FAMILY HISTORY OF GI NEOPLASM 2005     Priority: Low     father - colon CA ,  at  67          Family history:  CV disease: no  Prostate cancer: no  Colon cancer: father at age 67    Multivitamin or Vit D use: folic acid and MVI daily.     Vaccines:current     Past Colon cancer screenin    Review of Systems   Constitutional: Negative for chills and fever.   HENT: Negative for congestion and ear pain.    Eyes: Negative for pain.   Respiratory: Negative for cough.    Cardiovascular: Negative for chest pain.   Gastrointestinal: Negative for abdominal pain, constipation, diarrhea and hematochezia.   Genitourinary: Negative for frequency and hematuria.   Neurological: Negative for dizziness.   Psychiatric/Behavioral: The patient is not nervous/anxious.   "    OBJECTIVE:                                                    OBJECTIVE:Blood pressure (!) 148/78, pulse 80, temperature 97.9  F (36.6  C), temperature source Tympanic, resp. rate 16, height 1.753 m (5' 9\"), weight 93.5 kg (206 lb 3.2 oz), SpO2 97 %. BMI=Body mass index is 30.45 kg/m .  GENERAL APPEARANCE ADULT: Alert, no acute distress, overweight  EYES: PERRL, EOM normal, conjunctiva and lids normal  HENT: Ears and TMs normal, oral mucosa and posterior oropharynx normal  NECK: No adenopathy,masses or thyromegaly  RESP: lungs clear to auscultation   CV: normal rate, regular rhythm, no murmur or gallop  ABDOMEN: soft, no organomegaly, masses or tenderness  MS: extremities normal, no peripheral edema    ASSESSMENT/PLAN:                                                    Lifestyle recommendations:regular exercise, at least 150 minutes per week (average 30 minutes 5 times a week)  reduce salt in the diet-avoid salty foods and avoid adding salt at the table  keep working on losing weight (ideal BMI-body mass index is <25)  The following exams/tests were recommended and discussed for health maintenance:  Colon cancer screening recommended in 2026  Prostate cancer screening is optional starting at age 50 if normal risk, age 40 or 45 for high risk men (strong family history or blacks.)  Screening can include digital rectal exam and PSA blood test.     (Z00.00) Encounter for Medicare annual wellness exam  (primary encounter diagnosis)    (Z23) Need for prophylactic vaccination and inoculation against influenza  Comment:   Plan: INFLUENZA (HIGH DOSE) 3 VALENT VACCINE [66924],        ADMIN INFLUENZA (For MEDICARE Patients ONLY)         []          (I10) Essential hypertension with goal blood pressure less than 140/90  Comment: high today  Plan: Basic metabolic panel, lisinopril         (PRINIVIL/ZESTRIL) 5 MG tablet        Increase lisinopril to 5mg daily.  You can take two of the 2.5mg pills until gone.     (E78.5) " "Hyperlipidemia LDL goal <130  Comment: due fore recheck  Plan: Lipid panel reflex to direct LDL Non-fasting,         simvastatin (ZOCOR) 40 MG tablet        Blood tests today, non-fasting.  Refills.     (N40.1,  R35.1) Benign prostatic hyperplasia with nocturia  Comment:   Plan: tamsulosin (FLOMAX) 0.4 MG capsule        Refills.     (L40.50) Psoriatic arthropathy (H)  Comment: sees Rheumatology   Plan: follow-up with Rheumatology as planned.     (K21.9) Gastroesophageal reflux disease without esophagitis  Comment: takes occasional Tums.   Plan: chief complaint     (M25.512) Left shoulder pain, unspecified chronicity  Comment:   Plan: Discussed options for treatment of shoulder pain which have some proven effectiveness including, appropriate doses of an anti-inflammatory medication like ibuprofen or naproxen.  Typicaly these are taken on a regular basis for a couple weeks.   Physical therapy is another way to improve shoulder pain and function.    Recheck if he is having persistent pain. Corticosteroid injection may be an option as well if not improving.      COUNSELING:  Reviewed preventive health counseling, as reflected in patient instructions  Special attention given to:       Colon cancer screening       Prostate cancer screening    Estimated body mass index is 30.45 kg/m  as calculated from the following:    Height as of this encounter: 1.753 m (5' 9\").    Weight as of this encounter: 93.5 kg (206 lb 3.2 oz).    Weight management plan: Discussed healthy diet and exercise guidelines     reports that he has never smoked. He has never used smokeless tobacco.      Appropriate preventive services were discussed with this patient, including applicable screening as appropriate for cardiovascular disease, diabetes, osteopenia/osteoporosis, and glaucoma.  As appropriate for age/gender, discussed screening for colorectal cancer, prostate cancer, breast cancer, and cervical cancer. Checklist reviewing preventive services " available has been given to the patient.    Reviewed patients plan of care and provided an AVS. The Basic Care Plan (routine screening as documented in Health Maintenance) for Pawan meets the Care Plan requirement. This Care Plan has been established and reviewed with the Patient.    Counseling Resources:  ATP IV Guidelines  Pooled Cohorts Equation Calculator  Breast Cancer Risk Calculator  FRAX Risk Assessment  ICSI Preventive Guidelines  Dietary Guidelines for Americans, 2010  USDA's MyPlate  ASA Prophylaxis  Lung CA Screening    Armando Finley MD  [facility]    Identified Health Risks:

## 2019-11-13 NOTE — PROGRESS NOTES
"  Answers for HPI/ROS submitted by the patient on 11/13/2019   Annual Exam:  In general, how would you rate your overall physical health?: good  Frequency of exercise:: 1 day/week  Do you usually eat at least 4 servings of fruit and vegetables a day, include whole grains & fiber, and avoid regularly eating high fat or \"junk\" foods? : No  Taking medications regularly:: Yes  Medication side effects:: None  Activities of Daily Living: no assistance needed  Home safety: no safety concerns identified  Hearing Impairment:: no hearing concerns  In the past 6 months, have you been bothered by leaking of urine?: No  abdominal pain: No  Blood in stool: No  Blood in urine: No  chest pain: No  chills: No  congestion: No  constipation: No  cough: No  diarrhea: No  dizziness: No  ear pain: No  eye pain: No  nervous/anxious: No  fever: No  frequency: No  In general, how would you rate your overall mental or emotional health?: good  Additional concerns today:: No  Duration of exercise:: 15-30 minutes    "

## 2019-11-13 NOTE — NURSING NOTE
"Chief Complaint   Patient presents with     Medicare Visit       Initial BP (!) 148/78   Pulse 80   Temp 97.9  F (36.6  C) (Tympanic)   Resp 16   Ht 1.753 m (5' 9\")   Wt 93.5 kg (206 lb 3.2 oz)   SpO2 97%   BMI 30.45 kg/m   Estimated body mass index is 30.45 kg/m  as calculated from the following:    Height as of this encounter: 1.753 m (5' 9\").    Weight as of this encounter: 93.5 kg (206 lb 3.2 oz).    Patient presents to the clinic using No DME    Health Maintenance that is potentially due pending provider review:  Fall risk assessment, flu shot, physical     Possibly completing today per provider review.    Is there anyone who you would like to be able to receive your results? No  If yes have patient fill out CURT      "

## 2019-11-13 NOTE — PATIENT INSTRUCTIONS
Patient Education   Personalized Prevention Plan  You are due for the preventive services outlined below.  Your care team is available to assist you in scheduling these services.  If you have already completed any of these items, please share that information with your care team to update in your medical record.  Health Maintenance Due   Topic Date Due     Zoster (Shingles) Vaccine (2 of 3) 01/05/2015     AORTIC ANEURYSM SCREENING (SYSTEM ASSIGNED)  01/23/2017     Discuss Advance Care Planning  03/04/2018     PHQ-2  01/01/2019     Flu Vaccine (1) 09/01/2019     FALL RISK ASSESSMENT  11/12/2019     Annual Wellness Visit  11/12/2019         ASSESSMENT/PLAN:                                                    Lifestyle recommendations:regular exercise, at least 150 minutes per week (average 30 minutes 5 times a week)  reduce salt in the diet-avoid salty foods and avoid adding salt at the table  keep working on losing weight (ideal BMI-body mass index is <25)  The following exams/tests were recommended and discussed for health maintenance:  Colon cancer screening recommended in 2026  Prostate cancer screening is optional starting at age 50 if normal risk, age 40 or 45 for high risk men (strong family history or blacks.)  Screening can include digital rectal exam and PSA blood test.     (Z00.00) Encounter for Medicare annual wellness exam  (primary encounter diagnosis)    (Z23) Need for prophylactic vaccination and inoculation against influenza  Comment:   Plan: INFLUENZA (HIGH DOSE) 3 VALENT VACCINE [28170],        ADMIN INFLUENZA (For MEDICARE Patients ONLY)         []          (I10) Essential hypertension with goal blood pressure less than 140/90  Comment: high today  Plan: Basic metabolic panel, lisinopril         (PRINIVIL/ZESTRIL) 5 MG tablet        Increase lisinopril to 5mg daily.  You can take two of the 2.5mg pills until gone.     (E78.5) Hyperlipidemia LDL goal <130  Comment: due fore recheck  Plan:  Lipid panel reflex to direct LDL Non-fasting,         simvastatin (ZOCOR) 40 MG tablet        Blood tests today, non-fasting.  Refills.     (N40.1,  R35.1) Benign prostatic hyperplasia with nocturia  Comment:   Plan: tamsulosin (FLOMAX) 0.4 MG capsule        Refills.     (L40.50) Psoriatic arthropathy (H)  Comment: sees Rheumatology   Plan: follow-up with Rheumatology as planned.     (K21.9) Gastroesophageal reflux disease without esophagitis  Comment: takes occasional Tums.   Plan: chief complaint     (M25.512) Left shoulder pain, unspecified chronicity  Comment:   Plan: Discussed options for treatment of shoulder pain which have some proven effectiveness including, appropriate doses of an anti-inflammatory medication like ibuprofen or naproxen.  Typicaly these are taken on a regular basis for a couple weeks.   Physical therapy is another way to improve shoulder pain and function.    Recheck if he is having persistent pain. Corticosteroid injection may be an option as well if not improving.

## 2019-11-18 ENCOUNTER — OFFICE VISIT (OUTPATIENT)
Dept: RHEUMATOLOGY | Facility: CLINIC | Age: 69
End: 2019-11-18
Payer: COMMERCIAL

## 2019-11-18 VITALS
OXYGEN SATURATION: 97 % | HEIGHT: 69 IN | WEIGHT: 210.6 LBS | SYSTOLIC BLOOD PRESSURE: 132 MMHG | BODY MASS INDEX: 31.19 KG/M2 | DIASTOLIC BLOOD PRESSURE: 82 MMHG | HEART RATE: 72 BPM

## 2019-11-18 DIAGNOSIS — M75.42 IMPINGEMENT SYNDROME OF LEFT SHOULDER: ICD-10-CM

## 2019-11-18 DIAGNOSIS — Z79.899 HIGH RISK MEDICATIONS (NOT ANTICOAGULANTS) LONG-TERM USE: ICD-10-CM

## 2019-11-18 DIAGNOSIS — L40.50 PSORIATIC ARTHROPATHY (H): Primary | ICD-10-CM

## 2019-11-18 LAB
ALBUMIN SERPL-MCNC: 3.9 G/DL (ref 3.4–5)
ALP SERPL-CCNC: 52 U/L (ref 40–150)
ALT SERPL W P-5'-P-CCNC: 52 U/L (ref 0–70)
AST SERPL W P-5'-P-CCNC: 36 U/L (ref 0–45)
BASOPHILS # BLD AUTO: 0 10E9/L (ref 0–0.2)
BASOPHILS NFR BLD AUTO: 0.2 %
BILIRUB DIRECT SERPL-MCNC: 0.2 MG/DL (ref 0–0.2)
BILIRUB SERPL-MCNC: 0.7 MG/DL (ref 0.2–1.3)
CREAT SERPL-MCNC: 0.96 MG/DL (ref 0.66–1.25)
CRP SERPL-MCNC: <2.9 MG/L (ref 0–8)
DIFFERENTIAL METHOD BLD: ABNORMAL
EOSINOPHIL # BLD AUTO: 0.1 10E9/L (ref 0–0.7)
EOSINOPHIL NFR BLD AUTO: 1.6 %
ERYTHROCYTE [DISTWIDTH] IN BLOOD BY AUTOMATED COUNT: 13.2 % (ref 10–15)
ERYTHROCYTE [SEDIMENTATION RATE] IN BLOOD BY WESTERGREN METHOD: 6 MM/H (ref 0–20)
GFR SERPL CREATININE-BSD FRML MDRD: 81 ML/MIN/{1.73_M2}
HCT VFR BLD AUTO: 45.2 % (ref 40–53)
HGB BLD-MCNC: 15.8 G/DL (ref 13.3–17.7)
LYMPHOCYTES # BLD AUTO: 1 10E9/L (ref 0.8–5.3)
LYMPHOCYTES NFR BLD AUTO: 19.4 %
MCH RBC QN AUTO: 33.6 PG (ref 26.5–33)
MCHC RBC AUTO-ENTMCNC: 35 G/DL (ref 31.5–36.5)
MCV RBC AUTO: 96 FL (ref 78–100)
MONOCYTES # BLD AUTO: 0.3 10E9/L (ref 0–1.3)
MONOCYTES NFR BLD AUTO: 6.5 %
NEUTROPHILS # BLD AUTO: 3.7 10E9/L (ref 1.6–8.3)
NEUTROPHILS NFR BLD AUTO: 72.3 %
PLATELET # BLD AUTO: 135 10E9/L (ref 150–450)
PROT SERPL-MCNC: 6.8 G/DL (ref 6.8–8.8)
RBC # BLD AUTO: 4.7 10E12/L (ref 4.4–5.9)
WBC # BLD AUTO: 5.1 10E9/L (ref 4–11)

## 2019-11-18 PROCEDURE — 82565 ASSAY OF CREATININE: CPT | Performed by: INTERNAL MEDICINE

## 2019-11-18 PROCEDURE — 36415 COLL VENOUS BLD VENIPUNCTURE: CPT | Performed by: INTERNAL MEDICINE

## 2019-11-18 PROCEDURE — 85652 RBC SED RATE AUTOMATED: CPT | Performed by: INTERNAL MEDICINE

## 2019-11-18 PROCEDURE — 86140 C-REACTIVE PROTEIN: CPT | Performed by: INTERNAL MEDICINE

## 2019-11-18 PROCEDURE — 85025 COMPLETE CBC W/AUTO DIFF WBC: CPT | Performed by: INTERNAL MEDICINE

## 2019-11-18 PROCEDURE — 80076 HEPATIC FUNCTION PANEL: CPT | Performed by: INTERNAL MEDICINE

## 2019-11-18 PROCEDURE — 99214 OFFICE O/P EST MOD 30 MIN: CPT | Performed by: INTERNAL MEDICINE

## 2019-11-18 RX ORDER — METHOTREXATE 2.5 MG/1
20 TABLET ORAL WEEKLY
Qty: 96 TABLET | Refills: 1 | Status: SHIPPED | OUTPATIENT
Start: 2019-11-18 | End: 2020-02-17

## 2019-11-18 RX ORDER — FOLIC ACID 1 MG/1
1 TABLET ORAL DAILY
Qty: 100 TABLET | Refills: 3 | Status: SHIPPED | OUTPATIENT
Start: 2019-11-18 | End: 2020-08-31

## 2019-11-18 RX ORDER — SULFASALAZINE 500 MG/1
TABLET, DELAYED RELEASE ORAL
Qty: 120 TABLET | Refills: 3 | Status: SHIPPED | OUTPATIENT
Start: 2019-11-18 | End: 2020-02-07

## 2019-11-18 ASSESSMENT — MIFFLIN-ST. JEOR: SCORE: 1720.66

## 2019-11-18 NOTE — PROGRESS NOTES
Rheumatology Clinic Visit      Pawan Bullard MRN# [id number]   YOB: 1950 Age: 69 year old      Date of visit: 11/18/19   PCP: Dr. Armando Finley    Chief Complaint   Patient presents with:  Arthritis: Psoriatic, patient is having left shoulder pain (wonder if he could get a injection) and stiffness in fingers.    Assessment and Plan     1.  Psoriatic arthritis: Currently on MTX 20mg wkly; still with mild synovitis and inflammatory symptoms. Discussed SSZ, Otezla, Humira, Simponi (IV/SQ). He would like to try SSZ.   - Continue methotrexate 20mg once weekly  - Continue folic acid 1 mg daily  - Start sulfasalazine 500mg BID x7days, then 1000mg BID thereafter  - Labs today: CBC, Creatinine, Hepatic Panel, ESR, CRP  - Labs monthly [id number]: CBC, Cr, Hepatic Panel  - Labs in 3 months: CBC, Creatinine, Hepatic Panel, ESR, CRP               Rapid 3, cumulative scores                      11/18/2019:  3.3 (MTX 20mg wkly)                      05/20/2019:  Refused by patient                      11/26/2018:  5.2 (MTX 17.5mg wkly)    # Sulfasalazine Risks and Benefits: The risks and benefits of sulfasalazine were discussed in detail and the patient verbalized understanding.  The risks discussed include, but are not limited to, the risk for hypersensitivity, anaphylaxis, anaphylactoid reactions, infections, bone marrow suppression,  hepatotoxicity, nausea, vomiting, and GI upset.  Oligospermia may occur in males.  I encouraged reviewing the package insert and asking any questions about the medication.      # Adalimumab (Humira) Risks and Benefits: The risks and benefits of adalimumab were discussed in detail and the patient verbalized understanding.  The risks discussed include, but are not limited to, the risk for hypersensitivity, anaphylaxis, anaphylactoid reactions, an increased risk for serious infections leading to hospitalization or death, a possible increased risk for lymphoma and other malignancies, a  possible worsening of demyelinating diseases, a possible worsening of heart failure, risk for cytopenias, risk for drug induced lupus, possible reactivation of hepatitis B, and possible reactivation of latent tuberculosis.  Subcutaneous injections may result in injection site reactions and/or pain at the site of injection.  The most common adverse reactions are infections, injection site reactions, headache, and rash.  It was discussed that the medication would need to be discontinued if a serious infection develops.  It was discussed that live vaccinations should not be received while using adalimumab or within 30 days prior to starting adalimumab.  I encouraged reviewing the package insert and asking any questions about the medication.      # Golimumab (Simponi) Risks and Benefits: The risks and benefits of golimumab were discussed in detail and the patient verbalized understanding.  The risks discussed include, but are not limited to, the risk for hypersensitivity, anaphylaxis, anaphylactoid reactions, an increased risk for serious infections leading to hospitalization or death, a possible increased risk for lymphoma and other malignancies, a possible worsening of demyelinating diseases, a possible worsening of heart failure, risk for cytopenias, risk for drug induced lupus, possible reactivation of hepatitis B, and possible reactivation of latent tuberculosis.  Subcutaneous injections may result in injection site reactions and/or pain at the site of injection.  The most common adverse reactions are infections and injection site reactions.  It was discussed that the medication would need to be discontinued if a serious infection develops.  It was discussed that live vaccinations should not be received while using golimumab or within 30 days prior to starting golimumab.  I encouraged reviewing the package insert and asking any questions about the medication.      # Apremilast (Otezla) Risks and Benefits: The risks  and benefits of apremilast were discussed in detail and the patient verbalized understanding.  The risks discussed include, but are not limited to, the risk for hypersensitivity, anaphylaxis, anaphylactoid reactions, depression, weight decrease, diarrhea, nausea, headache, and infections such as upper respiratory tract infection.  If severe renal impairment, the dose may be reduced.  I encouraged reviewing the package insert and asking any questions about the medication.      2. Left shoulder pain: mild impingement syndrome. Suspect mild rotator cuff pathology. Discussed PT and steroid injection. Will proceed with PT and if needed in the future will consider steroid injection  - PT referral    3.  Vaccinations: Vaccinations reviewed with Mr. Bullard.  Risks and benefits of vaccinations were discussed.   CDC stance on shingrix when on moderate to high immunosuppression was reviewed.   - Influenza: encouraged yearly vaccination; up to date for the 2019/2020 season  - [id number]: up to date  - [id number]: up to date  - Shingrix: Patient plans to receive at the pharmacy    Mr. Bullard verbalized agreement with and understanding of the rational for the diagnosis and treatment plan.  All questions were answered to best of my ability and the patient's satisfaction. Mr. Bullard was advised to contact the clinic with any questions that may arise after the clinic visit.      Thank you for involving me in the care of the patient    Return to clinic: 3 months      HPI   Pawan Bullard is a 69 year old male with a past medical history significant for hypertension, hyperlipidemia, chondrocalcinosis, GERD, osteoarthritis, and psoriatic arthritis who presents for follow-up of psoriatic arthritis.      Today, Mr. Bullard reports that he is doing okay.  Feels better with the higher MTX dose and is tolerating it well.  Still with morning stiffness for about 3 hours; it feels like he is loosening up much more after the 3-hour.   Joints primarily affected are his MCPs and PIPs.  Also with left shoulder pain that is worse when he lays on the affected side and with any over the head activity.  He says that his left shoulder bothers him likely because of being a .     Denies fevers, chills, nausea, vomiting, constipation, diarrhea. No abdominal pain. No chest pain/pressure, palpitations, or shortness of breath. No LE swelling. No oral or nasal sores.  No sicca symptoms.  No history of inflammatory eye disease.  No history of inflammatory bowel disease.       Tobacco: None  EtOH: 0-2 beers or mixed drinks monthly  Drugs: None    ROS   GEN: No fevers, chills, night sweats, or weight change  SKIN: See HPI  HEENT: No oral or nasal ulcers.  CV: No chest pain, pressure, palpitations, or dyspnea on exertion.  PULM: No SOB, wheeze, cough.  GI: No nausea, vomiting, constipation, diarrhea. No blood in stool. No abdominal pain.  : No blood in urine.  MSK: See HPI.  NEURO: No numbness, tingling, or weakness.  EXT: No LE swelling  PSYCH: Negative    Active Problem List     Patient Active Problem List   Diagnosis     Psoriatic arthropathy (H)     Esophageal reflux     Hypertension goal BP (blood pressure) < 140/90     FAMILY HISTORY OF GI NEOPLASM     FAMILY HISTORY OF DIABETES MELLITUS     Impotence of organic origin     Chondrocalcinosis     HYPERLIPIDEMIA LDL GOAL <130     Osteoarthritis     Advanced directives, counseling/discussion     High risk medications (not anticoagulants) long-term use     Past Medical History     Past Medical History:   Diagnosis Date     Esophageal reflux     GERD     Hypertension      Psoriatic arthropathy (H)     Psoriatic arthritis     Past Surgical History     Past Surgical History:   Procedure Laterality Date     ARTHROSCOPY KNEE RT/LT  April 2009    left knee     ARTHROSCOPY KNEE WITH MEDIAL MENISCECTOMY  7/9/2013    Procedure: ARTHROSCOPY KNEE WITH MEDIAL MENISCECTOMY;  Left Knee Arthroscopic and Open  Repair of Patellar Tendon with Platelet Rich Plasma;  Surgeon: Ley, Jeffrey Duane, MD;  Location: [facility]     COLONOSCOPY  Jan. 2006    diverticuli. Father had colon CA     COLONOSCOPY  2001     COLONOSCOPY  4/11/2011    Procedure:COLONOSCOPY; Surgeon:JENNIFER JEAN; Location:[facility]     COLONOSCOPY N/A 11/21/2016    Procedure: COLONOSCOPY;  Surgeon: Elias Santamaria MD;  Location: [facility]     REPAIR TENDON PATELLA  7/9/2013    Procedure: REPAIR TENDON PATELLA;;  Surgeon: Ley, Jeffrey Duane, MD;  Location: [facility]     SURGICAL HISTORY OF -   1999    (L) Herniorrhaphy     SURGICAL HISTORY OF -   2003    (R) Herniorrhaphy     SURGICAL HISTORY OF -   1981    Hemorrohid     Allergy     Allergies   Allergen Reactions     Acetaminophen W/Codeine Nausea     Current Medication List     Current Outpatient Medications   Medication Sig     folic acid (FOLVITE) 1 MG tablet Take 1 tablet (1 mg) by mouth daily     lisinopril (PRINIVIL/ZESTRIL) 5 MG tablet Take 1 tablet (5 mg) by mouth daily     methotrexate sodium 2.5 MG TABS Take 8 tablets (20 mg) by mouth once a week . Take all 8 tablets on the same day of each week.     MULTI-VITAMIN OR TABS 1 tab daily     Probiotic Product (PROBIOTIC DAILY PO) Take 1 capsule by mouth daily      sildenafil (REVATIO) 20 MG tablet Take as many pills as needed up to maximum of 5 pills at a time prior to intercourse.     simvastatin (ZOCOR) 40 MG tablet Take 1 tablet (40 mg) by mouth At Bedtime     tamsulosin (FLOMAX) 0.4 MG capsule Take 1 capsule (0.4 mg) by mouth daily     UNABLE TO FIND daily MEDICATION NAME: TUMERIC     No current facility-administered medications for this visit.          Social History   See HPI    Family History     Family History   Problem Relation Age of Onset     Hypertension Mother      Diabetes Mother      Cerebrovascular Disease Mother         in her 70s     Arthritis Mother      Cancer - colorectal Father 67     Arthritis Paternal Grandmother      Prostate Cancer No  "family hx of      Coronary Artery Disease No family hx of        Physical Exam     Temp Readings from Last 3 Encounters:   11/13/19 97.9  F (36.6  C) (Tympanic)   11/15/18 97.4  F (36.3  C) (Tympanic)   11/12/18 97.6  F (36.4  C) (Tympanic)     BP Readings from Last 5 Encounters:   11/13/19 (!) 148/78   05/20/19 135/84   11/15/18 129/79   11/12/18 130/70   09/29/18 (!) 146/95     Pulse Readings from Last 1 Encounters:   11/13/19 80     Resp Readings from Last 1 Encounters:   11/13/19 16     Estimated body mass index is 30.45 kg/m  as calculated from the following:    Height as of 11/13/19: 1.753 m (5' 9\").    Weight as of 11/13/19: 93.5 kg (206 lb 3.2 oz).    GEN: NAD  HEENT: MMM. No oral lesions.  Anicteric, noninjected sclera  CV: S1, S2. RRR. No m/r/g.  PULM: CTA bilaterally. No w/c.  MSK: MCPs without swelling or tenderness palpation.  Left third and fourth PIPs with subtle synovial swelling and tenderness palpation.  Other PIPs without swelling or tenderness palpation.  DIPs, wrists, elbows, knees, ankles, and MTPs without swelling or tenderness palpation.  Right shoulder without swelling or tenderness palpation; normal range of motion.  Left shoulder with normal range of motion; nontender to palpation; positive liftoff test and empty can sign; pain with abduction above 90 degrees.  Hips nontender to palpation.    NEURO: UE and LE strengths 5/5 and equal bilaterally.   SKIN: No rash  EXT: No LE edema  PSYCH: Alert. Appropriate.    Labs / Imaging (select studies)     CBC  Recent Labs   Lab Test 08/12/19  1117 07/16/19  1000 06/17/19  1002   WBC 5.1 5.1 4.9   RBC 4.52 4.43 4.64   HGB 15.3 15.0 15.5   HCT 44.3 43.6 44.9   MCV 98 98 97   RDW 13.1 13.2 13.4   * 142* 139*   MCH 33.8* 33.9* 33.4*   MCHC 34.5 34.4 34.5   NEUTROPHIL 64.6 69.5 66.4   LYMPH 22.8 21.1 23.1   MONOCYTE 10.5 7.4 7.4   EOSINOPHIL 1.9 1.8 2.9   BASOPHIL 0.2 0.2 0.2   ANEU 3.3 3.6 3.2   ALYM 1.2 1.1 1.1   ERENDIRA 0.5 0.4 0.4   AEOS 0.1 0.1 " 0.1   ABAS 0.0 0.0 0.0     CMP  Recent Labs   Lab Test 11/13/19  1107 08/12/19  1117 07/16/19  1000 06/17/19  1002  09/29/18  1040  01/05/18  1252     --   --   --   --  139  --  135   POTASSIUM 4.3  --   --   --   --  3.7  --  4.2   CHLORIDE 103  --   --   --   --  104  --  103   CO2 32  --   --   --   --  29  --  26   ANIONGAP <1*  --   --   --   --  6  --  6   GLC 98  --   --   --   --  96  --  94   BUN 11  --   --   --   --  13  --  13   CR 1.02 0.99 1.01 1.05   < > 1.07   < > 0.92   GFRESTIMATED 75 78 76 73   < > 69   < > 82   GFRESTBLACK 87 >90 88 84   < > 83   < > >90   JOHN 9.1  --   --   --   --  8.8  --  9.3   BILITOTAL  --  0.8 1.2 0.9   < >  --    < > 0.9   ALBUMIN  --  3.8 3.9 3.8   < >  --    < > 4.1   PROTTOTAL  --  6.8 6.4* 6.7*   < >  --    < > 7.1   ALKPHOS  --  50 48 56   < >  --    < > 55   AST  --  26 32 20   < >  --    < > 35   ALT  --  35 45 32   < >  --    < > 41    < > = values in this interval not displayed.     Calcium/VitaminD  Recent Labs   Lab Test 11/13/19  1107 09/29/18  1040 01/05/18  1252   JOHN 9.1 8.8 9.3     ESR/CRP  Recent Labs   Lab Test 08/12/19  1117 05/15/19  0959 11/19/18  1318   SED 5 6 5   CRP <2.9 3.2 <2.9     Hepatitis B  Recent Labs   Lab Test 05/16/18  1016   HBCAB Nonreactive   HEPBANG Nonreactive     Hepatitis C  Recent Labs   Lab Test 08/26/16  1213   HCVAB Nonreactive   Assay performance characteristics have not been established for newborns,   infants, and children       Immunization History     Immunization History   Administered Date(s) Administered     Influenza (High Dose) 3 valent vaccine 10/18/2017, 09/21/2018, 11/13/2019     Influenza (IIV3) PF 11/17/2005, 12/04/2006, 11/22/2010, 09/26/2012, 10/01/2014     Influenza Vaccine IM > 6 months Valent IIV4 10/15/2013, 12/17/2015, 08/26/2016     Pneumo Conj 13-V (2010&after) 10/18/2017     Pneumococcal 23 valent 01/22/2018     TDAP Vaccine (Adacel) 04/22/2009, 05/20/2018          Chart documentation done in  part with Dragon Voice recognition Software. Although reviewed after completion, some word and grammatical error may remain.    Adria Lopez MD

## 2019-11-18 NOTE — NURSING NOTE
RAPID3 (0-30) Cumulative Score  3.3          RAPID3 Weighted Score (divide #4 by 3 and that is the weighted score)  1.1     Francie Cuellar [facility] Rheumatology  11/18/2019 1:13 PM

## 2019-11-18 NOTE — PATIENT INSTRUCTIONS
Rheumatology    Dr. Adria Lopez       [facility] in [place]   (Monday)  [address]  [place] [address]       [facility] in [place]   (Tuesday)  [address] [place] [address]    [facility] in [place]   (Wed., Thurs., and Friday)  [address] [place] [address]    Phone number: [phone]  Thank you for choosing [facility].  Francie Cuellar CMA

## 2019-11-26 NOTE — RESULT ENCOUNTER NOTE
"Please mail Mr. Bullard his results with the following message.    \"Mr. Bullard,    Your labs showed a reduced platelet count similar to previous labs.  This will be reevaluated with future labs.    Please let me know if you have any questions.    Sincerely,  Adria Lopez MD  11/26/2019 12:15 PM\"  "

## 2019-12-16 DIAGNOSIS — L40.50 PSORIATIC ARTHROPATHY (H): ICD-10-CM

## 2019-12-16 LAB
ALBUMIN SERPL-MCNC: 3.8 G/DL (ref 3.4–5)
ALP SERPL-CCNC: 49 U/L (ref 40–150)
ALT SERPL W P-5'-P-CCNC: 43 U/L (ref 0–70)
AST SERPL W P-5'-P-CCNC: 29 U/L (ref 0–45)
BASOPHILS # BLD AUTO: 0 10E9/L (ref 0–0.2)
BASOPHILS NFR BLD AUTO: 0.2 %
BILIRUB DIRECT SERPL-MCNC: 0.2 MG/DL (ref 0–0.2)
BILIRUB SERPL-MCNC: 0.6 MG/DL (ref 0.2–1.3)
CREAT SERPL-MCNC: 1.03 MG/DL (ref 0.66–1.25)
DIFFERENTIAL METHOD BLD: ABNORMAL
EOSINOPHIL # BLD AUTO: 0.1 10E9/L (ref 0–0.7)
EOSINOPHIL NFR BLD AUTO: 1.2 %
ERYTHROCYTE [DISTWIDTH] IN BLOOD BY AUTOMATED COUNT: 13.4 % (ref 10–15)
GFR SERPL CREATININE-BSD FRML MDRD: 74 ML/MIN/{1.73_M2}
HCT VFR BLD AUTO: 44.8 % (ref 40–53)
HGB BLD-MCNC: 15.5 G/DL (ref 13.3–17.7)
LYMPHOCYTES # BLD AUTO: 1.1 10E9/L (ref 0.8–5.3)
LYMPHOCYTES NFR BLD AUTO: 26 %
MCH RBC QN AUTO: 33.4 PG (ref 26.5–33)
MCHC RBC AUTO-ENTMCNC: 34.6 G/DL (ref 31.5–36.5)
MCV RBC AUTO: 97 FL (ref 78–100)
MONOCYTES # BLD AUTO: 0.5 10E9/L (ref 0–1.3)
MONOCYTES NFR BLD AUTO: 11.4 %
NEUTROPHILS # BLD AUTO: 2.6 10E9/L (ref 1.6–8.3)
NEUTROPHILS NFR BLD AUTO: 61.2 %
PLATELET # BLD AUTO: 139 10E9/L (ref 150–450)
PROT SERPL-MCNC: 6.7 G/DL (ref 6.8–8.8)
RBC # BLD AUTO: 4.64 10E12/L (ref 4.4–5.9)
WBC # BLD AUTO: 4.3 10E9/L (ref 4–11)

## 2019-12-16 PROCEDURE — 80076 HEPATIC FUNCTION PANEL: CPT | Performed by: INTERNAL MEDICINE

## 2019-12-16 PROCEDURE — 36415 COLL VENOUS BLD VENIPUNCTURE: CPT | Performed by: INTERNAL MEDICINE

## 2019-12-16 PROCEDURE — 85025 COMPLETE CBC W/AUTO DIFF WBC: CPT | Performed by: INTERNAL MEDICINE

## 2019-12-16 PROCEDURE — 82565 ASSAY OF CREATININE: CPT | Performed by: INTERNAL MEDICINE

## 2019-12-17 ENCOUNTER — TELEPHONE (OUTPATIENT)
Dept: RHEUMATOLOGY | Facility: CLINIC | Age: 69
End: 2019-12-17

## 2019-12-17 NOTE — TELEPHONE ENCOUNTER
Patient is having some stomach pain and upset stomach since taking Sulfasalazine.  Can he discontinue this or try something else? Ok to leave a detailed message.  He has been on this for a month or so. Please call cell if no answer at home number. Cell [phone]-  Ok to leave a detailed message.

## 2019-12-17 NOTE — TELEPHONE ENCOUNTER
Spoke with patient, he does not want to take sulfasalazine anymore, makes him get a real bad upset stomach, per Dr. Lopez he could try reducing the medication to 500mg twice a day but patient does not want to do that either as he read the insert and side effects show you could have shortness of breath and he said he noticed lately while walking he does get short of breath and never noticed it before. Patient does have an appointment on 2/17/2020 but if Dr. Lopez would like to see him sooner he is willing to come in.   Francie Cuellar CMA Rheumatology  12/17/2019 11:59 AM

## 2019-12-18 NOTE — TELEPHONE ENCOUNTER
Francie: please see if he would like to come in on 1/2/2020; otherwise will see him as already scheduled.    Adria Lopez MD  12/18/2019 12:19 AM

## 2019-12-20 NOTE — TELEPHONE ENCOUNTER
Called and made appointment for 1/2/2019 at 10:20 AM.  Francie Cuellar CMA Rheumatology  12/20/2019 8:53 AM

## 2020-01-03 ENCOUNTER — TELEPHONE (OUTPATIENT)
Dept: RHEUMATOLOGY | Facility: CLINIC | Age: 70
End: 2020-01-03

## 2020-01-03 NOTE — TELEPHONE ENCOUNTER
We received a call from [facility] asking us to answer clinical questions in regards to a PA request for Otezla that the patient initiated. We are unable to answer the questions because the medication is not listed in the patient's chart and we do not have the information necessary to complete prior authorization. If this a medication that the patient is taking, or will be taking, please place an order in the patient's chart so that we can complete the PA request.

## 2020-01-06 NOTE — TELEPHONE ENCOUNTER
Spoke with Hemalatha with the PA team, this medication has not yet been prescribed for the patient. At last visit patient started SSZ and was having stomach issues with it. New appointment was made but patient had canceled and rescheduled for 2/17/2020. Patient needs office visit prior to medication being prescribed. (patient had initiated the prior auth himself with his insurance).    Francie Cuellar CMA Rheumatology  1/6/2020 9:39 AM

## 2020-02-07 ENCOUNTER — APPOINTMENT (OUTPATIENT)
Dept: GENERAL RADIOLOGY | Facility: CLINIC | Age: 70
End: 2020-02-07
Attending: PHYSICIAN ASSISTANT
Payer: COMMERCIAL

## 2020-02-07 ENCOUNTER — HOSPITAL ENCOUNTER (EMERGENCY)
Facility: CLINIC | Age: 70
Discharge: HOME | End: 2020-02-07
Attending: PHYSICIAN ASSISTANT | Admitting: PHYSICIAN ASSISTANT
Payer: COMMERCIAL

## 2020-02-07 VITALS
SYSTOLIC BLOOD PRESSURE: 130 MMHG | OXYGEN SATURATION: 91 % | DIASTOLIC BLOOD PRESSURE: 74 MMHG | HEART RATE: 75 BPM | TEMPERATURE: 97.2 F | BODY MASS INDEX: 29.62 KG/M2 | RESPIRATION RATE: 7 BRPM | WEIGHT: 200 LBS | HEIGHT: 69 IN

## 2020-02-07 DIAGNOSIS — R06.09 DYSPNEA ON EXERTION: ICD-10-CM

## 2020-02-07 DIAGNOSIS — R07.9 CHEST PAIN: ICD-10-CM

## 2020-02-07 LAB
ANION GAP SERPL CALCULATED.3IONS-SCNC: 4 MMOL/L (ref 3–14)
BASOPHILS # BLD AUTO: 0 10E9/L (ref 0–0.2)
BASOPHILS NFR BLD AUTO: 0.4 %
BUN SERPL-MCNC: 15 MG/DL (ref 7–30)
CALCIUM SERPL-MCNC: 9.1 MG/DL (ref 8.5–10.1)
CHLORIDE SERPL-SCNC: 107 MMOL/L (ref 94–109)
CO2 SERPL-SCNC: 28 MMOL/L (ref 20–32)
CREAT SERPL-MCNC: 0.99 MG/DL (ref 0.66–1.25)
D DIMER PPP FEU-MCNC: 0.5 UG/ML FEU (ref 0–0.5)
DIFFERENTIAL METHOD BLD: ABNORMAL
EOSINOPHIL # BLD AUTO: 0.1 10E9/L (ref 0–0.7)
EOSINOPHIL NFR BLD AUTO: 1.9 %
ERYTHROCYTE [DISTWIDTH] IN BLOOD BY AUTOMATED COUNT: 12.9 % (ref 10–15)
GFR SERPL CREATININE-BSD FRML MDRD: 78 ML/MIN/{1.73_M2}
GLUCOSE SERPL-MCNC: 111 MG/DL (ref 70–99)
HCT VFR BLD AUTO: 44 % (ref 40–53)
HGB BLD-MCNC: 15.6 G/DL (ref 13.3–17.7)
IMM GRANULOCYTES # BLD: 0 10E9/L (ref 0–0.4)
IMM GRANULOCYTES NFR BLD: 0.2 %
LYMPHOCYTES # BLD AUTO: 1.4 10E9/L (ref 0.8–5.3)
LYMPHOCYTES NFR BLD AUTO: 29.5 %
MCH RBC QN AUTO: 33.9 PG (ref 26.5–33)
MCHC RBC AUTO-ENTMCNC: 35.5 G/DL (ref 31.5–36.5)
MCV RBC AUTO: 96 FL (ref 78–100)
MONOCYTES # BLD AUTO: 0.4 10E9/L (ref 0–1.3)
MONOCYTES NFR BLD AUTO: 7.8 %
NEUTROPHILS # BLD AUTO: 2.8 10E9/L (ref 1.6–8.3)
NEUTROPHILS NFR BLD AUTO: 60.2 %
NRBC # BLD AUTO: 0 10*3/UL
NRBC BLD AUTO-RTO: 0 /100
NT-PROBNP SERPL-MCNC: 32 PG/ML (ref 0–900)
PLATELET # BLD AUTO: 175 10E9/L (ref 150–450)
POTASSIUM SERPL-SCNC: 4.3 MMOL/L (ref 3.4–5.3)
RBC # BLD AUTO: 4.6 10E12/L (ref 4.4–5.9)
SODIUM SERPL-SCNC: 139 MMOL/L (ref 133–144)
TROPONIN I SERPL-MCNC: <0.015 UG/L (ref 0–0.04)
WBC # BLD AUTO: 4.6 10E9/L (ref 4–11)

## 2020-02-07 PROCEDURE — 85379 FIBRIN DEGRADATION QUANT: CPT | Performed by: PHYSICIAN ASSISTANT

## 2020-02-07 PROCEDURE — 83880 ASSAY OF NATRIURETIC PEPTIDE: CPT | Performed by: PHYSICIAN ASSISTANT

## 2020-02-07 PROCEDURE — 93010 ELECTROCARDIOGRAM REPORT: CPT | Mod: Z6 | Performed by: PHYSICIAN ASSISTANT

## 2020-02-07 PROCEDURE — 84484 ASSAY OF TROPONIN QUANT: CPT | Performed by: PHYSICIAN ASSISTANT

## 2020-02-07 PROCEDURE — 94640 AIRWAY INHALATION TREATMENT: CPT

## 2020-02-07 PROCEDURE — 93005 ELECTROCARDIOGRAM TRACING: CPT

## 2020-02-07 PROCEDURE — 94640 AIRWAY INHALATION TREATMENT: CPT | Mod: 76

## 2020-02-07 PROCEDURE — 99285 EMERGENCY DEPT VISIT HI MDM: CPT | Mod: 25 | Performed by: PHYSICIAN ASSISTANT

## 2020-02-07 PROCEDURE — 71046 X-RAY EXAM CHEST 2 VIEWS: CPT

## 2020-02-07 PROCEDURE — 80048 BASIC METABOLIC PNL TOTAL CA: CPT | Performed by: PHYSICIAN ASSISTANT

## 2020-02-07 PROCEDURE — 40000275 ZZH STATISTIC RCP TIME EA 10 MIN

## 2020-02-07 PROCEDURE — 85025 COMPLETE CBC W/AUTO DIFF WBC: CPT | Performed by: PHYSICIAN ASSISTANT

## 2020-02-07 PROCEDURE — 25000125 ZZHC RX 250: Performed by: PHYSICIAN ASSISTANT

## 2020-02-07 PROCEDURE — 99285 EMERGENCY DEPT VISIT HI MDM: CPT | Mod: 25

## 2020-02-07 RX ORDER — ALBUTEROL SULFATE 90 UG/1
2 AEROSOL, METERED RESPIRATORY (INHALATION) EVERY 6 HOURS PRN
Qty: 1 INHALER | Refills: 0 | Status: SHIPPED | OUTPATIENT
Start: 2020-02-07 | End: 2020-07-13

## 2020-02-07 RX ORDER — ALBUTEROL SULFATE 0.83 MG/ML
2.5 SOLUTION RESPIRATORY (INHALATION) ONCE
Status: COMPLETED | OUTPATIENT
Start: 2020-02-07 | End: 2020-02-07

## 2020-02-07 RX ORDER — TURMERIC/TURMERIC EXT/PEPR EXT 900-100 MG
1 CAPSULE ORAL DAILY
COMMUNITY
End: 2021-12-10

## 2020-02-07 RX ADMIN — ALBUTEROL SULFATE 2.5 MG: 2.5 SOLUTION RESPIRATORY (INHALATION) at 16:06

## 2020-02-07 ASSESSMENT — ENCOUNTER SYMPTOMS
PHOTOPHOBIA: 0
WEAKNESS: 0
ABDOMINAL PAIN: 0
SORE THROAT: 0
VOMITING: 0
CHILLS: 0
ARTHRALGIAS: 1
PALPITATIONS: 1
DIARRHEA: 0
DYSURIA: 0
EYE REDNESS: 0
NUMBNESS: 0
EYE DISCHARGE: 0
HEADACHES: 0
EYE PAIN: 0
NAUSEA: 0
WHEEZING: 0
HEMATURIA: 0
COUGH: 0
FEVER: 0
SHORTNESS OF BREATH: 1
WOUND: 0
DIZZINESS: 0
LIGHT-HEADEDNESS: 0
COLOR CHANGE: 0

## 2020-02-07 ASSESSMENT — MIFFLIN-ST. JEOR: SCORE: 1667.57

## 2020-02-07 NOTE — ED AVS SNAPSHOT
[facility] Emergency Department  [address] [address]  Phone:  [phone]  Fax:  [phone]                                    Pawan Bullard   MRN: [id number]    Department:  [facility] Emergency Department   Date of Visit:  2/7/2020           After Visit Summary Signature Page    I have received my discharge instructions, and my questions have been answered. I have discussed any challenges I see with this plan with the nurse or doctor.    ..........................................................................................................................................  Patient/Patient Representative Signature      ..........................................................................................................................................  Patient Representative Print Name and Relationship to Patient    ..................................................               ................................................  Date                                   Time    ..........................................................................................................................................  Reviewed by Signature/Title    ...................................................              ..............................................  Date                                               Time          22EPIC Rev 08/18

## 2020-02-07 NOTE — ED PROVIDER NOTES
History     Chief Complaint   Patient presents with     Chest Pain     ROSADO and CP with deep breath x 1 week.     HPI  Pawan Bullard is a 70 year old with past medical history significant for hypertension, hyperlipidemia, GERD, psoriatic arthritis who presents to the emergency department accompanied by wife with concern over shortness of breath and chest pain.  Patient reports that for the last several months since at least December he has had shortness of breath with exertion which is worse when he is outside such as taking out the trash, skating.  In the lats week he has developed some left upper chest pain which she described as a tightness however since then has been intermittent stabbing pains.  It waxes and wanes in intensity and was present last for approximately 10 minutes will occur 3-4 times an hour.  Patient is unclear of any clear aggravating or alleviating factors but states stabbing pains will typically occur at rest. Last episodes of pain was while driving here, currently is pain free.  He denies any current headache, dizziness, lightheadedness, numbness/paresthesiasvision changes, nasal congestion, sore throat, cough, wheezing, nausea, vomiting, diarrhea, abdominal pain, dysuria, hematuria, leg swelling.  He has not had any prior evaluation for his symptoms.  He has not attempted any OTC treatments.  He did have a stress test many years ago, [facility] records show treadmill stress test on 10/26/04 which appeared to be normal, however is somewhat difficult to read due to sensitivity of scanned images.  He is a non-smoker.  Rare infrequent alcohol use.  His  mother  had an MI in her 70s and reports a cousin  who is similar age recently had multivessel bypass.      Allergies:  Allergies   Allergen Reactions     Acetaminophen W/Codeine Nausea       Problem List:    Patient Active Problem List    Diagnosis Date Noted     High risk medications (not anticoagulants) long-term use 03/09/2015     Priority:  Medium     Advanced directives, counseling/discussion 2013     Priority: Medium     advanced care discussed form given to pt.           Osteoarthritis 2011     Priority: Medium     HYPERLIPIDEMIA LDL GOAL <130 10/31/2010     Priority: Medium     Chondrocalcinosis 02/10/2010     Priority: Medium     FAMILY HISTORY OF DIABETES MELLITUS 2006     Priority: Medium     mother       Impotence of organic origin 2006     Priority: Medium     FAMILY HISTORY OF GI NEOPLASM 2005     Priority: Medium     father - colon CA ,  at  67       Esophageal reflux 2005     Priority: Medium     GERD       Hypertension goal BP (blood pressure) < 140/90 2005     Priority: Medium     Psoriatic arthropathy (H) 2005     Priority: Medium        Past Medical History:    Past Medical History:   Diagnosis Date     Esophageal reflux      Hypertension      Psoriatic arthropathy (H)        Past Surgical History:    Past Surgical History:   Procedure Laterality Date     ARTHROSCOPY KNEE RT/LT  2009    left knee     ARTHROSCOPY KNEE WITH MEDIAL MENISCECTOMY  2013    Procedure: ARTHROSCOPY KNEE WITH MEDIAL MENISCECTOMY;  Left Knee Arthroscopic and Open Repair of Patellar Tendon with Platelet Rich Plasma;  Surgeon: Ley, Jeffrey Duane, MD;  Location: [facility]     COLONOSCOPY  2006    diverticuli. Father had colon CA     COLONOSCOPY       COLONOSCOPY  2011    Procedure:COLONOSCOPY; Surgeon:JENNIFER JEAN; Location:[facility]     COLONOSCOPY N/A 2016    Procedure: COLONOSCOPY;  Surgeon: Elias Santamaria MD;  Location: [facility]     REPAIR TENDON PATELLA  2013    Procedure: REPAIR TENDON PATELLA;;  Surgeon: Ley, Jeffrey Duane, MD;  Location: [facility]     SURGICAL HISTORY OF -       (L) Herniorrhaphy     SURGICAL HISTORY OF -       (R) Herniorrhaphy     SURGICAL HISTORY OF -       Hemorrohid     Family History:    Family History   Problem Relation Age of Onset      "Hypertension Mother      Diabetes Mother      Cerebrovascular Disease Mother         in her 70s     Arthritis Mother      Cancer - colorectal Father 67     Arthritis Paternal Grandmother      Prostate Cancer No family hx of      Coronary Artery Disease No family hx of      Social History:  Marital Status:   [2]  Social History     Tobacco Use     Smoking status: Never Smoker     Smokeless tobacco: Never Used   Substance Use Topics     Alcohol use: Yes     Comment:  0-2 beer's or mixed drink's monthly     Drug use: No      Medications:    folic acid (FOLVITE) 1 MG tablet  lisinopril (PRINIVIL/ZESTRIL) 5 MG tablet  methotrexate sodium 2.5 MG TABS  MULTI-VITAMIN OR TABS  Probiotic Product (PROBIOTIC DAILY PO)  sildenafil (REVATIO) 20 MG tablet  simvastatin (ZOCOR) 40 MG tablet  sulfaSALAzine ER (AZULFIDINE EN) 500 MG EC tablet  tamsulosin (FLOMAX) 0.4 MG capsule  UNABLE TO FIND      Review of Systems   Constitutional: Negative for chills and fever.   HENT: Negative for congestion, ear pain and sore throat.    Eyes: Negative for photophobia, pain, discharge, redness and visual disturbance.   Respiratory: Positive for shortness of breath. Negative for cough and wheezing.    Cardiovascular: Positive for chest pain and palpitations. Negative for leg swelling.   Gastrointestinal: Negative for abdominal pain, diarrhea, nausea and vomiting.   Genitourinary: Negative for dysuria and hematuria.   Musculoskeletal: Positive for arthralgias (chronic).   Skin: Negative for color change, rash and wound.   Neurological: Negative for dizziness, weakness, light-headedness, numbness and headaches.   All other systems reviewed and are negative.    Physical Exam   BP: (!) 165/94  Pulse: 72  Temp: 97.2  F (36.2  C)  Resp: 18  Height: 175.3 cm (5' 9\")  Weight: 90.7 kg (200 lb)  SpO2: 99 %  Physical Exam  GENERAL APPEARANCE:alert, mildly anxious appearing  EYES: EOMI,  PERRL, conjunctiva clear  HENT: ear canals and TM's normal.  Nose " and mouth without ulcers, erythema or lesions  NECK: supple, nontender, no lymphadenopathy  RESP: There are some crackles at the left lung base initially that cleared with repeated respirations, no wheezing  CV: regular rates and rhythm, normal S1 S2, no murmur noted  ABDOMEN:  soft, nontender, no HSM or masses and bowel sounds normal  NEURO: Normal strength and tone, sensory exam grossly normal,  normal speech and mentation  SKIN: no suspicious lesions or rashes  ED Course        Procedures               EKG Interpretation:      Interpreted by Mercy Reddy PA-C and Dr. Junior Burgess  Time reviewed: 14:25  Symptoms at time of EKG: None, chest pain at home ongoing dyspnea on exertion   Rhythm: normal sinus   Rate: Normal 70  Axis: Normal  Ectopy: none  Conduction: normal  ST Segments/ T Waves: nonspecific ST changes   Q Waves: none  Comparison to prior: Unchanged from 9/29/18    Clinical Impression: no acute change  Critical Care time:  none        Results for orders placed or performed during the hospital encounter of 02/07/20 (from the past 24 hour(s))   CBC with platelets differential   Result Value Ref Range    WBC 4.6 4.0 - 11.0 10e9/L    RBC Count 4.60 4.4 - 5.9 10e12/L    Hemoglobin 15.6 13.3 - 17.7 g/dL    Hematocrit 44.0 40.0 - 53.0 %    MCV 96 78 - 100 fl    MCH 33.9 (H) 26.5 - 33.0 pg    MCHC 35.5 31.5 - 36.5 g/dL    RDW 12.9 10.0 - 15.0 %    Platelet Count 175 150 - 450 10e9/L    Diff Method Automated Method     % Neutrophils 60.2 %    % Lymphocytes 29.5 %    % Monocytes 7.8 %    % Eosinophils 1.9 %    % Basophils 0.4 %    % Immature Granulocytes 0.2 %    Nucleated RBCs 0 0 /100    Absolute Neutrophil 2.8 1.6 - 8.3 10e9/L    Absolute Lymphocytes 1.4 0.8 - 5.3 10e9/L    Absolute Monocytes 0.4 0.0 - 1.3 10e9/L    Absolute Eosinophils 0.1 0.0 - 0.7 10e9/L    Absolute Basophils 0.0 0.0 - 0.2 10e9/L    Abs Immature Granulocytes 0.0 0 - 0.4 10e9/L    Absolute Nucleated RBC 0.0    Basic metabolic panel   Result  Value Ref Range    Sodium 139 133 - 144 mmol/L    Potassium 4.3 3.4 - 5.3 mmol/L    Chloride 107 94 - 109 mmol/L    Carbon Dioxide 28 20 - 32 mmol/L    Anion Gap 4 3 - 14 mmol/L    Glucose 111 (H) 70 - 99 mg/dL    Urea Nitrogen 15 7 - 30 mg/dL    Creatinine 0.99 0.66 - 1.25 mg/dL    GFR Estimate 78 >60 mL/min/[1.73_m2]    GFR Estimate If Black >90 >60 mL/min/[1.73_m2]    Calcium 9.1 8.5 - 10.1 mg/dL   Troponin I   Result Value Ref Range    Troponin I ES <0.015 0.000 - 0.045 ug/L   Nt probnp inpatient   Result Value Ref Range    N-Terminal Pro BNP Inpatient 32 0 - 900 pg/mL   Chest XR,  PA & LAT    Narrative    CHEST TWO VIEWS   2/7/2020 3:21 PM     HISTORY: Chest pain.    COMPARISON: Chest x-ray 9/21/2018.      Impression    IMPRESSION: PA and lateral views of the chest. Lungs are clear. Heart  is normal in size. No effusions are evident. No pneumothorax.    DERRICK QUINN MD       HEART Score  Background  Calculates the overall risk of adverse event in patient's presenting with chest pain.  Based on 5 criteria (each assigned 0-2 points) including suspiciousness of history, EKG, age, risk factors and troponin.    Data  70 year old male  has Psoriatic arthropathy (H); Esophageal reflux; Hypertension goal BP (blood pressure) < 140/90; FAMILY HISTORY OF GI NEOPLASM; FAMILY HISTORY OF DIABETES MELLITUS; Impotence of organic origin; Chondrocalcinosis; HYPERLIPIDEMIA LDL GOAL <130; Osteoarthritis; Advanced directives, counseling/discussion; and High risk medications (not anticoagulants) long-term use on their problem list.   reports that he has never smoked. He has never used smokeless tobacco.  family history includes Arthritis in his mother and paternal grandmother; Cancer - colorectal (age of onset: 67) in his father; Cerebrovascular Disease in his mother; Diabetes in his mother; Hypertension in his mother.  Lab Results   Component Value Date    TROPI <0.015 02/07/2020     Criteria   0-2 points for each of 5 items  (maximum of 10 points):  Score 0- History slightly suspicious for coronary syndrome  Score 0- EKG Normal  Score 2- Age 65 years or older  Score 1- One to 2 risk factors for atherosclerotic disease  Score 0- Within normal limits for troponin levels  Interpretation  Risk of adverse outcome  Heart Score: 3  Total Score 0-3- Adverse Outcome Risk 2.5% - Supports early discharge with appropriate follow-up    Medications - No data to display    Assessments & Plan (with Medical Decision Making)     I have reviewed the nursing notes.  I have reviewed the findings, diagnosis, plan and need for follow up with the patient.       Discharge Medication List as of 2/7/2020  5:09 PM      START taking these medications    Details   albuterol (PROAIR HFA/PROVENTIL HFA/VENTOLIN HFA) 108 (90 Base) MCG/ACT inhaler Inhale 2 puffs into the lungs every 6 hours as needed for shortness of breath / dyspnea or wheezing, Disp-1 Inhaler, R-0, E-Prescribe           Final diagnoses:   Chest pain   Dyspnea on exertion     60-year-old male presents to the emergency department with concern over ongoing dyspnea on exertion and a 1 week history of chest pain.  He had elevated blood pressure upon arrival, remainder of vital signs are within normal limits.  Physical exam findings as described above were significant for coarse breath sounds in the left lung base which cleared after repeated respirations.  No wheezing.  Patient had EKG which was negative for evidence of acute ischemia, arrhythmia, unchanged from prior.  Laboratory testing included non-concerning CBC, CMP, negative troponin, normal d-dimer effectively ruling out acute MI, PE.  He did have chest x-ray which was negative for acute infiltrate, pneumothorax, pleural effusion or change in cardiopulmonary vasculature. He did tolerate albuterol neb and did report some symptomatic improvement.  On repeat auscultation lungs were clear.  As symptoms of dyspnea on exertion occurs primarily in cold  climax would consider possibility of cold induced bronchospasm however cannot rule out angina.  He stratifies to low risk using heart score and will be discharged home.  He was instructed to follow-up with PCP within 1 week for recheck consider ordering stress testing, pulmonary function testing at that time.  Worrisome reasons to return to ER sooner discussed.     Disclaimer: This note consists of symbols derived from keyboarding, dictation, and/or voice recognition software. As a result, there may be errors in the script that have gone undetected.  Please consider this when interpreting information found in the chart.      2/7/2020   [facility] EMERGENCY DEPARTMENT     Mercy Reddy PA-C  02/12/20 [id number]

## 2020-02-07 NOTE — ED NOTES
Left upper chest pain with sob while walking to mailbox and shoveling x 1 week.   Deep breaths makes the pain feel better.

## 2020-02-10 DIAGNOSIS — L40.50 PSORIATIC ARTHROPATHY (H): ICD-10-CM

## 2020-02-10 LAB
ALBUMIN SERPL-MCNC: 3.6 G/DL (ref 3.4–5)
ALP SERPL-CCNC: 51 U/L (ref 40–150)
ALT SERPL W P-5'-P-CCNC: 46 U/L (ref 0–70)
AST SERPL W P-5'-P-CCNC: 23 U/L (ref 0–45)
BASOPHILS # BLD AUTO: 0 10E9/L (ref 0–0.2)
BASOPHILS NFR BLD AUTO: 0.2 %
BILIRUB DIRECT SERPL-MCNC: 0.2 MG/DL (ref 0–0.2)
BILIRUB SERPL-MCNC: 1 MG/DL (ref 0.2–1.3)
CREAT SERPL-MCNC: 1.04 MG/DL (ref 0.66–1.25)
CRP SERPL-MCNC: <2.9 MG/L (ref 0–8)
DIFFERENTIAL METHOD BLD: ABNORMAL
EOSINOPHIL # BLD AUTO: 0.1 10E9/L (ref 0–0.7)
EOSINOPHIL NFR BLD AUTO: 2.5 %
ERYTHROCYTE [DISTWIDTH] IN BLOOD BY AUTOMATED COUNT: 13.3 % (ref 10–15)
ERYTHROCYTE [SEDIMENTATION RATE] IN BLOOD BY WESTERGREN METHOD: 5 MM/H (ref 0–20)
GFR SERPL CREATININE-BSD FRML MDRD: 73 ML/MIN/{1.73_M2}
HCT VFR BLD AUTO: 43.3 % (ref 40–53)
HGB BLD-MCNC: 15 G/DL (ref 13.3–17.7)
LYMPHOCYTES # BLD AUTO: 1 10E9/L (ref 0.8–5.3)
LYMPHOCYTES NFR BLD AUTO: 20 %
MCH RBC QN AUTO: 33.6 PG (ref 26.5–33)
MCHC RBC AUTO-ENTMCNC: 34.6 G/DL (ref 31.5–36.5)
MCV RBC AUTO: 97 FL (ref 78–100)
MONOCYTES # BLD AUTO: 0.4 10E9/L (ref 0–1.3)
MONOCYTES NFR BLD AUTO: 7.4 %
NEUTROPHILS # BLD AUTO: 3.3 10E9/L (ref 1.6–8.3)
NEUTROPHILS NFR BLD AUTO: 69.9 %
PLATELET # BLD AUTO: 150 10E9/L (ref 150–450)
PROT SERPL-MCNC: 6.4 G/DL (ref 6.8–8.8)
RBC # BLD AUTO: 4.47 10E12/L (ref 4.4–5.9)
WBC # BLD AUTO: 4.7 10E9/L (ref 4–11)

## 2020-02-10 PROCEDURE — 80076 HEPATIC FUNCTION PANEL: CPT | Performed by: INTERNAL MEDICINE

## 2020-02-10 PROCEDURE — 36415 COLL VENOUS BLD VENIPUNCTURE: CPT | Performed by: INTERNAL MEDICINE

## 2020-02-10 PROCEDURE — 85652 RBC SED RATE AUTOMATED: CPT | Performed by: INTERNAL MEDICINE

## 2020-02-10 PROCEDURE — 85025 COMPLETE CBC W/AUTO DIFF WBC: CPT | Performed by: INTERNAL MEDICINE

## 2020-02-10 PROCEDURE — 86140 C-REACTIVE PROTEIN: CPT | Performed by: INTERNAL MEDICINE

## 2020-02-10 PROCEDURE — 82565 ASSAY OF CREATININE: CPT | Performed by: INTERNAL MEDICINE

## 2020-02-14 ENCOUNTER — OFFICE VISIT (OUTPATIENT)
Dept: FAMILY MEDICINE | Facility: CLINIC | Age: 70
End: 2020-02-14
Payer: COMMERCIAL

## 2020-02-14 VITALS
OXYGEN SATURATION: 99 % | RESPIRATION RATE: 14 BRPM | BODY MASS INDEX: 31.01 KG/M2 | DIASTOLIC BLOOD PRESSURE: 78 MMHG | HEART RATE: 70 BPM | SYSTOLIC BLOOD PRESSURE: 138 MMHG | WEIGHT: 210 LBS | TEMPERATURE: 97.3 F

## 2020-02-14 DIAGNOSIS — R07.9 CHEST PAIN, UNSPECIFIED TYPE: ICD-10-CM

## 2020-02-14 DIAGNOSIS — R06.09 DOE (DYSPNEA ON EXERTION): Primary | ICD-10-CM

## 2020-02-14 PROCEDURE — 99214 OFFICE O/P EST MOD 30 MIN: CPT | Performed by: FAMILY MEDICINE

## 2020-02-14 NOTE — NURSING NOTE
"Chief Complaint   Patient presents with     Hospital F/U     2/7/20       Initial BP (!) 140/74 (BP Location: Right arm, Patient Position: Sitting, Cuff Size: Adult Large)   Pulse 70   Temp 97.3  F (36.3  C) (Tympanic)   Resp 14   Wt 95.3 kg (210 lb)   SpO2 99%   BMI 31.01 kg/m   Estimated body mass index is 31.01 kg/m  as calculated from the following:    Height as of 2/7/20: 1.753 m (5' 9\").    Weight as of this encounter: 95.3 kg (210 lb).    Patient presents to the clinic using No DME    Health Maintenance that is potentially due pending provider review:  NONE    n/a    Is there anyone who you would like to be able to receive your results? No  If yes have patient fill out CURT    Tara Workman CMA    "

## 2020-02-14 NOTE — PATIENT INSTRUCTIONS
ASSESSMENT:     ICD-10-CM    1. ROSADO (dyspnea on exertion) R06.09 Exercise Stress Test - Adult     General PFT Lab (Please always keep checked)     Pulmonary Function Test   2. Chest pain, unspecified type R07.9 Exercise Stress Test - Adult      No further chest pain.   Ongoing dyspnea on exertion.  Possible asthma although no history of asthma or allergies.  Never smoked making COPD less likely.  R/O angina or heart cause.   PLAN:   Schedule an appointment with cardiology for an exercise stress test.  Call [phone] to schedule.  This will evaluate heart for impaired blood supply.  OK to use inhaler before stress test if needed.     Schedule an appointment with respiratory therapy for pulmonary function tests.   Call [phone] to schedule.   This will check for lung disease.   No inhaler use within 6 hours of lung tests.     Albuteral inhaler can be used taking 2 inhalations every 4 hours as needed for coughing, wheezing or shortness of breath.   You can use the albuteral inhaler 15-30 minutes before any exertion.     Continue with Coreg  Not on ACEI secondary to history of CKD

## 2020-02-14 NOTE — PROGRESS NOTES
SUBJECTIVE: Pawan Bullard is a 70 year old male  Chief Complaint   Patient presents with     Hospital F/U     2/7/20      ED/UC Followup:    Facility:  [facility]  Date of visit: 2/7/2020  Reason for visit: Chest Pain  Current Status: No more chest pain some breathing concerns once in awhile has to take a bid deep breath.     Seen in ED for pain upper left chest and breathing problems.   He had normal chest x-ray and EKG.  Labs OK.  HE had a neb in the ED which did help.     He had the chest pain for a week before the ED.  No chest pain since ED.   Can get the chest pain with exertion.      He has had dyspnea on exertion shoveling snow and bringing garbage out.  Walking in cold air caused the shortness of breath.  A little dyspnea on exertion stairs, more of a problem outside and more with cold air exposure.    Onset of symptoms: a few months.    Has been daily.   Was prescribed albuteral inhaler.  Using albuteral inhaler twice daily. It does help breathing.   No cough.  No URI symptoms this winter.     Mother with heart disease at age 75.     No history of asthma or allergies.   Never smoked.     Patient Active Problem List   Diagnosis     Psoriatic arthropathy (H)     Esophageal reflux     Hypertension goal BP (blood pressure) < 140/90     FAMILY HISTORY OF GI NEOPLASM     FAMILY HISTORY OF DIABETES MELLITUS     Impotence of organic origin     Chondrocalcinosis     HYPERLIPIDEMIA LDL GOAL <130     Osteoarthritis     Advanced directives, counseling/discussion     High risk medications (not anticoagulants) long-term use      ROS:General: No change in weight, sleep or appetite.  Normal energy.  No fever or chills  GI: No nausea, vomiting,  heartburn, abdominal pain, diarrhea, constipation or change in bowel habits  : No urinary frequency or dysuria, bladder or kidney problems  Musculoskeletal: POSITIVE  for:, rheumatoid arthritis.  Doing OK.   Psychiatric: No problems with anxiety, depression or mental  health    OBJECTIVE:Blood pressure 138/78, pulse 70, temperature 97.3  F (36.3  C), temperature source Tympanic, resp. rate 14, weight 95.3 kg (210 lb), SpO2 99 %. BMI=Body mass index is 31.01 kg/m .  GENERAL APPEARANCE ADULT: Alert, no acute distress  NECK: No adenopathy,masses or thyromegaly  RESP: lungs clear to auscultation   CV: normal rate, regular rhythm, no murmur or gallop  ABDOMEN: soft, no organomegaly, masses or tenderness  MS: extremities normal, no peripheral edema     ASSESSMENT:     ICD-10-CM    1. ROSADO (dyspnea on exertion) R06.09 Exercise Stress Test - Adult     General PFT Lab (Please always keep checked)     Pulmonary Function Test   2. Chest pain, unspecified type R07.9 Exercise Stress Test - Adult      No further chest pain.   Ongoing dyspnea on exertion.  Possible asthma although no history of asthma or allergies.  Never smoked making COPD less likely.  R/O angina or heart cause.   PLAN:   Schedule an appointment with cardiology for an exercise stress test.  Call [phone] to schedule.  This will evaluate heart for impaired blood supply.  OK to use inhaler before stress test if needed.     Schedule an appointment with respiratory therapy for pulmonary function tests.   Call [phone] to schedule.   This will check for lung disease.   No inhaler use within 6 hours of lung tests.     Albuteral inhaler can be used taking 2 inhalations every 4 hours as needed for coughing, wheezing or shortness of breath.   You can use the albuteral inhaler 15-30 minutes before any exertion.                        =====================  ED notes:  HPI  Pawan Bullard is a 70 year old with past medical history significant for hypertension, hyperlipidemia, GERD, psoriatic arthritis who presents to the emergency department accompanied by wife with concern over shortness of breath and chest pain.  Patient reports that for the last several months since at least December he has had shortness of breath with  exertion which is worse when he is outside such as taking out the trash, skating.  In the lats week he has developed some left upper chest pain which she described as a tightness however since then has been intermittent stabbing pains.  It waxes and wanes in intensity and was present last for approximately 10 minutes will occur 3-4 times an hour.  Patient is unclear of any clear aggravating or alleviating factors but states stabbing pains will typically occur at rest. Last episodes of pain was while driving here, currently is pain free.  He denies any current headache, dizziness, lightheadedness, numbness/paresthesiasvision changes, nasal congestion, sore throat, cough, wheezing, nausea, vomiting, diarrhea, abdominal pain, dysuria, hematuria, leg swelling.  He has not had any prior evaluation for his symptoms.  He has not attempted any OTC treatments.  He did have a stress test many years ago, [facility] records show treadmill stress test on 10/26/04 which appeared to be normal, however is somewhat difficult to read due to sensitivity of scanned images.  He is a non-smoker.  Rare infrequent alcohol use.  His  mother  had an MI in her 70s and reports a cousin  who is similar age recently had multivessel bypass.      Final diagnoses:   Chest pain   Dyspnea on exertion      60-year-old male presents to the emergency department with concern over ongoing dyspnea on exertion and a 1 week history of chest pain.  He had elevated blood pressure upon arrival, remainder of vital signs are within normal limits.  Physical exam findings as described above were significant for coarse breath sounds in the left lung base which cleared after repeated respirations.  No wheezing.  Patient had EKG which was negative for evidence of acute ischemia, arrhythmia, unchanged from prior.  Laboratory testing included non-concerning CBC, CMP, negative troponin, normal d-dimer effectively ruling out acute MI, PE.  He did have chest x-ray which was  negative for acute infiltrate, pneumothorax, pleural effusion or change in cardiopulmonary vasculature. He did tolerate albuterol neb and did report some symptomatic improvement.  On repeat auscultation lungs were clear.  As symptoms of dyspnea on exertion occurs primarily in cold climax would consider possibility of cold induced bronchospasm however cannot rule out angina.  He stratifies to low risk using heart score and will be discharged home.  He was instructed to follow-up with PCP within 1 week for recheck consider ordering stress testing, pulmonary function testing at that time.  Worrisome reasons to return to ER sooner discussed.

## 2020-02-17 ENCOUNTER — OFFICE VISIT (OUTPATIENT)
Dept: RHEUMATOLOGY | Facility: CLINIC | Age: 70
End: 2020-02-17
Payer: COMMERCIAL

## 2020-02-17 VITALS
BODY MASS INDEX: 31.52 KG/M2 | DIASTOLIC BLOOD PRESSURE: 76 MMHG | HEART RATE: 78 BPM | OXYGEN SATURATION: 96 % | WEIGHT: 212.8 LBS | HEIGHT: 69 IN | SYSTOLIC BLOOD PRESSURE: 139 MMHG

## 2020-02-17 DIAGNOSIS — Z79.899 HIGH RISK MEDICATIONS (NOT ANTICOAGULANTS) LONG-TERM USE: ICD-10-CM

## 2020-02-17 DIAGNOSIS — L40.50 PSORIATIC ARTHROPATHY (H): Primary | ICD-10-CM

## 2020-02-17 PROCEDURE — 99213 OFFICE O/P EST LOW 20 MIN: CPT | Performed by: INTERNAL MEDICINE

## 2020-02-17 RX ORDER — METHOTREXATE 2.5 MG/1
25 TABLET ORAL WEEKLY
Qty: 120 TABLET | Refills: 1 | Status: SHIPPED | OUTPATIENT
Start: 2020-02-17 | End: 2020-08-31

## 2020-02-17 ASSESSMENT — MIFFLIN-ST. JEOR: SCORE: 1725.63

## 2020-02-17 NOTE — PATIENT INSTRUCTIONS
Rheumatology    Dr. Adria Lopez       [facility] in [place]   (Monday)  [address]  [place] [address]       [facility] in [place]   (Tuesday)  [address] [place] [address]    [facility] in [place]   (Wed., Thurs., and Friday)  [address] [place] [address]    Phone number: [phone]  Thank you for choosing [facility].  Francie Cuellar CMA

## 2020-02-17 NOTE — NURSING NOTE
RAPID3 (0-30) Cumulative Score  4.7          RAPID3 Weighted Score (divide #4 by 3 and that is the weighted score)  1.6       Francie Cuellar [facility]  2/17/2020 1:00 PM

## 2020-02-17 NOTE — PROGRESS NOTES
Rheumatology Clinic Visit      Pawan Bullard MRN# [id number]   YOB: 1950 Age: 70 year old      Date of visit: 2/17/20   PCP: Dr. Armando Finley    Chief Complaint   Patient presents with:  Arthritis: Psoriatic. Patient is doing good    Assessment and Plan     1.  Psoriatic arthritis: Currently on MTX 20mg wkly; still with mild synovitis and inflammatory symptoms.  Previously on SSZ (GI upset).  Reviewed other treatment options.  He is concerned about cost of conventional synthetic and biologic DMARDs.  We discussed increasing methotrexate and/or changing the route of administration of methotrexate increase bioavailability.  After thorough conversation he would like to increase methotrexate and continue taking it orally.   - Increase methotrexate from 20mg once weekly, to 25mg once every 7 days, taking 5 tablets in the AM and 5 tablets in the PM on the same day of each week  - Continue folic acid 1 mg daily  - Labs monthly [id number]: CBC, Cr, Hepatic Panel  - Labs in 3 months: CBC, Creatinine, Hepatic Panel, ESR, CRP               Rapid 3, cumulative scores                      02/17/2020:  4.7 (MTX 20mg wkly)]                      11/18/2019:  3.3 (MTX 20mg wkly)                      05/20/2019:  Refused by patient                      11/26/2018:  5.2 (MTX 17.5mg wkly)    2. Left shoulder pain history: mild impingement syndrome previously and suspected mild rotator cuff pathology at that time.  Physical therapy exercises were done by the patient; not an issue today    3.  Vaccinations: Vaccinations reviewed with Mr. Bullard.  Risks and benefits of vaccinations were discussed.   CDC stance on shingrix when on moderate to high immunosuppression was reviewed.   - Influenza: encouraged yearly vaccination; up to date for the 2019/2020 season  - [id number]: up to date  - [id number]: up to date  - Shingrix: Patient plans to receive at the pharmacy; I reminded him of this vaccine today    Mr. Bullard  verbalized agreement with and understanding of the rational for the diagnosis and treatment plan.  All questions were answered to best of my ability and the patient's satisfaction. Mr. Bullard was advised to contact the clinic with any questions that may arise after the clinic visit.      Thank you for involving me in the care of the patient    Return to clinic: 3 months      HPI   Pawan Bullard is a 70 year old male with a past medical history significant for hypertension, hyperlipidemia, chondrocalcinosis, GERD, osteoarthritis, and psoriatic arthritis who presents for follow-up of psoriatic arthritis.      Today, Mr. Bullard reports that he did not tolerate sulfasalazine because of associated GI upset.  He says that he does tolerate methotrexate.  He says that he looked into the cost for Otezla and Humira with his insurance and found that they were very expensive.  He is still having pain and stiffness across his MCPs and PIPs that is worse in the morning and improves with time and activity.  Positive gelling phenomenon.  Morning stiffness for most of the day, reaching a baseline after about 3 hours.    Denies fevers, chills, nausea, vomiting, constipation, diarrhea. No abdominal pain. No chest pain/pressure, palpitations, or shortness of breath at this time but he says he did have an episode of shortness of breath with chest pressure so he was seen in the emergency department and subsequently by his PCP with plans to have a stress test and pulmonary function tests. No LE swelling. No oral or nasal sores.  No sicca symptoms.  No history of inflammatory eye disease.  No history of inflammatory bowel disease.       Tobacco: None  EtOH: 0-2 beers or mixed drinks monthly  Drugs: None    ROS   GEN: No fevers, chills, night sweats, or weight change  SKIN: See HPI  HEENT: No oral or nasal ulcers.  CV: No chest pain, pressure, palpitations, or dyspnea on exertion; see HPI  PULM: No SOB, wheeze, cough; see HPI  GI: No  nausea, vomiting, constipation, diarrhea. No blood in stool. No abdominal pain.  : No blood in urine.  MSK: See HPI.  NEURO: No numbness, tingling, or weakness.  EXT: No LE swelling  PSYCH: Negative    Active Problem List     Patient Active Problem List   Diagnosis     Psoriatic arthropathy (H)     Esophageal reflux     Hypertension goal BP (blood pressure) < 140/90     FAMILY HISTORY OF GI NEOPLASM     FAMILY HISTORY OF DIABETES MELLITUS     Impotence of organic origin     Chondrocalcinosis     HYPERLIPIDEMIA LDL GOAL <130     Osteoarthritis     Advanced directives, counseling/discussion     High risk medications (not anticoagulants) long-term use     Past Medical History     Past Medical History:   Diagnosis Date     Esophageal reflux     GERD     Hypertension      Psoriatic arthropathy (H)     Psoriatic arthritis     Past Surgical History     Past Surgical History:   Procedure Laterality Date     ARTHROSCOPY KNEE RT/LT  April 2009    left knee     ARTHROSCOPY KNEE WITH MEDIAL MENISCECTOMY  7/9/2013    Procedure: ARTHROSCOPY KNEE WITH MEDIAL MENISCECTOMY;  Left Knee Arthroscopic and Open Repair of Patellar Tendon with Platelet Rich Plasma;  Surgeon: Ley, Jeffrey Duane, MD;  Location: [facility]     COLONOSCOPY  Jan. 2006    diverticuli. Father had colon CA     COLONOSCOPY  2001     COLONOSCOPY  4/11/2011    Procedure:COLONOSCOPY; Surgeon:JENNIFER JEAN; Location:[facility]     COLONOSCOPY N/A 11/21/2016    Procedure: COLONOSCOPY;  Surgeon: Elias Santamaria MD;  Location: [facility]     REPAIR TENDON PATELLA  7/9/2013    Procedure: REPAIR TENDON PATELLA;;  Surgeon: Ley, Jeffrey Duane, MD;  Location: [facility]     SURGICAL HISTORY OF -   1999    (L) Herniorrhaphy     SURGICAL HISTORY OF -   2003    (R) Herniorrhaphy     SURGICAL HISTORY OF -   1981    Hemorrohid     Allergy     Allergies   Allergen Reactions     Acetaminophen W/Codeine Nausea     Current Medication List     Current Outpatient Medications   Medication Sig      albuterol (PROAIR HFA/PROVENTIL HFA/VENTOLIN HFA) 108 (90 Base) MCG/ACT inhaler Inhale 2 puffs into the lungs every 6 hours as needed for shortness of breath / dyspnea or wheezing     Black Pepper-Turmeric (TURMERIC CURCUMIN) 5-1000 MG CAPS Take 1 capsule by mouth daily     folic acid (FOLVITE) 1 MG tablet Take 1 tablet (1 mg) by mouth daily     lisinopril (PRINIVIL/ZESTRIL) 5 MG tablet Take 1 tablet (5 mg) by mouth daily     methotrexate sodium 2.5 MG TABS Take 10 tablets (25 mg) by mouth once a week . Take all 5 tablets in the AM and 5 tablets in the PM on the same day of each week     MULTI-VITAMIN OR TABS Take 1 tablet by mouth daily      Probiotic Product (PROBIOTIC DAILY PO) Take 1 capsule by mouth daily      sildenafil (REVATIO) 20 MG tablet Take as many pills as needed up to maximum of 5 pills at a time prior to intercourse.     simvastatin (ZOCOR) 40 MG tablet Take 1 tablet (40 mg) by mouth At Bedtime     tamsulosin (FLOMAX) 0.4 MG capsule Take 1 capsule (0.4 mg) by mouth daily     No current facility-administered medications for this visit.          Social History   See HPI    Family History     Family History   Problem Relation Age of Onset     Hypertension Mother      Diabetes Mother      Cerebrovascular Disease Mother         in her 70s     Arthritis Mother      Cancer - colorectal Father 67     Arthritis Paternal Grandmother      LUNG DISEASE Sister      Other - See Comments Sister      Prostate Cancer No family hx of      Coronary Artery Disease No family hx of        Physical Exam     Temp Readings from Last 3 Encounters:   02/14/20 97.3  F (36.3  C) (Tympanic)   02/07/20 97.2  F (36.2  C) (Temporal)   11/13/19 97.9  F (36.6  C) (Tympanic)     BP Readings from Last 5 Encounters:   02/17/20 139/76   02/14/20 138/78   02/07/20 130/74   11/18/19 132/82   11/13/19 (!) 148/78     Pulse Readings from Last 1 Encounters:   02/17/20 78     Resp Readings from Last 1 Encounters:   02/14/20 14     Estimated body  "mass index is 31.43 kg/m  as calculated from the following:    Height as of this encounter: 1.753 m (5' 9\").    Weight as of this encounter: 96.5 kg (212 lb 12.8 oz).    GEN: NAD  HEENT: MMM. No oral lesions.  Anicteric, noninjected sclera  CV: S1, S2. RRR. No m/r/g.  PULM: CTA bilaterally. No w/c.  MSK: MCPs without swelling or tenderness palpation.  Left third PIP with subtle synovial swelling and tenderness palpation.  Other PIPs without swelling or tenderness palpation.  DIPs, wrists, elbows, knees, ankles, and MTPs without swelling or tenderness palpation.  Right shoulder without swelling or tenderness palpation; normal range of motion.  Left shoulder with normal range of motion; nontender to palpation; normal range of motion.  Hips nontender to palpation.    NEURO: UE and LE strengths 5/5 and equal bilaterally.   SKIN: No rash  EXT: No LE edema  PSYCH: Alert. Appropriate.    Labs / Imaging (select studies)     CBC  Recent Labs   Lab Test 02/10/20  1028 02/07/20  1428 12/16/19  1033   WBC 4.7 4.6 4.3   RBC 4.47 4.60 4.64   HGB 15.0 15.6 15.5   HCT 43.3 44.0 44.8   MCV 97 96 97   RDW 13.3 12.9 13.4    175 139*   MCH 33.6* 33.9* 33.4*   MCHC 34.6 35.5 34.6   NEUTROPHIL 69.9 60.2 61.2   LYMPH 20.0 29.5 26.0   MONOCYTE 7.4 7.8 11.4   EOSINOPHIL 2.5 1.9 1.2   BASOPHIL 0.2 0.4 0.2   ANEU 3.3 2.8 2.6   ALYM 1.0 1.4 1.1   ERENDIRA 0.4 0.4 0.5   AEOS 0.1 0.1 0.1   ABAS 0.0 0.0 0.0     CMP  Recent Labs   Lab Test 02/10/20  1028 02/07/20  1428 12/16/19  1033 11/18/19  1343 11/13/19  1107  09/29/18  1040   NA  --  139  --   --  135  --  139   POTASSIUM  --  4.3  --   --  4.3  --  3.7   CHLORIDE  --  107  --   --  103  --  104   CO2  --  28  --   --  32  --  29   ANIONGAP  --  4  --   --  <1*  --  6   GLC  --  111*  --   --  98  --  96   BUN  --  15  --   --  11  --  13   CR 1.04 0.99 1.03 0.96 1.02   < > 1.07   GFRESTIMATED 73 78 74 81 75   < > 69   GFRESTBLACK 85 >90 86 >90 87   < > 83   JOHN  --  9.1  --   --  9.1  --  " 8.8   BILITOTAL 1.0  --  0.6 0.7  --    < >  --    ALBUMIN 3.6  --  3.8 3.9  --    < >  --    PROTTOTAL 6.4*  --  6.7* 6.8  --    < >  --    ALKPHOS 51  --  49 52  --    < >  --    AST 23  --  29 36  --    < >  --    ALT 46  --  43 52  --    < >  --     < > = values in this interval not displayed.     Calcium/VitaminD  Recent Labs   Lab Test 02/07/20  1428 11/13/19  1107 09/29/18  1040   JOHN 9.1 9.1 8.8     ESR/CRP  Recent Labs   Lab Test 02/10/20  1028 11/18/19  1343 08/12/19  1117   SED 5 6 5   CRP <2.9 <2.9 <2.9     Lipid Panel  Recent Labs   Lab Test 11/13/19  1107 11/19/18  1318 10/13/17  0809  05/29/15  1204 04/29/14  1102 06/30/12  0849   CHOL 161 207* 183   < > 168 175 182   TRIG 153* 363* 167*   < > 293* 165* 117   HDL 58 50 59   < > 54 47 62   LDL 72 84 91   < > 55 95 96   VLDL  --   --   --   --  59* 33* 23   CHOLHDLRATIO  --   --   --   --  3.1 4.0 3.0   NHDL 103 157* 124   < >  --   --   --     < > = values in this interval not displayed.     Hepatitis B  Recent Labs   Lab Test 05/16/18  1016   HBCAB Nonreactive   HEPBANG Nonreactive     Hepatitis C  Recent Labs   Lab Test 08/26/16  1213   HCVAB Nonreactive   Assay performance characteristics have not been established for newborns,   infants, and children       Immunization History     Immunization History   Administered Date(s) Administered     Influenza (High Dose) 3 valent vaccine 10/18/2017, 09/21/2018, 11/13/2019     Influenza (IIV3) PF 11/17/2005, 12/04/2006, 11/22/2010, 09/26/2012, 10/01/2014     Influenza Vaccine IM > 6 months Valent IIV4 10/15/2013, 12/17/2015, 08/26/2016     Pneumo Conj 13-V (2010&after) 10/18/2017     Pneumococcal 23 valent 01/22/2018     TDAP Vaccine (Adacel) 04/22/2009, 05/20/2018          Chart documentation done in part with Dragon Voice recognition Software. Although reviewed after completion, some word and grammatical error may remain.    Adria Lopez MD

## 2020-02-21 ENCOUNTER — HOSPITAL ENCOUNTER (OUTPATIENT)
Dept: CARDIOLOGY | Facility: CLINIC | Age: 70
Discharge: HOME | End: 2020-02-21
Attending: FAMILY MEDICINE | Admitting: FAMILY MEDICINE
Payer: COMMERCIAL

## 2020-02-21 DIAGNOSIS — R06.09 DOE (DYSPNEA ON EXERTION): ICD-10-CM

## 2020-02-21 DIAGNOSIS — R07.9 CHEST PAIN, UNSPECIFIED TYPE: ICD-10-CM

## 2020-02-21 PROCEDURE — 93017 CV STRESS TEST TRACING ONLY: CPT

## 2020-02-21 PROCEDURE — 93016 CV STRESS TEST SUPVJ ONLY: CPT | Performed by: INTERNAL MEDICINE

## 2020-02-21 NOTE — PROGRESS NOTES
"Patient had a treadmill stress test today. He was able to walk for 9:17 on Julio C Protocol. He did have 5/10 chest and arm tightness at peak exercise resolved by 6 minutes recovery. Discussed with supervising MD and he reviewed EKG's. Patient was OK\"d to go home. Instructed to avoid exertional activities until OK'd by Dr. Finley. BRANDO Downing RN  "

## 2020-02-24 DIAGNOSIS — R06.09 DOE (DYSPNEA ON EXERTION): ICD-10-CM

## 2020-02-24 DIAGNOSIS — R07.9 CHEST PAIN, UNSPECIFIED TYPE: Primary | ICD-10-CM

## 2020-02-25 NOTE — PROGRESS NOTES
I contacted him about abnormal /non-diagnostic stress test.   PLAN: Nuclear stress test.   AMAN SUTTON MD

## 2020-02-26 ENCOUNTER — HOSPITAL ENCOUNTER (OUTPATIENT)
Dept: RESPIRATORY THERAPY | Facility: CLINIC | Age: 70
Discharge: HOME | End: 2020-02-26
Attending: INTERNAL MEDICINE | Admitting: INTERNAL MEDICINE
Payer: COMMERCIAL

## 2020-02-26 DIAGNOSIS — R06.09 DOE (DYSPNEA ON EXERTION): ICD-10-CM

## 2020-02-26 PROCEDURE — 94726 PLETHYSMOGRAPHY LUNG VOLUMES: CPT | Mod: 26 | Performed by: INTERNAL MEDICINE

## 2020-02-26 PROCEDURE — 94726 PLETHYSMOGRAPHY LUNG VOLUMES: CPT

## 2020-02-26 PROCEDURE — 25000125 ZZHC RX 250: Performed by: FAMILY MEDICINE

## 2020-02-26 PROCEDURE — 94729 DIFFUSING CAPACITY: CPT | Mod: 26 | Performed by: INTERNAL MEDICINE

## 2020-02-26 PROCEDURE — 94729 DIFFUSING CAPACITY: CPT

## 2020-02-26 PROCEDURE — 94060 EVALUATION OF WHEEZING: CPT

## 2020-02-26 PROCEDURE — 94060 EVALUATION OF WHEEZING: CPT | Mod: 26 | Performed by: INTERNAL MEDICINE

## 2020-02-26 RX ORDER — ALBUTEROL SULFATE 0.83 MG/ML
2.5 SOLUTION RESPIRATORY (INHALATION) ONCE
Status: COMPLETED | OUTPATIENT
Start: 2020-02-26 | End: 2020-02-26

## 2020-02-26 RX ADMIN — ALBUTEROL SULFATE 2.5 MG: 2.5 SOLUTION RESPIRATORY (INHALATION) at 10:10

## 2020-02-26 NOTE — LETTER
[facility] RESPIRATORY THERAPY  [address] [place] [address]  Phone: [phone]  Fax: [phone]    February 27, 2020        Pawan Bullard  [address] [address]          Dear Pawan,       RESULTS:     The results of your recentpulmonary function tests were normal and do not show any primary lung disease.   PLAN: follow-up with nuclear stress test as planned.       If you have any further questions or problems, please contact our office.      Sincerely,        Armando Finley MD/ von

## 2020-02-27 LAB
DLCOCOR-%PRED-PRE: 101 %
DLCOCOR-PRE: 26.04 ML/MIN/MMHG
DLCOUNC-%PRED-PRE: 102 %
DLCOUNC-PRE: 26.33 ML/MIN/MMHG
DLCOUNC-PRED: 25.62 ML/MIN/MMHG
ERV-%PRED-PRE: 96 %
ERV-PRE: 0.8 L
ERV-PRED: 0.83 L
EXPTIME-PRE: 7.49 SEC
FEF2575-%PRED-POST: 183 %
FEF2575-%PRED-PRE: 167 %
FEF2575-POST: 4.59 L/SEC
FEF2575-PRE: 4.19 L/SEC
FEF2575-PRED: 2.5 L/SEC
FEFMAX-%PRED-PRE: 106 %
FEFMAX-PRE: 8.92 L/SEC
FEFMAX-PRED: 8.37 L/SEC
FEV1-%PRED-PRE: 124 %
FEV1-PRE: 3.99 L
FEV1FEV6-PRE: 82 %
FEV1FEV6-PRED: 78 %
FEV1FVC-PRE: 81 %
FEV1FVC-PRED: 76 %
FEV1SVC-PRE: 79 %
FEV1SVC-PRED: 69 %
FIFMAX-PRE: 3.9 L/SEC
FRCPLETH-%PRED-PRE: 106 %
FRCPLETH-PRE: 3.85 L
FRCPLETH-PRED: 3.63 L
FVC-%PRED-PRE: 116 %
FVC-PRE: 4.92 L
FVC-PRED: 4.21 L
IC-%PRED-PRE: 109 %
IC-PRE: 4.21 L
IC-PRED: 3.84 L
RVPLETH-%PRED-PRE: 118 %
RVPLETH-PRE: 3.03 L
RVPLETH-PRED: 2.57 L
TLCPLETH-%PRED-PRE: 115 %
TLCPLETH-PRE: 8.06 L
TLCPLETH-PRED: 6.96 L
VA-%PRED-PRE: 108 %
VA-PRE: 6.82 L
VC-%PRED-PRE: 107 %
VC-PRE: 5.03 L
VC-PRED: 4.67 L

## 2020-02-27 NOTE — RESULT ENCOUNTER NOTE
Please call.  pulmonary function tests were normal and do not show any primary lung disease.   PLAN: follow-up with nuclear stress test as planned.

## 2020-03-02 ENCOUNTER — HOSPITAL ENCOUNTER (OUTPATIENT)
Dept: NUCLEAR MEDICINE | Facility: CLINIC | Age: 70
Setting detail: NUCLEAR MEDICINE
Discharge: HOME | End: 2020-03-02
Attending: FAMILY MEDICINE | Admitting: FAMILY MEDICINE
Payer: COMMERCIAL

## 2020-03-02 VITALS — BODY MASS INDEX: 28.63 KG/M2 | HEIGHT: 70 IN | WEIGHT: 200 LBS

## 2020-03-02 DIAGNOSIS — R06.09 DOE (DYSPNEA ON EXERTION): ICD-10-CM

## 2020-03-02 DIAGNOSIS — R07.9 CHEST PAIN, UNSPECIFIED TYPE: ICD-10-CM

## 2020-03-02 LAB
CV STRESS MAX HR HE: 130
NUC STRESS EJECTION FRACTION: 70 %
RATE PRESSURE PRODUCT: NORMAL
STRESS ANGINA INDEX: 1
STRESS ECHO BASELINE DIASTOLIC HE: 74
STRESS ECHO BASELINE HR: 66
STRESS ECHO BASELINE SYSTOLIC BP: 118
STRESS ECHO CALCULATED PERCENT HR: 86 %
STRESS ECHO LAST STRESS DIASTOLIC BP: 72
STRESS ECHO LAST STRESS SYSTOLIC BP: 162
STRESS ECHO POST ESTIMATED WORKLOAD: 11.3 METS
STRESS ECHO POST EXERCISE DUR MIN: 10 MIN
STRESS ECHO POST EXERCISE DUR SEC: 22 SEC
STRESS ECHO TARGET HR: 152

## 2020-03-02 PROCEDURE — 93017 CV STRESS TEST TRACING ONLY: CPT

## 2020-03-02 PROCEDURE — 78452 HT MUSCLE IMAGE SPECT MULT: CPT | Mod: 26 | Performed by: INTERNAL MEDICINE

## 2020-03-02 PROCEDURE — 93016 CV STRESS TEST SUPVJ ONLY: CPT

## 2020-03-02 PROCEDURE — 93018 CV STRESS TEST I&R ONLY: CPT | Performed by: INTERNAL MEDICINE

## 2020-03-02 PROCEDURE — A9502 TC99M TETROFOSMIN: HCPCS | Performed by: FAMILY MEDICINE

## 2020-03-02 PROCEDURE — 78452 HT MUSCLE IMAGE SPECT MULT: CPT

## 2020-03-02 PROCEDURE — 34300033 ZZH RX 343: Performed by: FAMILY MEDICINE

## 2020-03-02 RX ADMIN — TETROFOSMIN 31.3 MCI.: 1.38 INJECTION, POWDER, LYOPHILIZED, FOR SOLUTION INTRAVENOUS at 09:47

## 2020-03-02 RX ADMIN — TETROFOSMIN 10.7 MCI.: 1.38 INJECTION, POWDER, LYOPHILIZED, FOR SOLUTION INTRAVENOUS at 08:18

## 2020-03-02 ASSESSMENT — MIFFLIN-ST. JEOR: SCORE: 1683.44

## 2020-03-02 NOTE — RESULT ENCOUNTER NOTE
Please call.  His stress test looked good.  He had above average exercise capacity.    Both EKG and images of heart were good and showed no signs of low blood supply.  This is a good report that heart is doing well.

## 2020-03-02 NOTE — PROGRESS NOTES
Developed 7/10 chest tightness at peak during his nuclear treadmill test.  Resolved by 4 minutes recovery. Patient had a GXT 10 days ago with same sx as today. Discussed with supervising MD. OK'd to go home. Patient verbalized understanding to avoid exertional activities until he speaks to Dr. Finley re results. BRANDO VIEIRA

## 2020-03-03 ENCOUNTER — NURSE TRIAGE (OUTPATIENT)
Dept: NURSING | Facility: CLINIC | Age: 70
End: 2020-03-03

## 2020-03-03 NOTE — TELEPHONE ENCOUNTER
"Caller states he is on \"methotrexate long term\" for psoriatic arthritis.  States has questions about his medication as related to the coronavirus.   Asks this RN if he should consider changing his dose or doing anything specifically at this time.  Not symptomatic by report. Calling for general information by report.  FNA triage not completed.    Protocol-  Information Only Call- Adult  Care advice reviewed.   Disposition-  Call PCP when office is open.  Also advised contacting Specialty Provider as a resource.   Caller states understanding of the recommended disposition.  Caller plans to contact PCP at [facility].   Advised to call back if further questions or concerns.     MICA Olivera RN  [facility]     Reason for Disposition    [1] Caller requesting NON-URGENT health information AND [2] PCP's office is the best resource    Protocols used: INFORMATION ONLY CALL-A-    "

## 2020-03-04 ENCOUNTER — TELEPHONE (OUTPATIENT)
Dept: FAMILY MEDICINE | Facility: CLINIC | Age: 70
End: 2020-03-04

## 2020-03-04 DIAGNOSIS — R06.09 DOE (DYSPNEA ON EXERTION): ICD-10-CM

## 2020-03-04 NOTE — TELEPHONE ENCOUNTER
Reason for call:  Patient reporting a symptom    Symptom or request: Pawan says he was notified that his stress test was normal. He wonders if Dr Finley had any other recommendations. He is still having some chest tightness.-It happened yesterday. He also has some   shortness of breath. Have you been treated for this before? Yes    Additional comments:      Phone Number patient can be reached at:  Home number on file [phone] (home)    Best Time:  anytime    Can we leave a detailed message on this number:  YES    Call taken on 3/4/2020 at 2:39 PM by Pricila Amato

## 2020-03-04 NOTE — TELEPHONE ENCOUNTER
I spoke with Pawan late afternoon.  He had a normal chest x-ray, normal pulmonary function tests, normal nuclear medicine stress test.  He does note occasional dyspnea on exertion.  He is also had chest tightness once in a while which could be from sitting or doing exertion.  He has been using albuterol inhaler a couple times a day and does notice that it helps.  He has had some shortness of breath sometimes just with bending over like clipping toenails.  PLAN: I recommend continuing albuterol inhaler can use every 4 hours if needed.  He can use 15 to 30 minutes before activity or exercise.  If he is using the albuterol inhaler frequently we could try a controller type inhaler as well he will let me know if there are changes if he is getting worse or needing his inhaler a lot.  AMAN SUTTON MD

## 2020-03-16 DIAGNOSIS — L40.50 PSORIATIC ARTHROPATHY (H): ICD-10-CM

## 2020-03-16 DIAGNOSIS — Z79.899 HIGH RISK MEDICATIONS (NOT ANTICOAGULANTS) LONG-TERM USE: ICD-10-CM

## 2020-03-16 LAB
ALBUMIN SERPL-MCNC: 3.8 G/DL (ref 3.4–5)
ALP SERPL-CCNC: 51 U/L (ref 40–150)
ALT SERPL W P-5'-P-CCNC: 48 U/L (ref 0–70)
AST SERPL W P-5'-P-CCNC: 30 U/L (ref 0–45)
BASOPHILS # BLD AUTO: 0 10E9/L (ref 0–0.2)
BASOPHILS NFR BLD AUTO: 0.2 %
BILIRUB DIRECT SERPL-MCNC: 0.2 MG/DL (ref 0–0.2)
BILIRUB SERPL-MCNC: 0.9 MG/DL (ref 0.2–1.3)
CREAT SERPL-MCNC: 1.01 MG/DL (ref 0.66–1.25)
DIFFERENTIAL METHOD BLD: ABNORMAL
EOSINOPHIL # BLD AUTO: 0.1 10E9/L (ref 0–0.7)
EOSINOPHIL NFR BLD AUTO: 1.8 %
ERYTHROCYTE [DISTWIDTH] IN BLOOD BY AUTOMATED COUNT: 13.5 % (ref 10–15)
GFR SERPL CREATININE-BSD FRML MDRD: 76 ML/MIN/{1.73_M2}
HCT VFR BLD AUTO: 43.8 % (ref 40–53)
HGB BLD-MCNC: 15.1 G/DL (ref 13.3–17.7)
LYMPHOCYTES # BLD AUTO: 1.2 10E9/L (ref 0.8–5.3)
LYMPHOCYTES NFR BLD AUTO: 23.8 %
MCH RBC QN AUTO: 33.6 PG (ref 26.5–33)
MCHC RBC AUTO-ENTMCNC: 34.5 G/DL (ref 31.5–36.5)
MCV RBC AUTO: 97 FL (ref 78–100)
MONOCYTES # BLD AUTO: 0.4 10E9/L (ref 0–1.3)
MONOCYTES NFR BLD AUTO: 8.9 %
NEUTROPHILS # BLD AUTO: 3.2 10E9/L (ref 1.6–8.3)
NEUTROPHILS NFR BLD AUTO: 65.3 %
PLATELET # BLD AUTO: 134 10E9/L (ref 150–450)
PROT SERPL-MCNC: 6.6 G/DL (ref 6.8–8.8)
RBC # BLD AUTO: 4.5 10E12/L (ref 4.4–5.9)
WBC # BLD AUTO: 5 10E9/L (ref 4–11)

## 2020-03-16 PROCEDURE — 80076 HEPATIC FUNCTION PANEL: CPT | Performed by: INTERNAL MEDICINE

## 2020-03-16 PROCEDURE — 85025 COMPLETE CBC W/AUTO DIFF WBC: CPT | Performed by: INTERNAL MEDICINE

## 2020-03-16 PROCEDURE — 36415 COLL VENOUS BLD VENIPUNCTURE: CPT | Performed by: INTERNAL MEDICINE

## 2020-03-16 PROCEDURE — 82565 ASSAY OF CREATININE: CPT | Performed by: INTERNAL MEDICINE

## 2020-03-18 ENCOUNTER — TELEPHONE (OUTPATIENT)
Dept: RHEUMATOLOGY | Facility: CLINIC | Age: 70
End: 2020-03-18

## 2020-03-18 NOTE — TELEPHONE ENCOUNTER
RN: Please call to inform Mr. Bullard that his labs do not show evidence for medication toxicity.    Adria Lopez MD  3/18/2020 3:39 PM

## 2020-03-19 NOTE — TELEPHONE ENCOUNTER
Called and informed patient of the message below.  Patient verbalized understanding and is wondering if he should hold off on his Methotrexate due to COVID-19.  RN advised that Dr. Lopez would like him to continue to take it.  Patient verbalized understanding and has no additional questions.    Sal Pierson RN....3/19/2020 9:12 AM

## 2020-04-06 ENCOUNTER — TELEPHONE (OUTPATIENT)
Dept: RHEUMATOLOGY | Facility: CLINIC | Age: 70
End: 2020-04-06

## 2020-04-06 NOTE — TELEPHONE ENCOUNTER
RN: please contact Mr. Bullard. At his last visit the methotrexate dose was increased so he needed labs.  If he is not taking methotrexate at all now then he does not need labs.    Please find out why he is not taking methotrexate.  If he is not taking methotrexate only out of concern about COVID19 and he is not having COVID19 symptoms then I do recommend he use methotrexate to control his SYSTEMIC inflammatory disease.  Okay to schedule a sooner virtual visit if he would like to discuss.    Adria Lopez MD  4/6/2020 5:57 PM

## 2020-04-06 NOTE — TELEPHONE ENCOUNTER
Patient is calling to speak with provider he has decided not to take his methotrexate and is wondering if he needs to have his lab work done on 04/13/2020.    Please call patient to discuss so if needed he will cancel this appointment     Thank you

## 2020-04-07 ENCOUNTER — TELEPHONE (OUTPATIENT)
Dept: LAB | Facility: CLINIC | Age: 70
End: 2020-04-07

## 2020-04-07 NOTE — TELEPHONE ENCOUNTER
"Called patient and discussed the message below. Patient reports he is not taking methotrexate as he is concerned being on an immunosuppressant drug during the COVID-19 pandemic. RN reviewed Dr. Lopez's recommendation again on continuing the med to control inflammatory disease however patient was hesitant to restart the med. Recommended he schedule a sooner virtual video visit and discussed instructions. Patient stated he would like to think about it and will see if he is able to download \"AW touchpoint\" arcenio on his wife's phone. He will call me when ready to schedule visit.    Sal Pierson RN....4/7/2020 9:06 AM     " declines

## 2020-04-07 NOTE — TELEPHONE ENCOUNTER
Patient has lab only appointment scheduled for 04/13/2020.   Due to current coronavirus pandemic, please consider whether these lab tests can be deferred.     Please open ORDER REVIEW, review orders, and then confirm or defer orders ASAP.  Enter <dot>keep or <dot>defer smart phrase into NOTES, complete documentation, and route encounter.  [facility]: /team  [facility]: individual provider pool  [facility] primary care:   [facility] specialty: scheduling pool

## 2020-04-07 NOTE — TELEPHONE ENCOUNTER
The need for performing scheduled test(s) has been reviewed, and test(s) should be performed at this time, as ordered.     Adria Lopez MD  4/7/2020 5:40 PM

## 2020-05-06 ENCOUNTER — TELEPHONE (OUTPATIENT)
Dept: LAB | Facility: CLINIC | Age: 70
End: 2020-05-06

## 2020-05-06 NOTE — TELEPHONE ENCOUNTER
Patient has lab only appointment scheduled for 05/11/2020.   Due to current coronavirus pandemic, please consider whether these lab tests can be deferred.     Please open ORDER REVIEW, review orders, and then confirm or defer orders ASAP.  Enter <dot>keep or <dot>defer smart phrase into NOTES, complete documentation, and route encounter.  [facility]: /team  [facility]: individual provider pool  [facility] primary care:   [facility] specialty: scheduling pool

## 2020-05-07 NOTE — TELEPHONE ENCOUNTER
The need for performing scheduled test(s) has been reviewed, and test(s) should be performed at this time, as ordered.     Adria Lopez MD  5/7/2020 10:09 AM

## 2020-05-11 DIAGNOSIS — Z79.899 HIGH RISK MEDICATIONS (NOT ANTICOAGULANTS) LONG-TERM USE: ICD-10-CM

## 2020-05-11 DIAGNOSIS — L40.50 PSORIATIC ARTHROPATHY (H): ICD-10-CM

## 2020-05-11 LAB
ALBUMIN SERPL-MCNC: 3.7 G/DL (ref 3.4–5)
ALP SERPL-CCNC: 60 U/L (ref 40–150)
ALT SERPL W P-5'-P-CCNC: 38 U/L (ref 0–70)
AST SERPL W P-5'-P-CCNC: 23 U/L (ref 0–45)
BASOPHILS # BLD AUTO: 0 10E9/L (ref 0–0.2)
BASOPHILS NFR BLD AUTO: 0.2 %
BILIRUB DIRECT SERPL-MCNC: 0.2 MG/DL (ref 0–0.2)
BILIRUB SERPL-MCNC: 0.9 MG/DL (ref 0.2–1.3)
CREAT SERPL-MCNC: 0.98 MG/DL (ref 0.66–1.25)
CRP SERPL-MCNC: 14.3 MG/L (ref 0–8)
DIFFERENTIAL METHOD BLD: ABNORMAL
EOSINOPHIL # BLD AUTO: 0.1 10E9/L (ref 0–0.7)
EOSINOPHIL NFR BLD AUTO: 1.7 %
ERYTHROCYTE [DISTWIDTH] IN BLOOD BY AUTOMATED COUNT: 12.7 % (ref 10–15)
ERYTHROCYTE [SEDIMENTATION RATE] IN BLOOD BY WESTERGREN METHOD: 8 MM/H (ref 0–20)
GFR SERPL CREATININE-BSD FRML MDRD: 79 ML/MIN/{1.73_M2}
HCT VFR BLD AUTO: 44 % (ref 40–53)
HGB BLD-MCNC: 15.4 G/DL (ref 13.3–17.7)
LYMPHOCYTES # BLD AUTO: 1.4 10E9/L (ref 0.8–5.3)
LYMPHOCYTES NFR BLD AUTO: 26.3 %
MCH RBC QN AUTO: 33 PG (ref 26.5–33)
MCHC RBC AUTO-ENTMCNC: 35 G/DL (ref 31.5–36.5)
MCV RBC AUTO: 94 FL (ref 78–100)
MONOCYTES # BLD AUTO: 0.3 10E9/L (ref 0–1.3)
MONOCYTES NFR BLD AUTO: 6 %
NEUTROPHILS # BLD AUTO: 3.5 10E9/L (ref 1.6–8.3)
NEUTROPHILS NFR BLD AUTO: 65.8 %
PLATELET # BLD AUTO: 135 10E9/L (ref 150–450)
PROT SERPL-MCNC: 6.9 G/DL (ref 6.8–8.8)
RBC # BLD AUTO: 4.66 10E12/L (ref 4.4–5.9)
WBC # BLD AUTO: 5.3 10E9/L (ref 4–11)

## 2020-05-11 PROCEDURE — 82565 ASSAY OF CREATININE: CPT | Performed by: INTERNAL MEDICINE

## 2020-05-11 PROCEDURE — 80076 HEPATIC FUNCTION PANEL: CPT | Performed by: INTERNAL MEDICINE

## 2020-05-11 PROCEDURE — 85652 RBC SED RATE AUTOMATED: CPT | Performed by: INTERNAL MEDICINE

## 2020-05-11 PROCEDURE — 86140 C-REACTIVE PROTEIN: CPT | Performed by: INTERNAL MEDICINE

## 2020-05-11 PROCEDURE — 36415 COLL VENOUS BLD VENIPUNCTURE: CPT | Performed by: INTERNAL MEDICINE

## 2020-05-11 PROCEDURE — 85025 COMPLETE CBC W/AUTO DIFF WBC: CPT | Performed by: INTERNAL MEDICINE

## 2020-06-01 ENCOUNTER — VIRTUAL VISIT (OUTPATIENT)
Dept: RHEUMATOLOGY | Facility: CLINIC | Age: 70
End: 2020-06-01
Payer: COMMERCIAL

## 2020-06-01 DIAGNOSIS — Z79.899 HIGH RISK MEDICATIONS (NOT ANTICOAGULANTS) LONG-TERM USE: ICD-10-CM

## 2020-06-01 DIAGNOSIS — L40.50 PSORIATIC ARTHROPATHY (H): Primary | ICD-10-CM

## 2020-06-01 PROCEDURE — 99213 OFFICE O/P EST LOW 20 MIN: CPT | Mod: 95 | Performed by: INTERNAL MEDICINE

## 2020-06-01 NOTE — PROGRESS NOTES
"Pawan Bullard is a 70 year old male who is being evaluated via a billable telephone visit.      The patient has been notified of following:     \"This telephone visit will be conducted via a call between you and your physician/provider. We have found that certain health care needs can be provided without the need for a physical exam.  This service lets us provide the care you need with a short phone conversation.  If a prescription is necessary we can send it directly to your pharmacy.  If lab work is needed we can place an order for that and you can then stop by our lab to have the test done at a later time.    Telephone visits are billed at different rates depending on your insurance coverage. During this emergency period, for some insurers they may be billed the same as an in-person visit.  Please reach out to your insurance provider with any questions.    If during the course of the call the physician/provider feels a telephone visit is not appropriate, you will not be charged for this service.\"    Patient has given verbal consent for Telephone visit?  Yes    What phone number would you like to be contacted at? [phone]    How would you like to obtain your AVS? Mail a copy    Rheumatology Telephone/Telehealth Visit      Pawan Bullard MRN# [id number]   YOB: 1950 Age: 70 year old      Date of visit: 6/01/20   PCP: Dr. Armando Finley    Chief Complaint   Patient presents with:  Psoriatic arthropathy: doing ok    Assessment and Plan     1.  Psoriatic arthritis: Synovitis when on MTX 20mg wkly; dose was increased but then he held MTX as noted in history of present illness; back on MTX now and improving again.  Increased dose compared to now so will continue monthly monitoring.  Previously on SSZ (GI upset)  - Increase methotrexate from 20mg once weekly, to 25mg once every 7 days, taking 5 tablets in the AM and 5 tablets in the PM on the same day of each week  - Continue folic acid 1 mg " daily  - Labs monthly [id number]: CBC, Cr, Hepatic Panel  - Labs in 3 months: CBC, Creatinine, Hepatic Panel, ESR, CRP               Rapid 3, cumulative scores                      02/17/2020:  4.7 (MTX 20mg wkly)]                      11/18/2019:  3.3 (MTX 20mg wkly)                      05/20/2019:  Refused by patient                      11/26/2018:  5.2 (MTX 17.5mg wkly)    # Relevant labs and imaging were reviewed with the patient    # High risk medication toxicity monitoring: discussion and labs reviewed; appropriate labs ordered. See above.  Instructed that if confirmed to have COVID-19 or exposure to someone with confirmed COVID-19 to call this clinic for directions on DMARD management.    # Note that this is a virtual visit to reduce the risk of COVID-19 exposure during this current pandemic.      # Considered to be at high risk of complications from the COVID-19 virus.  It is recommended to limit contact with other people and if possible to work remotely or provide a leave of absence to reduce the risk for COVID-19.      Mr. Bullard verbalized agreement with and understanding of the rational for the diagnosis and treatment plan.  All questions were answered to best of my ability and the patient's satisfaction. Mr. Bullard was advised to contact the clinic with any questions that may arise after the clinic visit.      Thank you for involving me in the care of the patient    Return to clinic: 3 months      HPI   Pawan Bullard is a 70 year old male with a past medical history significant for hypertension, hyperlipidemia, chondrocalcinosis, GERD, osteoarthritis, and psoriatic arthritis who presents for follow-up of psoriatic arthritis.      Today, Mr. Bullard reports that he stopped methotrexate in March 2020 out of concern for COVID19; joint symptoms worsened; so he restarted MTX about 5 weeks ago and is just now at 20mg wkly with plans to increase to 25mg wkly.  Joint pains include his MCPs and PIPs;  morning stiffness for 3-4 hrs; +gelling.      Denies fevers, chills, nausea, vomiting, constipation, diarrhea. No abdominal pain. No chest pain/pressure, palpitations, or shortness of breath at this time but he says he did have an episode of shortness of breath with chest pressure so he was seen in the emergency department and subsequently by his PCP with plans to have a stress test and pulmonary function tests. No LE swelling. No oral or nasal sores.  No sicca symptoms.  No history of inflammatory eye disease.  No history of inflammatory bowel disease.       Tobacco: None  EtOH: 0-2 beers or mixed drinks monthly  Drugs: None    ROS   GEN: No fevers, chills, night sweats, or weight change  SKIN: See HPI  HEENT: No oral or nasal ulcers.  CV: No chest pain, pressure, palpitations, or dyspnea on exertion; see HPI  PULM: No SOB, wheeze, cough; see HPI  GI: No nausea, vomiting, constipation, diarrhea. No blood in stool. No abdominal pain.  : No blood in urine.  MSK: See HPI.  NEURO: No numbness, tingling, or weakness.  EXT: No LE swelling  PSYCH: Negative    Active Problem List     Patient Active Problem List   Diagnosis     Psoriatic arthropathy (H)     Esophageal reflux     Hypertension goal BP (blood pressure) < 140/90     FAMILY HISTORY OF GI NEOPLASM     FAMILY HISTORY OF DIABETES MELLITUS     Impotence of organic origin     Chondrocalcinosis     HYPERLIPIDEMIA LDL GOAL <130     Osteoarthritis     Advanced directives, counseling/discussion     High risk medications (not anticoagulants) long-term use     ROSADO (dyspnea on exertion)     Past Medical History     Past Medical History:   Diagnosis Date     Esophageal reflux     GERD     Hypertension      Psoriatic arthropathy (H)     Psoriatic arthritis     Past Surgical History     Past Surgical History:   Procedure Laterality Date     ARTHROSCOPY KNEE RT/LT  April 2009    left knee     ARTHROSCOPY KNEE WITH MEDIAL MENISCECTOMY  7/9/2013    Procedure: ARTHROSCOPY KNEE  WITH MEDIAL MENISCECTOMY;  Left Knee Arthroscopic and Open Repair of Patellar Tendon with Platelet Rich Plasma;  Surgeon: Ley, Jeffrey Duane, MD;  Location: [facility]     COLONOSCOPY  Jan. 2006    diverticuli. Father had colon CA     COLONOSCOPY  2001     COLONOSCOPY  4/11/2011    Procedure:COLONOSCOPY; Surgeon:JENNIFER JEAN; Location:[facility]     COLONOSCOPY N/A 11/21/2016    Procedure: COLONOSCOPY;  Surgeon: Elias Santamaria MD;  Location: [facility]     REPAIR TENDON PATELLA  7/9/2013    Procedure: REPAIR TENDON PATELLA;;  Surgeon: Ley, Jeffrey Duane, MD;  Location: [facility]     SURGICAL HISTORY OF -   1999    (L) Herniorrhaphy     SURGICAL HISTORY OF -   2003    (R) Herniorrhaphy     SURGICAL HISTORY OF -   1981    Hemorrohid     Allergy     Allergies   Allergen Reactions     Acetaminophen W/Codeine Nausea     Current Medication List     Current Outpatient Medications   Medication Sig     albuterol (PROAIR HFA/PROVENTIL HFA/VENTOLIN HFA) 108 (90 Base) MCG/ACT inhaler Inhale 2 puffs into the lungs every 6 hours as needed for shortness of breath / dyspnea or wheezing     Black Pepper-Turmeric (TURMERIC CURCUMIN) 5-1000 MG CAPS Take 1 capsule by mouth daily     folic acid (FOLVITE) 1 MG tablet Take 1 tablet (1 mg) by mouth daily     lisinopril (PRINIVIL/ZESTRIL) 5 MG tablet Take 1 tablet (5 mg) by mouth daily     methotrexate sodium 2.5 MG TABS Take 10 tablets (25 mg) by mouth once a week . Take all 5 tablets in the AM and 5 tablets in the PM on the same day of each week     MULTI-VITAMIN OR TABS Take 1 tablet by mouth daily      Probiotic Product (PROBIOTIC DAILY PO) Take 1 capsule by mouth daily      sildenafil (REVATIO) 20 MG tablet Take as many pills as needed up to maximum of 5 pills at a time prior to intercourse.     simvastatin (ZOCOR) 40 MG tablet Take 1 tablet (40 mg) by mouth At Bedtime     tamsulosin (FLOMAX) 0.4 MG capsule Take 1 capsule (0.4 mg) by mouth daily     No current facility-administered medications  "for this visit.          Social History   See HPI    Family History     Family History   Problem Relation Age of Onset     Hypertension Mother      Diabetes Mother      Cerebrovascular Disease Mother         in her 70s     Arthritis Mother      Cancer - colorectal Father 67     Arthritis Paternal Grandmother      LUNG DISEASE Sister      Other - See Comments Sister      Prostate Cancer No family hx of      Coronary Artery Disease No family hx of        Physical Exam     Temp Readings from Last 3 Encounters:   02/14/20 97.3  F (36.3  C) (Tympanic)   02/07/20 97.2  F (36.2  C) (Temporal)   11/13/19 97.9  F (36.6  C) (Tympanic)     BP Readings from Last 5 Encounters:   02/17/20 139/76   02/14/20 138/78   02/07/20 130/74   11/18/19 132/82   11/13/19 (!) 148/78     Pulse Readings from Last 1 Encounters:   02/17/20 78     Resp Readings from Last 1 Encounters:   02/14/20 14     Estimated body mass index is 28.7 kg/m  as calculated from the following:    Height as of 3/2/20: 1.778 m (5' 10\").    Weight as of 3/2/20: 90.7 kg (200 lb).    Telephone Visit     Labs / Imaging (select studies)       CBC  Recent Labs   Lab Test 05/11/20  1026 03/16/20  1032 02/10/20  1028   WBC 5.3 5.0 4.7   RBC 4.66 4.50 4.47   HGB 15.4 15.1 15.0   HCT 44.0 43.8 43.3   MCV 94 97 97   RDW 12.7 13.5 13.3   * 134* 150   MCH 33.0 33.6* 33.6*   MCHC 35.0 34.5 34.6   NEUTROPHIL 65.8 65.3 69.9   LYMPH 26.3 23.8 20.0   MONOCYTE 6.0 8.9 7.4   EOSINOPHIL 1.7 1.8 2.5   BASOPHIL 0.2 0.2 0.2   ANEU 3.5 3.2 3.3   ALYM 1.4 1.2 1.0   ERENDIRA 0.3 0.4 0.4   AEOS 0.1 0.1 0.1   ABAS 0.0 0.0 0.0     CMP  Recent Labs   Lab Test 05/11/20  1026 03/16/20  1032 02/10/20  1028 02/07/20  1428  11/13/19  1107  09/29/18  1040   NA  --   --   --  139  --  135  --  139   POTASSIUM  --   --   --  4.3  --  4.3  --  3.7   CHLORIDE  --   --   --  107  --  103  --  104   CO2  --   --   --  28  --  32  --  29   ANIONGAP  --   --   --  4  --  <1*  --  6   GLC  --   --   --  111*  " --  98  --  96   BUN  --   --   --  15  --  11  --  13   CR 0.98 1.01 1.04 0.99   < > 1.02   < > 1.07   GFRESTIMATED 79 76 73 78   < > 75   < > 69   GFRESTBLACK >90 88 85 >90   < > 87   < > 83   JOHN  --   --   --  9.1  --  9.1  --  8.8   BILITOTAL 0.9 0.9 1.0  --    < >  --    < >  --    ALBUMIN 3.7 3.8 3.6  --    < >  --    < >  --    PROTTOTAL 6.9 6.6* 6.4*  --    < >  --    < >  --    ALKPHOS 60 51 51  --    < >  --    < >  --    AST 23 30 23  --    < >  --    < >  --    ALT 38 48 46  --    < >  --    < >  --     < > = values in this interval not displayed.     Calcium/VitaminD  Recent Labs   Lab Test 02/07/20  1428 11/13/19  1107 09/29/18  1040   JOHN 9.1 9.1 8.8     ESR/CRP  Recent Labs   Lab Test 05/11/20  1026 02/10/20  1028 11/18/19  1343   SED 8 5 6   CRP 14.3* <2.9 <2.9       Hepatitis B  Recent Labs   Lab Test 05/16/18  1016   HBCAB Nonreactive   HEPBANG Nonreactive     Hepatitis C  Recent Labs   Lab Test 08/26/16  1213   HCVAB Nonreactive   Assay performance characteristics have not been established for newborns,   infants, and children         Immunization History     Immunization History   Administered Date(s) Administered     Influenza (High Dose) 3 valent vaccine 10/18/2017, 09/21/2018, 11/13/2019     Influenza (IIV3) PF 11/17/2005, 12/04/2006, 11/22/2010, 09/26/2012, 10/01/2014     Influenza Vaccine IM > 6 months Valent IIV4 10/15/2013, 12/17/2015, 08/26/2016     Pneumo Conj 13-V (2010&after) 10/18/2017     Pneumococcal 23 valent 01/22/2018     TDAP Vaccine (Adacel) 04/22/2009, 05/20/2018          Chart documentation done in part with Dragon Voice recognition Software. Although reviewed after completion, some word and grammatical error may remain.    Phone call start time: 1:58 PM  Phone call end time: 2:10 PM    This visit is equivalent to a [id number] visit    Location of patient: home  Location of provider: home    Follow up:  follow up appointment scheduled to be in Sept    Adria Lopez MD  6/1/2020

## 2020-06-08 DIAGNOSIS — Z79.899 HIGH RISK MEDICATIONS (NOT ANTICOAGULANTS) LONG-TERM USE: ICD-10-CM

## 2020-06-08 DIAGNOSIS — L40.50 PSORIATIC ARTHROPATHY (H): ICD-10-CM

## 2020-06-08 LAB
ALBUMIN SERPL-MCNC: 3.8 G/DL (ref 3.4–5)
ALP SERPL-CCNC: 53 U/L (ref 40–150)
ALT SERPL W P-5'-P-CCNC: 41 U/L (ref 0–70)
AST SERPL W P-5'-P-CCNC: 25 U/L (ref 0–45)
BASOPHILS # BLD AUTO: 0 10E9/L (ref 0–0.2)
BASOPHILS NFR BLD AUTO: 0.2 %
BILIRUB DIRECT SERPL-MCNC: 0.2 MG/DL (ref 0–0.2)
BILIRUB SERPL-MCNC: 0.8 MG/DL (ref 0.2–1.3)
CREAT SERPL-MCNC: 0.99 MG/DL (ref 0.66–1.25)
DIFFERENTIAL METHOD BLD: ABNORMAL
EOSINOPHIL # BLD AUTO: 0.1 10E9/L (ref 0–0.7)
EOSINOPHIL NFR BLD AUTO: 1.8 %
ERYTHROCYTE [DISTWIDTH] IN BLOOD BY AUTOMATED COUNT: 13.1 % (ref 10–15)
GFR SERPL CREATININE-BSD FRML MDRD: 77 ML/MIN/{1.73_M2}
HCT VFR BLD AUTO: 44.1 % (ref 40–53)
HGB BLD-MCNC: 15.4 G/DL (ref 13.3–17.7)
LYMPHOCYTES # BLD AUTO: 1.3 10E9/L (ref 0.8–5.3)
LYMPHOCYTES NFR BLD AUTO: 26.4 %
MCH RBC QN AUTO: 33 PG (ref 26.5–33)
MCHC RBC AUTO-ENTMCNC: 34.9 G/DL (ref 31.5–36.5)
MCV RBC AUTO: 94 FL (ref 78–100)
MONOCYTES # BLD AUTO: 0.4 10E9/L (ref 0–1.3)
MONOCYTES NFR BLD AUTO: 8.5 %
NEUTROPHILS # BLD AUTO: 3.1 10E9/L (ref 1.6–8.3)
NEUTROPHILS NFR BLD AUTO: 63.1 %
PLATELET # BLD AUTO: 141 10E9/L (ref 150–450)
PROT SERPL-MCNC: 6.8 G/DL (ref 6.8–8.8)
RBC # BLD AUTO: 4.67 10E12/L (ref 4.4–5.9)
WBC # BLD AUTO: 4.9 10E9/L (ref 4–11)

## 2020-06-08 PROCEDURE — 82565 ASSAY OF CREATININE: CPT | Performed by: INTERNAL MEDICINE

## 2020-06-08 PROCEDURE — 36415 COLL VENOUS BLD VENIPUNCTURE: CPT | Performed by: INTERNAL MEDICINE

## 2020-06-08 PROCEDURE — 85025 COMPLETE CBC W/AUTO DIFF WBC: CPT | Performed by: INTERNAL MEDICINE

## 2020-06-08 PROCEDURE — 80076 HEPATIC FUNCTION PANEL: CPT | Performed by: INTERNAL MEDICINE

## 2020-07-06 ENCOUNTER — IMPORTED ENCOUNTER (OUTPATIENT)
Dept: URBAN - METROPOLITAN AREA CLINIC 50 | Facility: CLINIC | Age: 70
End: 2020-07-06

## 2020-07-08 ENCOUNTER — IMPORTED ENCOUNTER (OUTPATIENT)
Dept: URBAN - METROPOLITAN AREA CLINIC 50 | Facility: CLINIC | Age: 70
End: 2020-07-08

## 2020-07-08 NOTE — PATIENT DISCUSSION
"""Increase Artificial tears both eyes two - four times a day ."" ""Start lubrication ointment both eyes at bedtime

## 2020-07-13 ENCOUNTER — VIRTUAL VISIT (OUTPATIENT)
Dept: FAMILY MEDICINE | Facility: CLINIC | Age: 70
End: 2020-07-13
Payer: COMMERCIAL

## 2020-07-13 DIAGNOSIS — Z20.822 SUSPECTED COVID-19 VIRUS INFECTION: ICD-10-CM

## 2020-07-13 DIAGNOSIS — R05.9 COUGH: ICD-10-CM

## 2020-07-13 DIAGNOSIS — R06.09 DOE (DYSPNEA ON EXERTION): Primary | ICD-10-CM

## 2020-07-13 PROCEDURE — 99213 OFFICE O/P EST LOW 20 MIN: CPT | Mod: 95 | Performed by: FAMILY MEDICINE

## 2020-07-13 RX ORDER — ALBUTEROL SULFATE 90 UG/1
2 AEROSOL, METERED RESPIRATORY (INHALATION) EVERY 6 HOURS PRN
Qty: 1 INHALER | Refills: 0 | Status: CANCELLED | OUTPATIENT
Start: 2020-07-13

## 2020-07-13 RX ORDER — ALBUTEROL SULFATE 90 UG/1
2 AEROSOL, METERED RESPIRATORY (INHALATION) EVERY 4 HOURS PRN
Qty: 1 INHALER | Refills: 11 | Status: SHIPPED | OUTPATIENT
Start: 2020-07-13 | End: 2021-11-09

## 2020-07-13 NOTE — PATIENT INSTRUCTIONS
THIS DOCUMENT WAS MADE IN PART WITH VOICE RECOGNITION SOFTWARE.  OCCASIONALLY THIS SOFTWARE WILL MISINTERPRET WORDS OR PHRASES.    Assessment and Plan:    1. Mild intermittent reactive airway disease without complication  - albuterol (PROVENTIL) 2.5 mg /3 mL (0.083 %) nebulizer solution; Take 3 mLs (2.5 mg total) by nebulization every 6 (six) hours as needed for Wheezing. Rescue  Dispense: 1 Box; Refill: 11    2. Recurrent infection of skin  Clindamycin x7 days    3. Cellulitis of right arm  - clindamycin (CLEOCIN) 300 MG capsule; Take 1 capsule (300 mg total) by mouth every 8 (eight) hours.  Dispense: 21 capsule; Refill: 0    4. Allergic rhinitis, unspecified seasonality, unspecified trigger  Continue Xyzal  - montelukast (SINGULAIR) 10 mg tablet; Take 1 tablet (10 mg total) by mouth every evening.  Dispense: 90 tablet; Refill: 3  - fluticasone propionate (FLONASE) 50 mcg/actuation nasal spray; 2 sprays (100 mcg total) by Each Nostril route once daily.  Dispense: 16 g; Refill: 5        ______________________________________________________________________  Subjective:    Chief Complaint:  Chief Complaint   Patient presents with    Recurrent Skin Infections     RA, ocurring since 3/25/2020, pt c/o heat, pain, and has tried taking motrin and clindamycin and keflex     Asthma     chronic issue, pt wants medications to do more breathing treatments         HPI:  Jacki is a 46 y.o. year old     Recurrent skin infections  Most recent rash initiated 03/25/2020  Associated with heat, pain  Location right arm  Notable history of right axillary lymph node resection secondary to breast cancer  Has had similar infections in that area before  Patient contributes biting her nails and picking at her cuticles on ipsilateral hand  Denies any fever or shortness of breath    Self-reported history of intermittent bronchitis versus questionable asthma  Never required daily prophylactic medication  Today requesting refills of  ASSESSMENT:     ICD-10-CM    1. ROSADO (dyspnea on exertion)  R06.00 albuterol (PROAIR HFA/PROVENTIL HFA/VENTOLIN HFA) 108 (90 Base) MCG/ACT inhaler   2. Cough  R05 Symptomatic COVID-19 Virus (Coronavirus) by PCR   3. Suspected COVID-19 virus infection  Z20.828 Symptomatic COVID-19 Virus (Coronavirus) by PCR      This may be a viral upper respiratory infection.  It could be a coronavirus infection.  PLAN: I have ordered the coronavirus PCR test to see if you have the virus.  Expect a call to schedule a time and location for the test.  It may take a couple of days to get a report.    In the meantime, you can use your albuterol inhaler 2 puffs every 4 hours as needed for shortness of breath coughing or wheezing.  Over-the-counter cough and cold medicines are okay to use if desired.  Make sure you are drinking enough fluids.  Limit exposure to other people at this time and quarantine at home until we know about the PCR coronavirus test reports.    Discharge Instructions for COVID-19 Patients  You have--or may have--COVID-19. Please follow the instructions listed below.   If you have a weakened immune system, discuss with your doctor any other actions you need to take.  How can I protect others?  If you have symptoms (fever, cough, body aches or trouble breathing):    Stay home and away from others (self-isolate) until:  ? At least 10 days have passed since your symptoms started. And   ? You've had no fever--and no medicine that reduces fever--for 3 full days (72 hours). And   ? Your other symptoms have resolved (gotten better).  If you don't show symptoms, but testing showed that you have COVID-19:    Stay home and away from others (self-isolate) until at least 10 days have passed since the date of your first positive COVID-19 test.  During this time    Stay in your own room, even for meals. Use your own bathroom if you can.    Stay away from others in your home. No hugging, kissing or shaking hands. No visitors.    Don't  "go to work, school or anywhere else.    Clean \"high touch\" surfaces often (doorknobs, counters, handles). Use household cleaning spray or wipes. You'll find a full list of  on the EPA website: www.epa.gov/pesticide-registration/list-n-disinfectants-use-against-sars-cov-2.    Cover your mouth and nose with a mask, tissue or wash cloth to avoid spreading germs.    Wash your hands and face often. Use soap and water.    Caregivers in these groups are at risk for severe illness due to COVID-19:  ? People 65 years and older  ? People who live in a nursing home or long-term care facility  ? People with chronic disease (lung, heart, cancer, diabetes, kidney, liver, immunologic)  ? People who have a weakened immune system, including those who:    Are in cancer treatment    Take medicine that weakens the immune system, such as corticosteroids    Had a bone marrow or organ transplant    Have an immune deficiency    Have poorly controlled HIV or AIDS    Are obese (body mass index of 40 or higher)    Smoke regularly    Caregivers should wear gloves while washing dishes, handling laundry and cleaning bedrooms and bathrooms.    Use caution when washing and drying laundry: Don't shake dirty laundry and use the warmest water setting that you can.    For more tips on managing your health at home, go to www.cdc.gov/coronavirus/2019-ncov/downloads/10Things.pdf.  How can I take care of myself at home?  1. Get lots of rest. Drink extra fluids (unless a doctor has told you not to).  2. Take Tylenol (acetaminophen) for fever or pain. If you have liver or kidney problems, ask your family doctor if it's okay to take Tylenol.     Adults can take either:  ? 650 mg (two 325 mg pills) every 4 to 6 hours, or   ? 1,000 mg (two 500 mg pills) every 8 hours as needed.  ? Note: Don't take more than 3,000 mg in one day. Acetaminophen is found in many medicines (both prescribed and over-the-counter medicines). Read all labels to be sure you " nebulizer medication  Has associated allergy symptoms, sinus pressure and congestion.  Taking levocetirizine    Past Medical History:  Past Medical History:   Diagnosis Date    Acute deep vein thrombosis (DVT) of right upper extremity     Atrial flutter by electrocardiogram     Breast CA     Cancer     Osteoporosis        Past Surgical History:  Past Surgical History:   Procedure Laterality Date    BILATERAL SALPINGO-OOPHORECTOMY (BSO)      BREAST SURGERY      Left mastectomy      right axillary lymph node dissection         Family History:  Family History   Problem Relation Age of Onset    Hypertension Father     Diabetes Father     Heart disease Maternal Grandmother     Diabetes Maternal Grandmother     Hypertension Maternal Grandfather     Hypertension Paternal Grandmother     Hypertension Paternal Grandfather     Osteoarthritis Mother     Osteopenia Mother     Anxiety disorder Sister     No Known Problems Son        Social History:  Social History     Socioeconomic History    Marital status:      Spouse name: Not on file    Number of children: 1    Years of education: Not on file    Highest education level: Not on file   Occupational History    Occupation: hospice nurse   Social Needs    Financial resource strain: Not on file    Food insecurity:     Worry: Not on file     Inability: Not on file    Transportation needs:     Medical: Not on file     Non-medical: Not on file   Tobacco Use    Smoking status: Never Smoker    Smokeless tobacco: Never Used   Substance and Sexual Activity    Alcohol use: Not Currently     Comment: social    Drug use: Not Currently    Sexual activity: Yes     Partners: Male   Lifestyle    Physical activity:     Days per week: Not on file     Minutes per session: Not on file    Stress: Not on file   Relationships    Social connections:     Talks on phone: Not on file     Gets together: Not on file     Attends Christianity service: Not on file      "Active member of club or organization: Not on file     Attends meetings of clubs or organizations: Not on file     Relationship status: Not on file   Other Topics Concern    Not on file   Social History Narrative    Not on file       Medications:  Current Outpatient Medications on File Prior to Visit   Medication Sig Dispense Refill    ergocalciferol (ERGOCALCIFEROL) 50,000 unit Cap Take 1 capsule (50,000 Units total) by mouth every 7 days. 12 capsule 0    hydrocodone-acetaminophen 7.5-500 mg (LORTAB) 7.5-500 mg per tablet Take 1 tablet by mouth every 6 (six) hours as needed.      LORazepam (ATIVAN) 0.5 MG tablet Take 1 tablet (0.5 mg total) by mouth daily as needed for Anxiety. 15 tablet 1    [DISCONTINUED] DULoxetine (CYMBALTA) 30 MG capsule Take 2 capsules (60 mg total) by mouth once daily. 60 capsule 11    [DISCONTINUED] methocarbamol (ROBAXIN) 750 MG Tab methocarbamol 750 mg tablet       No current facility-administered medications on file prior to visit.        Allergies:  Adhesive and Neosporin [hydrocortisone]    Immunizations:  Immunization History   Administered Date(s) Administered    DTP 1976, 1976, 1976, 09/06/1977, 09/09/1980    Hepatitis B, Adult 07/25/2005, 09/20/2005, 08/15/2006    IPV 1976, 1976, 1976, 09/06/1977, 09/09/1980    Influenza - Quadrivalent 10/01/2017    Influenza - Quadrivalent - PF (6 months and older) 11/19/2019    Influenza - Trivalent (ADULT) 10/12/2015, 12/16/2016    MMR 06/14/1977, 02/07/1994, 12/02/1998       Review of Systems:  Review of Systems   HENT: Positive for congestion and sneezing.    Respiratory: Positive for cough.    Skin: Positive for rash.   All other systems reviewed and are negative.      Objective:    Vitals:  Vitals:    03/27/20 0947   BP: 112/88   Pulse: 101   Temp: 98 °F (36.7 °C)   TempSrc: Oral   Weight: 101 kg (222 lb 10.6 oz)   Height: 5' 4" (1.626 m)   PainSc:   4   PainLoc: Arm       Physical Exam " don't take too much.   For children, check the Tylenol bottle for the right dose. The dose is based on the child's age or weight.  3. If you have other health problems (like cancer, heart failure, an organ transplant or severe kidney disease): Call your specialty clinic if you don't feel better in the next 2 days.  4. Know when to call 911. Emergency warning signs include:  ? Trouble breathing or shortness of breath  ? Pain or pressure in the chest that doesn't go away  ? Feeling confused like you haven't felt before, or not being able to wake up  ? Bluish-colored lips or face  5. Your doctor may have prescribed a blood thinner medicine. Follow their instructions.  Where can I get more information?    [facility] - About COVID-19:   [email or web address]    CDC - What to Do If You're Sick: www.cdc.gov/coronavirus/2019-ncov/about/steps-when-sick.html    [facility] - Ending Home Isolation: www.cdc.gov/coronavirus/2019-ncov/hcp/disposition-in-home-patients.html    CDC - Caring for Someone: www.cdc.gov/coronavirus/2019-ncov/if-you-are-sick/care-for-someone.html    [facility] - Interim Guidance for Hospital Discharge to Home: www.health.[facility].mn.us/diseases/coronavirus/hcp/hospdischarge.pdf    [facility] clinical trials (COVID-19 research studies): [email or web address]-[email or web address]-[email or web address]    Below are the COVID-19 hotlines at the [facility] of [facility] ([facility]). Interpreters are available.  ? For health questions: Call 720-048-4281 or 1-199.236.4753 (7 a.m. to 7 p.m.)  ? For questions about schools and childcare: Call 418-511-0322 or 1-367.772.8281 (7 a.m. to 7 p.m.)    For informational purposes only. Not to replace the advice of your health care provider. Clinically reviewed by the Infection Prevention Team.Copyright   2020 [employer]. All rights reserved. [employer] [id number] - 06/20.     Constitutional: No distress.   HENT:   Head: Normocephalic and atraumatic.   Eyes: Pupils are equal, round, and reactive to light. EOM are normal.   Neck: Neck supple.   Cardiovascular: Normal rate and regular rhythm. Exam reveals no friction rub.   No murmur heard.  Pulmonary/Chest: Effort normal and breath sounds normal.   Abdominal: Soft. Bowel sounds are normal. She exhibits no distension. There is no tenderness.   Skin: Skin is warm and dry. No rash noted.        Psychiatric: She has a normal mood and affect. Her behavior is normal.       Data:  No previous labs, imaging, or notes available.        Jt Aiken MD  Family Medicine

## 2020-07-13 NOTE — TELEPHONE ENCOUNTER
Routing refill request to provider for review/approval because:  Last signed by another provider in the ED    Sharonda Blackman RN

## 2020-07-16 DIAGNOSIS — Z20.822 SUSPECTED COVID-19 VIRUS INFECTION: ICD-10-CM

## 2020-07-16 DIAGNOSIS — R05.9 COUGH: ICD-10-CM

## 2020-07-16 PROCEDURE — U0003 INFECTIOUS AGENT DETECTION BY NUCLEIC ACID (DNA OR RNA); SEVERE ACUTE RESPIRATORY SYNDROME CORONAVIRUS 2 (SARS-COV-2) (CORONAVIRUS DISEASE [COVID-19]), AMPLIFIED PROBE TECHNIQUE, MAKING USE OF HIGH THROUGHPUT TECHNOLOGIES AS DESCRIBED BY CMS-2020-01-R: HCPCS | Performed by: FAMILY MEDICINE

## 2020-07-16 NOTE — LETTER
July 21, 2020        Pawan Bullard  [address] [facility] [id number]    This letter provides a written record that you were tested for COVID-19 on 7/16/20.       Your result was negative. This means that we didn t find the virus that causes COVID-19 in your sample. A test may show negative when you do actually have the virus. This can happen when the virus is in the early stages of infection, before you feel illness symptoms.    If you have symptoms   Stay home and away from others (self-isolate) until you meet ALL of the guidelines below:    You ve had no fever--and no medicine that reduces fever--for 3 full days (72 hours). And      Your other symptoms have gotten better. For example, your cough or breathing has improved. And     At least 10 days have passed since your symptoms started.    During this time:    Stay home. Don t go to work, school or anywhere else.     Stay in your own room, including for meals. Use your own bathroom if you can.    Stay away from others in your home. No hugging, kissing or shaking hands. No visitors.    Clean  high touch  surfaces often (doorknobs, counters, handles, etc.). Use a household cleaning spray or wipes. You can find a full list on the EPA website at www.epa.gov/pesticide-registration/list-n-disinfectants-use-against-sars-cov-2.    Cover your mouth and nose with a mask, tissue or washcloth to avoid spreading germs.    Wash your hands and face often with soap and water.    Going back to work  Check with your employer for any guidelines to follow for going back to work.    Employers: This document serves as formal notice that your employee tested negative for COVID-19, as of the testing date shown above.

## 2020-07-17 LAB
SARS-COV-2 RNA SPEC QL NAA+PROBE: NOT DETECTED
SPECIMEN SOURCE: NORMAL

## 2020-07-20 DIAGNOSIS — Z79.899 HIGH RISK MEDICATIONS (NOT ANTICOAGULANTS) LONG-TERM USE: ICD-10-CM

## 2020-07-20 DIAGNOSIS — L40.50 PSORIATIC ARTHROPATHY (H): ICD-10-CM

## 2020-07-20 LAB
ALBUMIN SERPL-MCNC: 3.8 G/DL (ref 3.4–5)
ALP SERPL-CCNC: 54 U/L (ref 40–150)
ALT SERPL W P-5'-P-CCNC: 43 U/L (ref 0–70)
AST SERPL W P-5'-P-CCNC: 29 U/L (ref 0–45)
BASOPHILS # BLD AUTO: 0 10E9/L (ref 0–0.2)
BASOPHILS NFR BLD AUTO: 0.2 %
BILIRUB DIRECT SERPL-MCNC: 0.2 MG/DL (ref 0–0.2)
BILIRUB SERPL-MCNC: 0.8 MG/DL (ref 0.2–1.3)
CREAT SERPL-MCNC: 1.01 MG/DL (ref 0.66–1.25)
DIFFERENTIAL METHOD BLD: NORMAL
EOSINOPHIL # BLD AUTO: 0.1 10E9/L (ref 0–0.7)
EOSINOPHIL NFR BLD AUTO: 1.9 %
ERYTHROCYTE [DISTWIDTH] IN BLOOD BY AUTOMATED COUNT: 13.3 % (ref 10–15)
GFR SERPL CREATININE-BSD FRML MDRD: 76 ML/MIN/{1.73_M2}
HCT VFR BLD AUTO: 43.5 % (ref 40–53)
HGB BLD-MCNC: 15.1 G/DL (ref 13.3–17.7)
LYMPHOCYTES # BLD AUTO: 1.4 10E9/L (ref 0.8–5.3)
LYMPHOCYTES NFR BLD AUTO: 30.3 %
MCH RBC QN AUTO: 32.9 PG (ref 26.5–33)
MCHC RBC AUTO-ENTMCNC: 34.7 G/DL (ref 31.5–36.5)
MCV RBC AUTO: 95 FL (ref 78–100)
MONOCYTES # BLD AUTO: 0.4 10E9/L (ref 0–1.3)
MONOCYTES NFR BLD AUTO: 8.5 %
NEUTROPHILS # BLD AUTO: 2.8 10E9/L (ref 1.6–8.3)
NEUTROPHILS NFR BLD AUTO: 59.1 %
PLATELET # BLD AUTO: 151 10E9/L (ref 150–450)
PROT SERPL-MCNC: 6.7 G/DL (ref 6.8–8.8)
RBC # BLD AUTO: 4.59 10E12/L (ref 4.4–5.9)
WBC # BLD AUTO: 4.7 10E9/L (ref 4–11)

## 2020-07-20 PROCEDURE — 82565 ASSAY OF CREATININE: CPT | Performed by: INTERNAL MEDICINE

## 2020-07-20 PROCEDURE — 36415 COLL VENOUS BLD VENIPUNCTURE: CPT | Performed by: INTERNAL MEDICINE

## 2020-07-20 PROCEDURE — 85025 COMPLETE CBC W/AUTO DIFF WBC: CPT | Performed by: INTERNAL MEDICINE

## 2020-07-20 PROCEDURE — 80076 HEPATIC FUNCTION PANEL: CPT | Performed by: INTERNAL MEDICINE

## 2020-07-22 ENCOUNTER — IMPORTED ENCOUNTER (OUTPATIENT)
Dept: URBAN - METROPOLITAN AREA CLINIC 50 | Facility: CLINIC | Age: 70
End: 2020-07-22

## 2020-08-17 DIAGNOSIS — Z79.899 HIGH RISK MEDICATIONS (NOT ANTICOAGULANTS) LONG-TERM USE: ICD-10-CM

## 2020-08-17 DIAGNOSIS — L40.50 PSORIATIC ARTHROPATHY (H): ICD-10-CM

## 2020-08-17 LAB
ALBUMIN SERPL-MCNC: 3.7 G/DL (ref 3.4–5)
ALP SERPL-CCNC: 56 U/L (ref 40–150)
ALT SERPL W P-5'-P-CCNC: 39 U/L (ref 0–70)
AST SERPL W P-5'-P-CCNC: 27 U/L (ref 0–45)
BASOPHILS # BLD AUTO: 0 10E9/L (ref 0–0.2)
BASOPHILS NFR BLD AUTO: 0.2 %
BILIRUB DIRECT SERPL-MCNC: 0.2 MG/DL (ref 0–0.2)
BILIRUB SERPL-MCNC: 0.9 MG/DL (ref 0.2–1.3)
CREAT SERPL-MCNC: 1.01 MG/DL (ref 0.66–1.25)
CRP SERPL-MCNC: 3.9 MG/L (ref 0–8)
DIFFERENTIAL METHOD BLD: NORMAL
EOSINOPHIL # BLD AUTO: 0.1 10E9/L (ref 0–0.7)
EOSINOPHIL NFR BLD AUTO: 1.6 %
ERYTHROCYTE [DISTWIDTH] IN BLOOD BY AUTOMATED COUNT: 13.4 % (ref 10–15)
ERYTHROCYTE [SEDIMENTATION RATE] IN BLOOD BY WESTERGREN METHOD: 7 MM/H (ref 0–20)
GFR SERPL CREATININE-BSD FRML MDRD: 76 ML/MIN/{1.73_M2}
HCT VFR BLD AUTO: 42.9 % (ref 40–53)
HGB BLD-MCNC: 14.7 G/DL (ref 13.3–17.7)
LYMPHOCYTES # BLD AUTO: 1.2 10E9/L (ref 0.8–5.3)
LYMPHOCYTES NFR BLD AUTO: 24.3 %
MCH RBC QN AUTO: 32.7 PG (ref 26.5–33)
MCHC RBC AUTO-ENTMCNC: 34.3 G/DL (ref 31.5–36.5)
MCV RBC AUTO: 95 FL (ref 78–100)
MONOCYTES # BLD AUTO: 0.5 10E9/L (ref 0–1.3)
MONOCYTES NFR BLD AUTO: 9 %
NEUTROPHILS # BLD AUTO: 3.3 10E9/L (ref 1.6–8.3)
NEUTROPHILS NFR BLD AUTO: 64.9 %
PLATELET # BLD AUTO: 154 10E9/L (ref 150–450)
PROT SERPL-MCNC: 6.5 G/DL (ref 6.8–8.8)
RBC # BLD AUTO: 4.5 10E12/L (ref 4.4–5.9)
WBC # BLD AUTO: 5 10E9/L (ref 4–11)

## 2020-08-17 PROCEDURE — 36415 COLL VENOUS BLD VENIPUNCTURE: CPT | Performed by: FAMILY MEDICINE

## 2020-08-17 PROCEDURE — 82565 ASSAY OF CREATININE: CPT | Performed by: FAMILY MEDICINE

## 2020-08-17 PROCEDURE — 85652 RBC SED RATE AUTOMATED: CPT | Performed by: FAMILY MEDICINE

## 2020-08-17 PROCEDURE — 86140 C-REACTIVE PROTEIN: CPT | Performed by: FAMILY MEDICINE

## 2020-08-17 PROCEDURE — 85025 COMPLETE CBC W/AUTO DIFF WBC: CPT | Performed by: FAMILY MEDICINE

## 2020-08-17 PROCEDURE — 80076 HEPATIC FUNCTION PANEL: CPT | Performed by: FAMILY MEDICINE

## 2020-08-27 ENCOUNTER — IMPORTED ENCOUNTER (OUTPATIENT)
Dept: URBAN - METROPOLITAN AREA CLINIC 50 | Facility: CLINIC | Age: 70
End: 2020-08-27

## 2020-08-31 ENCOUNTER — VIRTUAL VISIT (OUTPATIENT)
Dept: RHEUMATOLOGY | Facility: CLINIC | Age: 70
End: 2020-08-31
Payer: COMMERCIAL

## 2020-08-31 DIAGNOSIS — L40.50 PSORIATIC ARTHROPATHY (H): ICD-10-CM

## 2020-08-31 DIAGNOSIS — Z79.899 HIGH RISK MEDICATIONS (NOT ANTICOAGULANTS) LONG-TERM USE: Primary | ICD-10-CM

## 2020-08-31 PROCEDURE — 99213 OFFICE O/P EST LOW 20 MIN: CPT | Mod: 95 | Performed by: INTERNAL MEDICINE

## 2020-08-31 RX ORDER — FOLIC ACID 1 MG/1
1 TABLET ORAL DAILY
Qty: 100 TABLET | Refills: 3 | Status: SHIPPED | OUTPATIENT
Start: 2020-08-31 | End: 2021-03-09

## 2020-08-31 RX ORDER — METHOTREXATE 2.5 MG/1
25 TABLET ORAL WEEKLY
Qty: 120 TABLET | Refills: 1 | Status: SHIPPED | OUTPATIENT
Start: 2020-08-31 | End: 2020-12-07

## 2020-08-31 NOTE — PROGRESS NOTES
"Pawan Bullard is a 70 year old male who is being evaluated via a billable telephone visit.      The patient has been notified of following:     \"This telephone visit will be conducted via a call between you and your physician/provider. We have found that certain health care needs can be provided without the need for a physical exam.  This service lets us provide the care you need with a short phone conversation.  If a prescription is necessary we can send it directly to your pharmacy.  If lab work is needed we can place an order for that and you can then stop by our lab to have the test done at a later time.    Telephone visits are billed at different rates depending on your insurance coverage. During this emergency period, for some insurers they may be billed the same as an in-person visit.  Please reach out to your insurance provider with any questions.    If during the course of the call the physician/provider feels a telephone visit is not appropriate, you will not be charged for this service.\"    Patient has given verbal consent for Telephone visit?  Yes    What phone number would you like to be contacted at? [phone]    How would you like to obtain your AVS? Mail a copy    Rheumatology Telephone/Telehealth Visit      Pawan Bullard MRN# [id number]   YOB: 1950 Age: 70 year old      Date of visit: 8/31/20   PCP: Dr. Armando Finley    Chief Complaint   Patient presents with:  Psoriatic arthropathy: Doing ok    Assessment and Plan     1.  Psoriatic arthritis: Synovitis when on MTX 20mg wkly; the dose was increased previously but he did not do so; then he held methotrexate due to concerns about COVID-19 and his arthritis worsened.  Doing well now on methotrexate 25 mg once weekly.  If needed in the future would consider adding a biologic DMARD.  Previously on SSZ (GI upset)  - Continue methotrexate 25mg once every 7 days, taking 5 tablets in the AM and 5 tablets in the PM on the same " day of each week  - Continue folic acid 1 mg daily  - Labs in 3 months: CBC, Creatinine, Hepatic Panel, ESR, CRP               Rapid 3, cumulative scores                      02/17/2020:  4.7 (MTX 20mg wkly)]                      11/18/2019:  3.3 (MTX 20mg wkly)                      05/20/2019:  Refused by patient                      11/26/2018:  5.2 (MTX 17.5mg wkly)    # Relevant labs and imaging were reviewed with the patient    # High risk medication toxicity monitoring: discussion and labs reviewed; appropriate labs ordered. See above.  Instructed that if confirmed to have COVID-19 or exposure to someone with confirmed COVID-19 to call this clinic for directions on DMARD management.    # Note that this is a virtual visit to reduce the risk of COVID-19 exposure during this current pandemic.      # Considered to be at high risk of complications from the COVID-19 virus.  It is recommended to limit contact with other people and if possible to work remotely or provide a leave of absence to reduce the risk for COVID-19.      Mr. Bullard verbalized agreement with and understanding of the rational for the diagnosis and treatment plan.  All questions were answered to best of my ability and the patient's satisfaction. Mr. Bullard was advised to contact the clinic with any questions that may arise after the clinic visit.      Thank you for involving me in the care of the patient    Return to clinic: 3 months      HPI   Pawan Bullard is a 70 year old male with a past medical history significant for hypertension, hyperlipidemia, chondrocalcinosis, GERD, osteoarthritis, and psoriatic arthritis who presents for follow-up of psoriatic arthritis.      Today, Mr. Bullard reports that he is doing well on methotrexate.  Mild knee pain after he was sitting with his leg extended for a long period of time at a tractor pull; this is nearly resolved.  Other joints are doing well.  No pain/stiffness/swelling at his MCPs or PIPs.   No morning stiffness.  No gelling.  Other joints are doing well.    Denies fevers, chills, nausea, vomiting, constipation, diarrhea. No abdominal pain. No chest pain/pressure, palpitations, or shortness of breath at this time but he says he did have an episode of shortness of breath with chest pressure so he was seen in the emergency department and subsequently by his PCP with plans to have a stress test and pulmonary function tests. No LE swelling. No oral or nasal sores.  No sicca symptoms.  No history of inflammatory eye disease.  No history of inflammatory bowel disease.       Tobacco: None  EtOH: 0-2 beers or mixed drinks monthly  Drugs: None    ROS   GEN: No fevers, chills, night sweats, or weight change  SKIN: See HPI  HEENT: No oral or nasal ulcers.  CV: No chest pain, pressure, palpitations, or dyspnea on exertion; see HPI  PULM: No SOB, wheeze, cough; see HPI  GI: No nausea, vomiting, constipation, diarrhea. No blood in stool. No abdominal pain.  : No blood in urine.  MSK: See HPI.  NEURO: No numbness, tingling, or weakness.  EXT: No LE swelling  PSYCH: Negative    Active Problem List     Patient Active Problem List   Diagnosis     Psoriatic arthropathy (H)     Esophageal reflux     Hypertension goal BP (blood pressure) < 140/90     FAMILY HISTORY OF GI NEOPLASM     FAMILY HISTORY OF DIABETES MELLITUS     Impotence of organic origin     Chondrocalcinosis     HYPERLIPIDEMIA LDL GOAL <130     Osteoarthritis     Advanced directives, counseling/discussion     High risk medications (not anticoagulants) long-term use     ROSADO (dyspnea on exertion)     Past Medical History     Past Medical History:   Diagnosis Date     Esophageal reflux     GERD     Hypertension      Psoriatic arthropathy (H)     Psoriatic arthritis     Past Surgical History     Past Surgical History:   Procedure Laterality Date     ARTHROSCOPY KNEE RT/LT  April 2009    left knee     ARTHROSCOPY KNEE WITH MEDIAL MENISCECTOMY  7/9/2013     Procedure: ARTHROSCOPY KNEE WITH MEDIAL MENISCECTOMY;  Left Knee Arthroscopic and Open Repair of Patellar Tendon with Platelet Rich Plasma;  Surgeon: Ley, Jeffrey Duane, MD;  Location: [facility]     COLONOSCOPY  Jan. 2006    diverticuli. Father had colon CA     COLONOSCOPY  2001     COLONOSCOPY  4/11/2011    Procedure:COLONOSCOPY; Surgeon:JENNIFER JEAN; Location:[facility]     COLONOSCOPY N/A 11/21/2016    Procedure: COLONOSCOPY;  Surgeon: Elias Santamaria MD;  Location: [facility]     REPAIR TENDON PATELLA  7/9/2013    Procedure: REPAIR TENDON PATELLA;;  Surgeon: Ley, Jeffrey Duane, MD;  Location: [facility]     SURGICAL HISTORY OF -   1999    (L) Herniorrhaphy     SURGICAL HISTORY OF -   2003    (R) Herniorrhaphy     SURGICAL HISTORY OF -   1981    Hemorrohid     Allergy     Allergies   Allergen Reactions     Acetaminophen W/Codeine Nausea     Current Medication List     Current Outpatient Medications   Medication Sig     albuterol (PROAIR HFA/PROVENTIL HFA/VENTOLIN HFA) 108 (90 Base) MCG/ACT inhaler Inhale 2 puffs into the lungs every 4 hours as needed for shortness of breath / dyspnea or wheezing     Black Pepper-Turmeric (TURMERIC CURCUMIN) 5-1000 MG CAPS Take 1 capsule by mouth daily     folic acid (FOLVITE) 1 MG tablet Take 1 tablet (1 mg) by mouth daily     lisinopril (PRINIVIL/ZESTRIL) 5 MG tablet Take 1 tablet (5 mg) by mouth daily     methotrexate sodium 2.5 MG TABS Take 10 tablets (25 mg) by mouth once a week . Take all 5 tablets in the AM and 5 tablets in the PM on the same day of each week     MULTI-VITAMIN OR TABS Take 1 tablet by mouth daily      Probiotic Product (PROBIOTIC DAILY PO) Take 1 capsule by mouth daily      sildenafil (REVATIO) 20 MG tablet Take as many pills as needed up to maximum of 5 pills at a time prior to intercourse.     simvastatin (ZOCOR) 40 MG tablet Take 1 tablet (40 mg) by mouth At Bedtime     tamsulosin (FLOMAX) 0.4 MG capsule Take 1 capsule (0.4 mg) by mouth daily     No current  "facility-administered medications for this visit.          Social History   See HPI    Family History     Family History   Problem Relation Age of Onset     Hypertension Mother      Diabetes Mother      Cerebrovascular Disease Mother         in her 70s     Arthritis Mother      Cancer - colorectal Father 67     Arthritis Paternal Grandmother      LUNG DISEASE Sister      Other - See Comments Sister      Prostate Cancer No family hx of      Coronary Artery Disease No family hx of        Physical Exam     Temp Readings from Last 3 Encounters:   02/14/20 97.3  F (36.3  C) (Tympanic)   02/07/20 97.2  F (36.2  C) (Temporal)   11/13/19 97.9  F (36.6  C) (Tympanic)     BP Readings from Last 5 Encounters:   02/17/20 139/76   02/14/20 138/78   02/07/20 130/74   11/18/19 132/82   11/13/19 (!) 148/78     Pulse Readings from Last 1 Encounters:   02/17/20 78     Resp Readings from Last 1 Encounters:   02/14/20 14     Estimated body mass index is 28.7 kg/m  as calculated from the following:    Height as of 3/2/20: 1.778 m (5' 10\").    Weight as of 3/2/20: 90.7 kg (200 lb).    Telephone Visit     Labs / Imaging (select studies)       CBC  Recent Labs   Lab Test 08/17/20  1029 07/20/20  1149 06/08/20  1043   WBC 5.0 4.7 4.9   RBC 4.50 4.59 4.67   HGB 14.7 15.1 15.4   HCT 42.9 43.5 44.1   MCV 95 95 94   RDW 13.4 13.3 13.1    151 141*   MCH 32.7 32.9 33.0   MCHC 34.3 34.7 34.9   NEUTROPHIL 64.9 59.1 63.1   LYMPH 24.3 30.3 26.4   MONOCYTE 9.0 8.5 8.5   EOSINOPHIL 1.6 1.9 1.8   BASOPHIL 0.2 0.2 0.2   ANEU 3.3 2.8 3.1   ALYM 1.2 1.4 1.3   ERENDIRA 0.5 0.4 0.4   AEOS 0.1 0.1 0.1   ABAS 0.0 0.0 0.0     CMP  Recent Labs   Lab Test 08/17/20  1029 07/20/20  1149 06/08/20  1043  02/07/20  1428  11/13/19  1107  09/29/18  1040   NA  --   --   --   --  139  --  135  --  139   POTASSIUM  --   --   --   --  4.3  --  4.3  --  3.7   CHLORIDE  --   --   --   --  107  --  103  --  104   CO2  --   --   --   --  28  --  32  --  29   ANIONGAP  --   -- "   --   --  4  --  <1*  --  6   GLC  --   --   --   --  111*  --  98  --  96   BUN  --   --   --   --  15  --  11  --  13   CR 1.01 1.01 0.99   < > 0.99   < > 1.02   < > 1.07   GFRESTIMATED 76 76 77   < > 78   < > 75   < > 69   GFRESTBLACK 88 88 90   < > >90   < > 87   < > 83   JOHN  --   --   --   --  9.1  --  9.1  --  8.8   BILITOTAL 0.9 0.8 0.8   < >  --    < >  --    < >  --    ALBUMIN 3.7 3.8 3.8   < >  --    < >  --    < >  --    PROTTOTAL 6.5* 6.7* 6.8   < >  --    < >  --    < >  --    ALKPHOS 56 54 53   < >  --    < >  --    < >  --    AST 27 29 25   < >  --    < >  --    < >  --    ALT 39 43 41   < >  --    < >  --    < >  --     < > = values in this interval not displayed.     Calcium/VitaminD  Recent Labs   Lab Test 02/07/20  1428 11/13/19  1107 09/29/18  1040   JOHN 9.1 9.1 8.8     ESR/CRP  Recent Labs   Lab Test 08/17/20  1029 05/11/20  1026 02/10/20  1028   SED 7 8 5   CRP 3.9 14.3* <2.9     Hepatitis B  Recent Labs   Lab Test 05/16/18  1016   HBCAB Nonreactive   HEPBANG Nonreactive     Hepatitis C  Recent Labs   Lab Test 08/26/16  1213   HCVAB Nonreactive   Assay performance characteristics have not been established for newborns,   infants, and children         Immunization History     Immunization History   Administered Date(s) Administered     Influenza (High Dose) 3 valent vaccine 10/18/2017, 09/21/2018, 11/13/2019     Influenza (IIV3) PF 11/17/2005, 12/04/2006, 11/22/2010, 09/26/2012, 10/01/2014     Influenza Vaccine IM > 6 months Valent IIV4 10/15/2013, 12/17/2015, 08/26/2016     Pneumo Conj 13-V (2010&after) 10/18/2017     Pneumococcal 23 valent 01/22/2018     TDAP Vaccine (Adacel) 04/22/2009, 05/20/2018          Chart documentation done in part with Dragon Voice recognition Software. Although reviewed after completion, some word and grammatical error may remain.      Phone call start time: 12:45 PM  Phone call end time: 12:58 PM    This visit is equivalent to a 87902 visit    Location of patient:  home, in [place]  Location of provider: home    Follow up:  follow up appointment scheduled to be in Roger Lopez MD

## 2020-10-09 ENCOUNTER — IMMUNIZATION (OUTPATIENT)
Dept: FAMILY MEDICINE | Facility: CLINIC | Age: 70
End: 2020-10-09
Payer: COMMERCIAL

## 2020-10-09 PROCEDURE — G0008 ADMIN INFLUENZA VIRUS VAC: HCPCS

## 2020-10-09 PROCEDURE — 90662 IIV NO PRSV INCREASED AG IM: CPT

## 2020-11-16 ENCOUNTER — OFFICE VISIT (OUTPATIENT)
Dept: FAMILY MEDICINE | Facility: CLINIC | Age: 70
End: 2020-11-16
Payer: COMMERCIAL

## 2020-11-16 VITALS
RESPIRATION RATE: 18 BRPM | SYSTOLIC BLOOD PRESSURE: 120 MMHG | DIASTOLIC BLOOD PRESSURE: 70 MMHG | HEART RATE: 74 BPM | TEMPERATURE: 98.6 F | WEIGHT: 207 LBS | HEIGHT: 69 IN | BODY MASS INDEX: 30.66 KG/M2

## 2020-11-16 DIAGNOSIS — Z79.899 HIGH RISK MEDICATIONS (NOT ANTICOAGULANTS) LONG-TERM USE: ICD-10-CM

## 2020-11-16 DIAGNOSIS — Z00.00 ENCOUNTER FOR SUBSEQUENT ANNUAL WELLNESS VISIT IN MEDICARE PATIENT: Primary | ICD-10-CM

## 2020-11-16 DIAGNOSIS — L40.50 PSORIATIC ARTHROPATHY (H): ICD-10-CM

## 2020-11-16 DIAGNOSIS — Z12.5 SCREENING FOR PROSTATE CANCER: ICD-10-CM

## 2020-11-16 DIAGNOSIS — E78.5 HYPERLIPIDEMIA LDL GOAL <130: ICD-10-CM

## 2020-11-16 DIAGNOSIS — I10 ESSENTIAL HYPERTENSION WITH GOAL BLOOD PRESSURE LESS THAN 140/90: ICD-10-CM

## 2020-11-16 DIAGNOSIS — N40.1 BENIGN PROSTATIC HYPERPLASIA WITH NOCTURIA: ICD-10-CM

## 2020-11-16 DIAGNOSIS — R35.1 BENIGN PROSTATIC HYPERPLASIA WITH NOCTURIA: ICD-10-CM

## 2020-11-16 DIAGNOSIS — N52.9 IMPOTENCE OF ORGANIC ORIGIN: ICD-10-CM

## 2020-11-16 LAB
ALBUMIN SERPL-MCNC: 4 G/DL (ref 3.4–5)
ALP SERPL-CCNC: 58 U/L (ref 40–150)
ALT SERPL W P-5'-P-CCNC: 42 U/L (ref 0–70)
ANION GAP SERPL CALCULATED.3IONS-SCNC: 6 MMOL/L (ref 3–14)
AST SERPL W P-5'-P-CCNC: 32 U/L (ref 0–45)
BASOPHILS # BLD AUTO: 0 10E9/L (ref 0–0.2)
BASOPHILS NFR BLD AUTO: 0.2 %
BILIRUB DIRECT SERPL-MCNC: 0.2 MG/DL (ref 0–0.2)
BILIRUB SERPL-MCNC: 1 MG/DL (ref 0.2–1.3)
BUN SERPL-MCNC: 13 MG/DL (ref 7–30)
CALCIUM SERPL-MCNC: 9.5 MG/DL (ref 8.5–10.1)
CHLORIDE SERPL-SCNC: 105 MMOL/L (ref 94–109)
CHOLEST SERPL-MCNC: 177 MG/DL
CO2 SERPL-SCNC: 27 MMOL/L (ref 20–32)
CREAT SERPL-MCNC: 1.05 MG/DL (ref 0.66–1.25)
CREAT SERPL-MCNC: 1.06 MG/DL (ref 0.66–1.25)
CRP SERPL-MCNC: <2.9 MG/L (ref 0–8)
DIFFERENTIAL METHOD BLD: ABNORMAL
EOSINOPHIL # BLD AUTO: 0.1 10E9/L (ref 0–0.7)
EOSINOPHIL NFR BLD AUTO: 1.9 %
ERYTHROCYTE [DISTWIDTH] IN BLOOD BY AUTOMATED COUNT: 13.7 % (ref 10–15)
ERYTHROCYTE [SEDIMENTATION RATE] IN BLOOD BY WESTERGREN METHOD: 4 MM/H (ref 0–20)
GFR SERPL CREATININE-BSD FRML MDRD: 71 ML/MIN/{1.73_M2}
GFR SERPL CREATININE-BSD FRML MDRD: 72 ML/MIN/{1.73_M2}
GLUCOSE SERPL-MCNC: 107 MG/DL (ref 70–99)
HCT VFR BLD AUTO: 44.8 % (ref 40–53)
HDLC SERPL-MCNC: 62 MG/DL
HGB BLD-MCNC: 15.5 G/DL (ref 13.3–17.7)
LDLC SERPL CALC-MCNC: 76 MG/DL
LYMPHOCYTES # BLD AUTO: 0.9 10E9/L (ref 0.8–5.3)
LYMPHOCYTES NFR BLD AUTO: 16.2 %
MCH RBC QN AUTO: 32.9 PG (ref 26.5–33)
MCHC RBC AUTO-ENTMCNC: 34.6 G/DL (ref 31.5–36.5)
MCV RBC AUTO: 95 FL (ref 78–100)
MONOCYTES # BLD AUTO: 0.4 10E9/L (ref 0–1.3)
MONOCYTES NFR BLD AUTO: 7.9 %
NEUTROPHILS # BLD AUTO: 3.9 10E9/L (ref 1.6–8.3)
NEUTROPHILS NFR BLD AUTO: 73.8 %
NONHDLC SERPL-MCNC: 115 MG/DL
PLATELET # BLD AUTO: 146 10E9/L (ref 150–450)
POTASSIUM SERPL-SCNC: 4 MMOL/L (ref 3.4–5.3)
PROT SERPL-MCNC: 6.7 G/DL (ref 6.8–8.8)
PSA SERPL-ACNC: 1.71 UG/L (ref 0–4)
RBC # BLD AUTO: 4.71 10E12/L (ref 4.4–5.9)
SODIUM SERPL-SCNC: 138 MMOL/L (ref 133–144)
TRIGL SERPL-MCNC: 194 MG/DL
WBC # BLD AUTO: 5.3 10E9/L (ref 4–11)

## 2020-11-16 PROCEDURE — 99213 OFFICE O/P EST LOW 20 MIN: CPT | Mod: 25 | Performed by: FAMILY MEDICINE

## 2020-11-16 PROCEDURE — 80076 HEPATIC FUNCTION PANEL: CPT | Performed by: INTERNAL MEDICINE

## 2020-11-16 PROCEDURE — 82565 ASSAY OF CREATININE: CPT | Performed by: INTERNAL MEDICINE

## 2020-11-16 PROCEDURE — 36415 COLL VENOUS BLD VENIPUNCTURE: CPT | Performed by: FAMILY MEDICINE

## 2020-11-16 PROCEDURE — 80061 LIPID PANEL: CPT | Performed by: FAMILY MEDICINE

## 2020-11-16 PROCEDURE — 85652 RBC SED RATE AUTOMATED: CPT | Performed by: INTERNAL MEDICINE

## 2020-11-16 PROCEDURE — 99397 PER PM REEVAL EST PAT 65+ YR: CPT | Performed by: FAMILY MEDICINE

## 2020-11-16 PROCEDURE — G0103 PSA SCREENING: HCPCS | Performed by: FAMILY MEDICINE

## 2020-11-16 PROCEDURE — 86140 C-REACTIVE PROTEIN: CPT | Performed by: INTERNAL MEDICINE

## 2020-11-16 PROCEDURE — 80048 BASIC METABOLIC PNL TOTAL CA: CPT | Performed by: FAMILY MEDICINE

## 2020-11-16 PROCEDURE — 85025 COMPLETE CBC W/AUTO DIFF WBC: CPT | Performed by: INTERNAL MEDICINE

## 2020-11-16 RX ORDER — SIMVASTATIN 40 MG
40 TABLET ORAL AT BEDTIME
Qty: 90 TABLET | Refills: 3 | Status: SHIPPED | OUTPATIENT
Start: 2020-11-16 | End: 2021-12-10

## 2020-11-16 RX ORDER — TAMSULOSIN HYDROCHLORIDE 0.4 MG/1
0.4 CAPSULE ORAL DAILY
Qty: 90 CAPSULE | Refills: 3 | Status: SHIPPED | OUTPATIENT
Start: 2020-11-16 | End: 2021-12-10

## 2020-11-16 RX ORDER — LISINOPRIL 5 MG/1
5 TABLET ORAL DAILY
Qty: 90 TABLET | Refills: 3 | Status: SHIPPED | OUTPATIENT
Start: 2020-11-16 | End: 2021-11-30

## 2020-11-16 RX ORDER — SILDENAFIL CITRATE 20 MG/1
TABLET ORAL
Qty: 50 TABLET | Refills: 11 | Status: SHIPPED | OUTPATIENT
Start: 2020-11-16 | End: 2021-12-10

## 2020-11-16 ASSESSMENT — ENCOUNTER SYMPTOMS
HEMATURIA: 0
COUGH: 0
DIARRHEA: 0
ABDOMINAL PAIN: 0
CHILLS: 0
HEMATOCHEZIA: 0
CONSTIPATION: 0

## 2020-11-16 ASSESSMENT — ACTIVITIES OF DAILY LIVING (ADL): CURRENT_FUNCTION: NO ASSISTANCE NEEDED

## 2020-11-16 ASSESSMENT — MIFFLIN-ST. JEOR: SCORE: 1699.33

## 2020-11-16 NOTE — PROGRESS NOTES
"  SUBJECTIVE:   Pawan Bullard is a 70 year old male who presents for Preventive Visit.  Chief Complaint   Patient presents with     Physical      Needs labs for Rheumatology.  Some stomach pain related to methotrexate.  Arthritis has been doing well.  Fingers get stiff.     No specific health concerns.     Hypertension:Checks sometimes.  Usually 130/90.  No side effects from medications.      Hyperlipidemia:  Taking simvastatin.     BPH:taking tamsulosin.  Seems to be doing OK.  NocturiaX1.      Uses sildenafil which helps some.     Patient has been advised of split billing requirements and indicates understanding: Yes   Are you in the first 12 months of your Medicare coverage?  No    Healthy Habits:     In general, how would you rate your overall health?  Good    Frequency of exercise:  2-3 days/week    Duration of exercise:  30-45 minutes    Do you usually eat at least 4 servings of fruit and vegetables a day, include whole grains    & fiber and avoid regularly eating high fat or \"junk\" foods?  No    Taking medications regularly:  Yes    Medication side effects:  None    Ability to successfully perform activities of daily living:  No assistance needed    Home Safety:  No safety concerns identified    Hearing Impairment:  No hearing concerns    In the past 6 months, have you been bothered by leaking of urine?  No    In general, how would you rate your overall mental or emotional health?  Good      PHQ-2 Total Score: 0    Additional concerns today:  No  Exercise: yard work.      Do you feel safe in your environment? Yes    Have you ever done Advance Care Planning? (For example, a Health Directive, POLST, or a discussion with a medical provider or your loved ones about your wishes): No, advance care planning information given to patient to review.  Patient plans to discuss their wishes with loved ones or provider.      Fall risk  Fallen 2 or more times in the past year?: No  Any fall with injury in the past " year?: No    Cognitive Screening   1) Repeat 3 items (Leader, Season, Table)    2) Clock draw: NORMAL  3) 3 item recall: Recalls 3 objects  Results: 3 items recalled: COGNITIVE IMPAIRMENT LESS LIKELY    Mini-CogTM Copyright S Kylie. Licensed by the author for use in [facility]; reprinted with permission (santiago@[facility]). All rights reserved.      Do you have sleep apnea, excessive snoring or daytime drowsiness?: no    Social History     Tobacco Use     Smoking status: Never Smoker     Smokeless tobacco: Never Used   Substance Use Topics     Alcohol use: Yes     Comment:  0-2 beer's or mixed drink's monthly     If you drink alcohol do you typically have >3 drinks per day or >7 drinks per week? No    Alcohol Use 11/16/2020   Prescreen: >3 drinks/day or >7 drinks/week? Not Applicable   Prescreen: >3 drinks/day or >7 drinks/week? -     Current providers sharing in care for this patient include:   Patient Care Team:  Armando Finley MD as PCP - General (Family Practice)  Armando Finley MD as Assigned PCP    The following health maintenance items are reviewed in Epic and correct as of today:  Health Maintenance   Topic Date Due     ZOSTER IMMUNIZATION (2 of 3) 01/05/2015     AORTIC ANEURYSM SCREENING (SYSTEM ASSIGNED)  01/23/2017     FALL RISK ASSESSMENT  11/13/2020     MEDICARE ANNUAL WELLNESS VISIT  11/16/2021     COLORECTAL CANCER SCREENING  11/21/2021     LIPID  11/13/2024     ADVANCE CARE PLANNING  11/13/2024     DTAP/TDAP/TD IMMUNIZATION (3 - Td) 05/20/2028     HEPATITIS C SCREENING  Completed     PHQ-2  Completed     INFLUENZA VACCINE  Completed     Pneumococcal Vaccine: 65+ Years  Completed     Pneumococcal Vaccine: Pediatrics (0 to 5 Years) and At-Risk Patients (6 to 64 Years)  Aged Out     IPV IMMUNIZATION  Aged Out     MENINGITIS IMMUNIZATION  Aged Out     HEPATITIS B IMMUNIZATION  Aged Out     Patient Active Problem List    Diagnosis Date Noted     ROSADO (dyspnea on exertion) 03/04/2020      "Priority: Medium     3/4/2020: Some dyspnea on exertion.  He had normal chest x-ray.  His pulmonary function tests were normal.  Nuclear medicine stress test was normal.  albuteral inhaler seems to help.        High risk medications (not anticoagulants) long-term use 2015     Priority: Medium     Osteoarthritis 2011     Priority: Medium     HYPERLIPIDEMIA LDL GOAL <130 10/31/2010     Priority: Medium     Chondrocalcinosis 02/10/2010     Priority: Medium     Impotence of organic origin 2006     Priority: Medium     Esophageal reflux 2005     Priority: Medium     GERD       Hypertension goal BP (blood pressure) < 140/90 2005     Priority: Medium     Psoriatic arthropathy (H) 2005     Priority: Medium     Advanced directives, counseling/discussion 2013     Priority: Low     advanced care discussed form given to pt.           FAMILY HISTORY OF DIABETES MELLITUS 2006     Priority: Low     mother       FAMILY HISTORY OF GI NEOPLASM 2005     Priority: Low     father - colon CA ,  at  67          Family history:  CV disease: no  Prostate cancer: no  Colon cancer: father    Multivitamin or Vit D use: folic acid.  MVI     Vaccines:current     Past Colon cancer screenin    Review of Systems   Constitutional: Negative for chills.   HENT: Negative for congestion.    Respiratory: Negative for cough.    Cardiovascular: Negative for chest pain.   Gastrointestinal: Negative for abdominal pain, constipation, diarrhea and hematochezia.   Genitourinary: Negative for hematuria.     OBJECTIVE:                                                    OBJECTIVE:Blood pressure 120/70, pulse 74, temperature 98.6  F (37  C), temperature source Tympanic, resp. rate 18, height 1.753 m (5' 9\"), weight 93.9 kg (207 lb). BMI=Body mass index is 30.57 kg/m .  GENERAL APPEARANCE ADULT: Alert, no acute distress  EYES: PERRL, EOM normal, conjunctiva and lids normal  HENT: Ears and TMs normal, " oral mucosa and posterior oropharynx normal  NECK: No adenopathy,masses or thyromegaly  RESP: lungs clear to auscultation   CV: normal rate, regular rhythm, no murmur or gallop  ABDOMEN: soft, no organomegaly, masses or tenderness  MS: extremities normal, no peripheral edema    ASSESSMENT/PLAN:                                                    Lifestyle recommendations:regular exercise, at least 150 minutes per week (average 30 minutes 5 times a week)  weight loss recommended (ideal BMI-body mass index is <25)  The following exams/tests were recommended and discussed for health maintenance:  Colon cancer screening recommended next year  Prostate cancer screening-PSA test     (Z00.00) Encounter for subsequent annual wellness visit in Medicare patient  (primary encounter diagnosis)    (I10) Essential hypertension with goal blood pressure less than 140/90  Comment: doing well  Plan: lisinopril (ZESTRIL) 5 MG tablet, Basic         metabolic panel        No change in current treatment plan.   Refills for a year.     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  Plan: sildenafil (REVATIO) 20 MG tablet        Refills     (E78.5) Hyperlipidemia LDL goal <130  Comment: due for follow-up   Plan: simvastatin (ZOCOR) 40 MG tablet, Lipid panel         reflex to direct LDL Fasting        Fasting blood tests today.   Refills.     (N40.1,  R35.1) Benign prostatic hyperplasia with nocturia  Comment: stable  Plan: tamsulosin (FLOMAX) 0.4 MG capsule        Refills.  No change in current treatment plan.     (Z12.5) Screening for prostate cancer  Comment:   Plan: PSA, screen              Patient has been advised of split billing requirements and indicates understanding: Yes  COUNSELING:  Reviewed preventive health counseling, as reflected in patient instructions  Special attention given to:       Healthy diet/nutrition       Colon cancer screening       Prostate cancer screening    Estimated body mass index is  "30.57 kg/m  as calculated from the following:    Height as of this encounter: 1.753 m (5' 9\").    Weight as of this encounter: 93.9 kg (207 lb).    Weight management plan: Discussed healthy diet and exercise guidelines    He reports that he has never smoked. He has never used smokeless tobacco.      Appropriate preventive services were discussed with this patient, including applicable screening as appropriate for cardiovascular disease, diabetes, osteopenia/osteoporosis, and glaucoma.  As appropriate for age/gender, discussed screening for colorectal cancer, prostate cancer, breast cancer, and cervical cancer. Checklist reviewing preventive services available has been given to the patient.    Reviewed patients plan of care and provided an AVS. The Basic Care Plan (routine screening as documented in Health Maintenance) for Pawan meets the Care Plan requirement. This Care Plan has been established and reviewed with the Patient.    Counseling Resources:  ATP IV Guidelines  Pooled Cohorts Equation Calculator  Breast Cancer Risk Calculator  Breast Cancer: Medication to Reduce Risk  FRAX Risk Assessment  ICSI Preventive Guidelines  Dietary Guidelines for Americans, 2010  USDA's MyPlate  ASA Prophylaxis  Lung CA Screening    Armando Finley MD  [facility]    Identified Health Risks:  "

## 2020-11-16 NOTE — LETTER
November 20, 2020      Pawan Bullard  [address] [id number]        Dear ,    We are writing to inform you of your test results.    Triglycerides, a type of fat found in the bloodstream is high. Other lipids all good.   The blood chemistries (Basic metabolic panel) are all normal including electrolytes (salt balances in the blood) and kidney tests with the exception of glucose which is mildly increased in pre-diabetes range.       PSA test (for prostate cancer screening) is normal.   PLAN: No new changes in treatment recommended.     Resulted Orders   Lipid panel reflex to direct LDL Fasting   Result Value Ref Range    Cholesterol 177 <200 mg/dL    Triglycerides 194 (H) <150 mg/dL      Comment:      Borderline high:  150-199 mg/dl  High:             200-499 mg/dl  Very high:       >499 mg/dl  Fasting specimen      HDL Cholesterol 62 >39 mg/dL    LDL Cholesterol Calculated 76 <100 mg/dL      Comment:      Desirable:       <100 mg/dl    Non HDL Cholesterol 115 <130 mg/dL   Basic metabolic panel   Result Value Ref Range    Sodium 138 133 - 144 mmol/L    Potassium 4.0 3.4 - 5.3 mmol/L    Chloride 105 94 - 109 mmol/L    Carbon Dioxide 27 20 - 32 mmol/L    Anion Gap 6 3 - 14 mmol/L    Glucose 107 (H) 70 - 99 mg/dL      Comment:      Fasting specimen    Urea Nitrogen 13 7 - 30 mg/dL    Creatinine 1.05 0.66 - 1.25 mg/dL    GFR Estimate 72 >60 mL/min/[1.73_m2]      Comment:      Non  GFR Calc  Starting 12/18/2018, serum creatinine based estimated GFR (eGFR) will be   calculated using the Chronic Kidney Disease Epidemiology Collaboration   (CKD-EPI) equation.      GFR Estimate If Black 84 >60 mL/min/[1.73_m2]      Comment:       GFR Calc  Starting 12/18/2018, serum creatinine based estimated GFR (eGFR) will be   calculated using the Chronic Kidney Disease Epidemiology Collaboration   (CKD-EPI) equation.      Calcium 9.5 8.5 - 10.1 mg/dL   PSA, screen    Result Value Ref Range    PSA 1.71 0 - 4 ug/L      Comment:      Assay Method:  Chemiluminescence using Siemens Vista analyzer       If you have any questions or concerns, please call the clinic at the number listed above.       Sincerely,        Armando Finley MD/kb

## 2020-11-16 NOTE — NURSING NOTE
"Chief Complaint   Patient presents with     Physical       Initial /70   Pulse 74   Temp 98.6  F (37  C) (Tympanic)   Resp 18   Ht 1.753 m (5' 9\")   Wt 93.9 kg (207 lb)   BMI 30.57 kg/m   Estimated body mass index is 30.57 kg/m  as calculated from the following:    Height as of this encounter: 1.753 m (5' 9\").    Weight as of this encounter: 93.9 kg (207 lb).    Patient presents to the clinic using     Health Maintenance that is potentially due pending provider review:          Is there anyone who you would like to be able to receive your results?   If yes have patient fill out CURT    "

## 2020-11-16 NOTE — PATIENT INSTRUCTIONS
ASSESSMENT/PLAN:                                                    Lifestyle recommendations:regular exercise, at least 150 minutes per week (average 30 minutes 5 times a week)  weight loss recommended (ideal BMI-body mass index is <25)  The following exams/tests were recommended and discussed for health maintenance:  Colon cancer screening recommended next year  Prostate cancer screening-PSA test     (Z00.00) Encounter for subsequent annual wellness visit in Medicare patient  (primary encounter diagnosis)    (I10) Essential hypertension with goal blood pressure less than 140/90  Comment: doing well  Plan: lisinopril (ZESTRIL) 5 MG tablet, Basic         metabolic panel        No change in current treatment plan.   Refills for a year.     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  Plan: sildenafil (REVATIO) 20 MG tablet        Refills     (E78.5) Hyperlipidemia LDL goal <130  Comment: due for follow-up   Plan: simvastatin (ZOCOR) 40 MG tablet, Lipid panel         reflex to direct LDL Fasting        Fasting blood tests today.   Refills.     (N40.1,  R35.1) Benign prostatic hyperplasia with nocturia  Comment: stable  Plan: tamsulosin (FLOMAX) 0.4 MG capsule        Refills.  No change in current treatment plan.     (Z12.5) Screening for prostate cancer  Comment:   Plan: PSA, screen

## 2020-11-17 NOTE — RESULT ENCOUNTER NOTE
Please call.  Triglycerides, a type of fat found in the bloodstream is high.  Other lipids all good.   The blood chemistries (Basic metabolic panel) are all normal including electrolytes (salt balances in the blood) and kidney tests with the exception of glucose which is mildly increased in pre-diabetes range.      PSA test (for prostate cancer screening) is normal.   PLAN: No new changes in treatment recommended.

## 2020-12-07 ENCOUNTER — VIRTUAL VISIT (OUTPATIENT)
Dept: RHEUMATOLOGY | Facility: CLINIC | Age: 70
End: 2020-12-07
Payer: COMMERCIAL

## 2020-12-07 DIAGNOSIS — L40.50 PSORIATIC ARTHROPATHY (H): Primary | ICD-10-CM

## 2020-12-07 DIAGNOSIS — Z79.899 HIGH RISK MEDICATIONS (NOT ANTICOAGULANTS) LONG-TERM USE: ICD-10-CM

## 2020-12-07 PROCEDURE — 99213 OFFICE O/P EST LOW 20 MIN: CPT | Mod: 95 | Performed by: INTERNAL MEDICINE

## 2020-12-07 RX ORDER — METHOTREXATE 2.5 MG/1
20 TABLET ORAL WEEKLY
Qty: 96 TABLET | Refills: 1 | Status: SHIPPED | OUTPATIENT
Start: 2020-12-07 | End: 2021-03-09

## 2020-12-07 NOTE — PROGRESS NOTES
"Pawan Bullard is a 70 year old male who is being evaluated via a billable telephone visit.      The patient has been notified of following:     \"This telephone visit will be conducted via a call between you and your physician/provider. We have found that certain health care needs can be provided without the need for a physical exam.  This service lets us provide the care you need with a short phone conversation.  If a prescription is necessary we can send it directly to your pharmacy.  If lab work is needed we can place an order for that and you can then stop by our lab to have the test done at a later time.    Telephone visits are billed at different rates depending on your insurance coverage. During this emergency period, for some insurers they may be billed the same as an in-person visit.  Please reach out to your insurance provider with any questions.    If during the course of the call the physician/provider feels a telephone visit is not appropriate, you will not be charged for this service.\"    Patient has given verbal consent for Telephone visit?  Yes    What phone number would you like to be contacted at? [phone]    How would you like to obtain your AVS? Mail a copy    Rheumatology Telephone/Telehealth Visit      Pawna Bullard MRN# [id number]   YOB: 1950 Age: 70 year old      Date of visit: 12/07/20   PCP: Dr. Armando Finley    Chief Complaint   Patient presents with:  RECHECK: Doing okay overall, has mild stomach that occurred after methotrexate dose increase    Assessment and Plan     1.  Psoriatic arthritis: Synovitis when on MTX 20mg wkly; the dose was increased previously but he did not do so; then he held methotrexate due to concerns about COVID-19 and his arthritis worsened. Previously on SSZ (GI upset) Doing well now on methotrexate 25 mg once weekly.  However stomach upset the day after taking MTX so discussed reduced dose but worry about worsening symptoms that he " had previously on MTX 20mg wkly.  Discussed MTX SQ but he declined.  Discussed TNFi but he would like to see how he dose on lower MTX PO dose first.   - Reduce methotrexate to 20mg once every 7 days (4 tabs in the AM and 4 tabs in the PM)  - Continue folic acid 1 mg daily  - Labs every 3 months (next due in Feb): CBC, Creatinine, Hepatic Panel, ESR, CRP               Rapid 3, cumulative scores                      02/17/2020:  4.7 (MTX 20mg wkly)]                      11/18/2019:  3.3 (MTX 20mg wkly)                      05/20/2019:  Refused by patient                      11/26/2018:  5.2 (MTX 17.5mg wkly)    # Relevant labs and imaging were reviewed with the patient    # High risk medication toxicity monitoring: discussion and labs reviewed; appropriate labs ordered. See above.  Instructed that if confirmed to have COVID-19 or exposure to someone with confirmed COVID-19 to call this clinic for directions on DMARD management.    # Note that this is a virtual visit to reduce the risk of COVID-19 exposure during this current pandemic.      # Considered to be at high risk of complications from the COVID-19 virus.  It is recommended to limit contact with other people and if possible to work remotely or provide a leave of absence to reduce the risk for COVID-19.      Mr. Bullard verbalized agreement with and understanding of the rational for the diagnosis and treatment plan.  All questions were answered to best of my ability and the patient's satisfaction. Mr. Bullard was advised to contact the clinic with any questions that may arise after the clinic visit.      Thank you for involving me in the care of the patient    Return to clinic: 3 months      HPI   Pawan Bullard is a 70 year old male with a past medical history significant for hypertension, hyperlipidemia, chondrocalcinosis, GERD, osteoarthritis, and psoriatic arthritis who presents for follow-up of psoriatic arthritis.      Today, Mr. Bullard reports that  the arthritis is well controlled on MTX 25mg PO wkly but having stomach upset for the day after. Taking MTX 5 tabs in the AM and 5 tabs in the PM.  No joint pain/swelling/stiffness. No gelling.     Denies fevers, chills, nausea, vomiting, constipation, diarrhea. No abdominal pain. No chest pain/pressure, palpitations, or shortness of breath. No sicca symptoms.     Tobacco: None  EtOH: 0-2 beers or mixed drinks monthly  Drugs: None    ROS   GEN: No fevers, chills, night sweats, or weight change  SKIN: See HPI  HEENT: No oral or nasal ulcers.  CV: No chest pain, pressure, palpitations, or dyspnea on exertion; see HPI  PULM: No SOB, wheeze, cough; see HPI  GI: No nausea, vomiting, constipation, diarrhea. No blood in stool. No abdominal pain.  MSK: See HPI.  NEURO: No numbness, tingling, or weakness.  EXT: No LE swelling  PSYCH: Negative    Active Problem List     Patient Active Problem List   Diagnosis     Psoriatic arthropathy (H)     Esophageal reflux     Hypertension goal BP (blood pressure) < 140/90     FAMILY HISTORY OF GI NEOPLASM     FAMILY HISTORY OF DIABETES MELLITUS     Impotence of organic origin     Chondrocalcinosis     HYPERLIPIDEMIA LDL GOAL <130     Osteoarthritis     Advanced directives, counseling/discussion     High risk medications (not anticoagulants) long-term use     ROSADO (dyspnea on exertion)     Past Medical History     Past Medical History:   Diagnosis Date     Esophageal reflux     GERD     Hypertension      Psoriatic arthropathy (H)     Psoriatic arthritis     Past Surgical History     Past Surgical History:   Procedure Laterality Date     ARTHROSCOPY KNEE RT/LT  April 2009    left knee     ARTHROSCOPY KNEE WITH MEDIAL MENISCECTOMY  7/9/2013    Procedure: ARTHROSCOPY KNEE WITH MEDIAL MENISCECTOMY;  Left Knee Arthroscopic and Open Repair of Patellar Tendon with Platelet Rich Plasma;  Surgeon: Ley, Jeffrey Duane, MD;  Location: [facility]     COLONOSCOPY  Jan. 2006    diverticuli. Father had colon  [place]     COLONOSCOPY  2001     COLONOSCOPY  4/11/2011    Procedure:COLONOSCOPY; Surgeon:JENNIFER JEAN; Location:[facility]     COLONOSCOPY N/A 11/21/2016    Procedure: COLONOSCOPY;  Surgeon: Elias Santamaria MD;  Location: [facility]     REPAIR TENDON PATELLA  7/9/2013    Procedure: REPAIR TENDON PATELLA;;  Surgeon: Ley, Jeffrey Duane, MD;  Location: [facility]     SURGICAL HISTORY OF -   1999    (L) Herniorrhaphy     SURGICAL HISTORY OF -   2003    (R) Herniorrhaphy     SURGICAL HISTORY OF -   1981    Hemorrohid     Allergy     Allergies   Allergen Reactions     Acetaminophen W/Codeine Nausea     Current Medication List     Current Outpatient Medications   Medication Sig     albuterol (PROAIR HFA/PROVENTIL HFA/VENTOLIN HFA) 108 (90 Base) MCG/ACT inhaler Inhale 2 puffs into the lungs every 4 hours as needed for shortness of breath / dyspnea or wheezing     Black Pepper-Turmeric (TURMERIC CURCUMIN) 5-1000 MG CAPS Take 1 capsule by mouth daily     folic acid (FOLVITE) 1 MG tablet Take 1 tablet (1 mg) by mouth daily     lisinopril (ZESTRIL) 5 MG tablet Take 1 tablet (5 mg) by mouth daily     methotrexate sodium 2.5 MG TABS Take 10 tablets (25 mg) by mouth once a week . Take all 5 tablets in the AM and 5 tablets in the PM on the same day of each week     MULTI-VITAMIN OR TABS Take 1 tablet by mouth daily      Probiotic Product (PROBIOTIC DAILY PO) Take 1 capsule by mouth daily      sildenafil (REVATIO) 20 MG tablet Take as many pills as needed up to maximum of 5 pills at a time prior to intercourse.     simvastatin (ZOCOR) 40 MG tablet Take 1 tablet (40 mg) by mouth At Bedtime     tamsulosin (FLOMAX) 0.4 MG capsule Take 1 capsule (0.4 mg) by mouth daily     No current facility-administered medications for this visit.          Social History   See HPI    Family History     Family History   Problem Relation Age of Onset     Hypertension Mother      Diabetes Mother      Cerebrovascular Disease Mother         in her 70s     Arthritis  "Mother      Cancer - colorectal Father 67     Arthritis Paternal Grandmother      LUNG DISEASE Sister      Other - See Comments Sister      Prostate Cancer No family hx of      Coronary Artery Disease No family hx of        Physical Exam     Temp Readings from Last 3 Encounters:   11/16/20 98.6  F (37  C) (Tympanic)   02/14/20 97.3  F (36.3  C) (Tympanic)   02/07/20 97.2  F (36.2  C) (Temporal)     BP Readings from Last 5 Encounters:   11/16/20 120/70   02/17/20 139/76   02/14/20 138/78   02/07/20 130/74   11/18/19 132/82     Pulse Readings from Last 1 Encounters:   11/16/20 74     Resp Readings from Last 1 Encounters:   11/16/20 18     Estimated body mass index is 30.57 kg/m  as calculated from the following:    Height as of 11/16/20: 1.753 m (5' 9\").    Weight as of 11/16/20: 93.9 kg (207 lb).    Telephone Visit     Labs / Imaging (select studies)     CBC  Recent Labs   Lab Test 11/16/20 0903 08/17/20  1029 07/20/20  1149   WBC 5.3 5.0 4.7   RBC 4.71 4.50 4.59   HGB 15.5 14.7 15.1   HCT 44.8 42.9 43.5   MCV 95 95 95   RDW 13.7 13.4 13.3   * 154 151   MCH 32.9 32.7 32.9   MCHC 34.6 34.3 34.7   NEUTROPHIL 73.8 64.9 59.1   LYMPH 16.2 24.3 30.3   MONOCYTE 7.9 9.0 8.5   EOSINOPHIL 1.9 1.6 1.9   BASOPHIL 0.2 0.2 0.2   ANEU 3.9 3.3 2.8   ALYM 0.9 1.2 1.4   ERENDIRA 0.4 0.5 0.4   AEOS 0.1 0.1 0.1   ABAS 0.0 0.0 0.0     CMP  Recent Labs   Lab Test 11/16/20  0903 08/17/20  1029 07/20/20  1149 02/07/20  1428 02/07/20  1428 11/13/19  1107 11/13/19  1107     --   --   --  139  --  135   POTASSIUM 4.0  --   --   --  4.3  --  4.3   CHLORIDE 105  --   --   --  107  --  103   CO2 27  --   --   --  28  --  32   ANIONGAP 6  --   --   --  4  --  <1*   *  --   --   --  111*  --  98   BUN 13  --   --   --  15  --  11   CR 1.06  1.05 1.01 1.01   < > 0.99   < > 1.02   GFRESTIMATED 71  72 76 76   < > 78   < > 75   GFRESTBLACK 83  84 88 88   < > >90   < > 87   JOHN 9.5  --   --   --  9.1  --  9.1   BILITOTAL 1.0 0.9 0.8  "  < >  --    < >  --    ALBUMIN 4.0 3.7 3.8   < >  --    < >  --    PROTTOTAL 6.7* 6.5* 6.7*   < >  --    < >  --    ALKPHOS 58 56 54   < >  --    < >  --    AST 32 27 29   < >  --    < >  --    ALT 42 39 43   < >  --    < >  --     < > = values in this interval not displayed.     Calcium/VitaminD  Recent Labs   Lab Test 11/16/20  0903 02/07/20  1428 11/13/19  1107   JOHN 9.5 9.1 9.1     ESR/CRP  Recent Labs   Lab Test 11/16/20  0903 08/17/20  1029 05/11/20  1026   SED 4 7 8   CRP <2.9 3.9 14.3*     Lipid Panel  Recent Labs   Lab Test 11/16/20  0903 11/13/19  1107 11/19/18  1318 05/29/15  1204 05/29/15  1204 04/29/14  1102   CHOL 177 161 207*   < > 168 175   TRIG 194* 153* 363*   < > 293* 165*   HDL 62 58 50   < > 54 47   LDL 76 72 84   < > 55 95   VLDL  --   --   --   --  59* 33*   CHOLHDLRATIO  --   --   --   --  3.1 4.0   NHDL 115 103 157*   < >  --   --     < > = values in this interval not displayed.     Hepatitis B  Recent Labs   Lab Test 05/16/18  1016   HBCAB Nonreactive   HEPBANG Nonreactive     Hepatitis C  Recent Labs   Lab Test 08/26/16  1213   HCVAB Nonreactive   Assay performance characteristics have not been established for newborns,   infants, and children       Immunization History     Immunization History   Administered Date(s) Administered     Influenza (High Dose) 3 valent vaccine 10/18/2017, 09/21/2018, 11/13/2019     Influenza (IIV3) PF 11/17/2005, 12/04/2006, 11/22/2010, 09/26/2012, 10/01/2014     Influenza Vaccine IM > 6 months Valent IIV4 10/15/2013, 12/17/2015, 08/26/2016     Influenza, Quad, High Dose, Pf, 65yr + 10/09/2020     Pneumo Conj 13-V (2010&after) 10/18/2017     Pneumococcal 23 valent 01/22/2018     TDAP Vaccine (Adacel) 04/22/2009, 05/20/2018          Chart documentation done in part with Dragon Voice recognition Software. Although reviewed after completion, some word and grammatical error may remain.      Phone call start time: 2:10 PM  Phone call end time: 2:25 PM    This visit  is equivalent to a 56131 visit    Location of patient: [place], [place]   Location of provider: home    Follow up:  follow up appointment scheduled to be in March    Adria Lopez MD

## 2021-01-07 ENCOUNTER — IMPORTED ENCOUNTER (OUTPATIENT)
Dept: URBAN - METROPOLITAN AREA CLINIC 50 | Facility: CLINIC | Age: 71
End: 2021-01-07

## 2021-02-26 DIAGNOSIS — Z79.899 HIGH RISK MEDICATIONS (NOT ANTICOAGULANTS) LONG-TERM USE: ICD-10-CM

## 2021-02-26 DIAGNOSIS — L40.50 PSORIATIC ARTHROPATHY (H): ICD-10-CM

## 2021-02-26 LAB
ALBUMIN SERPL-MCNC: 3.8 G/DL (ref 3.4–5)
ALP SERPL-CCNC: 58 U/L (ref 40–150)
ALT SERPL W P-5'-P-CCNC: 58 U/L (ref 0–70)
AST SERPL W P-5'-P-CCNC: 39 U/L (ref 0–45)
BASOPHILS # BLD AUTO: 0 10E9/L (ref 0–0.2)
BASOPHILS NFR BLD AUTO: 0.2 %
BILIRUB DIRECT SERPL-MCNC: 0.2 MG/DL (ref 0–0.2)
BILIRUB SERPL-MCNC: 0.8 MG/DL (ref 0.2–1.3)
CREAT SERPL-MCNC: 0.96 MG/DL (ref 0.66–1.25)
CRP SERPL-MCNC: <2.9 MG/L (ref 0–8)
DIFFERENTIAL METHOD BLD: ABNORMAL
EOSINOPHIL # BLD AUTO: 0.1 10E9/L (ref 0–0.7)
EOSINOPHIL NFR BLD AUTO: 2.5 %
ERYTHROCYTE [DISTWIDTH] IN BLOOD BY AUTOMATED COUNT: 13 % (ref 10–15)
ERYTHROCYTE [SEDIMENTATION RATE] IN BLOOD BY WESTERGREN METHOD: 5 MM/H (ref 0–20)
GFR SERPL CREATININE-BSD FRML MDRD: 81 ML/MIN/{1.73_M2}
HCT VFR BLD AUTO: 41.9 % (ref 40–53)
HGB BLD-MCNC: 14.6 G/DL (ref 13.3–17.7)
LYMPHOCYTES # BLD AUTO: 1.3 10E9/L (ref 0.8–5.3)
LYMPHOCYTES NFR BLD AUTO: 28.6 %
MCH RBC QN AUTO: 33.5 PG (ref 26.5–33)
MCHC RBC AUTO-ENTMCNC: 34.8 G/DL (ref 31.5–36.5)
MCV RBC AUTO: 96 FL (ref 78–100)
MONOCYTES # BLD AUTO: 0.2 10E9/L (ref 0–1.3)
MONOCYTES NFR BLD AUTO: 4.6 %
NEUTROPHILS # BLD AUTO: 2.8 10E9/L (ref 1.6–8.3)
NEUTROPHILS NFR BLD AUTO: 64.1 %
PLATELET # BLD AUTO: 154 10E9/L (ref 150–450)
PROT SERPL-MCNC: 6.7 G/DL (ref 6.8–8.8)
RBC # BLD AUTO: 4.36 10E12/L (ref 4.4–5.9)
WBC # BLD AUTO: 4.4 10E9/L (ref 4–11)

## 2021-02-26 PROCEDURE — 36415 COLL VENOUS BLD VENIPUNCTURE: CPT | Performed by: INTERNAL MEDICINE

## 2021-02-26 PROCEDURE — 85652 RBC SED RATE AUTOMATED: CPT | Performed by: INTERNAL MEDICINE

## 2021-02-26 PROCEDURE — 82565 ASSAY OF CREATININE: CPT | Performed by: INTERNAL MEDICINE

## 2021-02-26 PROCEDURE — 85025 COMPLETE CBC W/AUTO DIFF WBC: CPT | Performed by: INTERNAL MEDICINE

## 2021-02-26 PROCEDURE — 86140 C-REACTIVE PROTEIN: CPT | Performed by: INTERNAL MEDICINE

## 2021-02-26 PROCEDURE — 80076 HEPATIC FUNCTION PANEL: CPT | Performed by: INTERNAL MEDICINE

## 2021-03-09 ENCOUNTER — VIRTUAL VISIT (OUTPATIENT)
Dept: RHEUMATOLOGY | Facility: CLINIC | Age: 71
End: 2021-03-09
Payer: COMMERCIAL

## 2021-03-09 DIAGNOSIS — L40.50 PSORIATIC ARTHROPATHY (H): Primary | ICD-10-CM

## 2021-03-09 DIAGNOSIS — Z79.899 HIGH RISK MEDICATIONS (NOT ANTICOAGULANTS) LONG-TERM USE: ICD-10-CM

## 2021-03-09 PROCEDURE — 99214 OFFICE O/P EST MOD 30 MIN: CPT | Mod: 95 | Performed by: INTERNAL MEDICINE

## 2021-03-09 RX ORDER — FOLIC ACID 1 MG/1
1 TABLET ORAL DAILY
Qty: 100 TABLET | Refills: 3 | Status: SHIPPED | OUTPATIENT
Start: 2021-03-09 | End: 2021-12-17

## 2021-03-09 RX ORDER — METHOTREXATE 2.5 MG/1
20 TABLET ORAL WEEKLY
Qty: 104 TABLET | Refills: 1 | Status: SHIPPED | OUTPATIENT
Start: 2021-03-09 | End: 2021-06-18

## 2021-03-09 NOTE — PROGRESS NOTES
Pawan Bullard is a 71 year old year old male who is being evaluated via a billable telephone visit.      What phone number would you like to be contacted at? [phone]  How would you like to obtain your AVS? Mail a copy      Rheumatology Telephone/Telehealth Visit      Pawan Bullard MRN# [id number]   YOB: 1950 Age: 71 year old      Date of visit: 3/09/21   PCP: Dr. Armando Finley    Chief Complaint   Patient presents with:  Psoriatic arthropathy    Assessment and Plan     1.  Psoriatic arthritis: Synovitis when on MTX 20mg wkly; the dose was increased previously but he did not do so; then he held methotrexate due to concerns about COVID-19 and his arthritis worsened. Previously on SSZ (GI upset).  Doing well now on methotrexate 20 mg once weekly (GI upset with 25mg once weekly).  Chronic illness, stable.     - Continue methotrexate 20mg once every 7 days (4 tabs in the AM and 4 tabs in the PM)  - Continue folic acid 1 mg daily  - Labs every 3 months (next due in Feb): CBC, Creatinine, Hepatic Panel, ESR, CRP               Rapid 3, cumulative scores                      02/17/2020:  4.7 (MTX 20mg wkly)]                      11/18/2019:  3.3 (MTX 20mg wkly)                      05/20/2019:  Refused by patient                      11/26/2018:  5.2 (MTX 17.5mg wkly)    High risk medication requiring intensive toxicity monitoring at least quarterly: labs ordered include CBC, Creatinine, Hepatic panel to monitor for cytopenia and hepatotoxicity; checking creatinine as it affects clearance of medication.       #Status-post single dose Polo & Polo COVID-19 vaccine on 3/4/2021 per patient report.  In the [place] immunization information connection it is documented that he received the Moderna COVID-19 vaccine on 3/2/2021.  I advised that he call the site that he had the vaccination completed to ensure that he did receive the Polo & Polo vaccine; if he actually received the Moderna vaccine  then he will need to return for a second dose.  He says that he will call to have this record corrected.  Reportedly his vaccine card states that he received the Evan vaccine ([employer] being a [employer])    # This is a virtual visit to reduce the risk of COVID-19 exposure during this current pandemic.      Total minutes spent in evaluation with patient, documentation, , and review of pertinent studies and chart notes: 16      Mr. Bullard verbalized agreement with and understanding of the rational for the diagnosis and treatment plan.  All questions were answered to best of my ability and the patient's satisfaction. Mr. Bullard was advised to contact the clinic with any questions that may arise after the clinic visit.      Thank you for involving me in the care of the patient    Return to clinic: 3 months      HPI   Pawan Bullard is a 71 year old male with a past medical history significant for hypertension, hyperlipidemia, chondrocalcinosis, GERD, osteoarthritis, and psoriatic arthritis who presents for follow-up of psoriatic arthritis.      Today, 3/9/2021: joint ache only with rapid weather changes but otherwise okay.  No worsening of joint symptoms with MTX dose reduction.  GI upset improved with MTX dose reduction.  Reports having had the 1 dose Polo & Polo COVID-19 vaccine on 3/4/2021.  Methotrexate was then taken a few days later on his regular once weekly dosing day.    Denies fevers, chills, nausea, vomiting, constipation, diarrhea. No abdominal pain. No chest pain/pressure, palpitations, or shortness of breath. No sicca symptoms.     Tobacco: None  EtOH: 0-2 beers or mixed drinks monthly  Drugs: None    ROS   12 point review of system was completed and negative except as noted in the HPI     Active Problem List     Patient Active Problem List   Diagnosis     Psoriatic arthropathy (H)     Esophageal reflux     Hypertension goal BP (blood pressure) < 140/90     FAMILY  HISTORY OF GI NEOPLASM     FAMILY HISTORY OF DIABETES MELLITUS     Impotence of organic origin     Chondrocalcinosis     HYPERLIPIDEMIA LDL GOAL <130     Osteoarthritis     Advanced directives, counseling/discussion     High risk medications (not anticoagulants) long-term use     ROSADO (dyspnea on exertion)     Past Medical History     Past Medical History:   Diagnosis Date     Esophageal reflux     GERD     Hypertension      Psoriatic arthropathy (H)     Psoriatic arthritis     Past Surgical History     Past Surgical History:   Procedure Laterality Date     ARTHROSCOPY KNEE RT/LT  April 2009    left knee     ARTHROSCOPY KNEE WITH MEDIAL MENISCECTOMY  7/9/2013    Procedure: ARTHROSCOPY KNEE WITH MEDIAL MENISCECTOMY;  Left Knee Arthroscopic and Open Repair of Patellar Tendon with Platelet Rich Plasma;  Surgeon: Ley, Jeffrey Duane, MD;  Location: [facility]     COLONOSCOPY  Jan. 2006    diverticuli. Father had colon CA     COLONOSCOPY  2001     COLONOSCOPY  4/11/2011    Procedure:COLONOSCOPY; Surgeon:JENNIFER JEAN; Location:[facility]     COLONOSCOPY N/A 11/21/2016    Procedure: COLONOSCOPY;  Surgeon: Elias Santamaria MD;  Location: [facility]     REPAIR TENDON PATELLA  7/9/2013    Procedure: REPAIR TENDON PATELLA;;  Surgeon: Ley, Jeffrey Duane, MD;  Location: [facility]     SURGICAL HISTORY OF -   1999    (L) Herniorrhaphy     SURGICAL HISTORY OF -   2003    (R) Herniorrhaphy     SURGICAL HISTORY OF -   1981    Hemorrohid     Allergy     Allergies   Allergen Reactions     Acetaminophen W/Codeine Nausea     Current Medication List     Current Outpatient Medications   Medication Sig     albuterol (PROAIR HFA/PROVENTIL HFA/VENTOLIN HFA) 108 (90 Base) MCG/ACT inhaler Inhale 2 puffs into the lungs every 4 hours as needed for shortness of breath / dyspnea or wheezing     Black Pepper-Turmeric (TURMERIC CURCUMIN) 5-1000 MG CAPS Take 1 capsule by mouth daily     folic acid (FOLVITE) 1 MG tablet Take 1 tablet (1 mg) by mouth daily      "lisinopril (ZESTRIL) 5 MG tablet Take 1 tablet (5 mg) by mouth daily     methotrexate sodium 2.5 MG TABS Take 8 tablets (20 mg) by mouth once a week . Take all 4 tablets in the AM and 4 tablets in the PM on the same day of each week     MULTI-VITAMIN OR TABS Take 1 tablet by mouth daily      Probiotic Product (PROBIOTIC DAILY PO) Take 1 capsule by mouth daily      sildenafil (REVATIO) 20 MG tablet Take as many pills as needed up to maximum of 5 pills at a time prior to intercourse.     simvastatin (ZOCOR) 40 MG tablet Take 1 tablet (40 mg) by mouth At Bedtime     tamsulosin (FLOMAX) 0.4 MG capsule Take 1 capsule (0.4 mg) by mouth daily     No current facility-administered medications for this visit.          Social History   See HPI    Family History     Family History   Problem Relation Age of Onset     Hypertension Mother      Diabetes Mother      Cerebrovascular Disease Mother         in her 70s     Arthritis Mother      Cancer - colorectal Father 67     Arthritis Paternal Grandmother      LUNG DISEASE Sister      Other - See Comments Sister      Prostate Cancer No family hx of      Coronary Artery Disease No family hx of        Physical Exam     Temp Readings from Last 3 Encounters:   11/16/20 98.6  F (37  C) (Tympanic)   02/14/20 97.3  F (36.3  C) (Tympanic)   02/07/20 97.2  F (36.2  C) (Temporal)     BP Readings from Last 5 Encounters:   11/16/20 120/70   02/17/20 139/76   02/14/20 138/78   02/07/20 130/74   11/18/19 132/82     Pulse Readings from Last 1 Encounters:   11/16/20 74     Resp Readings from Last 1 Encounters:   11/16/20 18     Estimated body mass index is 30.57 kg/m  as calculated from the following:    Height as of 11/16/20: 1.753 m (5' 9\").    Weight as of 11/16/20: 93.9 kg (207 lb).    GEN: alert and no distress  PSYCH: Alert; coherent speech, normal rate and volume, able to articulate logical thoughts, able   to abstract reason, no tangential thoughts. Normal affect.   RESP: No cough, no " audible wheezing, able to talk in full sentences  Remainder of exam unable to be completed due to telephone visits      Labs / Imaging (select studies)       CBC  Recent Labs   Lab Test 02/26/21  1012 11/16/20  0903 08/17/20  1029   WBC 4.4 5.3 5.0   RBC 4.36* 4.71 4.50   HGB 14.6 15.5 14.7   HCT 41.9 44.8 42.9   MCV 96 95 95   RDW 13.0 13.7 13.4    146* 154   MCH 33.5* 32.9 32.7   MCHC 34.8 34.6 34.3   NEUTROPHIL 64.1 73.8 64.9   LYMPH 28.6 16.2 24.3   MONOCYTE 4.6 7.9 9.0   EOSINOPHIL 2.5 1.9 1.6   BASOPHIL 0.2 0.2 0.2   ANEU 2.8 3.9 3.3   ALYM 1.3 0.9 1.2   ERENDIRA 0.2 0.4 0.5   AEOS 0.1 0.1 0.1   ABAS 0.0 0.0 0.0     CMP  Recent Labs   Lab Test 02/26/21  1012 11/16/20  0903 08/17/20  1029 02/07/20  1428 02/07/20  1428 11/13/19  1107 11/13/19  1107   NA  --  138  --   --  139  --  135   POTASSIUM  --  4.0  --   --  4.3  --  4.3   CHLORIDE  --  105  --   --  107  --  103   CO2  --  27  --   --  28  --  32   ANIONGAP  --  6  --   --  4  --  <1*   GLC  --  107*  --   --  111*  --  98   BUN  --  13  --   --  15  --  11   CR 0.96 1.06  1.05 1.01   < > 0.99   < > 1.02   GFRESTIMATED 81 71  72 76   < > 78   < > 75   GFRESTBLACK >90 83  84 88   < > >90   < > 87   JOHN  --  9.5  --   --  9.1  --  9.1   BILITOTAL 0.8 1.0 0.9   < >  --    < >  --    ALBUMIN 3.8 4.0 3.7   < >  --    < >  --    PROTTOTAL 6.7* 6.7* 6.5*   < >  --    < >  --    ALKPHOS 58 58 56   < >  --    < >  --    AST 39 32 27   < >  --    < >  --    ALT 58 42 39   < >  --    < >  --     < > = values in this interval not displayed.     Calcium/VitaminD  Recent Labs   Lab Test 11/16/20  0903 02/07/20  1428 11/13/19  1107   JOHN 9.5 9.1 9.1     ESR/CRP  Recent Labs   Lab Test 02/26/21  1012 11/16/20  0903 08/17/20  1029   SED 5 4 7   CRP <2.9 <2.9 3.9     Lipid Panel  Recent Labs   Lab Test 11/16/20  0903 11/13/19  1107 11/19/18  1318 05/29/15  1204 05/29/15  1204 04/29/14  1102   CHOL 177 161 207*   < > 168 175   TRIG 194* 153* 363*   < > 293* 165*    HDL 62 58 50   < > 54 47   LDL 76 72 84   < > 55 95   VLDL  --   --   --   --  59* 33*   CHOLHDLRATIO  --   --   --   --  3.1 4.0   NHDL 115 103 157*   < >  --   --     < > = values in this interval not displayed.     Hepatitis B  Recent Labs   Lab Test 05/16/18  1016   HBCAB Nonreactive   HEPBANG Nonreactive     Hepatitis C  Recent Labs   Lab Test 08/26/16  1213   HCVAB Nonreactive   Assay performance characteristics have not been established for newborns,   infants, and children         Immunization History     Immunization History   Administered Date(s) Administered     Influenza (High Dose) 3 valent vaccine 10/18/2017, 09/21/2018, 11/13/2019     Influenza (IIV3) PF 11/17/2005, 12/04/2006, 11/22/2010, 09/26/2012, 10/01/2014     Influenza Vaccine IM > 6 months Valent IIV4 10/15/2013, 12/17/2015, 08/26/2016     Influenza, Quad, High Dose, Pf, 65yr + 10/09/2020     Pneumo Conj 13-V (2010&after) 10/18/2017     Pneumococcal 23 valent 01/22/2018     TDAP Vaccine (Adacel) 04/22/2009, 05/20/2018          Chart documentation done in part with Dragon Voice recognition Software. Although reviewed after completion, some word and grammatical error may remain.    Phone call duration with patient (in minutes): 12    Location of patient: [place], [place]   Location of provider: [place]    Adria Lopez MD

## 2021-03-09 NOTE — PATIENT INSTRUCTIONS
RHEUMATOLOGY    Dr. Adria Lopez    [facility]  [address] [place] [address]    Our new phone number for Rheumatology is [phone], this number will be able to help you schedule appointments for Dr. Lopez or if you have any message you would like sent to us.    Thank you for choosing [facility]!    Francie Cuellar [facility] Rheumatology

## 2021-04-17 ASSESSMENT — PACHYMETRY
OD_CT_UM: 598
OS_CT_UM: 585
OD_CT_UM: 598
OD_CT_UM: 598

## 2021-04-17 ASSESSMENT — VISUAL ACUITY
OS_OTHER: 20/30. 20/30.
OD_CC: J1+-2
OS_BAT: 20/100
OS_CC: 20/25-
OD_OTHER: 20/25. 20/25.
OD_OTHER: 20/80. 20/100.
OS_OTHER: 20/100. 20/200.
OS_CC: J1+-2
OD_BAT: 20/80
OD_BAT: 20/25
OS_BAT: 20/30
OD_CC: 20/25-
OS_CC: 20/25-2
OD_CC: 20/25-

## 2021-04-17 ASSESSMENT — TONOMETRY
OD_IOP_MMHG: 11
OS_IOP_MMHG: 13
OD_IOP_MMHG: 14
OD_IOP_MMHG: 14
OS_IOP_MMHG: 15
OS_IOP_MMHG: 14

## 2021-06-11 DIAGNOSIS — L40.50 PSORIATIC ARTHROPATHY (H): ICD-10-CM

## 2021-06-11 DIAGNOSIS — Z79.899 HIGH RISK MEDICATIONS (NOT ANTICOAGULANTS) LONG-TERM USE: ICD-10-CM

## 2021-06-11 LAB
ALBUMIN SERPL-MCNC: 3.6 G/DL (ref 3.4–5)
ALP SERPL-CCNC: 51 U/L (ref 40–150)
ALT SERPL W P-5'-P-CCNC: 41 U/L (ref 0–70)
AST SERPL W P-5'-P-CCNC: 26 U/L (ref 0–45)
BASOPHILS # BLD AUTO: 0 10E9/L (ref 0–0.2)
BASOPHILS NFR BLD AUTO: 0.2 %
BILIRUB DIRECT SERPL-MCNC: 0.2 MG/DL (ref 0–0.2)
BILIRUB SERPL-MCNC: 0.7 MG/DL (ref 0.2–1.3)
CREAT SERPL-MCNC: 1.07 MG/DL (ref 0.66–1.25)
CRP SERPL-MCNC: <2.9 MG/L (ref 0–8)
DIFFERENTIAL METHOD BLD: ABNORMAL
EOSINOPHIL # BLD AUTO: 0.1 10E9/L (ref 0–0.7)
EOSINOPHIL NFR BLD AUTO: 2.4 %
ERYTHROCYTE [DISTWIDTH] IN BLOOD BY AUTOMATED COUNT: 13.4 % (ref 10–15)
ERYTHROCYTE [SEDIMENTATION RATE] IN BLOOD BY WESTERGREN METHOD: 7 MM/H (ref 0–20)
GFR SERPL CREATININE-BSD FRML MDRD: 70 ML/MIN/{1.73_M2}
HCT VFR BLD AUTO: 40.9 % (ref 40–53)
HGB BLD-MCNC: 14.1 G/DL (ref 13.3–17.7)
LYMPHOCYTES # BLD AUTO: 1.2 10E9/L (ref 0.8–5.3)
LYMPHOCYTES NFR BLD AUTO: 23.6 %
MCH RBC QN AUTO: 33.9 PG (ref 26.5–33)
MCHC RBC AUTO-ENTMCNC: 34.5 G/DL (ref 31.5–36.5)
MCV RBC AUTO: 98 FL (ref 78–100)
MONOCYTES # BLD AUTO: 0.5 10E9/L (ref 0–1.3)
MONOCYTES NFR BLD AUTO: 9.3 %
NEUTROPHILS # BLD AUTO: 3.2 10E9/L (ref 1.6–8.3)
NEUTROPHILS NFR BLD AUTO: 64.5 %
PLATELET # BLD AUTO: 164 10E9/L (ref 150–450)
PROT SERPL-MCNC: 6.4 G/DL (ref 6.8–8.8)
RBC # BLD AUTO: 4.16 10E12/L (ref 4.4–5.9)
WBC # BLD AUTO: 5 10E9/L (ref 4–11)

## 2021-06-11 PROCEDURE — 36415 COLL VENOUS BLD VENIPUNCTURE: CPT | Performed by: INTERNAL MEDICINE

## 2021-06-11 PROCEDURE — 80076 HEPATIC FUNCTION PANEL: CPT | Performed by: INTERNAL MEDICINE

## 2021-06-11 PROCEDURE — 86140 C-REACTIVE PROTEIN: CPT | Performed by: INTERNAL MEDICINE

## 2021-06-11 PROCEDURE — 85025 COMPLETE CBC W/AUTO DIFF WBC: CPT | Performed by: INTERNAL MEDICINE

## 2021-06-11 PROCEDURE — 85652 RBC SED RATE AUTOMATED: CPT | Performed by: INTERNAL MEDICINE

## 2021-06-11 PROCEDURE — 82565 ASSAY OF CREATININE: CPT | Performed by: INTERNAL MEDICINE

## 2021-06-18 ENCOUNTER — OFFICE VISIT (OUTPATIENT)
Dept: RHEUMATOLOGY | Facility: CLINIC | Age: 71
End: 2021-06-18
Payer: COMMERCIAL

## 2021-06-18 ENCOUNTER — TELEPHONE (OUTPATIENT)
Dept: RHEUMATOLOGY | Facility: CLINIC | Age: 71
End: 2021-06-18

## 2021-06-18 VITALS
BODY MASS INDEX: 31.64 KG/M2 | SYSTOLIC BLOOD PRESSURE: 134 MMHG | HEIGHT: 69 IN | OXYGEN SATURATION: 98 % | HEART RATE: 67 BPM | DIASTOLIC BLOOD PRESSURE: 85 MMHG | WEIGHT: 213.6 LBS

## 2021-06-18 DIAGNOSIS — Z79.899 HIGH RISK MEDICATIONS (NOT ANTICOAGULANTS) LONG-TERM USE: ICD-10-CM

## 2021-06-18 DIAGNOSIS — M19.041 PRIMARY OSTEOARTHRITIS OF BOTH HANDS: ICD-10-CM

## 2021-06-18 DIAGNOSIS — M19.042 PRIMARY OSTEOARTHRITIS OF BOTH HANDS: ICD-10-CM

## 2021-06-18 DIAGNOSIS — L40.50 PSORIATIC ARTHROPATHY (H): Primary | ICD-10-CM

## 2021-06-18 PROCEDURE — 99214 OFFICE O/P EST MOD 30 MIN: CPT | Performed by: INTERNAL MEDICINE

## 2021-06-18 RX ORDER — METHOTREXATE 2.5 MG/1
20 TABLET ORAL WEEKLY
Qty: 104 TABLET | Refills: 2 | Status: SHIPPED | OUTPATIENT
Start: 2021-06-18 | End: 2021-12-17

## 2021-06-18 ASSESSMENT — MIFFLIN-ST. JEOR: SCORE: 1724.26

## 2021-06-18 ASSESSMENT — PAIN SCALES - GENERAL: PAINLEVEL: NO PAIN (0)

## 2021-06-18 NOTE — PROGRESS NOTES
Rheumatology Clinic Visit      Pawan Bullard MRN# [id number]   YOB: 1950 Age: 71 year old      Date of visit: 6/18/21   PCP: Dr. Armando Finley    Chief Complaint   Patient presents with:  RECHECK: Psoriatic arthropathy - things are going ok    Assessment and Plan     1.  Psoriatic arthritis: Synovitis when on MTX 20mg wkly; the dose was increased previously but he did not do so; then he held methotrexate due to concerns about COVID-19 and his arthritis worsened. Previously on SSZ (GI upset).  Doing well now on methotrexate 20 mg once weekly (GI upset with 25mg once weekly).  Mild GI upset with methotrexate dosing despite split dose.  Discussed splitting the dose further apart, but all within a 24-hour period and he is going to try this.  If this is not tolerated later then may change to subcutaneous route of administration.  Chronic illness, stable.     - Continue methotrexate 20mg once every 7 days (split dose within a 24-hour period)  - Continue folic acid 1 mg daily  - Labs in 3 months: CBC, Creatinine, Hepatic Panel  - Labs in 6 months: CBC, Creatinine, Hepatic Panel, ESR, CRP                Rapid 3, cumulative scores                      6/18/2021:    4.3 (MTX 20mg wkly)                      02/17/2020:  4.7 (MTX 20mg wkly)                      11/18/2019:  3.3 (MTX 20mg wkly)                      05/20/2019:  Refused by patient                      11/26/2018:  5.2 (MTX 17.5mg wkly)    High risk medication requiring intensive toxicity monitoring at least quarterly: labs ordered include CBC, Creatinine, Hepatic panel to monitor for cytopenia and hepatotoxicity; checking creatinine as it affects clearance of medication.     2. Hand OA: Affecting the PIPs and DIPs.   - start diclofenac (VOLTAREN) 1 % topical gel; Apply up to 2 grams of 1% gel to hands up to 4 times daily as needed for joint pain (maximum: 8 g per upper extremity per day)  Dispense: 400 g; Refill: 1       # Status-post single  dose Polo & Polo COVID-19 vaccine on 3/4/2021 per patient report.      Total minutes spent in evaluation with patient, documentation, , and review of pertinent studies and chart notes: 15      Mr. Bullard verbalized agreement with and understanding of the rational for the diagnosis and treatment plan.  All questions were answered to best of my ability and the patient's satisfaction. Mr. Bullard was advised to contact the clinic with any questions that may arise after the clinic visit.      Thank you for involving me in the care of the patient    Return to clinic: 3 months      HPI   Pawan Bullard is a 71 year old male with a past medical history significant for hypertension, hyperlipidemia, chondrocalcinosis, GERD, osteoarthritis, and psoriatic arthritis who presents for follow-up of psoriatic arthritis.      Today, 6/18/2021: Currently doing well with no joint pain, swelling, stiffness.  No gelling.  Some stomach upset associated with methotrexate dosing; he splits the dose between morning and evening on the same day of each week.  Says that the stomach upset is very tolerable, mild, and resolves fairly quickly.  Occasional ache at PIPs and DIPs that is worse with activity and improves with rest.     Denies fevers, chills, nausea, vomiting, constipation, diarrhea. No abdominal pain. No chest pain/pressure, palpitations, or shortness of breath. No sicca symptoms.     Tobacco: None  EtOH: 0-2 beers or mixed drinks monthly  Drugs: None    ROS   12 point review of system was completed and negative except as noted in the HPI     Active Problem List     Patient Active Problem List   Diagnosis     Psoriatic arthropathy (H)     Esophageal reflux     Hypertension goal BP (blood pressure) < 140/90     FAMILY HISTORY OF GI NEOPLASM     FAMILY HISTORY OF DIABETES MELLITUS     Impotence of organic origin     Chondrocalcinosis     HYPERLIPIDEMIA LDL GOAL <130     Osteoarthritis     Advanced directives,  counseling/discussion     High risk medications (not anticoagulants) long-term use     ROSADO (dyspnea on exertion)     Past Medical History     Past Medical History:   Diagnosis Date     Esophageal reflux     GERD     Hypertension      Psoriatic arthropathy (H)     Psoriatic arthritis     Past Surgical History     Past Surgical History:   Procedure Laterality Date     ARTHROSCOPY KNEE RT/LT  April 2009    left knee     ARTHROSCOPY KNEE WITH MEDIAL MENISCECTOMY  7/9/2013    Procedure: ARTHROSCOPY KNEE WITH MEDIAL MENISCECTOMY;  Left Knee Arthroscopic and Open Repair of Patellar Tendon with Platelet Rich Plasma;  Surgeon: Ley, Jeffrey Duane, MD;  Location: [facility]     COLONOSCOPY  Jan. 2006    diverticuli. Father had colon CA     COLONOSCOPY  2001     COLONOSCOPY  4/11/2011    Procedure:COLONOSCOPY; Surgeon:JENNIFER JEAN; Location:[facility]     COLONOSCOPY N/A 11/21/2016    Procedure: COLONOSCOPY;  Surgeon: Elias Santamaria MD;  Location: [facility]     REPAIR TENDON PATELLA  7/9/2013    Procedure: REPAIR TENDON PATELLA;;  Surgeon: Ley, Jeffrey Duane, MD;  Location: [facility]     SURGICAL HISTORY OF -   1999    (L) Herniorrhaphy     SURGICAL HISTORY OF -   2003    (R) Herniorrhaphy     SURGICAL HISTORY OF -   1981    Hemorrohid     Allergy     Allergies   Allergen Reactions     Acetaminophen W/Codeine Nausea     Current Medication List     Current Outpatient Medications   Medication Sig     albuterol (PROAIR HFA/PROVENTIL HFA/VENTOLIN HFA) 108 (90 Base) MCG/ACT inhaler Inhale 2 puffs into the lungs every 4 hours as needed for shortness of breath / dyspnea or wheezing     folic acid (FOLVITE) 1 MG tablet Take 1 tablet (1 mg) by mouth daily     lisinopril (ZESTRIL) 5 MG tablet Take 1 tablet (5 mg) by mouth daily     methotrexate sodium 2.5 MG TABS Take 8 tablets (20 mg) by mouth once a week . Take all 4 tablets in the AM and 4 tablets in the PM on the same day of each week     MULTI-VITAMIN OR TABS Take 1 tablet by mouth daily   "    Probiotic Product (PROBIOTIC DAILY PO) Take 1 capsule by mouth daily      sildenafil (REVATIO) 20 MG tablet Take as many pills as needed up to maximum of 5 pills at a time prior to intercourse.     simvastatin (ZOCOR) 40 MG tablet Take 1 tablet (40 mg) by mouth At Bedtime     tamsulosin (FLOMAX) 0.4 MG capsule Take 1 capsule (0.4 mg) by mouth daily     Black Pepper-Turmeric (TURMERIC CURCUMIN) 5-1000 MG CAPS Take 1 capsule by mouth daily     No current facility-administered medications for this visit.          Social History   See HPI    Family History     Family History   Problem Relation Age of Onset     Hypertension Mother      Diabetes Mother      Cerebrovascular Disease Mother         in her 70s     Arthritis Mother      Cancer - colorectal Father 67     Arthritis Paternal Grandmother      LUNG DISEASE Sister      Other - See Comments Sister      Prostate Cancer No family hx of      Coronary Artery Disease No family hx of        Physical Exam     Temp Readings from Last 3 Encounters:   11/16/20 98.6  F (37  C) (Tympanic)   02/14/20 97.3  F (36.3  C) (Tympanic)   02/07/20 97.2  F (36.2  C) (Temporal)     BP Readings from Last 5 Encounters:   06/18/21 (!) 145/85   11/16/20 120/70   02/17/20 139/76   02/14/20 138/78   02/07/20 130/74     Pulse Readings from Last 1 Encounters:   06/18/21 67     Resp Readings from Last 1 Encounters:   11/16/20 18     Estimated body mass index is 31.54 kg/m  as calculated from the following:    Height as of this encounter: 1.753 m (5' 9\").    Weight as of this encounter: 96.9 kg (213 lb 9.6 oz).      GEN: NAD. Healthy appearing adult.   HEENT:  Anicteric, noninjected sclera. No obvious external lesions of the ear and nose. Hearing intact.  PULM: No increased work of breathing  MSK: MCPs, PIPs, DIPs without swelling or tenderness to palpation.  Heberden's and Portia's nodes present.  Wrists without swelling or tenderness to palpation.  Elbows and shoulders without swelling or " tenderness to palpation.  Shoulders with normal range of motion.  Knees, ankles, and MTPs without swelling or tenderness to palpation.    SKIN: No rash or jaundice seen  PSYCH: Alert. Appropriate.           Labs / Imaging (select studies)       CBC  Recent Labs   Lab Test 06/11/21  1014 02/26/21  1012 11/16/20  0903   WBC 5.0 4.4 5.3   RBC 4.16* 4.36* 4.71   HGB 14.1 14.6 15.5   HCT 40.9 41.9 44.8   MCV 98 96 95   RDW 13.4 13.0 13.7    154 146*   MCH 33.9* 33.5* 32.9   MCHC 34.5 34.8 34.6   NEUTROPHIL 64.5 64.1 73.8   LYMPH 23.6 28.6 16.2   MONOCYTE 9.3 4.6 7.9   EOSINOPHIL 2.4 2.5 1.9   BASOPHIL 0.2 0.2 0.2   ANEU 3.2 2.8 3.9   ALYM 1.2 1.3 0.9   ERENDIRA 0.5 0.2 0.4   AEOS 0.1 0.1 0.1   ABAS 0.0 0.0 0.0     CMP  Recent Labs   Lab Test 06/11/21  1014 02/26/21  1012 11/16/20  0903 02/07/20  1428 02/07/20  1428 11/13/19  1107 11/13/19  1107   NA  --   --  138  --  139  --  135   POTASSIUM  --   --  4.0  --  4.3  --  4.3   CHLORIDE  --   --  105  --  107  --  103   CO2  --   --  27  --  28  --  32   ANIONGAP  --   --  6  --  4  --  <1*   GLC  --   --  107*  --  111*  --  98   BUN  --   --  13  --  15  --  11   CR 1.07 0.96 1.06  1.05   < > 0.99   < > 1.02   GFRESTIMATED 70 81 71  72   < > 78   < > 75   GFRESTBLACK 81 >90 83  84   < > >90   < > 87   JOHN  --   --  9.5  --  9.1  --  9.1   BILITOTAL 0.7 0.8 1.0   < >  --    < >  --    ALBUMIN 3.6 3.8 4.0   < >  --    < >  --    PROTTOTAL 6.4* 6.7* 6.7*   < >  --    < >  --    ALKPHOS 51 58 58   < >  --    < >  --    AST 26 39 32   < >  --    < >  --    ALT 41 58 42   < >  --    < >  --     < > = values in this interval not displayed.     Calcium/VitaminD  Recent Labs   Lab Test 11/16/20  0903 02/07/20  1428 11/13/19  1107   JOHN 9.5 9.1 9.1     ESR/CRP  Recent Labs   Lab Test 06/11/21  1014 02/26/21  1012 11/16/20  0903   SED 7 5 4   CRP <2.9 <2.9 <2.9     Lipid Panel  Recent Labs   Lab Test 11/16/20  0903 11/13/19  1107 11/19/18  1318 05/29/15  1204 05/29/15  1204  04/29/14  1102   CHOL 177 161 207*   < > 168 175   TRIG 194* 153* 363*   < > 293* 165*   HDL 62 58 50   < > 54 47   LDL 76 72 84   < > 55 95   VLDL  --   --   --   --  59* 33*   CHOLHDLRATIO  --   --   --   --  3.1 4.0   NHDL 115 103 157*   < >  --   --     < > = values in this interval not displayed.     Hepatitis B  Recent Labs   Lab Test 05/16/18  1016   HBCAB Nonreactive   HEPBANG Nonreactive     Hepatitis C  Recent Labs   Lab Test 08/26/16  1213   HCVAB Nonreactive   Assay performance characteristics have not been established for newborns,   infants, and children           Immunization History     Immunization History   Administered Date(s) Administered     Influenza (High Dose) 3 valent vaccine 10/18/2017, 09/21/2018, 11/13/2019     Influenza (IIV3) PF 11/17/2005, 12/04/2006, 11/22/2010, 09/26/2012, 10/01/2014     Influenza Vaccine IM > 6 months Valent IIV4 10/15/2013, 12/17/2015, 08/26/2016     Influenza, Quad, High Dose, Pf, 65yr + 10/09/2020     Pneumo Conj 13-V (2010&after) 10/18/2017     Pneumococcal 23 valent 01/22/2018     TDAP Vaccine (Adacel) 04/22/2009, 05/20/2018          Chart documentation done in part with Dragon Voice recognition Software. Although reviewed after completion, some word and grammatical error may remain.        Adria Lopez MD

## 2021-06-21 NOTE — TELEPHONE ENCOUNTER
Cover my meds can't find the patient.  Called [employer] and they will be sending PA form to PA team to fill out and send back.   
PA Initiation    Medication: diclofenac (VOLTAREN) 1 % topical gel   Insurance Company: [employer] - Phone [phone] Fax [phone]  Pharmacy Filling the Rx: [place], [place] - [address] [place] [address]  Filling Pharmacy Phone: [phone]  Filling Pharmacy Fax: [phone]  Start Date: 6/21/2021      
Prior Authorization Approval    Authorization Effective Date: 3/23/2021  Authorization Expiration Date: 6/21/2022  Medication: diclofenac (VOLTAREN) 1 % topical gel--APPROVED  Approved Dose/Quantity:   Reference #:     Insurance Company: [employer] - Phone [phone] Fax [phone]  Expected CoPay:       CoPay Card Available:      Foundation Assistance Needed:    Which Pharmacy is filling the prescription (Not needed for infusion/clinic administered): [facility] [address]  Pharmacy Notified: Yes  Patient Notified: Yes **Instructed pharmacy to notify patient when script is ready to /ship.**      
Prior Authorization Retail Medication Request    Medication/Dose: diclofenac sodium (voltaren) gel - prior authorization required  ICD code (if different than what is on RX):    Previously Tried and Failed:    Rationale:      Insurance Name:  [facility] Part [facility]  Insurance ID:  [id number]  Insurance Phone#: [phone]      Pharmacy Information (if different than what is on RX)  Name:  [facility]  Phone:  [phone]      Thank You!    Terence Rasmussen CPhT  [facility]    
Contraindicated

## 2021-07-22 ENCOUNTER — 6 MONTH FOLLOW-UP (OUTPATIENT)
Dept: URBAN - METROPOLITAN AREA CLINIC 49 | Facility: CLINIC | Age: 71
End: 2021-07-22

## 2021-07-22 DIAGNOSIS — H25.13: ICD-10-CM

## 2021-07-22 DIAGNOSIS — H40.1132: ICD-10-CM

## 2021-07-22 PROCEDURE — 92012 INTRM OPH EXAM EST PATIENT: CPT

## 2021-07-22 PROCEDURE — 92015 DETERMINE REFRACTIVE STATE: CPT

## 2021-07-22 ASSESSMENT — VISUAL ACUITY
OU_CC: J1
OS_GLARE: 20/50
OD_GLARE: 20/50
OD_CC: 20/30
OD_GLARE: 20/50
OS_GLARE: 20/50
OS_CC: 20/30

## 2021-07-22 ASSESSMENT — TONOMETRY
OS_IOP_MMHG: 14
OD_IOP_MMHG: 12
OS_IOP_MMHG: 12
OD_IOP_MMHG: 15

## 2021-09-17 ENCOUNTER — LAB (OUTPATIENT)
Dept: LAB | Facility: CLINIC | Age: 71
End: 2021-09-17
Payer: COMMERCIAL

## 2021-09-17 ENCOUNTER — ALLIED HEALTH/NURSE VISIT (OUTPATIENT)
Dept: FAMILY MEDICINE | Facility: CLINIC | Age: 71
End: 2021-09-17
Payer: COMMERCIAL

## 2021-09-17 DIAGNOSIS — Z23 NEED FOR PROPHYLACTIC VACCINATION AND INOCULATION AGAINST INFLUENZA: Primary | ICD-10-CM

## 2021-09-17 DIAGNOSIS — Z79.899 HIGH RISK MEDICATIONS (NOT ANTICOAGULANTS) LONG-TERM USE: ICD-10-CM

## 2021-09-17 DIAGNOSIS — L40.50 PSORIATIC ARTHROPATHY (H): ICD-10-CM

## 2021-09-17 LAB
ALBUMIN SERPL-MCNC: 3.6 G/DL (ref 3.4–5)
ALP SERPL-CCNC: 54 U/L (ref 40–150)
ALT SERPL W P-5'-P-CCNC: 44 U/L (ref 0–70)
AST SERPL W P-5'-P-CCNC: 25 U/L (ref 0–45)
BASOPHILS # BLD AUTO: 0 10E3/UL (ref 0–0.2)
BASOPHILS NFR BLD AUTO: 0 %
BILIRUB DIRECT SERPL-MCNC: 0.2 MG/DL (ref 0–0.2)
BILIRUB SERPL-MCNC: 0.9 MG/DL (ref 0.2–1.3)
CREAT SERPL-MCNC: 1.06 MG/DL (ref 0.66–1.25)
EOSINOPHIL # BLD AUTO: 0.1 10E3/UL (ref 0–0.7)
EOSINOPHIL NFR BLD AUTO: 3 %
ERYTHROCYTE [DISTWIDTH] IN BLOOD BY AUTOMATED COUNT: 13.8 % (ref 10–15)
GFR SERPL CREATININE-BSD FRML MDRD: 71 ML/MIN/1.73M2
HCT VFR BLD AUTO: 41.9 % (ref 40–53)
HGB BLD-MCNC: 14.7 G/DL (ref 13.3–17.7)
LYMPHOCYTES # BLD AUTO: 1.2 10E3/UL (ref 0.8–5.3)
LYMPHOCYTES NFR BLD AUTO: 26 %
MCH RBC QN AUTO: 33.7 PG (ref 26.5–33)
MCHC RBC AUTO-ENTMCNC: 35.1 G/DL (ref 31.5–36.5)
MCV RBC AUTO: 96 FL (ref 78–100)
MONOCYTES # BLD AUTO: 0.4 10E3/UL (ref 0–1.3)
MONOCYTES NFR BLD AUTO: 8 %
NEUTROPHILS # BLD AUTO: 3 10E3/UL (ref 1.6–8.3)
NEUTROPHILS NFR BLD AUTO: 64 %
PLATELET # BLD AUTO: 153 10E3/UL (ref 150–450)
PROT SERPL-MCNC: 6.6 G/DL (ref 6.8–8.8)
RBC # BLD AUTO: 4.36 10E6/UL (ref 4.4–5.9)
WBC # BLD AUTO: 4.7 10E3/UL (ref 4–11)

## 2021-09-17 PROCEDURE — 90662 IIV NO PRSV INCREASED AG IM: CPT

## 2021-09-17 PROCEDURE — 90471 IMMUNIZATION ADMIN: CPT

## 2021-09-17 PROCEDURE — 82565 ASSAY OF CREATININE: CPT

## 2021-09-17 PROCEDURE — 36415 COLL VENOUS BLD VENIPUNCTURE: CPT

## 2021-09-17 PROCEDURE — 85025 COMPLETE CBC W/AUTO DIFF WBC: CPT

## 2021-09-17 PROCEDURE — 99207 PR NO CHARGE NURSE ONLY: CPT

## 2021-09-17 PROCEDURE — 80076 HEPATIC FUNCTION PANEL: CPT

## 2021-11-09 DIAGNOSIS — R06.09 DOE (DYSPNEA ON EXERTION): ICD-10-CM

## 2021-11-10 RX ORDER — ALBUTEROL SULFATE 90 UG/1
2 AEROSOL, METERED RESPIRATORY (INHALATION) EVERY 4 HOURS PRN
Qty: 18 G | Refills: 0 | Status: SHIPPED | OUTPATIENT
Start: 2021-11-10 | End: 2021-12-10

## 2021-11-15 ENCOUNTER — DOCUMENTATION ONLY (OUTPATIENT)
Dept: LAB | Facility: CLINIC | Age: 71
End: 2021-11-15
Payer: COMMERCIAL

## 2021-11-15 DIAGNOSIS — E78.5 HYPERLIPIDEMIA LDL GOAL <130: Primary | ICD-10-CM

## 2021-11-15 DIAGNOSIS — I10 HYPERTENSION GOAL BP (BLOOD PRESSURE) < 140/90: ICD-10-CM

## 2021-11-15 NOTE — PROGRESS NOTES
Patient is coming for fasting pre-visit labs.  He is seeing Dr. Finley on 11/29, please place future orders.  Thanks!

## 2021-11-20 ENCOUNTER — LAB (OUTPATIENT)
Dept: LAB | Facility: CLINIC | Age: 71
End: 2021-11-20
Payer: COMMERCIAL

## 2021-11-20 DIAGNOSIS — E78.5 HYPERLIPIDEMIA LDL GOAL <130: ICD-10-CM

## 2021-11-20 DIAGNOSIS — I10 HYPERTENSION GOAL BP (BLOOD PRESSURE) < 140/90: ICD-10-CM

## 2021-11-20 LAB
ANION GAP SERPL CALCULATED.3IONS-SCNC: 3 MMOL/L (ref 3–14)
BUN SERPL-MCNC: 13 MG/DL (ref 7–30)
CALCIUM SERPL-MCNC: 9 MG/DL (ref 8.5–10.1)
CHLORIDE BLD-SCNC: 107 MMOL/L (ref 94–109)
CHOLEST SERPL-MCNC: 159 MG/DL
CO2 SERPL-SCNC: 30 MMOL/L (ref 20–32)
CREAT SERPL-MCNC: 1.06 MG/DL (ref 0.66–1.25)
FASTING STATUS PATIENT QL REPORTED: YES
GFR SERPL CREATININE-BSD FRML MDRD: 71 ML/MIN/1.73M2
GLUCOSE BLD-MCNC: 110 MG/DL (ref 70–99)
HDLC SERPL-MCNC: 60 MG/DL
LDLC SERPL CALC-MCNC: 69 MG/DL
NONHDLC SERPL-MCNC: 99 MG/DL
POTASSIUM BLD-SCNC: 3.9 MMOL/L (ref 3.4–5.3)
SODIUM SERPL-SCNC: 140 MMOL/L (ref 133–144)
TRIGL SERPL-MCNC: 150 MG/DL

## 2021-11-20 PROCEDURE — 80061 LIPID PANEL: CPT

## 2021-11-20 PROCEDURE — 80048 BASIC METABOLIC PNL TOTAL CA: CPT

## 2021-11-20 PROCEDURE — 36415 COLL VENOUS BLD VENIPUNCTURE: CPT

## 2021-11-21 NOTE — RESULT ENCOUNTER NOTE
"Ramesh Villalta,  Lipid tests including total cholesterol, triglycerides, HDL (\"good cholesterol\") and LDL (\"bad cholesterol\") are normal.     The blood chemistries (Basic metabolic panel) are all normal including electrolytes (salt balances in the blood) and kidney tests with the exception of glucose which is increased.  PLAN: No new changes in treatment recommended.   AMAN SUTTON MD   "

## 2021-11-29 DIAGNOSIS — I10 ESSENTIAL HYPERTENSION WITH GOAL BLOOD PRESSURE LESS THAN 140/90: ICD-10-CM

## 2021-11-30 RX ORDER — LISINOPRIL 5 MG/1
5 TABLET ORAL DAILY
Qty: 90 TABLET | Refills: 1 | Status: SHIPPED | OUTPATIENT
Start: 2021-11-30 | End: 2022-06-01

## 2021-12-10 ENCOUNTER — OFFICE VISIT (OUTPATIENT)
Dept: FAMILY MEDICINE | Facility: CLINIC | Age: 71
End: 2021-12-10
Payer: COMMERCIAL

## 2021-12-10 VITALS
SYSTOLIC BLOOD PRESSURE: 136 MMHG | HEART RATE: 78 BPM | DIASTOLIC BLOOD PRESSURE: 72 MMHG | WEIGHT: 210.8 LBS | TEMPERATURE: 97.4 F | RESPIRATION RATE: 16 BRPM | HEIGHT: 69 IN | OXYGEN SATURATION: 98 % | BODY MASS INDEX: 31.22 KG/M2

## 2021-12-10 DIAGNOSIS — N52.9 IMPOTENCE OF ORGANIC ORIGIN: ICD-10-CM

## 2021-12-10 DIAGNOSIS — Z80.0 FAMILY HISTORY OF MALIGNANT NEOPLASM OF GASTROINTESTINAL TRACT: ICD-10-CM

## 2021-12-10 DIAGNOSIS — R06.09 DOE (DYSPNEA ON EXERTION): ICD-10-CM

## 2021-12-10 DIAGNOSIS — Z79.899 HIGH RISK MEDICATIONS (NOT ANTICOAGULANTS) LONG-TERM USE: ICD-10-CM

## 2021-12-10 DIAGNOSIS — R35.1 BENIGN PROSTATIC HYPERPLASIA WITH NOCTURIA: ICD-10-CM

## 2021-12-10 DIAGNOSIS — Z12.5 SCREENING FOR PROSTATE CANCER: ICD-10-CM

## 2021-12-10 DIAGNOSIS — Z12.11 SPECIAL SCREENING FOR MALIGNANT NEOPLASMS, COLON: ICD-10-CM

## 2021-12-10 DIAGNOSIS — N40.1 BENIGN PROSTATIC HYPERPLASIA WITH NOCTURIA: ICD-10-CM

## 2021-12-10 DIAGNOSIS — Z00.00 ENCOUNTER FOR SUBSEQUENT ANNUAL WELLNESS VISIT IN MEDICARE PATIENT: Primary | ICD-10-CM

## 2021-12-10 DIAGNOSIS — E78.5 HYPERLIPIDEMIA LDL GOAL <130: ICD-10-CM

## 2021-12-10 DIAGNOSIS — L40.50 PSORIATIC ARTHROPATHY (H): ICD-10-CM

## 2021-12-10 LAB
ALBUMIN SERPL-MCNC: 3.6 G/DL (ref 3.4–5)
ALP SERPL-CCNC: 57 U/L (ref 40–150)
ALT SERPL W P-5'-P-CCNC: 38 U/L (ref 0–70)
AST SERPL W P-5'-P-CCNC: 25 U/L (ref 0–45)
BASOPHILS # BLD AUTO: 0 10E3/UL (ref 0–0.2)
BASOPHILS NFR BLD AUTO: 0 %
BILIRUB DIRECT SERPL-MCNC: 0.2 MG/DL (ref 0–0.2)
BILIRUB SERPL-MCNC: 0.7 MG/DL (ref 0.2–1.3)
CREAT SERPL-MCNC: 0.87 MG/DL (ref 0.66–1.25)
CRP SERPL-MCNC: <2.9 MG/L (ref 0–8)
EOSINOPHIL # BLD AUTO: 0.1 10E3/UL (ref 0–0.7)
EOSINOPHIL NFR BLD AUTO: 1 %
ERYTHROCYTE [DISTWIDTH] IN BLOOD BY AUTOMATED COUNT: 13.4 % (ref 10–15)
ERYTHROCYTE [SEDIMENTATION RATE] IN BLOOD BY WESTERGREN METHOD: 5 MM/HR (ref 0–20)
GFR SERPL CREATININE-BSD FRML MDRD: 88 ML/MIN/1.73M2
HCT VFR BLD AUTO: 40.4 % (ref 40–53)
HGB BLD-MCNC: 14.2 G/DL (ref 13.3–17.7)
LYMPHOCYTES # BLD AUTO: 2.2 10E3/UL (ref 0.8–5.3)
LYMPHOCYTES NFR BLD AUTO: 38 %
MCH RBC QN AUTO: 33.8 PG (ref 26.5–33)
MCHC RBC AUTO-ENTMCNC: 35.1 G/DL (ref 31.5–36.5)
MCV RBC AUTO: 96 FL (ref 78–100)
MONOCYTES # BLD AUTO: 0.5 10E3/UL (ref 0–1.3)
MONOCYTES NFR BLD AUTO: 8 %
NEUTROPHILS # BLD AUTO: 3 10E3/UL (ref 1.6–8.3)
NEUTROPHILS NFR BLD AUTO: 53 %
PLATELET # BLD AUTO: 143 10E3/UL (ref 150–450)
PROT SERPL-MCNC: 6.6 G/DL (ref 6.8–8.8)
PSA SERPL-MCNC: 1.81 UG/L (ref 0–4)
RBC # BLD AUTO: 4.2 10E6/UL (ref 4.4–5.9)
WBC # BLD AUTO: 5.8 10E3/UL (ref 4–11)

## 2021-12-10 PROCEDURE — G0103 PSA SCREENING: HCPCS | Performed by: FAMILY MEDICINE

## 2021-12-10 PROCEDURE — 85025 COMPLETE CBC W/AUTO DIFF WBC: CPT | Performed by: FAMILY MEDICINE

## 2021-12-10 PROCEDURE — 82565 ASSAY OF CREATININE: CPT | Performed by: FAMILY MEDICINE

## 2021-12-10 PROCEDURE — 99397 PER PM REEVAL EST PAT 65+ YR: CPT | Performed by: FAMILY MEDICINE

## 2021-12-10 PROCEDURE — 36415 COLL VENOUS BLD VENIPUNCTURE: CPT | Performed by: FAMILY MEDICINE

## 2021-12-10 PROCEDURE — 85652 RBC SED RATE AUTOMATED: CPT | Performed by: FAMILY MEDICINE

## 2021-12-10 PROCEDURE — 80076 HEPATIC FUNCTION PANEL: CPT | Performed by: FAMILY MEDICINE

## 2021-12-10 PROCEDURE — 86140 C-REACTIVE PROTEIN: CPT | Performed by: FAMILY MEDICINE

## 2021-12-10 RX ORDER — TAMSULOSIN HYDROCHLORIDE 0.4 MG/1
0.4 CAPSULE ORAL DAILY
Qty: 90 CAPSULE | Refills: 3 | Status: SHIPPED | OUTPATIENT
Start: 2021-12-10 | End: 2021-12-30

## 2021-12-10 RX ORDER — SIMVASTATIN 40 MG
40 TABLET ORAL AT BEDTIME
Qty: 90 TABLET | Refills: 3 | Status: SHIPPED | OUTPATIENT
Start: 2021-12-10 | End: 2021-12-30

## 2021-12-10 RX ORDER — SILDENAFIL CITRATE 20 MG/1
TABLET ORAL
Qty: 50 TABLET | Refills: 11 | Status: SHIPPED | OUTPATIENT
Start: 2021-12-10 | End: 2022-12-15

## 2021-12-10 RX ORDER — ALBUTEROL SULFATE 90 UG/1
2 AEROSOL, METERED RESPIRATORY (INHALATION) EVERY 4 HOURS PRN
Qty: 18 G | Refills: 11 | Status: SHIPPED | OUTPATIENT
Start: 2021-12-10 | End: 2022-12-15

## 2021-12-10 ASSESSMENT — ENCOUNTER SYMPTOMS
HEMATURIA: 0
SHORTNESS OF BREATH: 0
CONSTIPATION: 0
FREQUENCY: 0
PARESTHESIAS: 0
HEMATOCHEZIA: 0
FEVER: 0
EYE PAIN: 0
MYALGIAS: 0
JOINT SWELLING: 0
PALPITATIONS: 0
ABDOMINAL PAIN: 0
SORE THROAT: 0
CHILLS: 0
DYSURIA: 0
HEARTBURN: 0
WEAKNESS: 0
ARTHRALGIAS: 0
DIZZINESS: 0
HEADACHES: 0
COUGH: 0
DIARRHEA: 0
NAUSEA: 0
NERVOUS/ANXIOUS: 0

## 2021-12-10 ASSESSMENT — ACTIVITIES OF DAILY LIVING (ADL): CURRENT_FUNCTION: NO ASSISTANCE NEEDED

## 2021-12-10 ASSESSMENT — MIFFLIN-ST. JEOR: SCORE: 1711.56

## 2021-12-10 ASSESSMENT — PAIN SCALES - GENERAL: PAINLEVEL: NO PAIN (0)

## 2021-12-10 NOTE — PROGRESS NOTES
"SUBJECTIVE:   Pawan Bullard is a 71 year old male who presents for Preventive Visit.  Chief Complaint   Patient presents with     Wellness Visit   All not being cooperative  6/18/21: Rheumatology visit for psoriatic arthritis.  He has been on methotrexate 20 mg weekly.  He also has some hand osteoarthritis.  11/16/2020: Last clinic visit with me for wellness exam.  No changes made at that time.    He has had recent sore throat.  Some nasal congestion.  He has had symptoms  For a couple weeks.  COVID-19 virus negative on  Thanksgiving a few days after symptoms onset.   860  Arthritis doing OK.   Skipped methotrexate this week with URI.  Fingers a little more stiff.     Patient has been advised of split billing requirements and indicates understanding: Yes   Are you in the first 12 months of your Medicare coverage?  No    Healthy Habits:     In general, how would you rate your overall health?  Good    Frequency of exercise:  2-3 days/week    Duration of exercise:  15-30 minutes    Do you usually eat at least 4 servings of fruit and vegetables a day, include whole grains    & fiber and avoid regularly eating high fat or \"junk\" foods?  No    Taking medications regularly:  Yes    Medication side effects:  Other    Ability to successfully perform activities of daily living:  No assistance needed    Home Safety:  No safety concerns identified    Hearing Impairment:  No hearing concerns    In the past 6 months, have you been bothered by leaking of urine?  No    In general, how would you rate your overall mental or emotional health?  Good      PHQ-2 Total Score: 0    Additional concerns today:  No  Exercise: yard and house work.     Do you feel safe in your environment? Yes    Have you ever done Advance Care Planning? (For example, a Health Directive, POLST, or a discussion with a medical provider or your loved ones about your wishes): No, advance care planning information given to patient to review.  Patient plans to " discuss their wishes with loved ones or provider.       Fall risk  Fallen 2 or more times in the past year?: No  Any fall with injury in the past year?: No    Cognitive Screening   1) Repeat 3 items (Leader, Season, Table)    2) Clock draw: NORMAL  3) 3 item recall: Recalls 3 objects  Results: 3 items recalled: COGNITIVE IMPAIRMENT LESS LIKELY    Mini-CogTM Copyright [employer]. Licensed by the author for use in [facility]; reprinted with permission (santiago@[facility]). All rights reserved.      Do you have sleep apnea, excessive snoring or daytime drowsiness?: no    Social History     Tobacco Use     Smoking status: Never Smoker     Smokeless tobacco: Never Used   Substance Use Topics     Alcohol use: Yes     Comment:  0-2 beer's or mixed drink's monthly     If you drink alcohol do you typically have >3 drinks per day or >7 drinks per week? Not applicable    Alcohol Use 12/10/2021   Prescreen: >3 drinks/day or >7 drinks/week? No   Prescreen: >3 drinks/day or >7 drinks/week? -     Current providers sharing in care for this patient include:   Patient Care Team:  Armando Finley MD as PCP - General (Family Practice)  Armando Finley MD as Assigned PCP  Adira Lopez MD as Assigned Rheumatology Provider  Rheumatology   NO other specialists.     The following health maintenance items are reviewed in Epic and correct as of today:  Health Maintenance Due   Topic Date Due     ANNUAL REVIEW OF  ORDERS  Never done     ZOSTER IMMUNIZATION (2 of 3) 01/05/2015     AORTIC ANEURYSM SCREENING (SYSTEM ASSIGNED)  Never done     COVID-19 Vaccine (2 - Booster for Evan series) 04/29/2021     FALL RISK ASSESSMENT  11/16/2021     MEDICARE ANNUAL WELLNESS VISIT  11/16/2021     COLORECTAL CANCER SCREENING  11/21/2021     Patient Active Problem List    Diagnosis Date Noted     ROSADO (dyspnea on exertion) 03/04/2020     Priority: Medium     3/4/2020: Some dyspnea on exertion.  He had normal chest x-ray.  His pulmonary  function tests were normal.  Nuclear medicine stress test was normal.  albuteral inhaler seems to help.        High risk medications (not anticoagulants) long-term use 2015     Priority: Medium     Osteoarthritis 2011     Priority: Medium     HYPERLIPIDEMIA LDL GOAL <130 10/31/2010     Priority: Medium     Chondrocalcinosis 02/10/2010     Priority: Medium     Impotence of organic origin 2006     Priority: Medium     Esophageal reflux 2005     Priority: Medium     GERD       Hypertension goal BP (blood pressure) < 140/90 2005     Priority: Medium     Psoriatic arthropathy (H) 2005     Priority: Medium     Advanced directives, counseling/discussion 2013     Priority: Low     advanced care discussed form given to pt.           FAMILY HISTORY OF DIABETES MELLITUS 2006     Priority: Low     mother       FAMILY HISTORY OF GI NEOPLASM 2005     Priority: Low     father - colon CA ,  at  67          Family history:  CV disease: no  Prostate cancer: no  Colon cancer: father.     Multivitamin or Vit D use: folate, MVI, probiotic.     Vaccines:current tetanus.  Hs had COVID-19 virus vaccine.      Past Colon cancer screenin    Review of Systems   Constitutional: Negative for chills and fever.   HENT: Positive for congestion. Negative for ear pain, hearing loss and sore throat.    Eyes: Negative for pain and visual disturbance.   Respiratory: Negative for cough and shortness of breath.    Cardiovascular: Negative for chest pain, palpitations and peripheral edema.   Gastrointestinal: Negative for abdominal pain, constipation, diarrhea, heartburn, hematochezia and nausea.   Genitourinary: Negative for dysuria, frequency, genital sores, hematuria, impotence, penile discharge and urgency.   Musculoskeletal: Negative for arthralgias, joint swelling and myalgias.   Skin: Negative for rash.   Neurological: Negative for dizziness, weakness, headaches and paresthesias.  "  Psychiatric/Behavioral: Negative for mood changes. The patient is not nervous/anxious.    Uses albuteral inhaler when physically active-more in winter.     OBJECTIVE:                                                    OBJECTIVE:Blood pressure 136/72, pulse 78, temperature 97.4  F (36.3  C), temperature source Tympanic, resp. rate 16, height 1.753 m (5' 9\"), weight 95.6 kg (210 lb 12.8 oz), SpO2 98 %. BMI=Body mass index is 31.13 kg/m .  GENERAL APPEARANCE ADULT: Alert, no acute distress  EYES: PERRL, EOM normal, conjunctiva and lids normal  HENT: Ears and TMs normal, oral mucosa and posterior oropharynx normal  NECK: No adenopathy,masses or thyromegaly  RESP: lungs clear to auscultation   CV: normal rate, regular rhythm, no murmur or gallop  ABDOMEN: soft, no organomegaly, masses or tenderness  MS: extremities normal, no peripheral edema  SKIN: right great toenail discolored yellow distally but thin.      ASSESSMENT/PLAN:                                                    Lifestyle recommendations:regular exercise, at least 150 minutes per week (average 30 minutes 5 times a week)  weight loss recommended (ideal BMI-body mass index is <25)  The following exams/tests were recommended and discussed for health maintenance:  Colon cancer screening recommended now-every 5 years with father having colon cancer.   Prostate cancer screening is optional starting at age 50 if normal risk, age 40 or 45 for high risk men (strong family history or blacks.)  Screening can include digital rectal exam and PSA blood test.     (Z00.00) Encounter for subsequent annual wellness visit in Medicare patient  (primary encounter diagnosis)    (Z80.0) FAMILY HISTORY OF GI NEOPLASM  Comment: Father with colon cancer  Plan: due for colonoscopy   Schedule an appointment for colonoscopies or gastroscopies with surgery scheduling.  Call [phone]  to schedule the procedures.     (R06.00) ROSADO (dyspnea on exertion)  Comment: occasional albuteral " "inhaler use.   Plan: albuterol (PROAIR HFA/PROVENTIL HFA/VENTOLIN         HFA) 108 (90 Base) MCG/ACT inhaler        REfills.  No change in current treatment plan.  Albuteral inhaler can be used taking 2 inhalations every 4 hours as needed for coughing, wheezing or shortness of breath.  You can use 15-30 minutes before exercise.     (E78.5) Hyperlipidemia LDL goal <130  Comment: doing well  Plan: simvastatin (ZOCOR) 40 MG tablet        No change in current treatment plan.  REfills.     (N52.9) Impotence of organic origin  Comment: does OK with medication  Plan: sildenafil (REVATIO) 20 MG tablet        No change in current treatment plan.  Refills.     (N40.1,  R35.1) Benign prostatic hyperplasia with nocturia  Comment: stable  Plan: tamsulosin (FLOMAX) 0.4 MG capsule        No change in current treatment plan.  REfills.     (Z12.11) Special screening for malignant neoplasms, colon  Comment:   Plan: Adult Gastro Ref - Procedure Only          (Z12.5) Screening for prostate cancer  Comment:   Plan: PSA, screen           Shingles vaccine is recommended for those adults age 50 and older.  The newer vaccine available since 2018 is very effective in preventing shingles (over 90%).  It is not currently covered by Medicare.  Check at pharmacy for this vaccine, they can check on your cost and give you the vaccine.   The vaccine may be less expensive at a pharmacy.      Patient has been advised of split billing requirements and indicates understanding: Yes  COUNSELING:  Reviewed preventive health counseling, as reflected in patient instructions  Special attention given to:       Regular exercise       Colon cancer screening       Prostate cancer screening    Estimated body mass index is 31.54 kg/m  as calculated from the following:    Height as of 6/18/21: 1.753 m (5' 9\").    Weight as of 6/18/21: 96.9 kg (213 lb 9.6 oz).    Weight management plan: Discussed healthy diet and exercise guidelines    He reports that he has never " smoked. He has never used smokeless tobacco.      Appropriate preventive services were discussed with this patient, including applicable screening as appropriate for cardiovascular disease, diabetes, osteopenia/osteoporosis, and glaucoma.  As appropriate for age/gender, discussed screening for colorectal cancer, prostate cancer, breast cancer, and cervical cancer. Checklist reviewing preventive services available has been given to the patient.    Reviewed patients plan of care and provided an AVS. The Basic Care Plan (routine screening as documented in Health Maintenance) for Pawan meets the Care Plan requirement. This Care Plan has been established and reviewed with the Patient.    Counseling Resources:  ATP IV Guidelines  Pooled Cohorts Equation Calculator  Breast Cancer Risk Calculator  Breast Cancer: Medication to Reduce Risk  FRAX Risk Assessment  ICSI Preventive Guidelines  Dietary Guidelines for Americans, 2010  USDA's MyPlate  ASA Prophylaxis  Lung CA Screening    Armando Finley MD  [facility]    Identified Health Risks:

## 2021-12-10 NOTE — PATIENT INSTRUCTIONS
ASSESSMENT/PLAN:                                                    Lifestyle recommendations:regular exercise, at least 150 minutes per week (average 30 minutes 5 times a week)  weight loss recommended (ideal BMI-body mass index is <25)  The following exams/tests were recommended and discussed for health maintenance:  Colon cancer screening recommended now-every 5 years with father having colon cancer.   Prostate cancer screening is optional starting at age 50 if normal risk, age 40 or 45 for high risk men (strong family history or blacks.)  Screening can include digital rectal exam and PSA blood test.     (Z00.00) Encounter for subsequent annual wellness visit in Medicare patient  (primary encounter diagnosis)    (Z80.0) FAMILY HISTORY OF GI NEOPLASM  Comment: Father with colon cancer  Plan: due for colonoscopy   Schedule an appointment for colonoscopies or gastroscopies with surgery scheduling.  Call [phone]  to schedule the procedures.     (R06.00) ROSADO (dyspnea on exertion)  Comment: occasional albuteral inhaler use.   Plan: albuterol (PROAIR HFA/PROVENTIL HFA/VENTOLIN         HFA) 108 (90 Base) MCG/ACT inhaler        REfills.  No change in current treatment plan.  Albuteral inhaler can be used taking 2 inhalations every 4 hours as needed for coughing, wheezing or shortness of breath.  You can use 15-30 minutes before exercise.     (E78.5) Hyperlipidemia LDL goal <130  Comment: doing well  Plan: simvastatin (ZOCOR) 40 MG tablet        No change in current treatment plan.  REfills.     (N52.9) Impotence of organic origin  Comment: does OK with medication  Plan: sildenafil (REVATIO) 20 MG tablet        No change in current treatment plan.  Refills.     (N40.1,  R35.1) Benign prostatic hyperplasia with nocturia  Comment: stable  Plan: tamsulosin (FLOMAX) 0.4 MG capsule        No change in current treatment plan.  REfills.     (Z12.11) Special screening for malignant neoplasms, colon  Comment:   Plan: Adult  Gastro Ref - Procedure Only          (Z12.5) Screening for prostate cancer  Comment:   Plan: PSA, screen           Shingles vaccine is recommended for those adults age 50 and older.  The newer vaccine available since 2018 is very effective in preventing shingles (over 90%).  It is not currently covered by Medicare.  Check at pharmacy for this vaccine, they can check on your cost and give you the vaccine.   The vaccine may be less expensive at a pharmacy.

## 2021-12-10 NOTE — NURSING NOTE
"Chief Complaint   Patient presents with     Wellness Visit       Initial /72 (BP Location: Right arm, Patient Position: Chair, Cuff Size: Adult Large)   Pulse 78   Temp 97.4  F (36.3  C) (Tympanic)   Resp 16   Ht 1.753 m (5' 9\")   Wt 95.6 kg (210 lb 12.8 oz)   SpO2 98%   BMI 31.13 kg/m   Estimated body mass index is 31.13 kg/m  as calculated from the following:    Height as of this encounter: 1.753 m (5' 9\").    Weight as of this encounter: 95.6 kg (210 lb 12.8 oz).    Patient presents to the clinic using No DME    Health Maintenance that is potentially due pending provider review:  Colonoscopy/FIT    Pt will schedule colonoscopy appt.    Is there anyone who you would like to be able to receive your results?   If yes have patient fill out CURT    "

## 2021-12-13 NOTE — RESULT ENCOUNTER NOTE
Ramesh Villalta,  PSA test (for prostate cancer screening) is normal.  It has been going up slightly every year but still well within the normal range.   PLAN: .follow-up in a year with another PSA.  AMAN SUTTON MD

## 2021-12-17 ENCOUNTER — OFFICE VISIT (OUTPATIENT)
Dept: RHEUMATOLOGY | Facility: CLINIC | Age: 71
End: 2021-12-17
Payer: COMMERCIAL

## 2021-12-17 ENCOUNTER — TELEPHONE (OUTPATIENT)
Dept: RHEUMATOLOGY | Facility: CLINIC | Age: 71
End: 2021-12-17

## 2021-12-17 VITALS
WEIGHT: 212 LBS | DIASTOLIC BLOOD PRESSURE: 78 MMHG | OXYGEN SATURATION: 99 % | BODY MASS INDEX: 31.4 KG/M2 | SYSTOLIC BLOOD PRESSURE: 138 MMHG | HEIGHT: 69 IN | HEART RATE: 73 BPM

## 2021-12-17 DIAGNOSIS — Z79.899 HIGH RISK MEDICATIONS (NOT ANTICOAGULANTS) LONG-TERM USE: ICD-10-CM

## 2021-12-17 DIAGNOSIS — L40.50 PSORIATIC ARTHROPATHY (H): Primary | ICD-10-CM

## 2021-12-17 PROCEDURE — 99214 OFFICE O/P EST MOD 30 MIN: CPT | Performed by: INTERNAL MEDICINE

## 2021-12-17 RX ORDER — FOLIC ACID 1 MG/1
1 TABLET ORAL DAILY
Qty: 90 TABLET | Refills: 2 | Status: SHIPPED | OUTPATIENT
Start: 2021-12-17 | End: 2022-06-17

## 2021-12-17 RX ORDER — METHOTREXATE 2.5 MG/1
20 TABLET ORAL WEEKLY
Qty: 104 TABLET | Refills: 1 | Status: SHIPPED | OUTPATIENT
Start: 2021-12-17 | End: 2022-06-17

## 2021-12-17 ASSESSMENT — MIFFLIN-ST. JEOR: SCORE: 1717.01

## 2021-12-17 NOTE — TELEPHONE ENCOUNTER
Central Prior Authorization Team   Phone: [phone]      PA Initiation    Medication: methotrexate  Insurance Company: [facility] - Phone [phone] Fax [phone]  Pharmacy Filling the Rx: [place], [place] - [address] [place] [address]  Filling Pharmacy Phone:    Filling Pharmacy Fax:    Start Date: 12/17/2021

## 2021-12-17 NOTE — PATIENT INSTRUCTIONS
RHEUMATOLOGY    Dr. Adria Lopez    [facility]  [address] [place] [address]  [place] [address]  Phone number: [phone]  Fax number: [phone]    Covid 19 vaccine scheduling phone number is [phone]    Thank you for choosing [facility]!    Francie Cuellar CMA Rheumatology

## 2021-12-17 NOTE — NURSING NOTE
RAPID3 (0-30) Cumulative Score  6.5          RAPID3 Weighted Score (divide #4 by 3 and that is the weighted score)  2.2

## 2021-12-17 NOTE — TELEPHONE ENCOUNTER
Received fax from pharmacy stating patient requires Prior Authorization for methotrexate.     Insurance information:   Name:   Phone number:   ID number:     Initiate prior authorization or change medication?    If a prior authorization is to be initiated, please list the following:    -any medications the patient has tried and failed or any contraindications.  -is the patient currently on this medication, or has tried before?  -What is the diagnosis?  -Justification or other information that may be helpful.

## 2021-12-20 NOTE — TELEPHONE ENCOUNTER
Prior Authorization Not Needed per Insurance    Medication: methotrexate sodium 2.5 MG TABS-Prior Auth Not Needed  Insurance Company: [employer] [place] - Phone [phone] Fax [phone]  Expected CoPay:      Pharmacy Filling the Rx: [facility], [place] - [address] [place] [address]  Pharmacy Notified: Yes  Patient Notified: No    Per insurance, Refill Too Soon until next year, on 01/18/2022    Anne Fogarty shows a paid claim on 11/29/2021.

## 2021-12-30 DIAGNOSIS — R35.1 BENIGN PROSTATIC HYPERPLASIA WITH NOCTURIA: ICD-10-CM

## 2021-12-30 DIAGNOSIS — E78.5 HYPERLIPIDEMIA LDL GOAL <130: ICD-10-CM

## 2021-12-30 DIAGNOSIS — N40.1 BENIGN PROSTATIC HYPERPLASIA WITH NOCTURIA: ICD-10-CM

## 2021-12-30 RX ORDER — TAMSULOSIN HYDROCHLORIDE 0.4 MG/1
0.4 CAPSULE ORAL DAILY
Qty: 90 CAPSULE | Refills: 3 | Status: SHIPPED | OUTPATIENT
Start: 2021-12-30 | End: 2022-12-15

## 2021-12-30 RX ORDER — SIMVASTATIN 40 MG
40 TABLET ORAL AT BEDTIME
Qty: 90 TABLET | Refills: 3 | Status: SHIPPED | OUTPATIENT
Start: 2021-12-30 | End: 2022-12-15

## 2022-01-27 ENCOUNTER — DIAGNOSTICS ONLY (OUTPATIENT)
Dept: URBAN - METROPOLITAN AREA CLINIC 48 | Facility: CLINIC | Age: 72
End: 2022-01-27

## 2022-01-27 DIAGNOSIS — Z11.59 ENCOUNTER FOR SCREENING FOR OTHER VIRAL DISEASES: Primary | ICD-10-CM

## 2022-01-27 DIAGNOSIS — H40.1132: ICD-10-CM

## 2022-01-27 PROCEDURE — 92133 CPTRZD OPH DX IMG PST SGM ON: CPT

## 2022-01-27 PROCEDURE — 92083 EXTENDED VISUAL FIELD XM: CPT

## 2022-02-03 ENCOUNTER — TELEPHONE (OUTPATIENT)
Dept: FAMILY MEDICINE | Facility: CLINIC | Age: 72
End: 2022-02-03
Payer: COMMERCIAL

## 2022-02-03 NOTE — TELEPHONE ENCOUNTER
Reason for Call:  Other     Detailed comments: Pt has a colonoscopy scheduled for next Tuesday, 2/8 and is wondering what medications he should and should not be taking prior to this procedure. Would like a call back to discuss.     Phone Number Patient can be reached at: Home number on file [phone] (home)    Best Time: anytime    Can we leave a detailed message on this number? YES    Call taken on 2/3/2022 at 12:21 PM by Mel Ortega

## 2022-02-03 NOTE — TELEPHONE ENCOUNTER
Call placed to patient.  He is instructed to not take morning medications day of procedure. He can take evening medications same day as procedure and resume Am medication the following am.  He states understanding.

## 2022-02-04 ENCOUNTER — LAB (OUTPATIENT)
Dept: LAB | Facility: CLINIC | Age: 72
End: 2022-02-04
Payer: COMMERCIAL

## 2022-02-04 DIAGNOSIS — Z11.59 ENCOUNTER FOR SCREENING FOR OTHER VIRAL DISEASES: ICD-10-CM

## 2022-02-04 PROCEDURE — U0005 INFEC AGEN DETEC AMPLI PROBE: HCPCS

## 2022-02-04 PROCEDURE — U0003 INFECTIOUS AGENT DETECTION BY NUCLEIC ACID (DNA OR RNA); SEVERE ACUTE RESPIRATORY SYNDROME CORONAVIRUS 2 (SARS-COV-2) (CORONAVIRUS DISEASE [COVID-19]), AMPLIFIED PROBE TECHNIQUE, MAKING USE OF HIGH THROUGHPUT TECHNOLOGIES AS DESCRIBED BY CMS-2020-01-R: HCPCS

## 2022-02-05 LAB — SARS-COV-2 RNA RESP QL NAA+PROBE: NEGATIVE

## 2022-02-07 ENCOUNTER — ANESTHESIA EVENT (OUTPATIENT)
Dept: GASTROENTEROLOGY | Facility: CLINIC | Age: 72
End: 2022-02-07
Payer: COMMERCIAL

## 2022-02-07 NOTE — ANESTHESIA PREPROCEDURE EVALUATION
Anesthesia Pre-Procedure Evaluation    Patient: Pawan Bullard   MRN: [id number] : 1950        Preoperative Diagnosis: Special screening for malignant neoplasm of colon [Z12.11]    Procedure : Procedure(s):  COLONOSCOPY          Past Medical History:   Diagnosis Date     Esophageal reflux     GERD     Hypertension      Psoriatic arthropathy (H)     Psoriatic arthritis      Past Surgical History:   Procedure Laterality Date     ARTHROSCOPY KNEE RT/LT  2009    left knee     ARTHROSCOPY KNEE WITH MEDIAL MENISCECTOMY  2013    Procedure: ARTHROSCOPY KNEE WITH MEDIAL MENISCECTOMY;  Left Knee Arthroscopic and Open Repair of Patellar Tendon with Platelet Rich Plasma;  Surgeon: Ley, Jeffrey Duane, MD;  Location: [facility]     COLONOSCOPY  2006    diverticuli. Father had colon CA     COLONOSCOPY       COLONOSCOPY  2011    Procedure:COLONOSCOPY; Surgeon:JENNIFER JEAN; Location:[facility]     COLONOSCOPY N/A 2016    Procedure: COLONOSCOPY;  Surgeon: Elias Santamaria MD;  Location: [facility]     REPAIR TENDON PATELLA  2013    Procedure: REPAIR TENDON PATELLA;;  Surgeon: Ley, Jeffrey Duane, MD;  Location: [facility]     SURGICAL HISTORY OF -       (L) Herniorrhaphy     SURGICAL HISTORY OF -       (R) Herniorrhaphy     SURGICAL HISTORY OF -       Hemorrohid      Allergies   Allergen Reactions     Acetaminophen W/Codeine Nausea      Social History     Tobacco Use     Smoking status: Never Smoker     Smokeless tobacco: Never Used   Substance Use Topics     Alcohol use: Yes     Comment:  0-2 beer's or mixed drink's monthly      Wt Readings from Last 1 Encounters:   21 96.2 kg (212 lb)        Anesthesia Evaluation            ROS/MED HX  ENT/Pulmonary:     (+) asthma     Neurologic:  - neg neurologic ROS     Cardiovascular:     (+) Dyslipidemia hypertension-----ROSADO.     METS/Exercise Tolerance: 3 - Able to walk 1-2 blocks without stopping    Hematologic:  - neg hematologic  ROS      Musculoskeletal:   (+) arthritis,     GI/Hepatic:     (+) GERD,     Renal/Genitourinary:  - neg Renal ROS     Endo:  - neg endo ROS     Psychiatric/Substance Use:  - neg psychiatric ROS     Infectious Disease:  - neg infectious disease ROS     Malignancy:  - neg malignancy ROS     Other:  - neg other ROS          Physical Exam    Airway  airway exam normal      Mallampati: II   TM distance: > 3 FB   Neck ROM: full   Mouth opening: > 3 cm    Respiratory Devices and Support         Dental  no notable dental history         Cardiovascular   cardiovascular exam normal          Pulmonary   pulmonary exam normal                OUTSIDE LABS:  CBC:   Lab Results   Component Value Date    WBC 5.8 12/10/2021    WBC 4.7 09/17/2021    HGB 14.2 12/10/2021    HGB 14.7 09/17/2021    HCT 40.4 12/10/2021    HCT 41.9 09/17/2021     (L) 12/10/2021     09/17/2021     BMP:   Lab Results   Component Value Date     11/20/2021     11/16/2020    POTASSIUM 3.9 11/20/2021    POTASSIUM 4.0 11/16/2020    CHLORIDE 107 11/20/2021    CHLORIDE 105 11/16/2020    CO2 30 11/20/2021    CO2 27 11/16/2020    BUN 13 11/20/2021    BUN 13 11/16/2020    CR 0.87 12/10/2021    CR 1.06 11/20/2021     (H) 11/20/2021     (H) 11/16/2020     COAGS: No results found for: PTT, INR, FIBR  POC: No results found for: BGM, HCG, HCGS  HEPATIC:   Lab Results   Component Value Date    ALBUMIN 3.6 12/10/2021    PROTTOTAL 6.6 (L) 12/10/2021    ALT 38 12/10/2021    AST 25 12/10/2021    ALKPHOS 57 12/10/2021    BILITOTAL 0.7 12/10/2021     OTHER:   Lab Results   Component Value Date    JOHN 9.0 11/20/2021    CRP <2.9 12/10/2021    SED 5 12/10/2021       Anesthesia Plan    ASA Status:  3   NPO Status:  NPO Appropriate    Anesthesia Type: General.   Induction: Intravenous.   Maintenance: TIVA.        Consents    Anesthesia Plan(s) and associated risks, benefits, and realistic alternatives discussed. Questions answered and  patient/representative(s) expressed understanding.    - Discussed:     - Discussed with:  Patient         Postoperative Care            Comments:                Yakov Quevedo CRNA, APRN CRNA

## 2022-02-08 ENCOUNTER — ANESTHESIA (OUTPATIENT)
Dept: GASTROENTEROLOGY | Facility: CLINIC | Age: 72
End: 2022-02-08
Payer: COMMERCIAL

## 2022-02-08 ENCOUNTER — HOSPITAL ENCOUNTER (OUTPATIENT)
Facility: CLINIC | Age: 72
Discharge: HOME | End: 2022-02-08
Attending: SURGERY | Admitting: SURGERY
Payer: COMMERCIAL

## 2022-02-08 VITALS
RESPIRATION RATE: 16 BRPM | HEIGHT: 69 IN | SYSTOLIC BLOOD PRESSURE: 133 MMHG | OXYGEN SATURATION: 96 % | HEART RATE: 73 BPM | TEMPERATURE: 97.4 F | DIASTOLIC BLOOD PRESSURE: 92 MMHG | WEIGHT: 205 LBS | BODY MASS INDEX: 30.36 KG/M2

## 2022-02-08 LAB — COLONOSCOPY: NORMAL

## 2022-02-08 PROCEDURE — 45378 DIAGNOSTIC COLONOSCOPY: CPT | Performed by: SURGERY

## 2022-02-08 PROCEDURE — 45385 COLONOSCOPY W/LESION REMOVAL: CPT | Mod: PT | Performed by: SURGERY

## 2022-02-08 PROCEDURE — 250N000009 HC RX 250: Performed by: NURSE ANESTHETIST, CERTIFIED REGISTERED

## 2022-02-08 PROCEDURE — 258N000003 HC RX IP 258 OP 636: Performed by: SURGERY

## 2022-02-08 PROCEDURE — 88305 TISSUE EXAM BY PATHOLOGIST: CPT | Mod: TC | Performed by: SURGERY

## 2022-02-08 PROCEDURE — 370N000017 HC ANESTHESIA TECHNICAL FEE, PER MIN: Performed by: SURGERY

## 2022-02-08 PROCEDURE — 250N000011 HC RX IP 250 OP 636: Performed by: NURSE ANESTHETIST, CERTIFIED REGISTERED

## 2022-02-08 PROCEDURE — 250N000009 HC RX 250: Performed by: SURGERY

## 2022-02-08 RX ORDER — LIDOCAINE HYDROCHLORIDE 10 MG/ML
INJECTION, SOLUTION EPIDURAL; INFILTRATION; INTRACAUDAL; PERINEURAL PRN
Status: DISCONTINUED | OUTPATIENT
Start: 2022-02-08 | End: 2022-02-08

## 2022-02-08 RX ORDER — PROPOFOL 10 MG/ML
INJECTION, EMULSION INTRAVENOUS CONTINUOUS PRN
Status: DISCONTINUED | OUTPATIENT
Start: 2022-02-08 | End: 2022-02-08

## 2022-02-08 RX ORDER — ONDANSETRON 2 MG/ML
4 INJECTION INTRAMUSCULAR; INTRAVENOUS
Status: DISCONTINUED | OUTPATIENT
Start: 2022-02-08 | End: 2022-02-08 | Stop reason: HOSPADM

## 2022-02-08 RX ORDER — LIDOCAINE 40 MG/G
CREAM TOPICAL
Status: DISCONTINUED | OUTPATIENT
Start: 2022-02-08 | End: 2022-02-08 | Stop reason: HOSPADM

## 2022-02-08 RX ORDER — GLYCOPYRROLATE 0.2 MG/ML
INJECTION, SOLUTION INTRAMUSCULAR; INTRAVENOUS PRN
Status: DISCONTINUED | OUTPATIENT
Start: 2022-02-08 | End: 2022-02-08

## 2022-02-08 RX ORDER — SODIUM CHLORIDE, SODIUM LACTATE, POTASSIUM CHLORIDE, CALCIUM CHLORIDE 600; 310; 30; 20 MG/100ML; MG/100ML; MG/100ML; MG/100ML
INJECTION, SOLUTION INTRAVENOUS CONTINUOUS
Status: DISCONTINUED | OUTPATIENT
Start: 2022-02-08 | End: 2022-02-08 | Stop reason: HOSPADM

## 2022-02-08 RX ORDER — PROPOFOL 10 MG/ML
INJECTION, EMULSION INTRAVENOUS PRN
Status: DISCONTINUED | OUTPATIENT
Start: 2022-02-08 | End: 2022-02-08

## 2022-02-08 RX ADMIN — LIDOCAINE HYDROCHLORIDE 0.2 ML: 10 INJECTION, SOLUTION EPIDURAL; INFILTRATION; INTRACAUDAL; PERINEURAL at 10:36

## 2022-02-08 RX ADMIN — PROPOFOL 200 MCG/KG/MIN: 10 INJECTION, EMULSION INTRAVENOUS at 10:43

## 2022-02-08 RX ADMIN — PROPOFOL 50 MG: 10 INJECTION, EMULSION INTRAVENOUS at 10:43

## 2022-02-08 RX ADMIN — SODIUM CHLORIDE, POTASSIUM CHLORIDE, SODIUM LACTATE AND CALCIUM CHLORIDE: 600; 310; 30; 20 INJECTION, SOLUTION INTRAVENOUS at 10:36

## 2022-02-08 RX ADMIN — GLYCOPYRROLATE 0.2 MG: 0.2 INJECTION, SOLUTION INTRAMUSCULAR; INTRAVENOUS at 10:43

## 2022-02-08 RX ADMIN — LIDOCAINE HYDROCHLORIDE 50 MG: 10 INJECTION, SOLUTION EPIDURAL; INFILTRATION; INTRACAUDAL; PERINEURAL at 10:43

## 2022-02-08 ASSESSMENT — MIFFLIN-ST. JEOR: SCORE: 1680.25

## 2022-02-08 NOTE — H&P
72 year old year old male here for colonoscopy for family history of colon cancer.    Patient Active Problem List   Diagnosis     Psoriatic arthropathy (H)     Esophageal reflux     Hypertension goal BP (blood pressure) < 140/90     FAMILY HISTORY OF GI NEOPLASM     FAMILY HISTORY OF DIABETES MELLITUS     Impotence of organic origin     Chondrocalcinosis     HYPERLIPIDEMIA LDL GOAL <130     Osteoarthritis     Advanced directives, counseling/discussion     High risk medications (not anticoagulants) long-term use     ROSADO (dyspnea on exertion)       Past Medical History:   Diagnosis Date     Esophageal reflux     GERD     Hypertension      Psoriatic arthropathy (H)     Psoriatic arthritis       Past Surgical History:   Procedure Laterality Date     ARTHROSCOPY KNEE RT/LT  April 2009    left knee     ARTHROSCOPY KNEE WITH MEDIAL MENISCECTOMY  7/9/2013    Procedure: ARTHROSCOPY KNEE WITH MEDIAL MENISCECTOMY;  Left Knee Arthroscopic and Open Repair of Patellar Tendon with Platelet Rich Plasma;  Surgeon: Ley, Jeffrey Duane, MD;  Location: [facility]     COLONOSCOPY  Jan. 2006    diverticuli. Father had colon CA     COLONOSCOPY  2001     COLONOSCOPY  4/11/2011    Procedure:COLONOSCOPY; Surgeon:JENNIFER JEAN; Location:[facility]     COLONOSCOPY N/A 11/21/2016    Procedure: COLONOSCOPY;  Surgeon: Elias Santamaria MD;  Location: [facility]     REPAIR TENDON PATELLA  7/9/2013    Procedure: REPAIR TENDON PATELLA;;  Surgeon: Ley, Jeffrey Duane, MD;  Location: [facility]     SURGICAL HISTORY OF -   1999    (L) Herniorrhaphy     SURGICAL HISTORY OF -   2003    (R) Herniorrhaphy     SURGICAL HISTORY OF -   1981    Hemorrohid       @[facility]@    No current outpatient medications on file.       Allergies   Allergen Reactions     Acetaminophen W/Codeine Nausea       Pt reports that he has never smoked. He has never used smokeless tobacco. He reports current alcohol use. He reports that he does not use drugs.    Exam:  There were no vitals taken for this  visit.    Awake, Alert OX3  Lungs - CTA bilaterally  CV - RRR, no murmurs, distal pulses intact  Abd - soft, non-distended, non-tender, +BS  Extr - No cyanosis or edema    A/P 72 year old year old male in need of colonoscopy for family history of colon cancer. Risks, benefits, alternatives, and complications were discussed including the possibility of perforation and the patient agreed to proceed.    Stephanie Pisano MD

## 2022-02-08 NOTE — ANESTHESIA POSTPROCEDURE EVALUATION
Patient: Pawan Bullard    Procedure: Procedure(s):  COLONOSCOPY       Diagnosis:Special screening for malignant neoplasm of colon [Z12.11]  Diagnosis Additional Information: No value filed.    Anesthesia Type:  General    Note:  Disposition: Outpatient   Postop Pain Control: Uneventful            Sign Out: Well controlled pain   PONV: No   Neuro/Psych: Uneventful            Sign Out: Acceptable/Baseline neuro status   Airway/Respiratory: Uneventful            Sign Out: Acceptable/Baseline resp. status   CV/Hemodynamics: Uneventful            Sign Out: Acceptable CV status   Other NRE: NONE   DID A NON-ROUTINE EVENT OCCUR? No           Last vitals:  Vitals Value Taken Time   /91 02/08/22 1106   Temp 36.3  C (97.4  F) 02/08/22 1106   Pulse 69 02/08/22 1106   Resp 14 02/08/22 1106   SpO2 93 % 02/08/22 1108   Vitals shown include unvalidated device data.    Electronically Signed By: RK Carcamo CRNA  February 8, 2022  11:09 AM

## 2022-02-08 NOTE — BRIEF OP NOTE
[facility]   Brief Operative Note    Pre-operative diagnosis: Special screening for malignant neoplasm of colon [Z12.11]   Post-operative diagnosis polyps(2), diverticulosis     Procedure: Procedure(s):  COLONOSCOPY   Surgeon(s): Surgeon(s) and Role:     * Junior Brown MD - Primary   Estimated blood loss: * No values recorded between 2/8/2022 10:46 AM and 2/8/2022 11:05 AM *    Specimens: ID Type Source Tests Collected by Time Destination   1 : colon ascending polyp  Polyp Large Intestine, Colon, Ascending SURGICAL PATHOLOGY EXAM Junior Brown MD 2/8/2022 10:54 AM       Findings: 1. Sigmoid diverticulosis  2. Two polyps ascending colon - removed  3. Colon otherwise normal

## 2022-02-09 LAB
PATH REPORT.COMMENTS IMP SPEC: NORMAL
PATH REPORT.COMMENTS IMP SPEC: NORMAL
PATH REPORT.FINAL DX SPEC: NORMAL
PATH REPORT.GROSS SPEC: NORMAL
PATH REPORT.MICROSCOPIC SPEC OTHER STN: NORMAL
PATH REPORT.RELEVANT HX SPEC: NORMAL
PHOTO IMAGE: NORMAL

## 2022-02-09 PROCEDURE — 88305 TISSUE EXAM BY PATHOLOGIST: CPT | Mod: 26 | Performed by: PATHOLOGY

## 2022-02-10 PROBLEM — Z86.0100 HISTORY OF COLONIC POLYPS: Status: ACTIVE | Noted: 2022-02-10

## 2022-02-16 NOTE — ANESTHESIA CARE TRANSFER NOTE
Patient: Pawan Bullard    Procedure: Procedure(s):  COLONOSCOPY       Diagnosis: Special screening for malignant neoplasm of colon [Z12.11]  Diagnosis Additional Information: No value filed.    Anesthesia Type:   General     Note:    Oropharynx: spontaneously breathing  Level of Consciousness: drowsy  Oxygen Supplementation: room air    Independent Airway: airway patency satisfactory and stable  Dentition: dentition unchanged  Vital Signs Stable: post-procedure vital signs reviewed and stable  Report to RN Given: handoff report given  Patient transferred to: Phase II    Handoff Report: Identifed the Patient, Identified the Reponsible Provider, Reviewed the pertinent medical history, Discussed the surgical course, Reviewed Intra-OP anesthesia mangement and issues during anesthesia, Set expectations for post-procedure period and Allowed opportunity for questions and acknowledgement of understanding      Vitals:  Vitals Value Taken Time   /91 02/08/22 1106   Temp 36.3  C (97.4  F) 02/08/22 1106   Pulse 69 02/08/22 1106   Resp 14 02/08/22 1106   SpO2 93 % 02/08/22 1108   Vitals shown include unvalidated device data.    Electronically Signed By: RK Carcamo CRNA  February 8, 2022  11:09 AM   Clinically-stable with mild expiratory wheezing. Afebrile. No Elevation of WBC, Non-Productive Cough.  --Will continue home regimen of symbicort  --PRN Oxygen per Nasal Cannula for SpO2 <88%  --Pulse-Ox Monitoring every 4 hours  --Monitor respiratory status closely  --duo nebs PRN for sob/wheezing

## 2022-02-17 ENCOUNTER — COMPREHENSIVE EXAM (OUTPATIENT)
Dept: URBAN - METROPOLITAN AREA CLINIC 49 | Facility: CLINIC | Age: 72
End: 2022-02-17

## 2022-02-17 DIAGNOSIS — H35.373: ICD-10-CM

## 2022-02-17 DIAGNOSIS — H43.813: ICD-10-CM

## 2022-02-17 DIAGNOSIS — H40.1132: ICD-10-CM

## 2022-02-17 DIAGNOSIS — H25.13: ICD-10-CM

## 2022-02-17 DIAGNOSIS — Z98.890: ICD-10-CM

## 2022-02-17 PROCEDURE — 92014 COMPRE OPH EXAM EST PT 1/>: CPT

## 2022-02-17 ASSESSMENT — VISUAL ACUITY
OS_GLARE: 20/50-1
OU_CC: J1
OS_CC: 20/30-1
OD_GLARE: 20/400
OS_GLARE: 20/60
OD_GLARE: >20/400
OD_CC: 20/30+2

## 2022-02-17 ASSESSMENT — TONOMETRY
OS_IOP_MMHG: 14
OD_IOP_MMHG: 12
OS_IOP_MMHG: 12
OD_IOP_MMHG: 15

## 2022-03-15 ENCOUNTER — LAB (OUTPATIENT)
Dept: LAB | Facility: CLINIC | Age: 72
End: 2022-03-15
Payer: COMMERCIAL

## 2022-03-15 DIAGNOSIS — L40.50 PSORIATIC ARTHROPATHY (H): ICD-10-CM

## 2022-03-15 DIAGNOSIS — Z79.899 HIGH RISK MEDICATIONS (NOT ANTICOAGULANTS) LONG-TERM USE: ICD-10-CM

## 2022-03-15 LAB
ALBUMIN SERPL-MCNC: 3.6 G/DL (ref 3.4–5)
ALP SERPL-CCNC: 55 U/L (ref 40–150)
ALT SERPL W P-5'-P-CCNC: 49 U/L (ref 0–70)
AST SERPL W P-5'-P-CCNC: 32 U/L (ref 0–45)
BASOPHILS # BLD AUTO: 0 10E3/UL (ref 0–0.2)
BASOPHILS NFR BLD AUTO: 0 %
BILIRUB DIRECT SERPL-MCNC: 0.2 MG/DL (ref 0–0.2)
BILIRUB SERPL-MCNC: 0.9 MG/DL (ref 0.2–1.3)
CREAT SERPL-MCNC: 0.97 MG/DL (ref 0.66–1.25)
EOSINOPHIL # BLD AUTO: 0.1 10E3/UL (ref 0–0.7)
EOSINOPHIL NFR BLD AUTO: 1 %
ERYTHROCYTE [DISTWIDTH] IN BLOOD BY AUTOMATED COUNT: 13.9 % (ref 10–15)
GFR SERPL CREATININE-BSD FRML MDRD: 84 ML/MIN/1.73M2
HCT VFR BLD AUTO: 45.7 % (ref 40–53)
HGB BLD-MCNC: 15.5 G/DL (ref 13.3–17.7)
LYMPHOCYTES # BLD AUTO: 1.6 10E3/UL (ref 0.8–5.3)
LYMPHOCYTES NFR BLD AUTO: 27 %
MCH RBC QN AUTO: 33.5 PG (ref 26.5–33)
MCHC RBC AUTO-ENTMCNC: 33.9 G/DL (ref 31.5–36.5)
MCV RBC AUTO: 99 FL (ref 78–100)
MONOCYTES # BLD AUTO: 0.3 10E3/UL (ref 0–1.3)
MONOCYTES NFR BLD AUTO: 5 %
NEUTROPHILS # BLD AUTO: 3.8 10E3/UL (ref 1.6–8.3)
NEUTROPHILS NFR BLD AUTO: 66 %
PLATELET # BLD AUTO: 155 10E3/UL (ref 150–450)
PROT SERPL-MCNC: 6.6 G/DL (ref 6.8–8.8)
RBC # BLD AUTO: 4.62 10E6/UL (ref 4.4–5.9)
WBC # BLD AUTO: 5.7 10E3/UL (ref 4–11)

## 2022-03-15 PROCEDURE — 36415 COLL VENOUS BLD VENIPUNCTURE: CPT

## 2022-03-15 PROCEDURE — 85025 COMPLETE CBC W/AUTO DIFF WBC: CPT

## 2022-03-15 PROCEDURE — 82565 ASSAY OF CREATININE: CPT

## 2022-03-15 PROCEDURE — 80076 HEPATIC FUNCTION PANEL: CPT

## 2022-04-01 ENCOUNTER — OFFICE VISIT (OUTPATIENT)
Dept: FAMILY MEDICINE | Facility: CLINIC | Age: 72
End: 2022-04-01
Payer: COMMERCIAL

## 2022-04-01 ENCOUNTER — ANCILLARY PROCEDURE (OUTPATIENT)
Dept: GENERAL RADIOLOGY | Facility: CLINIC | Age: 72
End: 2022-04-01
Attending: FAMILY MEDICINE
Payer: COMMERCIAL

## 2022-04-01 VITALS
HEIGHT: 69 IN | WEIGHT: 213 LBS | OXYGEN SATURATION: 100 % | TEMPERATURE: 98.5 F | DIASTOLIC BLOOD PRESSURE: 80 MMHG | RESPIRATION RATE: 16 BRPM | BODY MASS INDEX: 31.55 KG/M2 | SYSTOLIC BLOOD PRESSURE: 144 MMHG | HEART RATE: 68 BPM

## 2022-04-01 DIAGNOSIS — M54.2 NECK PAIN: Primary | ICD-10-CM

## 2022-04-01 DIAGNOSIS — M54.2 NECK PAIN: ICD-10-CM

## 2022-04-01 PROCEDURE — 99213 OFFICE O/P EST LOW 20 MIN: CPT | Performed by: FAMILY MEDICINE

## 2022-04-01 PROCEDURE — 72040 X-RAY EXAM NECK SPINE 2-3 VW: CPT | Performed by: RADIOLOGY

## 2022-04-01 ASSESSMENT — PAIN SCALES - GENERAL: PAINLEVEL: MODERATE PAIN (5)

## 2022-04-01 NOTE — PROGRESS NOTES
ASSESSMENT:   (M54.2) Neck pain  (primary encounter diagnosis)  Comment: neck pain after fall.   Plan: XR Cervical Spine 2/3 Views          Heat or ice can help.   Stretches are important to do many times a day.   OK for acetaminophen or ibuprofen as needed.  See doses below.     Schedule an appointment with physical therapy.      Alina Villalta is a 72 year old who presents for the following health issues  Chief Complaint   Patient presents with     Fall      Fell straight back on ice a month ago.  Was wearing a saad.  Was ice skating.   No loss of consciousness.  Got right back up.  A little dazed for a few hours.      He has had some persistent pain posterior neck.   Alleviating factors: acetaminophen or ibuprofen.  Taking medication most days.  acetaminophen 650mg-two pills once daily.  ibuprofen 400mg.    Creaks if turns head.   Location: left posterior neck.   No radiation.   Course of symptoms over time: same.     Little headache.   No cognition problems.  No dizziness or lightheadedness.   NO neuro symptoms in upper extremities.    Past history of similar problems:arthritis in neck.   He has psoriatic arthritis. Overall arthritis is OK.  Some pain in fingers.  Worse if rain is coming.   On methotrexate which seems to work well.     Not checking  BP recently.     Pain History:  When did you first notice your pain? - Less than 1 week   Have you seen anyone else for your pain? No  Where in your body do you have pain? Neck Pain, back of head   Onset/Duration: 1 month ago   Description:   Location: neck pain   Radiation: none  Intensity: moderate  Progression of Symptoms:  same  Accompanying Signs & Symptoms:  Burning, tingling, prickly sensation in arm(s): no  Numbness in arm(s): no  Weakness in arm(s):  no  Fever: no  Headache: YES  Nausea and/or vomiting: no  History:   Trauma: YES- had a fall on ice a month ago   Previous neck pain: no  Previous surgery or injections: no  Previous Imaging (MRI,X ray):  "no  Precipitating or alleviating factors: None  Does movement impact the pain:  YES- gets stiff   Therapies tried and outcome: ibuprofen, tylenol     Patient Active Problem List   Diagnosis     Psoriatic arthropathy (H)     Esophageal reflux     Hypertension goal BP (blood pressure) < 140/90     FAMILY HISTORY OF GI NEOPLASM     FAMILY HISTORY OF DIABETES MELLITUS     Impotence of organic origin     Chondrocalcinosis     HYPERLIPIDEMIA LDL GOAL <130     Osteoarthritis     Advanced directives, counseling/discussion     High risk medications (not anticoagulants) long-term use     ROSADO (dyspnea on exertion)     History of colonic polyps        OBJECTIVE:Blood pressure (!) 144/80, pulse 68, temperature 98.5  F (36.9  C), temperature source Tympanic, resp. rate 16, height 1.753 m (5' 9\"), weight 96.6 kg (213 lb), SpO2 100 %. BMI=Body mass index is 31.45 kg/m .  GENERAL APPEARANCE ADULT: Alert, no acute distress  NECK: No adenopathy,masses or thyromegaly  MS: neck exam: appears to move neck normally, normal neck posture, tenderness at left lateral posterior neck, no tenderness around the spinous processes.    Range of motion: Good with mild posterior neck pain with movement.  Reflexes in upper extremities.  C spine xrays: Appear normal without fracture or bony abnormality aside from some mild arthritic changes by my interpretation, I independently visualized the xrays.     "

## 2022-04-01 NOTE — PATIENT INSTRUCTIONS
ASSESSMENT:   (M54.2) Neck pain  (primary encounter diagnosis)  Comment: neck pain after fall.   Plan: XR Cervical Spine 2/3 Views          Heat or ice can help.   Stretches are important to do many times a day.   OK for acetaminophen or ibuprofen as needed.  See doses below.     Schedule an appointment with physical therapy.

## 2022-04-01 NOTE — RESULT ENCOUNTER NOTE
Ramesh Villalta,  Bones appear a little thin.   Some degenerative changes-arthritis and aging.    No fractures.  AMAN SUTTON MD

## 2022-04-19 ENCOUNTER — DIAGNOSTICS ONLY (OUTPATIENT)
Dept: URBAN - METROPOLITAN AREA CLINIC 49 | Facility: CLINIC | Age: 72
End: 2022-04-19

## 2022-04-19 DIAGNOSIS — H25.13: ICD-10-CM

## 2022-04-19 PROCEDURE — 92136 OPHTHALMIC BIOMETRY: CPT

## 2022-04-19 PROCEDURE — 92025IOL CORNEAL TOPOGRAPHY PREMIUM IOL

## 2022-04-19 ASSESSMENT — KERATOMETRY
OS_AXISANGLE_DEGREES: 136
OD_AXISANGLE2_DEGREES: 106
OS_K2POWER_DIOPTERS: 41.37
OD_K2POWER_DIOPTERS: 40.75
OS_AXISANGLE2_DEGREES: 46
OD_K1POWER_DIOPTERS: 44.37
OD_AXISANGLE_DEGREES: 016
OS_K1POWER_DIOPTERS: 43.12

## 2022-04-28 ENCOUNTER — HOSPITAL ENCOUNTER (OUTPATIENT)
Dept: PHYSICAL THERAPY | Facility: CLINIC | Age: 72
Setting detail: THERAPIES SERIES
Discharge: HOME | End: 2022-04-28
Attending: ORTHOPAEDIC SURGERY
Payer: COMMERCIAL

## 2022-04-28 PROCEDURE — 97110 THERAPEUTIC EXERCISES: CPT | Mod: GP | Performed by: PHYSICAL THERAPIST

## 2022-04-28 PROCEDURE — 97140 MANUAL THERAPY 1/> REGIONS: CPT | Mod: GP | Performed by: PHYSICAL THERAPIST

## 2022-04-28 PROCEDURE — 97161 PT EVAL LOW COMPLEX 20 MIN: CPT | Mod: GP | Performed by: PHYSICAL THERAPIST

## 2022-04-28 NOTE — PROGRESS NOTES
[facility]    OUTPATIENT PHYSICAL THERAPY ORTHOPEDIC EVALUATION  PLAN OF TREATMENT FOR OUTPATIENT REHABILITATION  (COMPLETE FOR INITIAL CLAIMS ONLY)  Patient's Last Name, First Name, M.I.  YOB: 1950  Pawan Bullard    Provider s Name:  [facility]   Medical Record No.  [id number]   Start of Care Date:  04/28/22   Onset Date:  02/26/22   Type:     _X__PT   ___OT   ___SLP Medical Diagnosis:  Neck pain (M54.2)  - Primary     PT Diagnosis:  neck pain   Visits from SOC:  1      _________________________________________________________________________________  Plan of Treatment/Functional Goals:  joint mobilization, manual therapy, neuromuscular re-education, ROM, strengthening     Cryotherapy, Electrical stimulation, TENS, Traction     Goals  Goal Identifier: Cervical ROM  Goal Description: Pt will demonstrate 60 deg bi of cervical rot in order to be able to safely turn head to drive car safely.  Target Date: 05/19/22    Goal Identifier: sitting  Goal Description: Pt will relate 0/10 stiffness with sitting to return to PLOF  Target Date: 05/19/22    Goal Identifier: HEP  Goal Description: Pt will be compliant and competent in HEP to allow them to achieve maximal strength and reduce pain  Target Date: 06/09/22                      Therapy Frequency:  other (see comments) (1x/every 2 weeks)  Predicted Duration of Therapy Intervention:  6 weeks    Imani Estrada, PT                 I CERTIFY THE NEED FOR THESE SERVICES FURNISHED UNDER        THIS PLAN OF TREATMENT AND WHILE UNDER MY CARE     (Physician co-signature of this document indicates review and certification of the therapy plan).                     Certification Date From:  04/28/22   Certification Date To:   6/9/22    Referring Provider:  Dr Finley    Initial Assessment        See Epic Evaluation Start of Care  Date: 04/28/22 04/28/22 1500   General Information   Type of Visit Initial OP Ortho PT Evaluation   Start of Care Date 04/28/22   Referring Physician Dr Finley   Patient/Family Goals Statement reduce pain   Orders Evaluate and Treat   Date of Order 04/01/22   Certification Required? Yes   Medical Diagnosis Neck pain (M54.2)  - Primary   Surgical/Medical history reviewed Yes   Precautions/Limitations no known precautions/limitations   General Information Comments PMH: high BP, OA< knne surgery 2009 and 2013   Body Part(s)   Body Part(s) Cervical Spine   Presentation and Etiology   Pertinent history of current problem (include personal factors and/or comorbidities that impact the POC) Pt relates was ice skating in Feb and fell back and hit head. Pt relates was wearing a helmet but no c/o of stiffness and crepitus with movement. Pt relates overall improvement and denies radicular symptoms. Pt relates initially had a headache but is doing better now.   Impairments E. Decreased flexibility;M. Locking or catching   Functional Limitations perform activities of daily living;perform required work activities   Symptom Location Neck   How/Where did it occur With a fall   Onset date of current episode/exacerbation 02/26/22   Chronicity New   Pain rating (0-10 point scale) Worst (/10);Best (/10)   Best (/10) 1   Worst (/10) 4   Pain quality C. Aching;G. Cramping   Frequency of pain/symptoms B. Intermittent   Pain/symptoms exacerbated by A. Sitting;G. Certain positions   Pain/symptoms eased by C. Rest;F. Certain positions;G. Heat;H. Cold;I. OTC medication(s)   Progression of symptoms since onset: Improved   Current Level of Function   Current Community Support Family/friend caregiver   Patient role/employment history F. Retired   Living environment [place]   Fall Risk Screen   Fall screen completed by PT   Have you fallen 2 or more times in the past year? No   Have you fallen and had an injury in the past  year? Yes   Is patient a fall risk? No   Fall screen comments Fell when ice skating   Abuse Screen (yes response referral indicated)   Feels Unsafe at Home or Work/School no   Feels Threatened by Someone no   Does Anyone Try to Keep You From Having Contact with Others or Doing Things Outside Your Home? no   Physical Signs of Abuse Present no   Functional Scales   Functional Scales Other   Other Scales  NDI:14%   Cervical Spine   Cervical Left Side Bending ROM 25   Cervical Right Rotation ROM 55   Cervical Left Rotation ROM 50   Cervical Flexion ROM 30   Cervical Extension ROM 38   Cervical Right Side Bending ROM 10 w elevated shoulder   Shoulder AROM Screen WFL   Vertebral Artery Test negative   Alar Ligament Test negative   Transverse Ligament Test negative   Spurling Test negative   Cervical Distraction Test negative   Cervical Rotation/Lateral Flexion Test + on R   Segmental Mobility-Cervical mod tightness   Segmental Mobility-Thoracic min restricitons -   Palpation mod tightness B upper trap, cervical parapsinals   Posture fwd head posture, mild rounded head posture   Planned Therapy Interventions   Planned Therapy Interventions joint mobilization;manual therapy;neuromuscular re-education;ROM;strengthening   Planned Modality Interventions   Planned Modality Interventions Cryotherapy;Electrical stimulation;TENS;Traction   Clinical Impression   Criteria for Skilled Therapeutic Interventions Met yes, treatment indicated   PT Diagnosis neck pain   Influenced by the following impairments limited ROM, weakness, pain   Functional limitations due to impairments sitting, sleeping, ROM   Clinical Presentation Stable/Uncomplicated   Clinical Presentation Rationale Pt is 72 yr old male who presents w cervical stifnees s/p fall. pt is overall improving and is otherwise healthy. degenerticve changes were noted in imaging but pt relates no pain prior to fall   Clinical Decision Making (Complexity) Low complexity   Therapy  Frequency other (see comments)  (1x/every 2 weeks)   Predicted Duration of Therapy Intervention (days/wks) 6 weeks   Risk & Benefits of therapy have been explained Yes   Patient, Family & other staff in agreement with plan of care Yes   Education Assessment   Preferred Learning Style Listening;Reading;Demonstration;Pictures/video   Barriers to Learning No barriers   ORTHO GOALS   PT Ortho Eval Goals 1;2;3;4   Ortho Goal 1   Goal Identifier Cervical ROM   Goal Description Pt will demonstrate 60 deg bi of cervical rot in order to be able to safely turn head to drive car safely.   Target Date 05/19/22   Ortho Goal 2   Goal Identifier sitting   Goal Description Pt will relate 0/10 stiffness with sitting to return to PLOF   Target Date 05/19/22   Ortho Goal 3   Goal Identifier HEP   Goal Description Pt will be compliant and competent in HEP to allow them to achieve maximal strength and reduce pain   Target Date 06/09/22   Total Evaluation Time   PT Eval, Low Complexity Minutes (55550) 20   Therapy Certification   Certification date from 04/28/22   Medical Diagnosis Neck pain (M54.2)  - Primary

## 2022-06-01 DIAGNOSIS — I10 ESSENTIAL HYPERTENSION WITH GOAL BLOOD PRESSURE LESS THAN 140/90: ICD-10-CM

## 2022-06-01 RX ORDER — LISINOPRIL 5 MG/1
5 TABLET ORAL DAILY
Qty: 90 TABLET | Refills: 1 | Status: SHIPPED | OUTPATIENT
Start: 2022-06-01 | End: 2022-11-29

## 2022-06-07 ENCOUNTER — PRE-OP/H&P (OUTPATIENT)
Dept: URBAN - METROPOLITAN AREA CLINIC 49 | Facility: CLINIC | Age: 72
End: 2022-06-07

## 2022-06-07 VITALS
RESPIRATION RATE: 16 BRPM | DIASTOLIC BLOOD PRESSURE: 96 MMHG | SYSTOLIC BLOOD PRESSURE: 132 MMHG | HEART RATE: 63 BPM | HEIGHT: 60 IN

## 2022-06-07 DIAGNOSIS — H16.212: ICD-10-CM

## 2022-06-07 DIAGNOSIS — H40.1112: ICD-10-CM

## 2022-06-07 DIAGNOSIS — H25.13: ICD-10-CM

## 2022-06-07 DIAGNOSIS — H35.373: ICD-10-CM

## 2022-06-07 PROCEDURE — PREOP PRE OP VISIT

## 2022-06-07 PROCEDURE — 92134 CPTRZ OPH DX IMG PST SGM RTA: CPT

## 2022-06-07 ASSESSMENT — TONOMETRY
OS_IOP_MMHG: 15
OD_IOP_MMHG: 12
OS_IOP_MMHG: 13
OD_IOP_MMHG: 15

## 2022-06-07 ASSESSMENT — VISUAL ACUITY
OD_CC: 20/30
OU_CC: 20/25-1
OS_CC: 20/30

## 2022-06-07 NOTE — PATIENT DISCUSSION
[employer]ARACT SURGERY PLANNER - STANDARD IOL/NO FEMTO/MIGS: Phacoemulsification with IOL: Eye: OD|DOS: 6/16/2022|Model: AABOO|Power: 15. 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 Dilated Pupils: 6.5MM.

## 2022-06-07 NOTE — PATIENT DISCUSSION
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  Inferior corneal thinning noted on todays exam.

## 2022-06-07 NOTE — PATIENT DISCUSSION
Inability to close eye. Inferior corneal thinning noted on todays exam. At 5 months old patient had Poliovirus that settled into left side of face.

## 2022-06-14 ENCOUNTER — LAB (OUTPATIENT)
Dept: LAB | Facility: CLINIC | Age: 72
End: 2022-06-14
Payer: COMMERCIAL

## 2022-06-14 DIAGNOSIS — L40.50 PSORIATIC ARTHROPATHY (H): ICD-10-CM

## 2022-06-14 DIAGNOSIS — Z79.899 HIGH RISK MEDICATIONS (NOT ANTICOAGULANTS) LONG-TERM USE: ICD-10-CM

## 2022-06-14 LAB
ALBUMIN SERPL-MCNC: 3.7 G/DL (ref 3.4–5)
ALP SERPL-CCNC: 55 U/L (ref 40–150)
ALT SERPL W P-5'-P-CCNC: 77 U/L (ref 0–70)
AST SERPL W P-5'-P-CCNC: 55 U/L (ref 0–45)
BASOPHILS # BLD AUTO: 0 10E3/UL (ref 0–0.2)
BASOPHILS NFR BLD AUTO: 0 %
BILIRUB DIRECT SERPL-MCNC: 0.2 MG/DL (ref 0–0.2)
BILIRUB SERPL-MCNC: 0.9 MG/DL (ref 0.2–1.3)
CREAT SERPL-MCNC: 0.92 MG/DL (ref 0.66–1.25)
CRP SERPL-MCNC: <2.9 MG/L (ref 0–8)
EOSINOPHIL # BLD AUTO: 0.2 10E3/UL (ref 0–0.7)
EOSINOPHIL NFR BLD AUTO: 4 %
ERYTHROCYTE [DISTWIDTH] IN BLOOD BY AUTOMATED COUNT: 13.6 % (ref 10–15)
ERYTHROCYTE [SEDIMENTATION RATE] IN BLOOD BY WESTERGREN METHOD: 6 MM/HR (ref 0–20)
GFR SERPL CREATININE-BSD FRML MDRD: 89 ML/MIN/1.73M2
HCT VFR BLD AUTO: 42.5 % (ref 40–53)
HGB BLD-MCNC: 14.6 G/DL (ref 13.3–17.7)
IMM GRANULOCYTES # BLD: 0 10E3/UL
IMM GRANULOCYTES NFR BLD: 0 %
LYMPHOCYTES # BLD AUTO: 1.3 10E3/UL (ref 0.8–5.3)
LYMPHOCYTES NFR BLD AUTO: 23 %
MCH RBC QN AUTO: 33.6 PG (ref 26.5–33)
MCHC RBC AUTO-ENTMCNC: 34.4 G/DL (ref 31.5–36.5)
MCV RBC AUTO: 98 FL (ref 78–100)
MONOCYTES # BLD AUTO: 0.3 10E3/UL (ref 0–1.3)
MONOCYTES NFR BLD AUTO: 5 %
NEUTROPHILS # BLD AUTO: 3.7 10E3/UL (ref 1.6–8.3)
NEUTROPHILS NFR BLD AUTO: 68 %
PLATELET # BLD AUTO: 137 10E3/UL (ref 150–450)
PROT SERPL-MCNC: 6.6 G/DL (ref 6.8–8.8)
RBC # BLD AUTO: 4.34 10E6/UL (ref 4.4–5.9)
WBC # BLD AUTO: 5.5 10E3/UL (ref 4–11)

## 2022-06-14 PROCEDURE — 80076 HEPATIC FUNCTION PANEL: CPT

## 2022-06-14 PROCEDURE — 85652 RBC SED RATE AUTOMATED: CPT

## 2022-06-14 PROCEDURE — 85025 COMPLETE CBC W/AUTO DIFF WBC: CPT

## 2022-06-14 PROCEDURE — 82565 ASSAY OF CREATININE: CPT

## 2022-06-14 PROCEDURE — 86140 C-REACTIVE PROTEIN: CPT

## 2022-06-14 PROCEDURE — 36415 COLL VENOUS BLD VENIPUNCTURE: CPT

## 2022-06-16 ENCOUNTER — POST-OP (OUTPATIENT)
Dept: URBAN - METROPOLITAN AREA CLINIC 48 | Facility: CLINIC | Age: 72
End: 2022-06-16

## 2022-06-16 ENCOUNTER — SURGERY/PROCEDURE (OUTPATIENT)
Dept: URBAN - METROPOLITAN AREA SURGERY 16 | Facility: SURGERY | Age: 72
End: 2022-06-16

## 2022-06-16 DIAGNOSIS — H40.1112: ICD-10-CM

## 2022-06-16 DIAGNOSIS — Z96.1: ICD-10-CM

## 2022-06-16 DIAGNOSIS — H35.373: ICD-10-CM

## 2022-06-16 DIAGNOSIS — Z98.41: ICD-10-CM

## 2022-06-16 DIAGNOSIS — H25.12: ICD-10-CM

## 2022-06-16 DIAGNOSIS — H16.212: ICD-10-CM

## 2022-06-16 DIAGNOSIS — H25.11: ICD-10-CM

## 2022-06-16 PROCEDURE — 66174 TRLUML DIL AQ O/F CAN W/O ST: CPT

## 2022-06-16 PROCEDURE — 66991 XCAPSL CTRC RMVL INSJ 1+: CPT

## 2022-06-16 ASSESSMENT — TONOMETRY: OD_IOP_MMHG: 6

## 2022-06-16 ASSESSMENT — VISUAL ACUITY: OD_SC: HM @ 2FT

## 2022-06-17 ENCOUNTER — OFFICE VISIT (OUTPATIENT)
Dept: RHEUMATOLOGY | Facility: CLINIC | Age: 72
End: 2022-06-17
Payer: COMMERCIAL

## 2022-06-17 VITALS
BODY MASS INDEX: 31.16 KG/M2 | OXYGEN SATURATION: 99 % | HEIGHT: 69 IN | DIASTOLIC BLOOD PRESSURE: 80 MMHG | SYSTOLIC BLOOD PRESSURE: 130 MMHG | HEART RATE: 74 BPM | WEIGHT: 210.4 LBS | RESPIRATION RATE: 16 BRPM

## 2022-06-17 DIAGNOSIS — Z79.899 HIGH RISK MEDICATIONS (NOT ANTICOAGULANTS) LONG-TERM USE: ICD-10-CM

## 2022-06-17 DIAGNOSIS — L40.50 PSORIATIC ARTHROPATHY (H): Primary | ICD-10-CM

## 2022-06-17 PROCEDURE — 99214 OFFICE O/P EST MOD 30 MIN: CPT | Performed by: INTERNAL MEDICINE

## 2022-06-17 RX ORDER — METHOTREXATE 2.5 MG/1
15 TABLET ORAL WEEKLY
Qty: 78 TABLET | Refills: 0 | Status: SHIPPED | OUTPATIENT
Start: 2022-06-17 | End: 2022-10-14

## 2022-06-17 RX ORDER — FOLIC ACID 1 MG/1
1 TABLET ORAL DAILY
Qty: 90 TABLET | Refills: 2 | Status: SHIPPED | OUTPATIENT
Start: 2022-06-17 | End: 2023-02-16

## 2022-06-17 NOTE — PATIENT INSTRUCTIONS
"RHEUMATOLOGY    Dr. Adria Lopez    [facility]  [address] [place] [address]  [place] Thomas [address]  Phone number: [phone]  Fax number: [phone]      Thank you for choosing [facility]!    rFancie Cuellar CMA Rheumatology    ----------------------------  COVID-19 vaccination: below is the course of vaccination, please compare this to your vaccination card.     The J&J and 1st moderna vaccine count as your \"initial series.\" A second mRNA (Pfizer or Moderna) COVID-19 vaccine would be considered the first booster and should be received at least 2 months after the initial series is completed.  A 3rd mRNA COVID-19 vaccine, considered the 2nd booster, may be received at least 4 months after the first booster.     Based on our current understanding (and this may change over time as we learn more), medications should be adjusted as noted below, if disease activity allows:  - NSAIDs and Acetaminophen: hold for 24 hours prior to vaccination if able to do so  - Methotrexate should be held for 1-2 weeks after each COVID-19 vaccine (as disease activity allows)               "

## 2022-06-17 NOTE — NURSING NOTE
Blood pressure rechecked after visit    130/80  Francie Cuellar CMA Rheumatology  6/17/2022                                 RAPID3 (0-30) Cumulative Score  5.0          RAPID3 Weighted Score (divide #4 by 3 and that is the weighted score)  1.6

## 2022-06-17 NOTE — NURSING NOTE
"Chief Complaint   Patient presents with     Psoriatic arthropathy       Initial BP (!) 143/84 (BP Location: Right arm, Patient Position: Sitting, Cuff Size: Adult Large)   Pulse 74   Resp 16   Ht 1.753 m (5' 9\")   Wt 95.4 kg (210 lb 6.4 oz)   SpO2 99%   BMI 31.07 kg/m   Estimated body mass index is 31.07 kg/m  as calculated from the following:    Height as of this encounter: 1.753 m (5' 9\").    Weight as of this encounter: 95.4 kg (210 lb 6.4 oz).  BP completed using cuff size: large  Medications and allergies reviewed.      Amie MO MA    "

## 2022-06-17 NOTE — PROGRESS NOTES
Rheumatology Clinic Visit      Pawan Bullard MRN# [id number]   YOB: 1950 Age: 72 year old      Date of visit: 6/17/22   PCP: Dr. Armando Finley    Chief Complaint   Patient presents with:  Psoriatic arthropathy    Assessment and Plan     1.  Psoriatic arthritis: Synovitis when on MTX 20mg wkly; the dose was increased previously but he did not do so; then he held methotrexate due to concerns about COVID-19 and his arthritis worsened. Previously on SSZ (GI upset).  Doing well now on methotrexate 20 mg once weekly (GI upset with 25mg once weekly).  GI upset associated with MTX resolved with splitting the dose.  Historically arthritis worsened when off methotrexate.  No liver enzyme elevation and stable mild thrombocytopenia.  Therefore, reduce methotrexate today.  Consider biologic DMARD or targeted synthetic DMARDs if needed in the future.  Chronic illness, side effect from treatment   - Reduce methotrexate from 20mg once every 7 days, to 15mg once every 7 days (split dose within a 24-hour period)  - Continue folic acid 1 mg daily  - Labs in 12 weeks: CBC, Creatinine, Hepatic Panel, ESR, CRP                Rapid 3, cumulative scores                      6/18/2021:    4.3 (MTX 20mg wkly)                      02/17/2020:  4.7 (MTX 20mg wkly)                      11/18/2019:  3.3 (MTX 20mg wkly)                      05/20/2019:  Refused by patient                      11/26/2018:  5.2 (MTX 17.5mg wkly)    High risk medication requiring intensive toxicity monitoring at least quarterly: labs ordered include CBC, Creatinine, Hepatic panel to monitor for cytopenia and hepatotoxicity; checking creatinine as it affects clearance of medication.     2. Hand OA: Affecting the PIPs and DIPs. Topical diclofenac PRN.     3. Vaccinations between [facility] and [facility] do not line up and he is not certain on dates, so below is what I put on the after visit summary:     COVID-19 vaccination: below is the course  "of vaccination, please compare this to your vaccination card.     The J&J and 1st moderna vaccine count as your \"initial series.\" A second mRNA (Pfizer or Moderna) COVID-19 vaccine would be considered the first booster and should be received at least 2 months after the initial series is completed.  A 3rd mRNA COVID-19 vaccine, considered the 2nd booster, may be received at least 4 months after the first booster.     Based on our current understanding (and this may change over time as we learn more), medications should be adjusted as noted below, if disease activity allows:  - NSAIDs and Acetaminophen: hold for 24 hours prior to vaccination if able to do so  - Methotrexate should be held for 1-2 weeks after each COVID-19 vaccine (as disease activity allows)      Total minutes spent in evaluation with patient, documentation, , and review of pertinent studies and chart notes: 12      Mr. Bullard verbalized agreement with and understanding of the rational for the diagnosis and treatment plan.  All questions were answered to best of my ability and the patient's satisfaction. Mr. Bullard was advised to contact the clinic with any questions that may arise after the clinic visit.      Thank you for involving me in the care of the patient    Return to clinic: 6 months      HPI   Pawan Bullard is a 72 year old male with a past medical history significant for hypertension, hyperlipidemia, chondrocalcinosis, GERD, osteoarthritis, and psoriatic arthritis who presents for follow-up of psoriatic arthritis.      Today, 6/17/2022: Arthritis is doing well.  No joint pain or swelling.  No morning stiffness or gelling phenomenon.  Tolerating methotrexate well.    Denies fevers, chills, nausea, vomiting, constipation, diarrhea. No abdominal pain. No chest pain/pressure, palpitations, or shortness of breath. No sicca symptoms.     Tobacco: None  EtOH: 0-2 beers or mixed drinks monthly  Drugs: None    ROS   12 point review " of system was completed and negative except as noted in the HPI     Active Problem List     Patient Active Problem List   Diagnosis     Psoriatic arthropathy (H)     Esophageal reflux     Hypertension goal BP (blood pressure) < 140/90     FAMILY HISTORY OF GI NEOPLASM     FAMILY HISTORY OF DIABETES MELLITUS     Impotence of organic origin     Chondrocalcinosis     HYPERLIPIDEMIA LDL GOAL <130     Osteoarthritis     Advanced directives, counseling/discussion     High risk medications (not anticoagulants) long-term use     ROSADO (dyspnea on exertion)     History of colonic polyps     Past Medical History     Past Medical History:   Diagnosis Date     Esophageal reflux     GERD     Hypertension      Psoriatic arthropathy (H)     Psoriatic arthritis     Past Surgical History     Past Surgical History:   Procedure Laterality Date     ARTHROSCOPY KNEE RT/LT  April 2009    left knee     ARTHROSCOPY KNEE WITH MEDIAL MENISCECTOMY  7/9/2013    Procedure: ARTHROSCOPY KNEE WITH MEDIAL MENISCECTOMY;  Left Knee Arthroscopic and Open Repair of Patellar Tendon with Platelet Rich Plasma;  Surgeon: Ley, Jeffrey Duane, MD;  Location: [facility]     COLONOSCOPY  Jan. 2006    diverticuli. Father had colon CA     COLONOSCOPY  2001     COLONOSCOPY  4/11/2011    Procedure:COLONOSCOPY; Surgeon:JENNIFER JEAN; Location:[facility]     COLONOSCOPY N/A 11/21/2016    Procedure: COLONOSCOPY;  Surgeon: Elias Santamaria MD;  Location: [facility]     COLONOSCOPY N/A 2/8/2022    Procedure: COLONOSCOPY;  Surgeon: Junior Brown MD;  Location: [facility]     REPAIR TENDON PATELLA  7/9/2013    Procedure: REPAIR TENDON PATELLA;;  Surgeon: Ley, Jeffrey Duane, MD;  Location: [facility]     SURGICAL HISTORY OF -   1999    (L) Herniorrhaphy     SURGICAL HISTORY OF -   2003    (R) Herniorrhaphy     SURGICAL HISTORY OF -   1981    Hemorrohid     Allergy     Allergies   Allergen Reactions     Acetaminophen W/Codeine Nausea     Current Medication List     Current Outpatient  Medications   Medication Sig     albuterol (PROAIR HFA/PROVENTIL HFA/VENTOLIN HFA) 108 (90 Base) MCG/ACT inhaler Inhale 2 puffs into the lungs every 4 hours as needed for shortness of breath / dyspnea or wheezing     diclofenac (VOLTAREN) 1 % topical gel Apply up to 2 grams of 1% gel to hands up to 4 times daily as needed for joint pain (maximum: 8 g per upper extremity per day)     folic acid (FOLVITE) 1 MG tablet Take 1 tablet (1 mg) by mouth daily     lisinopril (ZESTRIL) 5 MG tablet TAKE 1 TABLET (5 MG) BY MOUTH DAILY     methotrexate sodium 2.5 MG TABS Take 6 tablets (15 mg) by mouth once a week . Take 3 tablets at 9AM on Sunday and 3 tablets at 9AM on Monday.  Needs to be taken within the same 24 hour time period of each week.     MULTI-VITAMIN OR TABS Take 1 tablet by mouth daily      Probiotic Product (PROBIOTIC DAILY PO) Take 1 capsule by mouth daily      sildenafil (REVATIO) 20 MG tablet Take as many pills as needed up to maximum of 5 pills at a time prior to intercourse.     simvastatin (ZOCOR) 40 MG tablet TAKE 1 TABLET (40 MG) BY MOUTH AT BEDTIME     tamsulosin (FLOMAX) 0.4 MG capsule TAKE 1 CAPSULE (0.4 MG) BY MOUTH DAILY     No current facility-administered medications for this visit.         Social History   See HPI    Family History     Family History   Problem Relation Age of Onset     Hypertension Mother      Diabetes Mother      Cerebrovascular Disease Mother         in her 70s     Arthritis Mother      Cancer - colorectal Father 67     Arthritis Paternal Grandmother      LUNG DISEASE Sister      Other - See Comments Sister      Prostate Cancer No family hx of      Coronary Artery Disease No family hx of        Physical Exam     Temp Readings from Last 3 Encounters:   04/01/22 98.5  F (36.9  C) (Tympanic)   02/08/22 97.4  F (36.3  C) (Axillary)   12/10/21 97.4  F (36.3  C) (Tympanic)     BP Readings from Last 5 Encounters:   06/17/22 130/80   04/01/22 (!) 144/80   02/08/22 (!) 133/92   12/17/21  "138/78   12/10/21 136/72     Pulse Readings from Last 1 Encounters:   06/17/22 74     Resp Readings from Last 1 Encounters:   06/17/22 16     Estimated body mass index is 31.07 kg/m  as calculated from the following:    Height as of this encounter: 1.753 m (5' 9\").    Weight as of this encounter: 95.4 kg (210 lb 6.4 oz).    GEN: NAD.   HEENT:  Anicteric, noninjected sclera. No obvious external lesions of the ear and nose. Hearing intact.  PULM: No increased work of breathing  MSK: Left second and third MCPs with synovial hypertrophy but no tenderness to palpation, increased warmth, or overlying erythema.  Other MCPs, PIPs, DIPs without swelling or tenderness to palpation.  Heberden's and Portia's nodes present.  Wrists without swelling or tenderness to palpation.  Elbows and shoulders without swelling or tenderness to palpation.    Knees, ankles, and MTPs without swelling or tenderness to palpation.    SKIN: No rash or jaundice seen  PSYCH: Alert. Appropriate.     Labs / Imaging (select studies)       CBC  Recent Labs   Lab Test 06/14/22  1012 03/15/22  1023 12/10/21  1603 09/17/21  1052 06/11/21  1014 02/26/21  1012 11/16/20  0903   WBC 5.5 5.7 5.8   < > 5.0 4.4 5.3   RBC 4.34* 4.62 4.20*   < > 4.16* 4.36* 4.71   HGB 14.6 15.5 14.2   < > 14.1 14.6 15.5   HCT 42.5 45.7 40.4   < > 40.9 41.9 44.8   MCV 98 99 96   < > 98 96 95   RDW 13.6 13.9 13.4   < > 13.4 13.0 13.7   * 155 143*   < > 164 154 146*   MCH 33.6* 33.5* 33.8*   < > 33.9* 33.5* 32.9   MCHC 34.4 33.9 35.1   < > 34.5 34.8 34.6   NEUTROPHIL 68 66 53   < > 64.5 64.1 73.8   LYMPH 23 27 38   < > 23.6 28.6 16.2   MONOCYTE 5 5 8   < > 9.3 4.6 7.9   EOSINOPHIL 4 1 1   < > 2.4 2.5 1.9   BASOPHIL 0 0 0   < > 0.2 0.2 0.2   ANEU  --   --   --   --  3.2 2.8 3.9   ALYM  --   --   --   --  1.2 1.3 0.9   ERENDIRA  --   --   --   --  0.5 0.2 0.4   AEOS  --   --   --   --  0.1 0.1 0.1   ABAS  --   --   --   --  0.0 0.0 0.0   ANEUTAUTO 3.7 3.8 3.0   < >  --   --   --  "   ALYMPAUTO 1.3 1.6 2.2   < >  --   --   --    AMONOAUTO 0.3 0.3 0.5   < >  --   --   --    AEOSAUTO 0.2 0.1 0.1   < >  --   --   --    ABSBASO 0.0 0.0 0.0   < >  --   --   --     < > = values in this interval not displayed.     CMP  Recent Labs   Lab Test 06/14/22  1012 03/15/22  1023 12/10/21  1603 11/20/21  0901 09/17/21  1052 06/11/21  1014 02/26/21  1012 11/16/20  0903 02/10/20  1028 02/07/20  1428   NA  --   --   --  140  --   --   --  138  --  139   POTASSIUM  --   --   --  3.9  --   --   --  4.0  --  4.3   CHLORIDE  --   --   --  107  --   --   --  105  --  107   CO2  --   --   --  30  --   --   --  27  --  28   ANIONGAP  --   --   --  3  --   --   --  6  --  4   GLC  --   --   --  110*  --   --   --  107*  --  111*   BUN  --   --   --  13  --   --   --  13  --  15   CR 0.92 0.97 0.87 1.06   < > 1.07 0.96 1.06  1.05   < > 0.99   GFRESTIMATED 89 84 88 71   < > 70 81 71  72   < > 78   GFRESTBLACK  --   --   --   --   --  81 >90 83  84   < > >90   JOHN  --   --   --  9.0  --   --   --  9.5  --  9.1   BILITOTAL 0.9 0.9 0.7  --    < > 0.7 0.8 1.0   < >  --    ALBUMIN 3.7 3.6 3.6  --    < > 3.6 3.8 4.0   < >  --    PROTTOTAL 6.6* 6.6* 6.6*  --    < > 6.4* 6.7* 6.7*   < >  --    ALKPHOS 55 55 57  --    < > 51 58 58   < >  --    AST 55* 32 25  --    < > 26 39 32   < >  --    ALT 77* 49 38  --    < > 41 58 42   < >  --     < > = values in this interval not displayed.     Calcium/VitaminD  Recent Labs   Lab Test 11/20/21  0901 11/16/20  0903 02/07/20  1428   JOHN 9.0 9.5 9.1     ESR/CRP  Recent Labs   Lab Test 06/14/22  1012 12/10/21  1603 06/11/21  1014   SED 6 5 7   CRP <2.9 <2.9 <2.9     Lipid Panel  Recent Labs   Lab Test 11/20/21  0901 11/16/20  0903 11/13/19  1107 08/26/16  1213 05/29/15  1204 04/29/14  1102   CHOL 159 177 161   < > 168 175   TRIG 150* 194* 153*   < > 293* 165*   HDL 60 62 58   < > 54 47   LDL 69 76 72   < > 55 95   VLDL  --   --   --   --  59* 33*   CHOLHDLRATIO  --   --   --   --  3.1 4.0    NHDL 99 115 103   < >  --   --     < > = values in this interval not displayed.     Hepatitis B  Recent Labs   Lab Test 05/16/18  1016   HBCAB Nonreactive   HEPBANG Nonreactive     Hepatitis C  Recent Labs   Lab Test 08/26/16  1213   HCVAB Nonreactive   Assay performance characteristics have not been established for newborns,   infants, and children       Immunization History     Immunization History   Administered Date(s) Administered     COVID-19,PF,Evan 03/04/2021     COVID-19,PF,Moderna 05/12/2022     COVID-19,PF,Moderna Booster 10/28/2021     Influenza (High Dose) 3 valent vaccine 10/18/2017, 09/21/2018, 11/13/2019, 11/16/2021     Influenza (IIV3) PF 11/17/2005, 12/04/2006, 11/22/2010, 09/26/2012, 10/01/2014     Influenza Vaccine IM > 6 months Valent IIV4 (Alfuria,Fluzone) 10/15/2013, 12/17/2015, 08/26/2016     Influenza, Quad, High Dose, Pf, 65yr+ (Fluzone HD) 10/09/2020, 09/17/2021     Pneumo Conj 13-V (2010&after) 10/18/2017     Pneumococcal 23 valent 01/22/2018     TDAP Vaccine (Adacel) 04/22/2009, 05/20/2018          Chart documentation done in part with Dragon Voice recognition Software. Although reviewed after completion, some word and grammatical error may remain.        Adria Lopez MD

## 2022-06-21 ENCOUNTER — POST OP/EVAL OF SECOND EYE (OUTPATIENT)
Dept: URBAN - METROPOLITAN AREA CLINIC 49 | Facility: CLINIC | Age: 72
End: 2022-06-21

## 2022-06-21 VITALS
RESPIRATION RATE: 16 BRPM | HEIGHT: 60 IN | SYSTOLIC BLOOD PRESSURE: 117 MMHG | HEART RATE: 66 BPM | DIASTOLIC BLOOD PRESSURE: 89 MMHG

## 2022-06-21 DIAGNOSIS — H40.1122: ICD-10-CM

## 2022-06-21 DIAGNOSIS — H25.12: ICD-10-CM

## 2022-06-21 DIAGNOSIS — H35.373: ICD-10-CM

## 2022-06-21 PROCEDURE — PREOP PRE OP VISIT

## 2022-06-21 PROCEDURE — 92134 CPTRZ OPH DX IMG PST SGM RTA: CPT

## 2022-06-21 PROCEDURE — 92136 - 2N OPHTHALMIC BIOMETRY BY PARTIAL COHERENCE INTERFEROMETRY WITH INTRAOCULAR LENS POWER CALCULATION

## 2022-06-21 ASSESSMENT — VISUAL ACUITY
OD_SC: J7 @ 14IN
OS_PH: 20/40
OD_SC: 20/100
OD_PH: 20/60
OS_SC: 20/80

## 2022-06-21 ASSESSMENT — TONOMETRY
OD_IOP_MMHG: 17
OD_IOP_MMHG: 20
OS_IOP_MMHG: 08
OS_IOP_MMHG: 10

## 2022-06-21 NOTE — PATIENT DISCUSSION
[facility] [employer] - STANDARD IOL/NO FEMTO/MIGS: Phacoemulsification with IOL: Eye: OS|DOS: 6/30/2022|Model: AABOO|Power: 16. 0|Target: PLANO|MIGS: HYDRUS WITH CANALOPLASTY|Visc: DUET|Omidria: YES|10% Phenylephrine: YES|Epi-shugarcaine: YES|Phaco Setting: DENSE|Notes: AABOO @ PLANO OU; HX: MODERATE POAG OU (LATANOPROST, TIMOLOL) ERM OU, INF. ANT. STROMAL SCAR OD, STROMAL SCAR OS, H/O PRISM IN GLASSES; DILATED PUPIL: 6.5MM.

## 2022-06-30 ENCOUNTER — POST-OP (OUTPATIENT)
Dept: URBAN - METROPOLITAN AREA CLINIC 49 | Facility: CLINIC | Age: 72
End: 2022-06-30

## 2022-06-30 ENCOUNTER — SURGERY/PROCEDURE (OUTPATIENT)
Dept: URBAN - METROPOLITAN AREA SURGERY 16 | Facility: SURGERY | Age: 72
End: 2022-06-30

## 2022-06-30 DIAGNOSIS — Z98.42: ICD-10-CM

## 2022-06-30 DIAGNOSIS — H35.373: ICD-10-CM

## 2022-06-30 DIAGNOSIS — H25.12: ICD-10-CM

## 2022-06-30 DIAGNOSIS — H16.212: ICD-10-CM

## 2022-06-30 DIAGNOSIS — Z96.1: ICD-10-CM

## 2022-06-30 DIAGNOSIS — H40.1122: ICD-10-CM

## 2022-06-30 PROCEDURE — 66991 XCAPSL CTRC RMVL INSJ 1+: CPT

## 2022-06-30 PROCEDURE — 66174 TRLUML DIL AQ O/F CAN W/O ST: CPT

## 2022-06-30 ASSESSMENT — VISUAL ACUITY: OS_SC: 20/400

## 2022-06-30 ASSESSMENT — TONOMETRY
OS_IOP_MMHG: 12
OS_IOP_MMHG: 08
OS_IOP_MMHG: 10

## 2022-07-05 ENCOUNTER — POST-OP (OUTPATIENT)
Dept: URBAN - METROPOLITAN AREA CLINIC 49 | Facility: CLINIC | Age: 72
End: 2022-07-05

## 2022-07-05 DIAGNOSIS — Z98.42: ICD-10-CM

## 2022-07-05 DIAGNOSIS — H35.373: ICD-10-CM

## 2022-07-05 PROCEDURE — 92134 CPTRZ OPH DX IMG PST SGM RTA: CPT

## 2022-07-05 PROCEDURE — 99024 POSTOP FOLLOW-UP VISIT: CPT

## 2022-07-05 ASSESSMENT — VISUAL ACUITY
OS_SC: J7 @ 14IN
OD_PH: 20/50
OD_SC: 20/70-1
OS_SC: 20/60-1

## 2022-07-05 ASSESSMENT — TONOMETRY
OS_IOP_MMHG: 15
OD_IOP_MMHG: 12

## 2022-07-05 NOTE — PATIENT DISCUSSION
Discussed with patient at next visit we will start discontinuing glaucoma drops. Possibly stopping drops and following up in 4-6 weeks for an IOP check. Patient to continue current management for now.

## 2022-07-06 NOTE — PROGRESS NOTES
Rheumatology Clinic Visit      Pawan Bullard MRN# [id number]   YOB: 1950 Age: 71 year old      Date of visit: 12/17/21   PCP: Dr. Armando Finley    Chief Complaint   Patient presents with:  Psoriatic arthropathy    Assessment and Plan     1.  Psoriatic arthritis: Synovitis when on MTX 20mg wkly; the dose was increased previously but he did not do so; then he held methotrexate due to concerns about COVID-19 and his arthritis worsened. Previously on SSZ (GI upset).  Doing well now on methotrexate 20 mg once weekly (GI upset with 25mg once weekly).  GI upset associated with MTX is nearly resolved with splitting the dose. Arthritis worsened when off MTX for sinusitis that has since resolved.  Improved since restarting MTX. Was doing well prior to holding MTX.  Chronic illness.    - Continue methotrexate 20mg once every 7 days (split dose within a 24-hour period)  - Continue folic acid 1 mg daily  - Labs in 3 months: CBC, Creatinine, Hepatic Panel  - Labs in 6 months: CBC, Creatinine, Hepatic Panel, ESR, CRP                  Rapid 3, cumulative scores                      6/18/2021:    4.3 (MTX 20mg wkly)                      02/17/2020:  4.7 (MTX 20mg wkly)                      11/18/2019:  3.3 (MTX 20mg wkly)                      05/20/2019:  Refused by patient                      11/26/2018:  5.2 (MTX 17.5mg wkly)    High risk medication requiring intensive toxicity monitoring at least quarterly: labs ordered include CBC, Creatinine, Hepatic panel to monitor for cytopenia and hepatotoxicity; checking creatinine as it affects clearance of medication.     2. Hand OA: Affecting the PIPs and DIPs. Topical diclofenac PRN.        # Status-post moderna vaccine on 3/2/2021, 3/30/2021, 10/28/2021    Total minutes spent in evaluation with patient, documentation, , and review of pertinent studies and chart notes: 8      Mr. Bullard verbalized agreement with and understanding of the rational for  Chief Complaint       Sinus Problem (sinus congestion and drainage, right ear hurts, sore throat started thursday )    History of Present Illness   Source of history provided by:  patient. Cassandra Capellan is a 23 y.o. old female presenting to the walk in clinic for evaluation of above symptoms, for x 8 days. Denies any diarrhea, nausea CP, dyspnea, LE edema, abdominal pain, vomiting, rash, or lethargy. Hx of asthma or COPD; denies tobacco use. Patient denies recent sick exposures. Patient has not been vaccinated for COVID-19. Patient has been taking Muscinex OTC for symptomatic relief. Works at [employer]. Able to eat & drink. Takes BC pills, denies pregnancy. ROS    Unless otherwise stated in this report or unable to obtain because of the patient's clinical or mental status as evidenced by the medical record, this patients's positive and negative responses for Review of Systems, constitutional, psych, eyes, ENT, cardiovascular, respiratory, gastrointestinal, neurological, genitourinary, musculoskeletal, integument systems and systems related to the presenting problem are either stated in the preceding or were not pertinent or were negative for the symptoms and/or complaints related to the medical problem. Past Medical History:  has no past medical history on file. Past Surgical History:  has no past surgical history on file. Social History:  reports that she has never smoked. She has never used smokeless tobacco. She reports that she does not drink alcohol and does not use drugs. Family History: family history is not on file. Allergies: Patient has no known allergies. Physical Exam         VS:  /81   Pulse 100   Temp 97.4 °F (36.3 °C) (Temporal)   Resp 17   Ht 5' 4\" (1.626 m)   Wt 159 lb (72.1 kg)   LMP 06/15/2022   SpO2 99%   BMI 27.29 kg/m²    Oxygen Saturation Interpretation: Normal.    Constitutional:  Alert, development consistent with age. NAD. Head:  NC/NT.  Airway patent. Tm's visualized bilaterally. Mouth: Posterior pharynx with mild erythema and clear postnasal drip. Tonsillar hypertrophy or exudate. Neck:  Normal ROM. Supple. No anterior cervical adenopathy noted. Lungs: CTAB without wheezes, rales, or rhonchi. Dry bronchospasm. CV:  Regular rate and rhythm, normal heart sounds, without pathological murmurs, ectopy, gallops, or rubs. Skin:  Normal turgor. Warm, dry, without visible rash. Lymphatic: No lymphangitis or adenopathy noted. Neurological:  Oriented. Motor functions intact. Lab / Imaging Results   (All laboratory and radiology results have been personally reviewed by myself)  Labs:  Results for orders placed or performed in visit on 07/06/22   POCT rapid strep A   Result Value Ref Range    Strep A Ag None Detected None Detected   POCT COVID-19, Antigen   Result Value Ref Range    SARS-COV-2, POC Not-Detected Not Detected    Lot Number [id number]     QC Pass/Fail pass     Performing Instrument BD Veritor        Imaging: All Radiology results interpreted by Radiologist unless otherwise noted. Results for orders placed or performed in visit on 07/06/22   POCT rapid strep A   Result Value Ref Range    Strep A Ag None Detected None Detected   POCT COVID-19, Antigen   Result Value Ref Range    SARS-COV-2, POC Not-Detected Not Detected    Lot Number [id number]     QC Pass/Fail pass     Performing Instrument BD Veritor      Assessment / Plan     Impression(s):  Verenice was seen today for sinus problem. Diagnoses and all orders for this visit:    Sinobronchitis  -     POCT rapid strep A  -     POCT COVID-19, Antigen  -     amoxicillin-clavulanate (AUGMENTIN) 875-125 MG per tablet; Take 1 tablet by mouth 2 times daily for 7 days    Conjunctivitis of right eye, unspecified conjunctivitis type  -     bacitracin-polymyxin b (POLYSPORIN) [id number] UNIT/GM ophthalmic ointment; Place 0.5 inches into the right eye 2 times daily for 3 days Every 12 hours.     - Red the diagnosis and treatment plan.  All questions were answered to best of my ability and the patient's satisfaction. Mr. Bullard was advised to contact the clinic with any questions that may arise after the clinic visit.      Thank you for involving me in the care of the patient    Return to clinic: 6 months      HPI   Pawan Bullard is a 71 year old male with a past medical history significant for hypertension, hyperlipidemia, chondrocalcinosis, GERD, osteoarthritis, and psoriatic arthritis who presents for follow-up of psoriatic arthritis.      Today: was doing well with no joint pain/swelling/stiffness. Then had sinus infection and held MTX with worsening arthritis symptoms; restarted MTX and is already noticing improvement. Near resolutions of MTX associated GI upset with splitting the dose each week.     Denies fevers, chills, nausea, vomiting, constipation, diarrhea. No abdominal pain. No chest pain/pressure, palpitations, or shortness of breath. No sicca symptoms.     Tobacco: None  EtOH: 0-2 beers or mixed drinks monthly  Drugs: None    ROS   12 point review of system was completed and negative except as noted in the HPI     Active Problem List     Patient Active Problem List   Diagnosis     Psoriatic arthropathy (H)     Esophageal reflux     Hypertension goal BP (blood pressure) < 140/90     FAMILY HISTORY OF GI NEOPLASM     FAMILY HISTORY OF DIABETES MELLITUS     Impotence of organic origin     Chondrocalcinosis     HYPERLIPIDEMIA LDL GOAL <130     Osteoarthritis     Advanced directives, counseling/discussion     High risk medications (not anticoagulants) long-term use     ROSADO (dyspnea on exertion)     Past Medical History     Past Medical History:   Diagnosis Date     Esophageal reflux     GERD     Hypertension      Psoriatic arthropathy (H)     Psoriatic arthritis     Past Surgical History     Past Surgical History:   Procedure Laterality Date     ARTHROSCOPY KNEE RT/LT  April 2009    left knee      ARTHROSCOPY KNEE WITH MEDIAL MENISCECTOMY  7/9/2013    Procedure: ARTHROSCOPY KNEE WITH MEDIAL MENISCECTOMY;  Left Knee Arthroscopic and Open Repair of Patellar Tendon with Platelet Rich Plasma;  Surgeon: Ley, Jeffrey Duane, MD;  Location: [facility]     COLONOSCOPY  Jan. 2006    diverticuli. Father had colon CA     COLONOSCOPY  2001     COLONOSCOPY  4/11/2011    Procedure:COLONOSCOPY; Surgeon:JENNIFER JEAN; Location:[facility]     COLONOSCOPY N/A 11/21/2016    Procedure: COLONOSCOPY;  Surgeon: Elias Santamaria MD;  Location: [facility]     REPAIR TENDON PATELLA  7/9/2013    Procedure: REPAIR TENDON PATELLA;;  Surgeon: Ley, Jeffrey Duane, MD;  Location: [facility]     SURGICAL HISTORY OF -   1999    (L) Herniorrhaphy     SURGICAL HISTORY OF -   2003    (R) Herniorrhaphy     SURGICAL HISTORY OF -   1981    Hemorrohid     Allergy     Allergies   Allergen Reactions     Acetaminophen W/Codeine Nausea     Current Medication List     Current Outpatient Medications   Medication Sig     albuterol (PROAIR HFA/PROVENTIL HFA/VENTOLIN HFA) 108 (90 Base) MCG/ACT inhaler Inhale 2 puffs into the lungs every 4 hours as needed for shortness of breath / dyspnea or wheezing     diclofenac (VOLTAREN) 1 % topical gel Apply up to 2 grams of 1% gel to hands up to 4 times daily as needed for joint pain (maximum: 8 g per upper extremity per day)     folic acid (FOLVITE) 1 MG tablet Take 1 tablet (1 mg) by mouth daily     lisinopril (ZESTRIL) 5 MG tablet TAKE 1 TABLET (5 MG) BY MOUTH DAILY     methotrexate sodium 2.5 MG TABS Take 8 tablets (20 mg) by mouth once a week . Take 4 tablets at 9AM on Sunday and 4 tablets at 9AM on Monday.  Needs to be taken within a 24 hour time period.     MULTI-VITAMIN OR TABS Take 1 tablet by mouth daily      Probiotic Product (PROBIOTIC DAILY PO) Take 1 capsule by mouth daily      sildenafil (REVATIO) 20 MG tablet Take as many pills as needed up to maximum of 5 pills at a time prior to intercourse.     simvastatin (ZOCOR)  Flag items & conservative methods discussed including OTC methods & Coricidin medication  - F/u with PCP if symptoms persist  - Work/school letter provided in AVS    - Pt is strongly encouraged to establish with a provider, Sumi Nielson MD telephone information provided    Disposition:  Disposition: Discharge to home. Advised cautionary self-quarantine at home in the interim. Increase fluids and rest. Symptomatic relief discussed including Tylenol prn pain/fever. Schedule virtual f/u with PCP in 7-10 days if symptoms persist. ED sooner if symptoms worsen or change. ED immediately with high or refractory fever, progressive SOB, dyspnea, CP, calf pain/swelling, shaking chills, vomiting, abdominal pain, lethargy, flank pain, or decreased urinary output. Pt verbalizes understanding and is in agreement with plan of care. All questions answered. Gary Carter, APRN - CNP    **This report was transcribed using voice recognition software. Every effort was made to ensure accuracy; however, inadvertent computerized transcription errors may be present. "40 MG tablet Take 1 tablet (40 mg) by mouth At Bedtime     tamsulosin (FLOMAX) 0.4 MG capsule Take 1 capsule (0.4 mg) by mouth daily     No current facility-administered medications for this visit.         Social History   See HPI    Family History     Family History   Problem Relation Age of Onset     Hypertension Mother      Diabetes Mother      Cerebrovascular Disease Mother         in her 70s     Arthritis Mother      Cancer - colorectal Father 67     Arthritis Paternal Grandmother      LUNG DISEASE Sister      Other - See Comments Sister      Prostate Cancer No family hx of      Coronary Artery Disease No family hx of        Physical Exam     Temp Readings from Last 3 Encounters:   12/10/21 97.4  F (36.3  C) (Tympanic)   11/16/20 98.6  F (37  C) (Tympanic)   02/14/20 97.3  F (36.3  C) (Tympanic)     BP Readings from Last 5 Encounters:   12/17/21 138/78   12/10/21 136/72   06/18/21 134/85   11/16/20 120/70   02/17/20 139/76     Pulse Readings from Last 1 Encounters:   12/17/21 73     Resp Readings from Last 1 Encounters:   12/10/21 16     Estimated body mass index is 31.31 kg/m  as calculated from the following:    Height as of this encounter: 1.753 m (5' 9\").    Weight as of this encounter: 96.2 kg (212 lb).    GEN: NAD. Healthy appearing adult.   HEENT:  Anicteric, noninjected sclera. No obvious external lesions of the ear and nose. Hearing intact.  PULM: No increased work of breathing  MSK: MCPs, PIPs, DIPs without swelling or tenderness to palpation.  Heberden's and Portia's nodes present.  Wrists without swelling or tenderness to palpation.  Elbows and shoulders without swelling or tenderness to palpation.    Knees, ankles, and MTPs without swelling or tenderness to palpation.    SKIN: No rash or jaundice seen  PSYCH: Alert. Appropriate.     Labs / Imaging (select studies)       CBC  Recent Labs   Lab Test 12/10/21  1603 09/17/21  1052 06/11/21  1014 02/26/21  1012 11/16/20  0903   WBC 5.8 4.7 5.0 4.4 5.3 "   RBC 4.20* 4.36* 4.16* 4.36* 4.71   HGB 14.2 14.7 14.1 14.6 15.5   HCT 40.4 41.9 40.9 41.9 44.8   MCV 96 96 98 96 95   RDW 13.4 13.8 13.4 13.0 13.7   * 153 164 154 146*   MCH 33.8* 33.7* 33.9* 33.5* 32.9   MCHC 35.1 35.1 34.5 34.8 34.6   NEUTROPHIL 53 64 64.5 64.1 73.8   LYMPH 38 26 23.6 28.6 16.2   MONOCYTE 8 8 9.3 4.6 7.9   EOSINOPHIL 1 3 2.4 2.5 1.9   BASOPHIL 0 0 0.2 0.2 0.2   ANEU  --   --  3.2 2.8 3.9   ALYM  --   --  1.2 1.3 0.9   ERENDIRA  --   --  0.5 0.2 0.4   AEOS  --   --  0.1 0.1 0.1   ABAS  --   --  0.0 0.0 0.0   ANEUTAUTO 3.0 3.0  --   --   --    ALYMPAUTO 2.2 1.2  --   --   --    AMONOAUTO 0.5 0.4  --   --   --    AEOSAUTO 0.1 0.1  --   --   --    ABSBASO 0.0 0.0  --   --   --      CMP  Recent Labs   Lab Test 12/10/21  1603 11/20/21  0901 09/17/21  1052 06/11/21  1014 02/26/21  1012 11/16/20  0903 02/10/20  1028 02/07/20  1428   NA  --  140  --   --   --  138  --  139   POTASSIUM  --  3.9  --   --   --  4.0  --  4.3   CHLORIDE  --  107  --   --   --  105  --  107   CO2  --  30  --   --   --  27  --  28   ANIONGAP  --  3  --   --   --  6  --  4   GLC  --  110*  --   --   --  107*  --  111*   BUN  --  13  --   --   --  13  --  15   CR 0.87 1.06 1.06 1.07 0.96 1.06  1.05   < > 0.99   GFRESTIMATED 88 71 71 70 81 71  72   < > 78   GFRESTBLACK  --   --   --  81 >90 83  84   < > >90   JOHN  --  9.0  --   --   --  9.5  --  9.1   BILITOTAL 0.7  --  0.9 0.7 0.8 1.0   < >  --    ALBUMIN 3.6  --  3.6 3.6 3.8 4.0   < >  --    PROTTOTAL 6.6*  --  6.6* 6.4* 6.7* 6.7*   < >  --    ALKPHOS 57  --  54 51 58 58   < >  --    AST 25  --  25 26 39 32   < >  --    ALT 38  --  44 41 58 42   < >  --     < > = values in this interval not displayed.     Calcium/VitaminD  Recent Labs   Lab Test 11/20/21  0901 11/16/20  0903 02/07/20  1428   JOHN 9.0 9.5 9.1     ESR/CRP  Recent Labs   Lab Test 12/10/21  1603 06/11/21  1014 02/26/21  1012   SED 5 7 5   CRP <2.9 <2.9 <2.9     Lipid Panel  Recent Labs   Lab Test 11/20/21  0901  11/16/20  0903 11/13/19  1107 08/26/16  1213 05/29/15  1204 04/29/14  1102   CHOL 159 177 161   < > 168 175   TRIG 150* 194* 153*   < > 293* 165*   HDL 60 62 58   < > 54 47   LDL 69 76 72   < > 55 95   VLDL  --   --   --   --  59* 33*   CHOLHDLRATIO  --   --   --   --  3.1 4.0   NHDL 99 115 103   < >  --   --     < > = values in this interval not displayed.     Hepatitis B  Recent Labs   Lab Test 05/16/18  1016   HBCAB Nonreactive   HEPBANG Nonreactive     Hepatitis C  Recent Labs   Lab Test 08/26/16  1213   HCVAB Nonreactive   Assay performance characteristics have not been established for newborns,   infants, and children         Immunization History     Immunization History   Administered Date(s) Administered     COVID-19,PF,Evan 03/04/2021     COVID-19,PF,Moderna Booster 10/28/2021     Influenza (High Dose) 3 valent vaccine 10/18/2017, 09/21/2018, 11/13/2019, 11/16/2021     Influenza (IIV3) PF 11/17/2005, 12/04/2006, 11/22/2010, 09/26/2012, 10/01/2014     Influenza Vaccine IM > 6 months Valent IIV4 (Alfuria,Fluzone) 10/15/2013, 12/17/2015, 08/26/2016     Influenza, Quad, High Dose, Pf, 65yr+ (Fluzone HD) 10/09/2020, 09/17/2021     Pneumo Conj 13-V (2010&after) 10/18/2017     Pneumococcal 23 valent 01/22/2018     TDAP Vaccine (Adacel) 04/22/2009, 05/20/2018          Chart documentation done in part with Dragon Voice recognition Software. Although reviewed after completion, some word and grammatical error may remain.        Adria Lopez MD

## 2022-07-15 ENCOUNTER — POST-OP (OUTPATIENT)
Dept: URBAN - METROPOLITAN AREA CLINIC 50 | Facility: CLINIC | Age: 72
End: 2022-07-15

## 2022-07-15 DIAGNOSIS — H40.1122: ICD-10-CM

## 2022-07-15 DIAGNOSIS — H16.232: ICD-10-CM

## 2022-07-15 PROCEDURE — 99024 POSTOP FOLLOW-UP VISIT: CPT

## 2022-07-15 ASSESSMENT — TONOMETRY
OD_IOP_MMHG: 14
OS_IOP_MMHG: 14
OD_IOP_MMHG: 11
OS_IOP_MMHG: 12

## 2022-07-15 ASSESSMENT — VISUAL ACUITY
OD_PH: 20/30
OD_SC: 20/50
OS_SC: 20/30-2

## 2022-07-15 NOTE — PATIENT DISCUSSION
Visual Field on 1/27/2022 maybe reflecting the defect area. In view of adequate IOPs. Will stop medications and reassess on Monday in [address] [place] [address].

## 2022-07-18 ENCOUNTER — FOLLOW UP (OUTPATIENT)
Dept: URBAN - METROPOLITAN AREA CLINIC 52 | Facility: CLINIC | Age: 72
End: 2022-07-18

## 2022-07-18 DIAGNOSIS — H35.373: ICD-10-CM

## 2022-07-18 DIAGNOSIS — H16.232: ICD-10-CM

## 2022-07-18 PROCEDURE — 92012 INTRM OPH EXAM EST PATIENT: CPT

## 2022-07-18 ASSESSMENT — VISUAL ACUITY
OD_SC: 20/60
OS_SC: 20/60
OU_SC: 20/50

## 2022-07-18 ASSESSMENT — TONOMETRY
OD_IOP_MMHG: 13
OD_IOP_MMHG: 10
OS_IOP_MMHG: 12
OS_IOP_MMHG: 10

## 2022-07-18 NOTE — PATIENT DISCUSSION
Visual Field on 1/27/2022 maybe reflecting the defect area. In view of adequate IOPs. Will stop medications and reassess on Monday in [address] [place] [address].

## 2022-07-26 ENCOUNTER — POST-OP (OUTPATIENT)
Dept: URBAN - METROPOLITAN AREA CLINIC 48 | Facility: CLINIC | Age: 72
End: 2022-07-26

## 2022-07-26 DIAGNOSIS — H43.813: ICD-10-CM

## 2022-07-26 DIAGNOSIS — Z98.42: ICD-10-CM

## 2022-07-26 DIAGNOSIS — H35.373: ICD-10-CM

## 2022-07-26 DIAGNOSIS — Z98.41: ICD-10-CM

## 2022-07-26 PROCEDURE — 92134 CPTRZ OPH DX IMG PST SGM RTA: CPT

## 2022-07-26 ASSESSMENT — VISUAL ACUITY
OD_PH: 20/50-2
OS_SC: 20/60-1
OD_SC: 20/100
OU_SC: J3

## 2022-07-26 ASSESSMENT — TONOMETRY
OD_IOP_MMHG: 11
OS_IOP_MMHG: 14
OD_IOP_MMHG: 14
OS_IOP_MMHG: 12

## 2022-07-26 NOTE — PATIENT DISCUSSION
S/P Hydrus with Canaloplasty OU. IOP 14/14 off drops. PACHS 598/585. Will continue to monitor off drops.

## 2022-09-09 ENCOUNTER — ALLIED HEALTH/NURSE VISIT (OUTPATIENT)
Dept: FAMILY MEDICINE | Facility: CLINIC | Age: 72
End: 2022-09-09
Payer: COMMERCIAL

## 2022-09-09 ENCOUNTER — LAB (OUTPATIENT)
Dept: LAB | Facility: CLINIC | Age: 72
End: 2022-09-09
Payer: COMMERCIAL

## 2022-09-09 DIAGNOSIS — Z00.00 PREVENTATIVE HEALTH CARE: Primary | ICD-10-CM

## 2022-09-09 DIAGNOSIS — Z79.899 HIGH RISK MEDICATIONS (NOT ANTICOAGULANTS) LONG-TERM USE: ICD-10-CM

## 2022-09-09 DIAGNOSIS — L40.50 PSORIATIC ARTHROPATHY (H): ICD-10-CM

## 2022-09-09 LAB
ALBUMIN SERPL BCG-MCNC: 4.2 G/DL (ref 3.5–5.2)
ALP SERPL-CCNC: 54 U/L (ref 40–129)
ALT SERPL W P-5'-P-CCNC: 38 U/L (ref 10–50)
AST SERPL W P-5'-P-CCNC: 30 U/L (ref 10–50)
BASOPHILS # BLD AUTO: 0 10E3/UL (ref 0–0.2)
BASOPHILS NFR BLD AUTO: 0 %
BILIRUB DIRECT SERPL-MCNC: <0.2 MG/DL (ref 0–0.3)
BILIRUB SERPL-MCNC: 0.8 MG/DL
CREAT SERPL-MCNC: 1 MG/DL (ref 0.67–1.17)
CRP SERPL-MCNC: <3 MG/L
EOSINOPHIL # BLD AUTO: 0.1 10E3/UL (ref 0–0.7)
EOSINOPHIL NFR BLD AUTO: 3 %
ERYTHROCYTE [DISTWIDTH] IN BLOOD BY AUTOMATED COUNT: 12.6 % (ref 10–15)
ERYTHROCYTE [SEDIMENTATION RATE] IN BLOOD BY WESTERGREN METHOD: 5 MM/HR (ref 0–20)
GFR SERPL CREATININE-BSD FRML MDRD: 81 ML/MIN/1.73M2
HCT VFR BLD AUTO: 44.6 % (ref 40–53)
HGB BLD-MCNC: 15.6 G/DL (ref 13.3–17.7)
IMM GRANULOCYTES # BLD: 0 10E3/UL
IMM GRANULOCYTES NFR BLD: 0 %
LYMPHOCYTES # BLD AUTO: 2 10E3/UL (ref 0.8–5.3)
LYMPHOCYTES NFR BLD AUTO: 37 %
MCH RBC QN AUTO: 32.9 PG (ref 26.5–33)
MCHC RBC AUTO-ENTMCNC: 35 G/DL (ref 31.5–36.5)
MCV RBC AUTO: 94 FL (ref 78–100)
MONOCYTES # BLD AUTO: 0.4 10E3/UL (ref 0–1.3)
MONOCYTES NFR BLD AUTO: 7 %
NEUTROPHILS # BLD AUTO: 2.9 10E3/UL (ref 1.6–8.3)
NEUTROPHILS NFR BLD AUTO: 54 %
PLATELET # BLD AUTO: 126 10E3/UL (ref 150–450)
PROT SERPL-MCNC: 6.3 G/DL (ref 6.4–8.3)
RBC # BLD AUTO: 4.74 10E6/UL (ref 4.4–5.9)
WBC # BLD AUTO: 5.4 10E3/UL (ref 4–11)

## 2022-09-09 PROCEDURE — 36415 COLL VENOUS BLD VENIPUNCTURE: CPT

## 2022-09-09 PROCEDURE — 85025 COMPLETE CBC W/AUTO DIFF WBC: CPT

## 2022-09-09 PROCEDURE — 86140 C-REACTIVE PROTEIN: CPT

## 2022-09-09 PROCEDURE — 85652 RBC SED RATE AUTOMATED: CPT

## 2022-09-09 PROCEDURE — 90662 IIV NO PRSV INCREASED AG IM: CPT

## 2022-09-09 PROCEDURE — G0008 ADMIN INFLUENZA VIRUS VAC: HCPCS

## 2022-09-09 PROCEDURE — 82565 ASSAY OF CREATININE: CPT

## 2022-09-09 PROCEDURE — 99207 PR NO CHARGE NURSE ONLY: CPT

## 2022-09-09 PROCEDURE — 80076 HEPATIC FUNCTION PANEL: CPT

## 2022-09-12 NOTE — PROGRESS NOTES
Outpatient Physical Therapy Discharge Note     Patient: Pawan Bullard  : 1950    Evaluation date:  22     Referring Provider: Miles    Therapy Diagnosis: Neck Pain    Client Self Report:   Current status is unknown since patient did not return for further PT visits.    Objective Measurements:  Current objective status is unknown since patient failed to complete treatment     Goals:  Goal Identifier Cervical ROM   Goal Description Pt will demonstrate 60 deg bi of cervical rot in order to be able to safely turn head to drive car safely.   Target Date 22   Date Met      Progress (detail required for progress note):       Goal Identifier sitting   Goal Description Pt will relate 0/10 stiffness with sitting to return to PLOF   Target Date 22   Date Met      Progress (detail required for progress note):       Goal Identifier HEP   Goal Description Pt will be compliant and competent in HEP to allow them to achieve maximal strength and reduce pain   Target Date 22   Date Met      Progress (detail required for progress note):             Progress towards Goals:   Progress this reporting period: Pt has failed to schedule f/u visits within 30 days from last visit thus is being d/c from therapy at this time. Objective measures are all taken from last visit. Current status is unknown at this time.    Plan:  Discharge from therapy.    Discharge:    Reason for Discharge: Patient has failed to schedule further appointments.    Equipment Issued: none    Discharge Plan:  Not completed since patient failed to schedule further appointments.    Imani Estrada  Physical Therapist  [address] [address]  [email or web address]   [email or web address]   Office: [phone] Fax: [phone]

## 2022-10-14 ENCOUNTER — OFFICE VISIT (OUTPATIENT)
Dept: RHEUMATOLOGY | Facility: CLINIC | Age: 72
End: 2022-10-14
Payer: COMMERCIAL

## 2022-10-14 VITALS
OXYGEN SATURATION: 97 % | SYSTOLIC BLOOD PRESSURE: 135 MMHG | DIASTOLIC BLOOD PRESSURE: 84 MMHG | WEIGHT: 212.8 LBS | HEART RATE: 72 BPM | BODY MASS INDEX: 31.43 KG/M2

## 2022-10-14 DIAGNOSIS — L40.50 PSORIATIC ARTHROPATHY (H): Primary | ICD-10-CM

## 2022-10-14 DIAGNOSIS — Z79.899 HIGH RISK MEDICATIONS (NOT ANTICOAGULANTS) LONG-TERM USE: ICD-10-CM

## 2022-10-14 PROCEDURE — 99214 OFFICE O/P EST MOD 30 MIN: CPT | Performed by: INTERNAL MEDICINE

## 2022-10-14 RX ORDER — METHOTREXATE 2.5 MG/1
15 TABLET ORAL WEEKLY
Qty: 78 TABLET | Refills: 0 | Status: SHIPPED | OUTPATIENT
Start: 2022-10-14 | End: 2023-02-16

## 2022-10-14 NOTE — PROGRESS NOTES
Rheumatology Clinic Visit      Pawan Bullard MRN# [id number]   YOB: 1950 Age: 72 year old      Date of visit: 10/14/22   PCP: Dr. Armando Finley    Chief Complaint   Patient presents with:  Psoriatic arthropathy : Left knee is still sore    Assessment and Plan     1.  Psoriatic arthritis:  Previously on SSZ (GI upset).  Currently on methotrexate 15 mg once weekly (GI upset with 25 mg once weekly; cytopenias).  Synovitis on exam today.  Advise escalating treatment with a biologic DMARD and he will consider this.  Due to financial situation, he may qualify for free medication from the  so if he chooses to start, then plan to use Humira.  He will notify this clinic if he chooses to start Humira and if so then we will need to check QuantiFERON-TB gold plus and chest x-ray.  Chronic illness, progressive.    - Continue methotrexate 15mg once every 7 days (split dose within a 24-hour period)  - Continue folic acid 1 mg daily  - Labs every 8-12 weeks: CBC, Creatinine, Hepatic Panel, ESR, CRP                Rapid 3, cumulative scores                      10/14/2022:  7.5 (MTX 15mg wkly)                      6/18/2021:    4.3 (MTX 20mg wkly)                      02/17/2020:  4.7 (MTX 20mg wkly)                      11/18/2019:  3.3 (MTX 20mg wkly)                      11/26/2018:  5.2 (MTX 17.5mg wkly)    # Adalimumab (Humira) Risks and Benefits: The risks and benefits of adalimumab were discussed in detail and the patient verbalized understanding.  The risks discussed include, but are not limited to, the risk for hypersensitivity, anaphylaxis, anaphylactoid reactions, an increased risk for serious infections leading to hospitalization or death, a possible increased risk for lymphoma and other malignancies, a possible worsening of demyelinating diseases, a possible worsening of heart failure, risk for cytopenias, risk for drug induced lupus, possible reactivation of hepatitis B, and possible  reactivation of latent tuberculosis.  Subcutaneous injections may result in injection site reactions and/or pain at the site of injection.  The most common adverse reactions are infections, injection site reactions, headache, and rash.  It was discussed that the medication would need to be discontinued if a serious infection develops.  It was discussed that live vaccinations should not be received while using adalimumab or within 30 days prior to starting adalimumab.  I encouraged reviewing the package insert and asking any questions about the medication.      # Infliximab (Remicade) Risks and Benefits: The risks and benefits of infliximab were discussed in detail and the patient verbalized understanding.  The risks discussed include, but are not limited to, the risk for hypersensitivity, anaphylaxis, anaphylactoid reactions, an increased risk for serious infections leading to hospitalization or death, a possible increased risk for lymphoma and other malignancies, a possible worsening of demyelinating diseases, a possible worsening of heart failure, cytopenias, hepatotoxicity, risk for drug induced lupus, possible reactivation of hepatitis B, and possible reactivation of latent tuberculosis.   The most common adverse reactions are infections, infusion-related reactions, and headache.  It was discussed that the medication would need to be discontinued if a serious infection develops.  It was discussed that live vaccinations should not be received while using infliximab or within 30 days prior to starting infliximab.  I encouraged reviewing the package insert and asking any questions about the medication.     High risk medication requiring intensive toxicity monitoring at least quarterly: labs ordered include CBC, Creatinine, Hepatic panel to monitor for cytopenia and hepatotoxicity; checking creatinine as it affects clearance of medication.     2. Hand OA: Affecting the PIPs and DIPs. Topical diclofenac PRN.     3.   Left knee osteoarthritis: History of meniscus surgery in 2009 and 2013.  Degenerative arthritis affecting the left knee.  If there is an inflammatory component then it is overshadowed by degenerative symptoms and there is no evidence of synovitis on exam today of the left knee; if there is an inflammatory component then he may improve with escalation of treatment as documented in #1. He will continue physical therapy exercises for the left knee.      4.  Vaccinations: Vaccinations reviewed with Mr. Bullard.  Risks and benefits of vaccinations were discussed.    - Influenza: up to date for the [id number] season per patient  - [id number]: up to date  - [id number]: up to date  - Shingrix: Advised receiving  - COVID-19: Advise getting the updated COVID-19 vaccination, and to hold methotrexate for 2 weeks after COVID-19 vaccination.  Note that in the [place] immunization information connection it states that he has already received the updated COVID-19 vaccine on 9/29/2022 and he reports that this is an accurate and that somebody keeps putting another person's information under his name.  advised to speak with his PCP and advised that he contact the [place] Department [facility] [employer] to have this corrected    Total minutes spent in evaluation with patient, documentation, , and review of pertinent studies and chart notes: 20       Mr. Bullard verbalized agreement with and understanding of the rational for the diagnosis and treatment plan.  All questions were answered to best of my ability and the patient's satisfaction. Mr. Bullard was advised to contact the clinic with any questions that may arise after the clinic visit.      Thank you for involving me in the care of the patient    Return to clinic: 6 months      HPI   Pawan Bullard is a 72 year old male with a past medical history significant for hypertension, hyperlipidemia, chondrocalcinosis, GERD, osteoarthritis, and psoriatic arthritis who presents for  follow-up of psoriatic arthritis.      Today, 10/14/2022: Left knee pain is worse with activity, and improved with rest.  No swelling in left knee.  History of meniscus repair in 2013 and 2009 per patient report.  No locking or clicking or giving out of the left knee.  Some pain at the MCPs and PIPs that is worse in the morning and improves with time and activity.  Knee and hand pain and morning stiffness worsens with methotrexate dose reduction.  No GI upset with methotrexate.  Morning stiffness for at least 1 hour.    Denies fevers, chills, nausea, vomiting, constipation, diarrhea. No abdominal pain. No chest pain/pressure, palpitations, or shortness of breath. No sicca symptoms.     Tobacco: None  EtOH: 0-2 beers or mixed drinks monthly  Drugs: None    ROS   12 point review of system was completed and negative except as noted in the HPI     Active Problem List     Patient Active Problem List   Diagnosis     Psoriatic arthropathy (H)     Esophageal reflux     Hypertension goal BP (blood pressure) < 140/90     FAMILY HISTORY OF GI NEOPLASM     FAMILY HISTORY OF DIABETES MELLITUS     Impotence of organic origin     Chondrocalcinosis     HYPERLIPIDEMIA LDL GOAL <130     Osteoarthritis     Advanced directives, counseling/discussion     High risk medications (not anticoagulants) long-term use     ROSADO (dyspnea on exertion)     History of colonic polyps     Past Medical History     Past Medical History:   Diagnosis Date     Esophageal reflux     GERD     Hypertension      Psoriatic arthropathy (H)     Psoriatic arthritis     Past Surgical History     Past Surgical History:   Procedure Laterality Date     ARTHROSCOPY KNEE RT/LT  April 2009    left knee     ARTHROSCOPY KNEE WITH MEDIAL MENISCECTOMY  7/9/2013    Procedure: ARTHROSCOPY KNEE WITH MEDIAL MENISCECTOMY;  Left Knee Arthroscopic and Open Repair of Patellar Tendon with Platelet Rich Plasma;  Surgeon: Ley, Jeffrey Duane, MD;  Location: [facility]     COLONOSCOPY  Jan.  2006    diverticuli. Father had colon CA     COLONOSCOPY  2001     COLONOSCOPY  4/11/2011    Procedure:COLONOSCOPY; Surgeon:JENNIFER JEAN; Location:[facility]     COLONOSCOPY N/A 11/21/2016    Procedure: COLONOSCOPY;  Surgeon: Elias Santamaria MD;  Location: [facility]     COLONOSCOPY N/A 2/8/2022    Procedure: COLONOSCOPY;  Surgeon: Junior Brown MD;  Location: [facility]     REPAIR TENDON PATELLA  7/9/2013    Procedure: REPAIR TENDON PATELLA;;  Surgeon: Ley, Jeffrey Duane, MD;  Location: [facility]     SURGICAL HISTORY OF -   1999    (L) Herniorrhaphy     SURGICAL HISTORY OF -   2003    (R) Herniorrhaphy     SURGICAL HISTORY OF -   1981    Hemorrohid     Allergy     Allergies   Allergen Reactions     Acetaminophen W/Codeine Nausea     Current Medication List     Current Outpatient Medications   Medication Sig     albuterol (PROAIR HFA/PROVENTIL HFA/VENTOLIN HFA) 108 (90 Base) MCG/ACT inhaler Inhale 2 puffs into the lungs every 4 hours as needed for shortness of breath / dyspnea or wheezing     diclofenac (VOLTAREN) 1 % topical gel Apply up to 2 grams of 1% gel to hands up to 4 times daily as needed for joint pain (maximum: 8 g per upper extremity per day)     folic acid (FOLVITE) 1 MG tablet Take 1 tablet (1 mg) by mouth daily     lisinopril (ZESTRIL) 5 MG tablet TAKE 1 TABLET (5 MG) BY MOUTH DAILY     methotrexate sodium 2.5 MG TABS Take 6 tablets (15 mg) by mouth once a week . Take 3 tablets at 9AM on Sunday and 3 tablets at 9AM on Monday.  Needs to be taken within the same 24 hour time period of each week.     MULTI-VITAMIN OR TABS Take 1 tablet by mouth daily      Probiotic Product (PROBIOTIC DAILY PO) Take 1 capsule by mouth daily      sildenafil (REVATIO) 20 MG tablet Take as many pills as needed up to maximum of 5 pills at a time prior to intercourse.     simvastatin (ZOCOR) 40 MG tablet TAKE 1 TABLET (40 MG) BY MOUTH AT BEDTIME     tamsulosin (FLOMAX) 0.4 MG capsule TAKE 1 CAPSULE (0.4 MG) BY MOUTH DAILY     No  "current facility-administered medications for this visit.         Social History   See HPI    Family History     Family History   Problem Relation Age of Onset     Hypertension Mother      Diabetes Mother      Cerebrovascular Disease Mother         in her 70s     Arthritis Mother      Cancer - colorectal Father 67     Arthritis Paternal Grandmother      LUNG DISEASE Sister      Other - See Comments Sister      Prostate Cancer No family hx of      Coronary Artery Disease No family hx of        Physical Exam     Temp Readings from Last 3 Encounters:   04/01/22 98.5  F (36.9  C) (Tympanic)   02/08/22 97.4  F (36.3  C) (Axillary)   12/10/21 97.4  F (36.3  C) (Tympanic)     BP Readings from Last 5 Encounters:   10/14/22 135/84   06/17/22 130/80   04/01/22 (!) 144/80   02/08/22 (!) 133/92   12/17/21 138/78     Pulse Readings from Last 1 Encounters:   10/14/22 72     Resp Readings from Last 1 Encounters:   06/17/22 16     Estimated body mass index is 31.43 kg/m  as calculated from the following:    Height as of 6/17/22: 1.753 m (5' 9\").    Weight as of this encounter: 96.5 kg (212 lb 12.8 oz).    GEN: NAD.   HEENT:  Anicteric, noninjected sclera. No obvious external lesions of the ear and nose. Hearing intact.  PULM: No increased work of breathing  MSK: Synovial swelling and tenderness to palpation of the bilateral second-third MCPs and left second and fourth PIPs.  DIPs without swelling or tenderness to palpation. Heberden's and Portia's nodes present.  Wrists without swelling or tenderness to palpation.  Elbows and shoulders without swelling or tenderness to palpation.  Right knee with crepitation and mild medial joint line tenderness but no effusion or increased warmth.  Left knee without crepitation; mild medial joint line tenderness ; no effusion or increased warmth.    SKIN: No rash or jaundice seen  PSYCH: Alert. Appropriate.     Labs / Imaging (select studies)     CBC  Recent Labs   Lab Test 09/09/22  1059 " 06/14/22  1012 03/15/22  1023 09/17/21  1052 06/11/21  1014 02/26/21  1012 11/16/20  0903   WBC 5.4 5.5 5.7   < > 5.0 4.4 5.3   RBC 4.74 4.34* 4.62   < > 4.16* 4.36* 4.71   HGB 15.6 14.6 15.5   < > 14.1 14.6 15.5   HCT 44.6 42.5 45.7   < > 40.9 41.9 44.8   MCV 94 98 99   < > 98 96 95   RDW 12.6 13.6 13.9   < > 13.4 13.0 13.7   * 137* 155   < > 164 154 146*   MCH 32.9 33.6* 33.5*   < > 33.9* 33.5* 32.9   MCHC 35.0 34.4 33.9   < > 34.5 34.8 34.6   NEUTROPHIL 54 68 66   < > 64.5 64.1 73.8   LYMPH 37 23 27   < > 23.6 28.6 16.2   MONOCYTE 7 5 5   < > 9.3 4.6 7.9   EOSINOPHIL 3 4 1   < > 2.4 2.5 1.9   BASOPHIL 0 0 0   < > 0.2 0.2 0.2   ANEU  --   --   --   --  3.2 2.8 3.9   ALYM  --   --   --   --  1.2 1.3 0.9   ERENDIRA  --   --   --   --  0.5 0.2 0.4   AEOS  --   --   --   --  0.1 0.1 0.1   ABAS  --   --   --   --  0.0 0.0 0.0   ANEUTAUTO 2.9 3.7 3.8   < >  --   --   --    ALYMPAUTO 2.0 1.3 1.6   < >  --   --   --    AMONOAUTO 0.4 0.3 0.3   < >  --   --   --    AEOSAUTO 0.1 0.2 0.1   < >  --   --   --    ABSBASO 0.0 0.0 0.0   < >  --   --   --     < > = values in this interval not displayed.     CMP  Recent Labs   Lab Test 09/09/22  1059 06/14/22  1012 03/15/22  1023 12/10/21  1603 11/20/21  0901 09/17/21  1052 06/11/21  1014 02/26/21  1012 11/16/20  0903 02/10/20  1028 02/07/20  1428   NA  --   --   --   --  140  --   --   --  138  --  139   POTASSIUM  --   --   --   --  3.9  --   --   --  4.0  --  4.3   CHLORIDE  --   --   --   --  107  --   --   --  105  --  107   CO2  --   --   --   --  30  --   --   --  27  --  28   ANIONGAP  --   --   --   --  3  --   --   --  6  --  4   GLC  --   --   --   --  110*  --   --   --  107*  --  111*   BUN  --   --   --   --  13  --   --   --  13  --  15   CR 1.00 0.92 0.97   < > 1.06   < > 1.07 0.96 1.06  1.05   < > 0.99   GFRESTIMATED 81 89 84   < > 71   < > 70 81 71  72   < > 78   GFRESTBLACK  --   --   --   --   --   --  81 >90 83  84   < > >90   JOHN  --   --   --   --  9.0   --   --   --  9.5  --  9.1   BILITOTAL 0.8 0.9 0.9   < >  --    < > 0.7 0.8 1.0   < >  --    ALBUMIN 4.2 3.7 3.6   < >  --    < > 3.6 3.8 4.0   < >  --    PROTTOTAL 6.3* 6.6* 6.6*   < >  --    < > 6.4* 6.7* 6.7*   < >  --    ALKPHOS 54 55 55   < >  --    < > 51 58 58   < >  --    AST 30 55* 32   < >  --    < > 26 39 32   < >  --    ALT 38 77* 49   < >  --    < > 41 58 42   < >  --     < > = values in this interval not displayed.     Calcium/VitaminD  Recent Labs   Lab Test 11/20/21  0901 11/16/20  0903 02/07/20  1428   JOHN 9.0 9.5 9.1     ESR/CRP  Recent Labs   Lab Test 09/09/22  1059 06/14/22  1012 12/10/21  1603   SED 5 6 5   CRP <3.00 <2.9 <2.9     Lipid Panel  Recent Labs   Lab Test 11/20/21  0901 11/16/20  0903 11/13/19  1107 08/26/16  1213 05/29/15  1204   CHOL 159 177 161   < > 168   TRIG 150* 194* 153*   < > 293*   HDL 60 62 58   < > 54   LDL 69 76 72   < > 55   VLDL  --   --   --   --  59*   CHOLHDLRATIO  --   --   --   --  3.1   NHDL 99 115 103   < >  --     < > = values in this interval not displayed.     Hepatitis B  Recent Labs   Lab Test 05/16/18  1016   HBCAB Nonreactive   HEPBANG Nonreactive     Hepatitis C  Recent Labs   Lab Test 08/26/16  1213   HCVAB Nonreactive   Assay performance characteristics have not been established for newborns,   infants, and children       Immunization History     Immunization History   Administered Date(s) Administered     COVID-19,PF,Evan 03/04/2021     COVID-19,PF,Moderna 05/12/2022     COVID-19,PF,Moderna Booster 10/28/2021     Influenza (High Dose) 3 valent vaccine 10/18/2017, 09/21/2018, 11/13/2019, 11/16/2021     Influenza (IIV3) PF 11/17/2005, 12/04/2006, 11/22/2010, 09/26/2012, 10/01/2014     Influenza Vaccine IM > 6 months Valent IIV4 (Alfuria,Fluzone) 10/15/2013, 12/17/2015, 08/26/2016     Influenza, Quad, High Dose, Pf, 65yr+ (Fluzone HD) 10/09/2020, 09/17/2021, 09/09/2022     Pneumo Conj 13-V (2010&after) 10/18/2017     Pneumococcal 23 valent 01/22/2018      TDAP Vaccine (Adacel) 04/22/2009, 05/20/2018          Chart documentation done in part with Dragon Voice recognition Software. Although reviewed after completion, some word and grammatical error may remain.    Adria Lopez MD

## 2022-10-14 NOTE — NURSING NOTE
RAPID3 (0-30) Cumulative Score  7.5          RAPID3 Weighted Score (divide #4 by 3 and that is the weighted score)  2.5

## 2022-10-14 NOTE — PATIENT INSTRUCTIONS
RHEUMATOLOGY    Dr. Adria Lopez    [facility]  [address] [place] [address]  [place] [address]  Phone number: [phone]  Fax number: [phone]    You may schedule your FLU shot by calling [phone] or if you would like to get your shot at a [facility] pharmacy you may schedule online at [email or web address].    Thank you for choosing [facility]!    Francie Cuellar CMA Rheumatology

## 2022-11-16 ENCOUNTER — COMPREHENSIVE EXAM (OUTPATIENT)
Dept: URBAN - METROPOLITAN AREA CLINIC 49 | Facility: CLINIC | Age: 72
End: 2022-11-16

## 2022-11-16 DIAGNOSIS — H35.373: ICD-10-CM

## 2022-11-16 DIAGNOSIS — H04.123: ICD-10-CM

## 2022-11-16 DIAGNOSIS — H43.813: ICD-10-CM

## 2022-11-16 DIAGNOSIS — H40.1132: ICD-10-CM

## 2022-11-16 PROCEDURE — 92015 DETERMINE REFRACTIVE STATE: CPT

## 2022-11-16 PROCEDURE — 92014 COMPRE OPH EXAM EST PT 1/>: CPT

## 2022-11-16 PROCEDURE — 92134 CPTRZ OPH DX IMG PST SGM RTA: CPT

## 2022-11-16 ASSESSMENT — TONOMETRY
OD_IOP_MMHG: 15
OS_IOP_MMHG: 14
OD_IOP_MMHG: 12
OS_IOP_MMHG: 12

## 2022-11-16 ASSESSMENT — VISUAL ACUITY
OS_PH: 20/30
OD_CC: 20/20-1
OD_GLARE: 20/40
OS_GLARE: 20/25
OU_CC: J1+@16
OS_CC: 20/60
OD_GLARE: 20/30-1
OS_GLARE: 20/25

## 2022-11-16 ASSESSMENT — KERATOMETRY
OD_K1POWER_DIOPTERS: 41.00
OD_K2POWER_DIOPTERS: 44.25
OD_AXISANGLE_DEGREES: 107
OD_AXISANGLE2_DEGREES: 17

## 2022-11-16 NOTE — PATIENT DISCUSSION
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 Inferior corneal thinning noted on previous exam. Stable.

## 2022-11-29 DIAGNOSIS — I10 ESSENTIAL HYPERTENSION WITH GOAL BLOOD PRESSURE LESS THAN 140/90: ICD-10-CM

## 2022-11-29 RX ORDER — LISINOPRIL 5 MG/1
5 TABLET ORAL DAILY
Qty: 90 TABLET | Refills: 0 | Status: SHIPPED | OUTPATIENT
Start: 2022-11-29 | End: 2022-12-15

## 2022-12-01 ENCOUNTER — LAB (OUTPATIENT)
Dept: LAB | Facility: CLINIC | Age: 72
End: 2022-12-01
Payer: COMMERCIAL

## 2022-12-01 DIAGNOSIS — L40.50 PSORIATIC ARTHROPATHY (H): ICD-10-CM

## 2022-12-01 DIAGNOSIS — I10 ESSENTIAL HYPERTENSION WITH GOAL BLOOD PRESSURE LESS THAN 140/90: ICD-10-CM

## 2022-12-01 DIAGNOSIS — Z79.899 HIGH RISK MEDICATIONS (NOT ANTICOAGULANTS) LONG-TERM USE: ICD-10-CM

## 2022-12-01 LAB
ALBUMIN SERPL BCG-MCNC: 4.3 G/DL (ref 3.5–5.2)
ALP SERPL-CCNC: 50 U/L (ref 40–129)
ALT SERPL W P-5'-P-CCNC: 41 U/L (ref 10–50)
AST SERPL W P-5'-P-CCNC: 32 U/L (ref 10–50)
BASOPHILS # BLD AUTO: 0 10E3/UL (ref 0–0.2)
BASOPHILS NFR BLD AUTO: 0 %
BILIRUB DIRECT SERPL-MCNC: 0.25 MG/DL (ref 0–0.3)
BILIRUB SERPL-MCNC: 1.1 MG/DL
CREAT SERPL-MCNC: 1.07 MG/DL (ref 0.67–1.17)
CRP SERPL-MCNC: <3 MG/L
EOSINOPHIL # BLD AUTO: 0.2 10E3/UL (ref 0–0.7)
EOSINOPHIL NFR BLD AUTO: 3 %
ERYTHROCYTE [DISTWIDTH] IN BLOOD BY AUTOMATED COUNT: 14 % (ref 10–15)
ERYTHROCYTE [SEDIMENTATION RATE] IN BLOOD BY WESTERGREN METHOD: 6 MM/HR (ref 0–20)
GFR SERPL CREATININE-BSD FRML MDRD: 75 ML/MIN/1.73M2
HCT VFR BLD AUTO: 42 % (ref 40–53)
HGB BLD-MCNC: 14.7 G/DL (ref 13.3–17.7)
IMM GRANULOCYTES # BLD: 0 10E3/UL
IMM GRANULOCYTES NFR BLD: 0 %
LYMPHOCYTES # BLD AUTO: 1.7 10E3/UL (ref 0.8–5.3)
LYMPHOCYTES NFR BLD AUTO: 32 %
MCH RBC QN AUTO: 33.7 PG (ref 26.5–33)
MCHC RBC AUTO-ENTMCNC: 35 G/DL (ref 31.5–36.5)
MCV RBC AUTO: 96 FL (ref 78–100)
MONOCYTES # BLD AUTO: 0.4 10E3/UL (ref 0–1.3)
MONOCYTES NFR BLD AUTO: 7 %
NEUTROPHILS # BLD AUTO: 3 10E3/UL (ref 1.6–8.3)
NEUTROPHILS NFR BLD AUTO: 58 %
PLATELET # BLD AUTO: 143 10E3/UL (ref 150–450)
POTASSIUM SERPL-SCNC: 4 MMOL/L (ref 3.4–5.3)
PROT SERPL-MCNC: 6.4 G/DL (ref 6.4–8.3)
RBC # BLD AUTO: 4.36 10E6/UL (ref 4.4–5.9)
WBC # BLD AUTO: 5.2 10E3/UL (ref 4–11)

## 2022-12-01 PROCEDURE — 84132 ASSAY OF SERUM POTASSIUM: CPT

## 2022-12-01 PROCEDURE — 82565 ASSAY OF CREATININE: CPT

## 2022-12-01 PROCEDURE — 36415 COLL VENOUS BLD VENIPUNCTURE: CPT

## 2022-12-01 PROCEDURE — 86140 C-REACTIVE PROTEIN: CPT

## 2022-12-01 PROCEDURE — 80076 HEPATIC FUNCTION PANEL: CPT

## 2022-12-01 PROCEDURE — 85025 COMPLETE CBC W/AUTO DIFF WBC: CPT

## 2022-12-01 PROCEDURE — 85652 RBC SED RATE AUTOMATED: CPT

## 2022-12-02 NOTE — RESULT ENCOUNTER NOTE
Ramesh Villalta,  Serum potassium, one of the salts in the blood is in normal balance.    AMAN SUTTON MD

## 2022-12-15 ENCOUNTER — OFFICE VISIT (OUTPATIENT)
Dept: FAMILY MEDICINE | Facility: CLINIC | Age: 72
End: 2022-12-15
Payer: COMMERCIAL

## 2022-12-15 VITALS
BODY MASS INDEX: 31.1 KG/M2 | HEIGHT: 69 IN | WEIGHT: 210 LBS | TEMPERATURE: 97.8 F | OXYGEN SATURATION: 98 % | DIASTOLIC BLOOD PRESSURE: 74 MMHG | SYSTOLIC BLOOD PRESSURE: 128 MMHG | HEART RATE: 86 BPM | RESPIRATION RATE: 16 BRPM

## 2022-12-15 DIAGNOSIS — R06.09 DOE (DYSPNEA ON EXERTION): ICD-10-CM

## 2022-12-15 DIAGNOSIS — N40.1 BENIGN PROSTATIC HYPERPLASIA WITH NOCTURIA: ICD-10-CM

## 2022-12-15 DIAGNOSIS — Z00.00 ENCOUNTER FOR SUBSEQUENT ANNUAL WELLNESS VISIT IN MEDICARE PATIENT: Primary | ICD-10-CM

## 2022-12-15 DIAGNOSIS — R73.09 ELEVATED GLUCOSE: ICD-10-CM

## 2022-12-15 DIAGNOSIS — I10 ESSENTIAL HYPERTENSION WITH GOAL BLOOD PRESSURE LESS THAN 140/90: ICD-10-CM

## 2022-12-15 DIAGNOSIS — R35.1 BENIGN PROSTATIC HYPERPLASIA WITH NOCTURIA: ICD-10-CM

## 2022-12-15 DIAGNOSIS — E78.5 HYPERLIPIDEMIA LDL GOAL <130: ICD-10-CM

## 2022-12-15 DIAGNOSIS — N52.9 IMPOTENCE OF ORGANIC ORIGIN: ICD-10-CM

## 2022-12-15 DIAGNOSIS — Z12.5 SCREENING FOR PROSTATE CANCER: ICD-10-CM

## 2022-12-15 LAB
CHOLEST SERPL-MCNC: 165 MG/DL
FASTING STATUS PATIENT QL REPORTED: YES
GLUCOSE SERPL-MCNC: 120 MG/DL (ref 70–99)
HBA1C MFR BLD: 5.7 % (ref 0–5.6)
HDLC SERPL-MCNC: 61 MG/DL
LDLC SERPL CALC-MCNC: 81 MG/DL
NONHDLC SERPL-MCNC: 104 MG/DL
PSA SERPL-MCNC: 1.47 NG/ML (ref 0–6.5)
TRIGL SERPL-MCNC: 113 MG/DL

## 2022-12-15 PROCEDURE — 99214 OFFICE O/P EST MOD 30 MIN: CPT | Mod: 25 | Performed by: FAMILY MEDICINE

## 2022-12-15 PROCEDURE — 83036 HEMOGLOBIN GLYCOSYLATED A1C: CPT | Performed by: FAMILY MEDICINE

## 2022-12-15 PROCEDURE — 80061 LIPID PANEL: CPT | Performed by: FAMILY MEDICINE

## 2022-12-15 PROCEDURE — 36415 COLL VENOUS BLD VENIPUNCTURE: CPT | Performed by: FAMILY MEDICINE

## 2022-12-15 PROCEDURE — 82947 ASSAY GLUCOSE BLOOD QUANT: CPT | Performed by: FAMILY MEDICINE

## 2022-12-15 PROCEDURE — G0103 PSA SCREENING: HCPCS | Performed by: FAMILY MEDICINE

## 2022-12-15 PROCEDURE — G0439 PPPS, SUBSEQ VISIT: HCPCS | Performed by: FAMILY MEDICINE

## 2022-12-15 RX ORDER — SIMVASTATIN 40 MG
40 TABLET ORAL AT BEDTIME
Qty: 90 TABLET | Refills: 3 | Status: SHIPPED | OUTPATIENT
Start: 2022-12-15 | End: 2024-01-02

## 2022-12-15 RX ORDER — TAMSULOSIN HYDROCHLORIDE 0.4 MG/1
0.4 CAPSULE ORAL DAILY
Qty: 90 CAPSULE | Refills: 3 | Status: SHIPPED | OUTPATIENT
Start: 2022-12-15 | End: 2023-01-11

## 2022-12-15 RX ORDER — ALBUTEROL SULFATE 90 UG/1
2 AEROSOL, METERED RESPIRATORY (INHALATION) EVERY 4 HOURS PRN
Qty: 18 G | Refills: 11 | Status: SHIPPED | OUTPATIENT
Start: 2022-12-15

## 2022-12-15 RX ORDER — LISINOPRIL 5 MG/1
5 TABLET ORAL DAILY
Qty: 90 TABLET | Refills: 3 | Status: SHIPPED | OUTPATIENT
Start: 2022-12-15 | End: 2024-02-20

## 2022-12-15 RX ORDER — SILDENAFIL CITRATE 20 MG/1
TABLET ORAL
Qty: 50 TABLET | Refills: 11 | Status: SHIPPED | OUTPATIENT
Start: 2022-12-15

## 2022-12-15 ASSESSMENT — ENCOUNTER SYMPTOMS
ARTHRALGIAS: 1
DYSURIA: 0
WEAKNESS: 0
HEADACHES: 0
PALPITATIONS: 0
EYE PAIN: 0
DIZZINESS: 0
HEARTBURN: 0
COUGH: 0
ABDOMINAL PAIN: 0
NERVOUS/ANXIOUS: 0
MYALGIAS: 0
HEMATOCHEZIA: 0
SHORTNESS OF BREATH: 1
FEVER: 0
FREQUENCY: 0
CHILLS: 0
DIARRHEA: 0
SORE THROAT: 0
JOINT SWELLING: 0
HEMATURIA: 0
PARESTHESIAS: 0
CONSTIPATION: 0
NAUSEA: 1

## 2022-12-15 ASSESSMENT — ACTIVITIES OF DAILY LIVING (ADL): CURRENT_FUNCTION: NO ASSISTANCE NEEDED

## 2022-12-15 ASSESSMENT — PAIN SCALES - GENERAL: PAINLEVEL: MILD PAIN (3)

## 2022-12-15 NOTE — PROGRESS NOTES
"SUBJECTIVE:   Pawan is a 72 year old who presents for Preventive Visit.  Chief Complaint   Patient presents with     Physical      10/14: Rheumatology visit.  Recommended consideration of a biologic medication.    Fell a couple days ago.   Slipped on ice last winter and injured neck.  He was seen in physical therapy. Neck sore yesterday.  Seems better today.  A little better today. Alleviating factors: ibuprofen.  He does have exercises from physical therapy he tried yesterday.     Patient has been advised of split billing requirements and indicates understanding: Yes  Are you in the first 12 months of your Medicare coverage?  No    Healthy Habits:     In general, how would you rate your overall health?  Good    Frequency of exercise:  2-3 days/week    Duration of exercise:  30-45 minutes    Do you usually eat at least 4 servings of fruit and vegetables a day, include whole grains    & fiber and avoid regularly eating high fat or \"junk\" foods?  No    Taking medications regularly:  Yes    Medication side effects:  None    Ability to successfully perform activities of daily living:  No assistance needed    Home Safety:  No safety concerns identified    Hearing Impairment:  Difficulty following a conversation in a noisy restaurant or crowded room and no hearing concerns    In the past 6 months, have you been bothered by leaking of urine?  No    In general, how would you rate your overall mental or emotional health?  Good      PHQ-2 Total Score: 0    Additional concerns today:  No  Exercise: shoveling snow.  Tries to stay busy around the house.     Have you ever done Advance Care Planning? (For example, a Health Directive, POLST, or a discussion with a medical provider or your loved ones about your wishes): No, advance care planning information given to patient to review.  Patient plans to discuss their wishes with loved ones or provider.      Fall risk  Fallen 2 or more times in the past year?: Yes  Any fall with " injury in the past year?: Yes  Related to falls on slippery surfaces.     Cognitive Screening   1) Repeat 3 items (Leader, Season, Table)    2) Clock draw: NORMAL  3) 3 item recall: Recalls 3 objects  Results: 3 items recalled: COGNITIVE IMPAIRMENT LESS LIKELY    Mini-CogTM Copyright S Kylie. Licensed by the author for use in [facility]; reprinted with permission (santiago@[facility]). All rights reserved.      Do you have sleep apnea, excessive snoring or daytime drowsiness?: no      Social History     Tobacco Use     Smoking status: Never     Smokeless tobacco: Never   Substance Use Topics     Alcohol use: Yes     Comment:  0-2 beer's or mixed drink's monthly   alcohol seldom.   If you drink alcohol do you typically have >3 drinks per day or >7 drinks per week? No    Alcohol Use 12/15/2022   Prescreen: >3 drinks/day or >7 drinks/week? No   Prescreen: >3 drinks/day or >7 drinks/week? -   No flowsheet data found.    Current providers sharing in care for this patient include:   Patient Care Team:  Armando Finley MD as PCP - General (Family Practice)  Armando Finley MD as Assigned PCP  Adria Lopez MD as Assigned Rheumatology Provider  Rheumatology.    No other specialists.     The following health maintenance items are reviewed in Epic and correct as of today:  Health Maintenance   Topic Date Due     ZOSTER IMMUNIZATION (1 of 2) 01/05/2015     AORTIC ANEURYSM SCREENING (SYSTEM ASSIGNED)  Never done     MEDICARE ANNUAL WELLNESS VISIT  12/10/2022     ANNUAL REVIEW OF HM ORDERS  04/01/2023     FALL RISK ASSESSMENT  12/15/2023     LIPID  11/20/2026     ADVANCE CARE PLANNING  12/10/2026     COLORECTAL CANCER SCREENING  02/08/2027     DTAP/TDAP/TD IMMUNIZATION (4 - Td or Tdap) 05/20/2028     HEPATITIS C SCREENING  Completed     PHQ-2 (once per calendar year)  Completed     INFLUENZA VACCINE  Completed     Pneumococcal Vaccine: 65+ Years  Completed     COVID-19 Vaccine  Completed     IPV IMMUNIZATION  Aged  Out     MENINGITIS IMMUNIZATION  Aged Out     Patient Active Problem List    Diagnosis Date Noted     History of colonic polyps 02/10/2022     Priority: Medium     22 colonoscopy with two polyps 2 and 4mm, tubular adenoma.  PLAN: 5 year [id number].       ROSADO (dyspnea on exertion) 2020     Priority: Medium     3/4/2020: Some dyspnea on exertion.  He had normal chest x-ray.  His pulmonary function tests were normal.  Nuclear medicine stress test was normal.  albuteral inhaler seems to help.        High risk medications (not anticoagulants) long-term use 2015     Priority: Medium     Osteoarthritis 2011     Priority: Medium     HYPERLIPIDEMIA LDL GOAL <130 10/31/2010     Priority: Medium     Chondrocalcinosis 02/10/2010     Priority: Medium     Impotence of organic origin 2006     Priority: Medium     Esophageal reflux 2005     Priority: Medium     GERD       Hypertension goal BP (blood pressure) < 140/90 2005     Priority: Medium     Psoriatic arthropathy (H) 2005     Priority: Medium     Advanced directives, counseling/discussion 2013     Priority: Low     advanced care discussed form given to pt.           FAMILY HISTORY OF DIABETES MELLITUS 2006     Priority: Low     mother       FAMILY HISTORY OF GI NEOPLASM 2005     Priority: Low     father - colon CA ,  at  67. Diagnosed at age 67  He has had colonoscopy in , ,  and .  No polyps.           Family history:  CV disease: no  Prostate cancer: no  Colon cancer: father    Multivitamin or Vit D use: MVI daily.     Vaccines:current tetanus.  Has had COVID-19 virus vaccines and are current.      Past Colon cancer screenin    Review of Systems   Constitutional: Negative for chills and fever.   HENT: Negative for congestion, ear pain, hearing loss and sore throat.    Eyes: Negative for pain and visual disturbance.   Respiratory: Positive for shortness of breath. Negative for  "cough.    Cardiovascular: Negative for chest pain, palpitations and peripheral edema.   Gastrointestinal: Positive for nausea. Negative for abdominal pain, constipation, diarrhea, heartburn and hematochezia.   Genitourinary: Negative for dysuria, frequency, genital sores, hematuria, impotence, penile discharge and urgency.   Musculoskeletal: Positive for arthralgias. Negative for joint swelling and myalgias.   Skin: Positive for rash.   Neurological: Negative for dizziness, weakness, headaches and paresthesias.   Psychiatric/Behavioral: Negative for mood changes. The patient is not nervous/anxious.    Some  dyspnea on exertion.  Taking albuteral inhaler ahead of exertion helps.   No history of asthma.   Not checking BP at home.     OBJECTIVE:                                                    OBJECTIVE:Blood pressure 128/74, pulse 86, temperature 97.8  F (36.6  C), temperature source Tympanic, resp. rate 16, height 1.758 m (5' 9.21\"), weight 95.3 kg (210 lb), SpO2 98 %. BMI=Body mass index is 30.82 kg/m .  GENERAL APPEARANCE ADULT: Alert, no acute distress  EYES: PERRL, EOM normal, conjunctiva and lids normal  HENT: Ears and TMs normal, oral mucosa and posterior oropharynx normal  NECK: No adenopathy,masses or thyromegaly  RESP: lungs clear to auscultation   CV: normal rate, regular rhythm, no murmur or gallop  ABDOMEN: soft, no organomegaly, masses or tenderness  MS: extremities normal, no peripheral edema    ASSESSMENT/PLAN:                                                    Lifestyle recommendations:regular exercise, at least 150 minutes per week (average 30 minutes 5 times a week)  weight loss recommended (ideal BMI-body mass index is <25)  The following exams/tests were recommended and discussed for health maintenance:  Colon cancer screening recommended 2027  Prostate cancer screening is optional starting at age 50 if normal risk, age 40 or 45 for high risk men (strong family history or blacks.)  Screening can " include digital rectal exam and PSA blood test.  Prostate cancer screening is not recommended for older men, those age 70 and older or with anticipated lifespan <10 years.  The expert group [employer]PSTF rcommmends against any PSA prostate cancer screening.      (Z00.00) Encounter for subsequent annual wellness visit in Medicare patient  (primary encounter diagnosis)    (R06.09) ROSADO (dyspnea on exertion)  Comment: takes albuteral inhaler prior to exertion which helps.  No past history of asthma.   Plan: albuterol (PROAIR HFA/PROVENTIL HFA/VENTOLIN         HFA) 108 (90 Base) MCG/ACT inhaler        No change in current treatment plan.  Refills.     (I10) Essential hypertension with goal blood pressure less than 140/90  Comment: doing well.   Plan: lisinopril (ZESTRIL) 5 MG tablet        No change in current treatment plan.  Refills.     (N52.9) Impotence of organic origin  Comment: does well with sildenafil.   Plan: sildenafil (REVATIO) 20 MG tablet        No change in current treatment plan.  REfills.     (E78.5) Hyperlipidemia LDL goal <130  Comment: due for follow-up   Plan: simvastatin (ZOCOR) 40 MG tablet, Lipid panel         reflex to direct LDL Fasting        Non-fasting blood tests (only coffee with some cream this AM)    (N40.1,  R35.1) Benign prostatic hyperplasia with nocturia  Comment: stable  Plan: tamsulosin (FLOMAX) 0.4 MG capsule        No change in current treatment plan.   REfills.     (R73.09) Elevated glucose  Comment:   Plan: Glucose, Hemoglobin A1c        Recheck test for erythromycin.     (Z12.5) Screening for prostate cancer  Comment:   Plan: PSA, screen             Patient has been advised of split billing requirements and indicates understanding: Yes      COUNSELING:  Reviewed preventive health counseling, as reflected in patient instructions  Special attention given to:       Regular exercise       Colon cancer screening       Prostate cancer screening      BMI:   Estimated body mass index is 30.82  "kg/m  as calculated from the following:    Height as of this encounter: 1.758 m (5' 9.21\").    Weight as of this encounter: 95.3 kg (210 lb).   Weight management plan: Discussed healthy diet and exercise guidelines      He reports that he has never smoked. He has never used smokeless tobacco.      Appropriate preventive services were discussed with this patient, including applicable screening as appropriate for cardiovascular disease, diabetes, osteopenia/osteoporosis, and glaucoma.  As appropriate for age/gender, discussed screening for colorectal cancer, prostate cancer, breast cancer, and cervical cancer. Checklist reviewing preventive services available has been given to the patient.    Reviewed patients plan of care and provided an AVS. The Basic Care Plan (routine screening as documented in Health Maintenance) for Pawan meets the Care Plan requirement. This Care Plan has been established and reviewed with the Patient.          Armando Finley MD  [facility]    Identified Health Risks:  "

## 2022-12-15 NOTE — RESULT ENCOUNTER NOTE
"Ramesh Villalta,  Glucose test for diabetes mellitus is high and Hgb A1C a little abnormal both suggesting pre-diabetes.   Lipid tests including total cholesterol, triglycerides, HDL (\"good cholesterol\") and LDL (\"bad cholesterol\") are normal.     PSA test (for prostate cancer screening) is normal.   PLAN: Weight loss, regular exercise with goal of 30 minutes most days of the week and eating a prudent diet (lots of fruits and vegetables, limiting unhealthy fats and excessive calories) can help prevent becoming diabetic.    Recheck blood tests in a year.   AMAN SUTTON MD  "

## 2022-12-15 NOTE — PATIENT INSTRUCTIONS
ASSESSMENT/PLAN:                                                    Lifestyle recommendations:regular exercise, at least 150 minutes per week (average 30 minutes 5 times a week)  weight loss recommended (ideal BMI-body mass index is <25)  The following exams/tests were recommended and discussed for health maintenance:  Colon cancer screening recommended 2027  Prostate cancer screening is optional starting at age 50 if normal risk, age 40 or 45 for high risk men (strong family history or blacks.)  Screening can include digital rectal exam and PSA blood test.  Prostate cancer screening is not recommended for older men, those age 70 and older or with anticipated lifespan <10 years.  The expert group [employer]PSTF rcommmends against any PSA prostate cancer screening.      (Z00.00) Encounter for subsequent annual wellness visit in Medicare patient  (primary encounter diagnosis)    (R06.09) ROSADO (dyspnea on exertion)  Comment: takes albuteral inhaler prior to exertion which helps.  No past history of asthma.   Plan: albuterol (PROAIR HFA/PROVENTIL HFA/VENTOLIN         HFA) 108 (90 Base) MCG/ACT inhaler        No change in current treatment plan.  Refills.     (I10) Essential hypertension with goal blood pressure less than 140/90  Comment: doing well.   Plan: lisinopril (ZESTRIL) 5 MG tablet        No change in current treatment plan.  Refills.     (N52.9) Impotence of organic origin  Comment: does well with sildenafil.   Plan: sildenafil (REVATIO) 20 MG tablet        No change in current treatment plan.  REfills.     (E78.5) Hyperlipidemia LDL goal <130  Comment: due for follow-up   Plan: simvastatin (ZOCOR) 40 MG tablet, Lipid panel         reflex to direct LDL Fasting        Non-fasting blood tests (only coffee with some cream this AM)    (N40.1,  R35.1) Benign prostatic hyperplasia with nocturia  Comment: stable  Plan: tamsulosin (FLOMAX) 0.4 MG capsule        No change in current treatment plan.   REfills.     (R73.09)  Elevated glucose  Comment:   Plan: Glucose, Hemoglobin A1c        Recheck test for erythromycin.     (Z12.5) Screening for prostate cancer  Comment:   Plan: PSA, screen      At your visit today, we discussed your risk for falls and preventive options.    Fall Prevention  Falls often occur due to slipping, tripping or losing your balance. Millions of people fall every year and injure themselves. Here are ways to reduce your risk of falling again.   Think about your fall, was there anything that caused your fall that can be fixed, removed, or replaced?  Make your home safe by keeping walkways clear of objects you may trip over, such as electric cords.  Use non-slip pads under rugs. Don't use area rugs or small throw rugs.  Use non-slip mats in bathtubs and showers.  Install handrails and lights on staircases. The handrails should be on both sides of the stairs.  Don't walk in poorly lit areas.  Don't stand on chairs or wobbly ladders.  Use caution when reaching overhead or looking upward. This position can cause a loss of balance.  Be sure your shoes fit properly, have non-slip bottoms and are in good condition.   Wear shoes both inside and out. Don't go barefoot or wear slippers.  Be cautious when going up and down stairs, curbs, and when walking on uneven sidewalks.  If your balance is poor, consider using a cane or walker.  If your fall was related to alcohol use, stop or limit alcohol intake.   If your fall was related to use of sleeping medicines, talk to your healthcare provider about this. You may need to reduce your dosage at bedtime if you awaken during the night to go to the bathroom.    To reduce the need for nighttime bathroom trips:  Don't drink fluids for several hours before going to bed  Empty your bladder before going to bed  Men can keep a urinal at the bedside  Stay as active as you can. Balance, flexibility, strength, and endurance all come from exercise. They all play a role in preventing falls.  Ask your healthcare provider which types of activity are right for you.  Get your vision checked on a regular basis.  If you have pets, know where they are before you stand up or walk so you don't trip over them.  Use night lights.  Go over all your medicines with a pharmacist or other healthcare provider to see if any of them could make you more likely to fall.  [employer] last reviewed this educational content on 4/1/2018 2000-2021 [employer]. All rights reserved. This information is not intended as a substitute for professional medical care. Always follow your healthcare professional's instructions.

## 2022-12-15 NOTE — NURSING NOTE
"Chief Complaint   Patient presents with     Physical       Initial /74   Pulse 86   Temp 97.8  F (36.6  C) (Tympanic)   Resp 16   Ht 1.758 m (5' 9.21\")   Wt 95.3 kg (210 lb)   SpO2 98%   BMI 30.82 kg/m   Estimated body mass index is 30.82 kg/m  as calculated from the following:    Height as of this encounter: 1.758 m (5' 9.21\").    Weight as of this encounter: 95.3 kg (210 lb).    Patient presents to the clinic using     Is there anyone who you would like to be able to receive your results?   If yes have patient fill out CURT    "

## 2022-12-27 DIAGNOSIS — E78.5 HYPERLIPIDEMIA LDL GOAL <130: ICD-10-CM

## 2022-12-27 RX ORDER — SIMVASTATIN 40 MG
TABLET ORAL
Qty: 90 TABLET | Refills: 3 | OUTPATIENT
Start: 2022-12-27

## 2023-01-09 DIAGNOSIS — N40.1 BENIGN PROSTATIC HYPERPLASIA WITH NOCTURIA: ICD-10-CM

## 2023-01-09 DIAGNOSIS — R35.1 BENIGN PROSTATIC HYPERPLASIA WITH NOCTURIA: ICD-10-CM

## 2023-01-11 RX ORDER — TAMSULOSIN HYDROCHLORIDE 0.4 MG/1
CAPSULE ORAL
Qty: 90 CAPSULE | Refills: 3 | Status: SHIPPED | OUTPATIENT
Start: 2023-01-11 | End: 2024-01-08

## 2023-01-27 NOTE — PATIENT DISCUSSION
Trial toric monovision cl ordered OU, OS near. OTC dispense, schedule cl ck 1-2 weeks with Dr Jasso Counter.

## 2023-02-16 ENCOUNTER — OFFICE VISIT (OUTPATIENT)
Dept: RHEUMATOLOGY | Facility: CLINIC | Age: 73
End: 2023-02-16
Payer: COMMERCIAL

## 2023-02-16 VITALS
WEIGHT: 216.8 LBS | HEART RATE: 68 BPM | BODY MASS INDEX: 31.82 KG/M2 | SYSTOLIC BLOOD PRESSURE: 153 MMHG | OXYGEN SATURATION: 98 % | DIASTOLIC BLOOD PRESSURE: 91 MMHG

## 2023-02-16 DIAGNOSIS — L40.50 PSORIATIC ARTHROPATHY (H): Primary | ICD-10-CM

## 2023-02-16 DIAGNOSIS — Z79.899 HIGH RISK MEDICATIONS (NOT ANTICOAGULANTS) LONG-TERM USE: ICD-10-CM

## 2023-02-16 LAB
ALBUMIN SERPL-MCNC: 3.9 G/DL (ref 3.4–5)
ALP SERPL-CCNC: 48 U/L (ref 40–150)
ALT SERPL W P-5'-P-CCNC: 65 U/L (ref 0–70)
AST SERPL W P-5'-P-CCNC: 36 U/L (ref 0–45)
BASOPHILS # BLD AUTO: 0 10E3/UL (ref 0–0.2)
BASOPHILS NFR BLD AUTO: 0 %
BILIRUB DIRECT SERPL-MCNC: 0.3 MG/DL (ref 0–0.2)
BILIRUB SERPL-MCNC: 0.9 MG/DL (ref 0.2–1.3)
CREAT SERPL-MCNC: 1.01 MG/DL (ref 0.66–1.25)
CRP SERPL-MCNC: <2.9 MG/L (ref 0–8)
EOSINOPHIL # BLD AUTO: 0.1 10E3/UL (ref 0–0.7)
EOSINOPHIL NFR BLD AUTO: 3 %
ERYTHROCYTE [DISTWIDTH] IN BLOOD BY AUTOMATED COUNT: 13.9 % (ref 10–15)
ERYTHROCYTE [SEDIMENTATION RATE] IN BLOOD BY WESTERGREN METHOD: 5 MM/HR (ref 0–20)
GFR SERPL CREATININE-BSD FRML MDRD: 80 ML/MIN/1.73M2
HCT VFR BLD AUTO: 44.2 % (ref 40–53)
HGB BLD-MCNC: 15.3 G/DL (ref 13.3–17.7)
LYMPHOCYTES # BLD AUTO: 1.1 10E3/UL (ref 0.8–5.3)
LYMPHOCYTES NFR BLD AUTO: 24 %
MCH RBC QN AUTO: 34.6 PG (ref 26.5–33)
MCHC RBC AUTO-ENTMCNC: 34.6 G/DL (ref 31.5–36.5)
MCV RBC AUTO: 100 FL (ref 78–100)
MONOCYTES # BLD AUTO: 0.3 10E3/UL (ref 0–1.3)
MONOCYTES NFR BLD AUTO: 5 %
NEUTROPHILS # BLD AUTO: 3.1 10E3/UL (ref 1.6–8.3)
NEUTROPHILS NFR BLD AUTO: 68 %
PLATELET # BLD AUTO: 149 10E3/UL (ref 150–450)
PROT SERPL-MCNC: 6.6 G/DL (ref 6.8–8.8)
RBC # BLD AUTO: 4.42 10E6/UL (ref 4.4–5.9)
WBC # BLD AUTO: 4.6 10E3/UL (ref 4–11)

## 2023-02-16 PROCEDURE — 80076 HEPATIC FUNCTION PANEL: CPT | Performed by: INTERNAL MEDICINE

## 2023-02-16 PROCEDURE — 86140 C-REACTIVE PROTEIN: CPT | Performed by: INTERNAL MEDICINE

## 2023-02-16 PROCEDURE — 99214 OFFICE O/P EST MOD 30 MIN: CPT | Performed by: INTERNAL MEDICINE

## 2023-02-16 PROCEDURE — 85652 RBC SED RATE AUTOMATED: CPT | Performed by: INTERNAL MEDICINE

## 2023-02-16 PROCEDURE — 82565 ASSAY OF CREATININE: CPT | Performed by: INTERNAL MEDICINE

## 2023-02-16 PROCEDURE — 85025 COMPLETE CBC W/AUTO DIFF WBC: CPT | Performed by: INTERNAL MEDICINE

## 2023-02-16 PROCEDURE — 36415 COLL VENOUS BLD VENIPUNCTURE: CPT | Performed by: INTERNAL MEDICINE

## 2023-02-16 RX ORDER — METHOTREXATE 2.5 MG/1
15 TABLET ORAL WEEKLY
Qty: 78 TABLET | Refills: 0 | Status: SHIPPED | OUTPATIENT
Start: 2023-02-16 | End: 2023-04-25

## 2023-02-16 RX ORDER — FOLIC ACID 1 MG/1
1 TABLET ORAL DAILY
Qty: 90 TABLET | Refills: 2 | Status: SHIPPED | OUTPATIENT
Start: 2023-02-16 | End: 2023-08-17

## 2023-02-16 NOTE — PROGRESS NOTES
Rheumatology Clinic Visit      Pawan Bullard MRN# [id number]   YOB: 1950 Age: 73 year old      Date of visit: 2/16/23   PCP: Dr. Armando Finley    Chief Complaint   Patient presents with:  Psoriatic arthropathy : Fell and hurt left wrist so that is bothering him    Assessment and Plan     1.  Psoriatic arthritis:  Previously on SSZ (GI upset).  Currently on methotrexate 15 mg once weekly (GI upset with 25 mg once weekly; cytopenias).  Currently doing well.  No synovitis on exam today.  Chronic illness, stable.    - Continue methotrexate 15mg once every 7 days (split dose within a 24-hour period)  - Continue folic acid 1 mg daily  - Labs every 8-12 weeks: CBC, Creatinine, Hepatic Panel, ESR, CRP                Rapid 3, cumulative scores                      02/16/2023:  7    (MTX 15mg wkly)                      10/14/2022:  7.5 (MTX 15mg wkly)                      6/18/2021:    4.3 (MTX 20mg wkly)                      02/17/2020:  4.7 (MTX 20mg wkly)                      11/18/2019:  3.3 (MTX 20mg wkly)                      11/26/2018:  5.2 (MTX 17.5mg wkly)    High risk medication requiring intensive toxicity monitoring at least quarterly: labs ordered include CBC, Creatinine, Hepatic panel to monitor for cytopenia and hepatotoxicity; checking creatinine as it affects clearance of medication.     2. Hand OA: Affecting the PIPs and DIPs. Topical diclofenac PRN.     3.  Left knee osteoarthritis: History of meniscus surgery in 2009 and 2013.  Degenerative arthritis affecting the left knee.  Doing well with home exercises.    4.  Vaccinations: Vaccinations reviewed with Mr. Bullard.  Risks and benefits of vaccinations were discussed.    - Influenza: up to date for the 8864-1594 season per patient  - [id number]: up to date  - [id number]: up to date  - Shingrix: Advised receiving  - COVID-19: up to date    5. Elevated blood pressure:  Pawan to follow up with Primary Care provider regarding elevated  blood pressure.     Total minutes spent in evaluation with patient, documentation, , and review of pertinent studies and chart notes: 16      Mr. Bullard verbalized agreement with and understanding of the rational for the diagnosis and treatment plan.  All questions were answered to best of my ability and the patient's satisfaction. Mr. Bullard was advised to contact the clinic with any questions that may arise after the clinic visit.      Thank you for involving me in the care of the patient    Return to clinic: 6 months      HPI   Pawan Bullard is a 73 year old male with a past medical history significant for hypertension, hyperlipidemia, chondrocalcinosis, GERD, osteoarthritis, and psoriatic arthritis who presents for follow-up of psoriatic arthritis.      Today, 2/16/2023: Recently fell and had a bruise on his left hand but that resolved and he has no pain of his left hand or distal forearm at this time.  He does not wish to have further work-up for this fall; he declined x-ray and this is reasonable considering that he is recovering well and he has a normal exam today.  Other joints are doing well; morning stiffness for about 10-20 minutes.  No joint swelling.  No other joint pain.  Methotrexate is well-tolerated as long as he splits the dose of methotrexate, taken within the same 24-hour period of each week.    Denies fevers, chills, nausea, vomiting, constipation, diarrhea. No abdominal pain. No chest pain/pressure, palpitations, or shortness of breath. No sicca symptoms.     Tobacco: None  EtOH: 0-2 beers or mixed drinks monthly  Drugs: None    ROS   12 point review of system was completed and negative except as noted in the HPI     Active Problem List     Patient Active Problem List   Diagnosis     Psoriatic arthropathy (H)     Esophageal reflux     Hypertension goal BP (blood pressure) < 140/90     FAMILY HISTORY OF GI NEOPLASM     FAMILY HISTORY OF DIABETES MELLITUS     Impotence of  organic origin     Chondrocalcinosis     HYPERLIPIDEMIA LDL GOAL <130     Osteoarthritis     Advanced directives, counseling/discussion     High risk medications (not anticoagulants) long-term use     ROSADO (dyspnea on exertion)     History of colonic polyps     Past Medical History     Past Medical History:   Diagnosis Date     Esophageal reflux     GERD     Hypertension      Psoriatic arthropathy (H)     Psoriatic arthritis     Past Surgical History     Past Surgical History:   Procedure Laterality Date     ARTHROSCOPY KNEE RT/LT  April 2009    left knee     ARTHROSCOPY KNEE WITH MEDIAL MENISCECTOMY  7/9/2013    Procedure: ARTHROSCOPY KNEE WITH MEDIAL MENISCECTOMY;  Left Knee Arthroscopic and Open Repair of Patellar Tendon with Platelet Rich Plasma;  Surgeon: Ley, Jeffrey Duane, MD;  Location: [facility]     COLONOSCOPY  Jan. 2006    diverticuli. Father had colon CA     COLONOSCOPY  2001     COLONOSCOPY  4/11/2011    Procedure:COLONOSCOPY; Surgeon:JENNIFER JEAN; Location:[facility]     COLONOSCOPY N/A 11/21/2016    Procedure: COLONOSCOPY;  Surgeon: Elias Santamaria MD;  Location: [facility]     COLONOSCOPY N/A 2/8/2022    Procedure: COLONOSCOPY;  Surgeon: Junior Brown MD;  Location: [facility]     REPAIR TENDON PATELLA  7/9/2013    Procedure: REPAIR TENDON PATELLA;;  Surgeon: Ley, Jeffrey Duane, MD;  Location: [facility]     SURGICAL HISTORY OF -   1999    (L) Herniorrhaphy     SURGICAL HISTORY OF -   2003    (R) Herniorrhaphy     SURGICAL HISTORY OF -   1981    Hemorrohid     Allergy     Allergies   Allergen Reactions     Acetaminophen W/Codeine Nausea     Current Medication List     Current Outpatient Medications   Medication Sig     albuterol (PROAIR HFA/PROVENTIL HFA/VENTOLIN HFA) 108 (90 Base) MCG/ACT inhaler Inhale 2 puffs into the lungs every 4 hours as needed for shortness of breath or wheezing     diclofenac (VOLTAREN) 1 % topical gel Apply up to 2 grams of 1% gel to hands up to 4 times daily as needed for joint pain  "(maximum: 8 g per upper extremity per day)     folic acid (FOLVITE) 1 MG tablet Take 1 tablet (1 mg) by mouth daily     lisinopril (ZESTRIL) 5 MG tablet Take 1 tablet (5 mg) by mouth daily     methotrexate sodium 2.5 MG TABS Take 6 tablets (15 mg) by mouth once a week . Take 3 tablets at 9AM on Sunday and 3 tablets at 9AM on Monday.  Needs to be taken within the same 24 hour time period of each week.     MULTI-VITAMIN OR TABS Take 1 tablet by mouth daily      Probiotic Product (PROBIOTIC DAILY PO) Take 1 capsule by mouth daily      sildenafil (REVATIO) 20 MG tablet Take as many pills as needed up to maximum of 5 pills at a time prior to intercourse.     simvastatin (ZOCOR) 40 MG tablet Take 1 tablet (40 mg) by mouth At Bedtime     tamsulosin (FLOMAX) 0.4 MG capsule TAKE ONE CAPSULE BY MOUTH ONCE DAILY     No current facility-administered medications for this visit.         Social History   See HPI    Family History     Family History   Problem Relation Age of Onset     Hypertension Mother      Diabetes Mother      Cerebrovascular Disease Mother         in her 70s     Arthritis Mother      Cancer - colorectal Father 67     Arthritis Paternal Grandmother      LUNG DISEASE Sister      Other - See Comments Sister      Prostate Cancer No family hx of      Coronary Artery Disease No family hx of        Physical Exam     Temp Readings from Last 3 Encounters:   12/15/22 97.8  F (36.6  C) (Tympanic)   04/01/22 98.5  F (36.9  C) (Tympanic)   02/08/22 97.4  F (36.3  C) (Axillary)     BP Readings from Last 5 Encounters:   02/16/23 (!) 153/91   12/15/22 128/74   10/14/22 135/84   06/17/22 130/80   04/01/22 (!) 144/80     Pulse Readings from Last 1 Encounters:   02/16/23 68     Resp Readings from Last 1 Encounters:   12/15/22 16     Estimated body mass index is 31.82 kg/m  as calculated from the following:    Height as of 12/15/22: 1.758 m (5' 9.21\").    Weight as of this encounter: 98.3 kg (216 lb 12.8 oz).      GEN: NAD. " Healthy appearing adult.   HEENT:  Anicteric, noninjected sclera. No obvious external lesions of the ear and nose. Hearing intact.  CV: S1, S2. RRR. No m/r/g  PULM: No increased work of breathing. CTA bilaterally   MSK: MCPs, PIPs, DIPs without swelling or tenderness to palpation.  Wrists without swelling or tenderness to palpation.  Elbows and shoulders without swelling or tenderness to palpation.  Shoulders with normal range of motion.  Knees, ankles, and MTPs without swelling or tenderness to palpation.    SKIN: No rash or jaundice seen  PSYCH: Alert. Appropriate.        Labs / Imaging (select studies)     CBC  Recent Labs   Lab Test 12/01/22  0807 09/09/22  1059 06/14/22  1012 09/17/21  1052 06/11/21  1014 02/26/21  1012 11/16/20  0903   WBC 5.2 5.4 5.5   < > 5.0 4.4 5.3   RBC 4.36* 4.74 4.34*   < > 4.16* 4.36* 4.71   HGB 14.7 15.6 14.6   < > 14.1 14.6 15.5   HCT 42.0 44.6 42.5   < > 40.9 41.9 44.8   MCV 96 94 98   < > 98 96 95   RDW 14.0 12.6 13.6   < > 13.4 13.0 13.7   * 126* 137*   < > 164 154 146*   MCH 33.7* 32.9 33.6*   < > 33.9* 33.5* 32.9   MCHC 35.0 35.0 34.4   < > 34.5 34.8 34.6   NEUTROPHIL 58 54 68   < > 64.5 64.1 73.8   LYMPH 32 37 23   < > 23.6 28.6 16.2   MONOCYTE 7 7 5   < > 9.3 4.6 7.9   EOSINOPHIL 3 3 4   < > 2.4 2.5 1.9   BASOPHIL 0 0 0   < > 0.2 0.2 0.2   ANEU  --   --   --   --  3.2 2.8 3.9   ALYM  --   --   --   --  1.2 1.3 0.9   ERENDIRA  --   --   --   --  0.5 0.2 0.4   AEOS  --   --   --   --  0.1 0.1 0.1   ABAS  --   --   --   --  0.0 0.0 0.0   ANEUTAUTO 3.0 2.9 3.7   < >  --   --   --    ALYMPAUTO 1.7 2.0 1.3   < >  --   --   --    AMONOAUTO 0.4 0.4 0.3   < >  --   --   --    AEOSAUTO 0.2 0.1 0.2   < >  --   --   --    ABSBASO 0.0 0.0 0.0   < >  --   --   --     < > = values in this interval not displayed.     CMP  Recent Labs   Lab Test 12/15/22  0952 12/01/22  0807 09/09/22  1059 06/14/22  1012 12/10/21  1603 11/20/21  0901 09/17/21  1052 06/11/21  1014 02/26/21  1012  11/16/20  0903 02/10/20  1028 02/07/20  1428   NA  --   --   --   --   --  140  --   --   --  138  --  139   POTASSIUM  --  4.0  --   --   --  3.9  --   --   --  4.0  --  4.3   CHLORIDE  --   --   --   --   --  107  --   --   --  105  --  107   CO2  --   --   --   --   --  30  --   --   --  27  --  28   ANIONGAP  --   --   --   --   --  3  --   --   --  6  --  4   *  --   --   --   --  110*  --   --   --  107*  --  111*   BUN  --   --   --   --   --  13  --   --   --  13  --  15   CR  --  1.07 1.00 0.92   < > 1.06   < > 1.07 0.96 1.06  1.05   < > 0.99   GFRESTIMATED  --  75 81 89   < > 71   < > 70 81 71  72   < > 78   GFRESTBLACK  --   --   --   --   --   --   --  81 >90 83  84   < > >90   JOHN  --   --   --   --   --  9.0  --   --   --  9.5  --  9.1   BILITOTAL  --  1.1 0.8 0.9   < >  --    < > 0.7 0.8 1.0   < >  --    ALBUMIN  --  4.3 4.2 3.7   < >  --    < > 3.6 3.8 4.0   < >  --    PROTTOTAL  --  6.4 6.3* 6.6*   < >  --    < > 6.4* 6.7* 6.7*   < >  --    ALKPHOS  --  50 54 55   < >  --    < > 51 58 58   < >  --    AST  --  32 30 55*   < >  --    < > 26 39 32   < >  --    ALT  --  41 38 77*   < >  --    < > 41 58 42   < >  --     < > = values in this interval not displayed.     Calcium/VitaminD  Recent Labs   Lab Test 11/20/21  0901 11/16/20  0903 02/07/20  1428   JOHN 9.0 9.5 9.1     ESR/CRP  Recent Labs   Lab Test 12/01/22  0807 09/09/22  1059 06/14/22  1012 12/10/21  1603 12/10/21  1603 06/11/21  1014   SED 6 5 6   < > 5 7   CRP  --   --  <2.9  --  <2.9 <2.9   CRPI <3.00 <3.00  --   --   --   --     < > = values in this interval not displayed.     Hepatitis B  Recent Labs   Lab Test 05/16/18  1016   HBCAB Nonreactive   HEPBANG Nonreactive     Hepatitis C  Recent Labs   Lab Test 08/26/16  1213   HCVAB Nonreactive   Assay performance characteristics have not been established for newborns,   infants, and children       Immunization History     Immunization History   Administered Date(s) Administered      COVID-19 Vaccine (Evan) 03/04/2021     COVID-19 Vaccine 18+ (Moderna) 05/12/2022     COVID-19 Vaccine Bivalent Booster 18+ (Moderna) 11/18/2022     COVID-19,PF,Moderna Booster 10/28/2021     HepA-Adult 08/02/2011     Influenza (High Dose) 3 valent vaccine 10/18/2017, 09/21/2018, 11/13/2019, 10/09/2020, 11/16/2021     Influenza (IIV3) PF 11/17/2005, 12/04/2006, 11/22/2010, 09/26/2012, 10/01/2014     Influenza Vaccine 65+ (Fluzone HD) 10/09/2020, 09/17/2021, 09/09/2022     Influenza Vaccine >6 months (Alfuria,Fluzone) 10/15/2013, 12/17/2015, 08/26/2016     Pneumo Conj 13-V (2010&after) 10/18/2017     Pneumococcal 23 valent 01/22/2018     TDAP Vaccine (Adacel) 04/22/2009, 05/20/2018     Tdap (Adacel,Boostrix) 01/09/2013     Zoster vaccine, live 11/10/2014          Chart documentation done in part with Dragon Voice recognition Software. Although reviewed after completion, some word and grammatical error may remain.    Adria Lopez MD

## 2023-02-16 NOTE — PATIENT INSTRUCTIONS
RHEUMATOLOGY    Dr. Adria Lopez    [facility]  [address] [place] [address]  [place] [address]  Phone number: [phone]  Fax number: [phone]    You may schedule your FLU shot by calling [phone] or if you would like to get your shot at a [facility] pharmacy you may schedule online at [email or web address].    Thank you for choosing [facility]!    Francie Cuellar CMA Rheumatology

## 2023-05-23 ENCOUNTER — LAB (OUTPATIENT)
Dept: LAB | Facility: CLINIC | Age: 73
End: 2023-05-23
Payer: COMMERCIAL

## 2023-05-23 DIAGNOSIS — Z79.899 HIGH RISK MEDICATIONS (NOT ANTICOAGULANTS) LONG-TERM USE: ICD-10-CM

## 2023-05-23 DIAGNOSIS — L40.50 PSORIATIC ARTHROPATHY (H): ICD-10-CM

## 2023-05-23 LAB
ALBUMIN SERPL BCG-MCNC: 4.6 G/DL (ref 3.5–5.2)
ALP SERPL-CCNC: 53 U/L (ref 40–129)
ALT SERPL W P-5'-P-CCNC: 61 U/L (ref 10–50)
AST SERPL W P-5'-P-CCNC: 49 U/L (ref 10–50)
BASOPHILS # BLD AUTO: 0 10E3/UL (ref 0–0.2)
BASOPHILS NFR BLD AUTO: 0 %
BILIRUB DIRECT SERPL-MCNC: <0.2 MG/DL (ref 0–0.3)
BILIRUB SERPL-MCNC: 0.8 MG/DL
CREAT SERPL-MCNC: 1.09 MG/DL (ref 0.67–1.17)
CRP SERPL-MCNC: <3 MG/L
EOSINOPHIL # BLD AUTO: 0.1 10E3/UL (ref 0–0.7)
EOSINOPHIL NFR BLD AUTO: 2 %
ERYTHROCYTE [DISTWIDTH] IN BLOOD BY AUTOMATED COUNT: 13.4 % (ref 10–15)
ERYTHROCYTE [SEDIMENTATION RATE] IN BLOOD BY WESTERGREN METHOD: 6 MM/HR (ref 0–20)
GFR SERPL CREATININE-BSD FRML MDRD: 73 ML/MIN/1.73M2
HCT VFR BLD AUTO: 41.8 % (ref 40–53)
HGB BLD-MCNC: 14.7 G/DL (ref 13.3–17.7)
IMM GRANULOCYTES # BLD: 0 10E3/UL
IMM GRANULOCYTES NFR BLD: 0 %
LYMPHOCYTES # BLD AUTO: 1.6 10E3/UL (ref 0.8–5.3)
LYMPHOCYTES NFR BLD AUTO: 30 %
MCH RBC QN AUTO: 33.9 PG (ref 26.5–33)
MCHC RBC AUTO-ENTMCNC: 35.2 G/DL (ref 31.5–36.5)
MCV RBC AUTO: 96 FL (ref 78–100)
MONOCYTES # BLD AUTO: 0.3 10E3/UL (ref 0–1.3)
MONOCYTES NFR BLD AUTO: 6 %
NEUTROPHILS # BLD AUTO: 3.3 10E3/UL (ref 1.6–8.3)
NEUTROPHILS NFR BLD AUTO: 62 %
PLATELET # BLD AUTO: 145 10E3/UL (ref 150–450)
PROT SERPL-MCNC: 6.3 G/DL (ref 6.4–8.3)
RBC # BLD AUTO: 4.34 10E6/UL (ref 4.4–5.9)
WBC # BLD AUTO: 5.4 10E3/UL (ref 4–11)

## 2023-05-23 PROCEDURE — 82565 ASSAY OF CREATININE: CPT

## 2023-05-23 PROCEDURE — 85025 COMPLETE CBC W/AUTO DIFF WBC: CPT

## 2023-05-23 PROCEDURE — 80076 HEPATIC FUNCTION PANEL: CPT

## 2023-05-23 PROCEDURE — 36415 COLL VENOUS BLD VENIPUNCTURE: CPT

## 2023-05-23 PROCEDURE — 85652 RBC SED RATE AUTOMATED: CPT

## 2023-05-23 PROCEDURE — 86140 C-REACTIVE PROTEIN: CPT

## 2023-06-12 ENCOUNTER — ANCILLARY PROCEDURE (OUTPATIENT)
Dept: GENERAL RADIOLOGY | Facility: CLINIC | Age: 73
End: 2023-06-12
Payer: COMMERCIAL

## 2023-06-12 ENCOUNTER — TELEPHONE (OUTPATIENT)
Dept: FAMILY MEDICINE | Facility: CLINIC | Age: 73
End: 2023-06-12
Payer: COMMERCIAL

## 2023-06-12 ENCOUNTER — OFFICE VISIT (OUTPATIENT)
Dept: URGENT CARE | Facility: URGENT CARE | Age: 73
End: 2023-06-12
Payer: COMMERCIAL

## 2023-06-12 VITALS
DIASTOLIC BLOOD PRESSURE: 86 MMHG | BODY MASS INDEX: 31.7 KG/M2 | WEIGHT: 216 LBS | HEART RATE: 66 BPM | OXYGEN SATURATION: 99 % | SYSTOLIC BLOOD PRESSURE: 140 MMHG | TEMPERATURE: 97.7 F

## 2023-06-12 DIAGNOSIS — B97.89 ACUTE VIRAL SINUSITIS: Primary | ICD-10-CM

## 2023-06-12 DIAGNOSIS — J40 BRONCHITIS: ICD-10-CM

## 2023-06-12 DIAGNOSIS — J01.90 ACUTE VIRAL SINUSITIS: Primary | ICD-10-CM

## 2023-06-12 LAB
BASOPHILS # BLD AUTO: 0 10E3/UL (ref 0–0.2)
BASOPHILS NFR BLD AUTO: 0 %
EOSINOPHIL # BLD AUTO: 0.1 10E3/UL (ref 0–0.7)
EOSINOPHIL NFR BLD AUTO: 2 %
ERYTHROCYTE [DISTWIDTH] IN BLOOD BY AUTOMATED COUNT: 13.5 % (ref 10–15)
HCT VFR BLD AUTO: 42.4 % (ref 40–53)
HGB BLD-MCNC: 14.9 G/DL (ref 13.3–17.7)
IMM GRANULOCYTES # BLD: 0 10E3/UL
IMM GRANULOCYTES NFR BLD: 0 %
LYMPHOCYTES # BLD AUTO: 1.3 10E3/UL (ref 0.8–5.3)
LYMPHOCYTES NFR BLD AUTO: 28 %
MCH RBC QN AUTO: 33.5 PG (ref 26.5–33)
MCHC RBC AUTO-ENTMCNC: 35.1 G/DL (ref 31.5–36.5)
MCV RBC AUTO: 95 FL (ref 78–100)
MONOCYTES # BLD AUTO: 0.4 10E3/UL (ref 0–1.3)
MONOCYTES NFR BLD AUTO: 8 %
NEUTROPHILS # BLD AUTO: 3 10E3/UL (ref 1.6–8.3)
NEUTROPHILS NFR BLD AUTO: 62 %
PLATELET # BLD AUTO: 138 10E3/UL (ref 150–450)
RBC # BLD AUTO: 4.45 10E6/UL (ref 4.4–5.9)
WBC # BLD AUTO: 4.9 10E3/UL (ref 4–11)

## 2023-06-12 PROCEDURE — 85025 COMPLETE CBC W/AUTO DIFF WBC: CPT

## 2023-06-12 PROCEDURE — 71046 X-RAY EXAM CHEST 2 VIEWS: CPT | Mod: TC | Performed by: RADIOLOGY

## 2023-06-12 PROCEDURE — 36415 COLL VENOUS BLD VENIPUNCTURE: CPT

## 2023-06-12 PROCEDURE — 99214 OFFICE O/P EST MOD 30 MIN: CPT

## 2023-06-12 RX ORDER — CETIRIZINE HYDROCHLORIDE 10 MG/1
10 TABLET ORAL DAILY
Qty: 30 TABLET | Refills: 0 | Status: SHIPPED | OUTPATIENT
Start: 2023-06-12 | End: 2023-09-21

## 2023-06-12 RX ORDER — PREDNISONE 10 MG/1
10 TABLET ORAL DAILY
Qty: 3 TABLET | Refills: 0 | Status: SHIPPED | OUTPATIENT
Start: 2023-06-12 | End: 2023-06-15

## 2023-06-12 NOTE — PROGRESS NOTES
URGENT CARE  Assessment & Plan   Assessment:   Pawan Bullard is a 73 year old male who's clinical presentation today is consistent with:   1. Acute viral sinusitis  - XR Chest 2 Views; Future  - CBC with platelets and differential  - predniSONE (DELTASONE) 10 MG tablet; Take 1 tablet (10 mg) by mouth daily for 3 days  Dispense: 3 tablet; Refill: 0  - cetirizine (ZYRTEC) 10 MG tablet; Take 1 tablet (10 mg) by mouth daily  Dispense: 30 tablet; Refill: 0    No alarm signs or symptoms present   Differential Diagnoses for this patient's CC include   Polyps, turbinate hypertrophy   Chronic sinusitis, adenoiditis, allergic rhinitis    ABRS, viral, or fungal etiology   Tumors/malignancy, foreign body     Plan:  Originally suspicious for bronchitis or pneumonia as possible cause of patient's symptoms however CBC and chest x-ray were reassuring today, thus, Will treat patient with supportive and symptomatic measures for viral sinusitis at this time which include: Fluids, rest, over-the-counter medications: decongestants, antihistamines,  and expectorants, side effects of medications reviewed.   Additionally we discussed if symptoms do not improve after starting today's treatment (or if symptoms worsen) to follow up in 7-10 days.    Patient  is  agreeable to treatment plan and state they will follow-up if symptoms do not improve and/or if symptoms worsen (see patient's AVS 'monitor for' section for specific patient instructions given and discussed regarding what to watch for and when to follow up)    Medications ordered are listed above, please see AVS for patient's specific and personalized discharge instructions given     RK Aly [facility] [place]      ______________________________________________________________________        Subjective  Subjective     HPI: Pawan Bullard  is a 73 year old  male who presents today for evaluation the following concerns:   Patient  endorses  "sinus congestion and cough \"for a few weeks\"   Patient reports he also has a headache.    Patient denies any fevers} sweats, chills, myalgias, wheezing, shortness of breath, difficulty breathing, chest pain, weakness or signs of dehydration   additionally patient denies a history of allergies, asthma or any other past medical history of breathing conditions      Allergies   Allergen Reactions     Acetaminophen-Codeine Nausea     Patient Active Problem List   Diagnosis     Psoriatic arthropathy (H)     Esophageal reflux     Hypertension goal BP (blood pressure) < 140/90     FAMILY HISTORY OF GI NEOPLASM     FAMILY HISTORY OF DIABETES MELLITUS     Impotence of organic origin     Chondrocalcinosis     HYPERLIPIDEMIA LDL GOAL <130     Osteoarthritis     Advanced directives, counseling/discussion     High risk medications (not anticoagulants) long-term use     ROSADO (dyspnea on exertion)     History of colonic polyps       Review of Systems:  Pertinent review of systems as reflected in HPI, otherwise negative.     Objective  Objective    Physical Exam:  Vitals:    06/12/23 1226   BP: (!) 140/86   Pulse: 66   Temp: 97.7  F (36.5  C)   TempSrc: Tympanic   SpO2: 99%   Weight: 98 kg (216 lb)      General: Alert and oriented, no acute distress, Vital signs reviewed: afebrile,  normotensive   Psy/mental status: Cooperative, nonanxious  SKIN: Intact, no rashes  EYES: EOMs intact, PERRLA bilaterally   Conjunctiva: Clear bilaterally, no injection or erythema present  EARS: TMs intact, translucent gray in color with normal landmarks present no erythema  or bulging tympanic membrane   Canals are without swelling, however have a mild amount of cerumen, no impaction  NOSE:  mucosa  erythematous bilaterally with a mild amount of rhinorrhea, clear  discharge}               No frontal or maxillary sinus tenderness present bilaterally  MOUTH/THROAT: lips, tongue, & oral mucosa appear normal upon inspection                Posterior " oropharynx is erythematous but without exudate, lesions or tonsillar  Edema, no dysphonia, no unilateral tonsillar edema, no uvular deviation,   no signs of peritonsillar abscess  NECK: supple, has full range of motion with no meningeal signs              No lymphadenopathy present  LUNG: normal work of breathing, good respiratory effort without retractions, good air  movement, non labored, inspection reveals normal chest expansion w/  inspiration            Lung sounds are clear to auscultation bilaterally,            No rales/rhonic/crackles wheezing noted           No cough noted         LABS:                 Results for orders placed or performed in visit on 06/12/23   CBC with platelets and differential     Status: Abnormal   Result Value Ref Range    WBC Count 4.9 4.0 - 11.0 10e3/uL    RBC Count 4.45 4.40 - 5.90 10e6/uL    Hemoglobin 14.9 13.3 - 17.7 g/dL    Hematocrit 42.4 40.0 - 53.0 %    MCV 95 78 - 100 fL    MCH 33.5 (H) 26.5 - 33.0 pg    MCHC 35.1 31.5 - 36.5 g/dL    RDW 13.5 10.0 - 15.0 %    Platelet Count 138 (L) 150 - 450 10e3/uL    % Neutrophils 62 %    % Lymphocytes 28 %    % Monocytes 8 %    % Eosinophils 2 %    % Basophils 0 %    % Immature Granulocytes 0 %    Absolute Neutrophils 3.0 1.6 - 8.3 10e3/uL    Absolute Lymphocytes 1.3 0.8 - 5.3 10e3/uL    Absolute Monocytes 0.4 0.0 - 1.3 10e3/uL    Absolute Eosinophils 0.1 0.0 - 0.7 10e3/uL    Absolute Basophils 0.0 0.0 - 0.2 10e3/uL    Absolute Immature Granulocytes 0.0 <=0.4 10e3/uL   CBC with platelets and differential     Status: Abnormal    Narrative    The following orders were created for panel order CBC with platelets and differential.  Procedure                               Abnormality         Status                     ---------                               -----------         ------                     CBC with platelets and d...[[id number]]  Abnormal            Final result                 Please view results for these tests on the  individual orders.       Imaging:   All images were personally read by this provider (myself).   Per my independent interpretation the xray shows no consolidation or infiltrates suggesting pneumonia    I explained my diagnostic considerations and recommendations to the patient, who voiced understanding and agreement with the treatment plan.   All questions were answered.   We discussed potential side effects, risks and benefits of any prescribed or recommended therapies, as well as expectations for response to treatments.  Please see AVS for any patient instructions & handouts given.   Patient was advised to contact the Nurse Care Line, their Primary Care provider, Urgent Care, or the Emergency Department if there are new or worsening symptoms, or call 911 for emergencies.        ______________________________________________________________________          Patient Instructions         Diagnosis:   Sinusitis- sinus pain (non bacterial cause) Viral sinusitis   The majority of sinusitis is not caused by bacteria and thus antibiotics are not indicated.   Green/yellow or thick mucus does not mean you have a bacterial sinus infection   Antibiotics can have anti-inflammatory effects and thus may decrease symptoms slightly but the side effects outweigh this risk.   For bacterial sinus infections, Azithromycin (Z-Pack) is not a first line nor a second line medication for this in part because of bacterial resistance caused in part by over-prescribing    Today   Labs:  no signs of bacteria that is causing sinus pain = viral sinusitis vs allergic rhinosinutis     Plan:     decongestants and antihistamines   1. Pseudoephedrine/ sudafed - behind the counter pharmacy - take while congested   2. Cetazine / loratadine   take daily   3. Benadryl - help w/ sleeping and breathing while sleeping     Nasal Sprays: When using a nasal spray insert the application into the nostril while  looking downward to the floor.     Remember NOSE  toward TOES. This will help ensure the medication stays  where it is supposed to be and it will work better.     Nasal Saline may be used to help alleviate symptoms    Fluticasone (e.g. Flonase, Nasacort, etc) nasal spray is a nasal steroid that works to decrease inflammation, swelling, pain, and drainage.     Use this after using nasal saline. Use 2 sprays per nostril ONE time a day for SEVEN days.   1. Then decrease to 1 spray per nostril ONE time a day as needed.  2. Oxymetazoline (Afrin): May use ONE spray per nostril 2 times a day as needed.   1. DO NOT USE FOR MORE THAN 3 DAYS. Doing so will result in worsening symptoms.   2. Wait at least 15 minutes between Afrin and Flonase.     Use nasal rinses    Neti pots - highly effective     Ensure humidity in your living space -     Especially during the winter months when it is notably more dry.     This can be done with a humidifier.     OTC pain relievers for headache and sinus pressure     Acetaminophen (Tylenol) -or/and- Ibuprofen (advil, motrin)     Stay Hydrated - drink fluids throughout the day.        Monitor for:     Stiff neck    Abnormal lethargy or confusion    Swelling of your eyelids    Vision problems, such as blurred or double vision    Fever of 101  F or higherSymptoms that don't go away in 10 days    Seizure    Trouble breathing    Feeling dizzy or faint    Fingernails, skin, or lips look blue, purple, or gray            Understanding viral  sinusitis  Viral sinusitis is when the lining of the inside of the nose and the sinuses becomes irritated and swollen. It is also called sinusitis, or a sinus infection.  Sinuses are air-filled spaces in the skull behind the face. They are kept moist and clean by a lining of mucosa. Things such as pollen, smoke, and chemical fumes can irritate the mucosa. It can then swell up. As a response to irritation, the mucosa makes more mucus and other fluids. Tiny hairlike cilia cover the mucosa. Cilia help carry mucus  toward the opening of the sinus. Too much mucus may cause the cilia to stop working. This blocks the sinus opening. A buildup of fluid in the sinuses then causes pain and pressure. It can also cause bacteria to grow in the sinuses.    Symptoms   Symptoms often last around 7 to 10 days.   They may also get better but then worsen. You may have:    Face pain or pressure under the eyes and around the nose    Headache    Fluid draining in the back of the throat (postnasal drip)    Congestion    Drainage that is thick and colored (often green), instead of clear    Cough    Problems with your sense of smell    Ear pain or hearing problems    Fever    Tooth pain    Fatigue    Diagnosing   Your healthcare provider will ask about your symptoms and past health.   He or she will look at your ears, nose, throat, and sinuses.   Often labs are needed to determine if the sinusitis is caused by a virus or bacterium     Treating acute rhinosinusitis  Most sinus infections will go away within 10 days. Your body will fight off the virus.  The bacterial kind need antiboitics but these are rare

## 2023-06-12 NOTE — TELEPHONE ENCOUNTER
Reason for Call:  Appointment Request    Patient requesting this type of appt:  Illness    Requested provider: Any PCP    Reason patient unable to be scheduled: Not within requested timeframe    When does patient want to be seen/preferred time: 1-2 days    Comments: Patient has been experiencing sinus issues for a few weeks. Patient's PCP retired, would like to see any PCP. No appts. In [facility] until June 26th    Could we send this information to you in [employer] or would you prefer to receive a phone call?:   Patient would prefer a phone call   Okay to leave a detailed message?: Yes at Home number on file [phone] (home)    Call taken on 6/12/2023 at 9:50 AM by Roney Montgomery

## 2023-06-12 NOTE — PATIENT INSTRUCTIONS
Diagnosis:   Sinusitis- sinus pain (non bacterial cause) Viral sinusitis   The majority of sinusitis is not caused by bacteria and thus antibiotics are not indicated.   Green/yellow or thick mucus does not mean you have a bacterial sinus infection   Antibiotics can have anti-inflammatory effects and thus may decrease symptoms slightly but the side effects outweigh this risk.   For bacterial sinus infections, Azithromycin (Z-Pack) is not a first line nor a second line medication for this in part because of bacterial resistance caused in part by over-prescribing    Today   Labs:  no signs of bacteria that is causing sinus pain = viral sinusitis vs allergic rhinosinutis     Plan:   decongestants and antihistamines   Pseudoephedrine/ sudafed - behind the counter pharmacy - take while congested   Cetazine / loratadine   take daily   Benadryl - help w/ sleeping and breathing while sleeping   Nasal Sprays: When using a nasal spray insert the application into the nostril while  looking downward to the floor.   Remember NOSE toward TOES. This will help ensure the medication stays  where it is supposed to be and it will work better.   Nasal Saline may be used to help alleviate symptoms  Fluticasone (e.g. Flonase, Nasacort, etc) nasal spray is a nasal steroid that works to decrease inflammation, swelling, pain, and drainage.   Use this after using nasal saline. Use 2 sprays per nostril ONE time a day for SEVEN days.   Then decrease to 1 spray per nostril ONE time a day as needed.  Oxymetazoline (Afrin): May use ONE spray per nostril 2 times a day as needed.   DO NOT USE FOR MORE THAN 3 DAYS. Doing so will result in worsening symptoms.   Wait at least 15 minutes between Afrin and Flonase.   Use nasal rinses  Neti pots - highly effective   Ensure humidity in your living space -   Especially during the winter months when it is notably more dry.   This can be done with a humidifier.   OTC pain relievers for headache and sinus  pressure   Acetaminophen (Tylenol) -or/and- Ibuprofen (advil, motrin)   Stay Hydrated - drink fluids throughout the day.        Monitor for:   Stiff neck  Abnormal lethargy or confusion  Swelling of your eyelids  Vision problems, such as blurred or double vision  Fever of 101  F or higherSymptoms that don't go away in 10 days  Seizure  Trouble breathing  Feeling dizzy or faint  Fingernails, skin, or lips look blue, purple, or gray            Understanding viral  sinusitis  Viral sinusitis is when the lining of the inside of the nose and the sinuses becomes irritated and swollen. It is also called sinusitis, or a sinus infection.  Sinuses are air-filled spaces in the skull behind the face. They are kept moist and clean by a lining of mucosa. Things such as pollen, smoke, and chemical fumes can irritate the mucosa. It can then swell up. As a response to irritation, the mucosa makes more mucus and other fluids. Tiny hairlike cilia cover the mucosa. Cilia help carry mucus toward the opening of the sinus. Too much mucus may cause the cilia to stop working. This blocks the sinus opening. A buildup of fluid in the sinuses then causes pain and pressure. It can also cause bacteria to grow in the sinuses.    Symptoms   Symptoms often last around 7 to 10 days.   They may also get better but then worsen. You may have:  Face pain or pressure under the eyes and around the nose  Headache  Fluid draining in the back of the throat (postnasal drip)  Congestion  Drainage that is thick and colored (often green), instead of clear  Cough  Problems with your sense of smell  Ear pain or hearing problems  Fever  Tooth pain  Fatigue    Diagnosing   Your healthcare provider will ask about your symptoms and past health.   He or she will look at your ears, nose, throat, and sinuses.   Often labs are needed to determine if the sinusitis is caused by a virus or bacterium     Treating acute rhinosinusitis  Most sinus infections will go away within  10 days. Your body will fight off the virus.  The bacterial kind need antiboitics but these are rare

## 2023-06-19 ENCOUNTER — HOSPITAL ENCOUNTER (EMERGENCY)
Facility: CLINIC | Age: 73
Discharge: HOME | End: 2023-06-19
Attending: NURSE PRACTITIONER | Admitting: NURSE PRACTITIONER
Payer: COMMERCIAL

## 2023-06-19 VITALS
SYSTOLIC BLOOD PRESSURE: 136 MMHG | OXYGEN SATURATION: 97 % | RESPIRATION RATE: 20 BRPM | HEART RATE: 72 BPM | DIASTOLIC BLOOD PRESSURE: 85 MMHG | TEMPERATURE: 99.3 F

## 2023-06-19 DIAGNOSIS — J01.90 ACUTE SINUSITIS WITH SYMPTOMS > 10 DAYS: ICD-10-CM

## 2023-06-19 PROCEDURE — 99213 OFFICE O/P EST LOW 20 MIN: CPT | Performed by: NURSE PRACTITIONER

## 2023-06-19 PROCEDURE — G0463 HOSPITAL OUTPT CLINIC VISIT: HCPCS | Performed by: NURSE PRACTITIONER

## 2023-06-19 ASSESSMENT — ENCOUNTER SYMPTOMS
SINUS PRESSURE: 1
EYE DISCHARGE: 0
VOMITING: 0
RHINORRHEA: 1
LIGHT-HEADEDNESS: 0
SORE THROAT: 0
ARTHRALGIAS: 0
COUGH: 1
WEAKNESS: 0
JOINT SWELLING: 0
NAUSEA: 0
NUMBNESS: 0
EYE REDNESS: 0
MYALGIAS: 0
FATIGUE: 0
TROUBLE SWALLOWING: 0
DIZZINESS: 0
SINUS PAIN: 1
FEVER: 0
ABDOMINAL PAIN: 0
HEADACHES: 0
SHORTNESS OF BREATH: 0
CHILLS: 0

## 2023-06-19 ASSESSMENT — ACTIVITIES OF DAILY LIVING (ADL): ADLS_ACUITY_SCORE: 35

## 2023-06-19 NOTE — ED PROVIDER NOTES
History     Chief Complaint   Patient presents with     Nasal Congestion     Little cough. Dry.     Neck Pain     HPI  Pawan Bullard is a 73 year old male who presents to the urgent care for evaluation of sinus pain, continued congestion, post nasal drip and slight cough. Patient was evaluated at outside  and diagnosed with viral sinusitis; normal CBC and chest xray at that time. He was placed on 3 days of prednisone but had no improvement in symptoms. He also began Zyrtec at that time. He reports symptoms continue to worsen with increased pain and fatigue. Denies fever, headache, dizziness, sore throat, shortness of breath, chest pain, abdominal pain, nausea, vomiting, diarrhea, urinary symptoms, and rash.      Allergies:  Allergies   Allergen Reactions     Acetaminophen-Codeine Nausea       Problem List:    Patient Active Problem List    Diagnosis Date Noted     History of colonic polyps 02/10/2022     Priority: Medium     22 colonoscopy with two polyps 2 and 4mm, tubular adenoma.  PLAN: 5 year [id number].       ROSADO (dyspnea on exertion) 2020     Priority: Medium     3/4/2020: Some dyspnea on exertion.  He had normal chest x-ray.  His pulmonary function tests were normal.  Nuclear medicine stress test was normal.  albuteral inhaler seems to help.        High risk medications (not anticoagulants) long-term use 2015     Priority: Medium     Advanced directives, counseling/discussion 2013     Priority: Medium     advanced care discussed form given to pt.           Osteoarthritis 2011     Priority: Medium     HYPERLIPIDEMIA LDL GOAL <130 10/31/2010     Priority: Medium     Chondrocalcinosis 02/10/2010     Priority: Medium     FAMILY HISTORY OF DIABETES MELLITUS 2006     Priority: Medium     mother       Impotence of organic origin 2006     Priority: Medium     FAMILY HISTORY OF GI NEOPLASM 2005     Priority: Medium     father - colon CA ,  at  67.  Diagnosed at age 67  He has had colonoscopy in 2000, 2006, 2011 and 2016.  No polyps.        Esophageal reflux 07/20/2005     Priority: Medium     GERD       Hypertension goal BP (blood pressure) < 140/90 07/20/2005     Priority: Medium     Psoriatic arthropathy (H) 06/14/2005     Priority: Medium        Past Medical History:    Past Medical History:   Diagnosis Date     Esophageal reflux      Hypertension      Psoriatic arthropathy (H)        Past Surgical History:    Past Surgical History:   Procedure Laterality Date     ARTHROSCOPY KNEE RT/LT  April 2009    left knee     ARTHROSCOPY KNEE WITH MEDIAL MENISCECTOMY  7/9/2013    Procedure: ARTHROSCOPY KNEE WITH MEDIAL MENISCECTOMY;  Left Knee Arthroscopic and Open Repair of Patellar Tendon with Platelet Rich Plasma;  Surgeon: Ley, Jeffrey Duane, MD;  Location: [facility]     COLONOSCOPY  Jan. 2006    diverticuli. Father had colon CA     COLONOSCOPY  2001     COLONOSCOPY  4/11/2011    Procedure:COLONOSCOPY; Surgeon:JENNIFER JEAN; Location:[facility]     COLONOSCOPY N/A 11/21/2016    Procedure: COLONOSCOPY;  Surgeon: Elias Santamaria MD;  Location: [facility]     COLONOSCOPY N/A 2/8/2022    Procedure: COLONOSCOPY;  Surgeon: Junior Brown MD;  Location: [facility]     REPAIR TENDON PATELLA  7/9/2013    Procedure: REPAIR TENDON PATELLA;;  Surgeon: Ley, Jeffrey Duane, MD;  Location: [facility]     SURGICAL HISTORY OF -   1999    (L) Herniorrhaphy     SURGICAL HISTORY OF -   2003    (R) Herniorrhaphy     SURGICAL HISTORY OF -   1981    Hemorrohid       Family History:    Family History   Problem Relation Age of Onset     Hypertension Mother      Diabetes Mother      Cerebrovascular Disease Mother         in her 70s     Arthritis Mother      Cancer - colorectal Father 67     Arthritis Paternal Grandmother      LUNG DISEASE Sister      Other - See Comments Sister      Prostate Cancer No family hx of      Coronary Artery Disease No family hx of        Social History:  Marital Status:    [2]  Social History     Tobacco Use     Smoking status: Never     Smokeless tobacco: Never   Vaping Use     Vaping status: Never Used     Passive vaping exposure: Yes   Substance Use Topics     Alcohol use: Yes     Comment:  0-2 beer's or mixed drink's monthly     Drug use: No        Medications:    amoxicillin-clavulanate (AUGMENTIN) 875-125 MG tablet  albuterol (PROAIR HFA/PROVENTIL HFA/VENTOLIN HFA) 108 (90 Base) MCG/ACT inhaler  cetirizine (ZYRTEC) 10 MG tablet  diclofenac (VOLTAREN) 1 % topical gel  folic acid (FOLVITE) 1 MG tablet  lisinopril (ZESTRIL) 5 MG tablet  methotrexate sodium 2.5 MG TABS  MULTI-VITAMIN OR TABS  Probiotic Product (PROBIOTIC DAILY PO)  sildenafil (REVATIO) 20 MG tablet  simvastatin (ZOCOR) 40 MG tablet  tamsulosin (FLOMAX) 0.4 MG capsule          Review of Systems   Constitutional: Negative for chills, fatigue and fever.   HENT: Positive for congestion, postnasal drip, rhinorrhea, sinus pressure and sinus pain. Negative for ear discharge, ear pain, sore throat and trouble swallowing.    Eyes: Negative for discharge and redness.   Respiratory: Positive for cough. Negative for shortness of breath.    Cardiovascular: Negative for chest pain.   Gastrointestinal: Negative for abdominal pain, nausea and vomiting.   Musculoskeletal: Negative for arthralgias, joint swelling and myalgias.   Skin: Negative for rash.   Neurological: Negative for dizziness, weakness, light-headedness, numbness and headaches.   All other systems reviewed and are negative.      Physical Exam   BP: 136/85  Pulse: 72  Temp: 99.3  F (37.4  C)  Resp: 20  SpO2: 97 %      Physical Exam  Constitutional:       General: He is not in acute distress.     Appearance: Normal appearance.   HENT:      Nose:      Right Sinus: Frontal sinus tenderness present.      Left Sinus: Frontal sinus tenderness present.      Comments: Left worse than right  Eyes:      Conjunctiva/sclera: Conjunctivae normal.      Pupils: Pupils are equal,  round, and reactive to light.   Cardiovascular:      Rate and Rhythm: Normal rate.   Pulmonary:      Effort: Pulmonary effort is normal.   Musculoskeletal:         General: Normal range of motion.      Cervical back: Normal range of motion.   Skin:     General: Skin is warm.      Capillary Refill: Capillary refill takes less than 2 seconds.   Neurological:      General: No focal deficit present.      Mental Status: He is alert.         ED Course                 Procedures            No results found for this or any previous visit (from the past 24 hour(s)).    Medications - No data to display    Assessments & Plan (with Medical Decision Making)   Pawan Bullard is a 73 year old male who presents to the urgent care for evaluation of sinus pain, continued congestion, post nasal drip and slight cough. Patient was evaluated at outside  and diagnosed with viral sinusitis; normal CBC and chest xray at that time. He was placed on 3 days of prednisone but had no improvement in symptoms. He also began Zyrtec at that time. Vital signs normal, physical exam as above. Patient appears uncomfortable. Discussed viral vs bacterial cause of symptoms.  Given patient has already tried antihistamine and prednisone and continues to have worsening symptoms will begin Augmentin at this time.  He may continue over-the-counter medications as needed and appropriate.  Increase rest and hydration.  Follow-up primary care if symptoms persist and return with new or worsening symptoms.  Discharged in good condition.    I have reviewed the nursing notes.    I have reviewed the findings, diagnosis, plan and need for follow up with the patient.      Discharge Medication List as of 6/19/2023  1:28 PM      START taking these medications    Details   amoxicillin-clavulanate (AUGMENTIN) 875-125 MG tablet Take 1 tablet by mouth 2 times daily for 10 days, Disp-20 tablet, R-0, E-Prescribe             Final diagnoses:   Acute sinusitis with symptoms  > 10 days       6/19/2023   [facility] EMERGENCY DEPT     Lashaun Rodriguez, APRN CNP  06/19/23 2809

## 2023-06-22 ENCOUNTER — FOLLOW UP (OUTPATIENT)
Dept: URBAN - METROPOLITAN AREA CLINIC 49 | Facility: CLINIC | Age: 73
End: 2023-06-22

## 2023-06-22 DIAGNOSIS — H40.1132: ICD-10-CM

## 2023-06-22 PROCEDURE — 92083 EXTENDED VISUAL FIELD XM: CPT

## 2023-06-22 PROCEDURE — 92012 INTRM OPH EXAM EST PATIENT: CPT

## 2023-06-22 PROCEDURE — 92133 CPTRZD OPH DX IMG PST SGM ON: CPT

## 2023-06-22 ASSESSMENT — VISUAL ACUITY
OS_CC: 20/25
OU_CC: 20/20-2
OD_CC: 20/25

## 2023-06-22 ASSESSMENT — TONOMETRY
OS_IOP_MMHG: 16
OD_IOP_MMHG: 15
OS_IOP_MMHG: 14
OD_IOP_MMHG: 12

## 2023-06-27 ENCOUNTER — HOSPITAL ENCOUNTER (EMERGENCY)
Facility: CLINIC | Age: 73
Discharge: HOME | End: 2023-06-27
Attending: PHYSICIAN ASSISTANT | Admitting: PHYSICIAN ASSISTANT
Payer: COMMERCIAL

## 2023-06-27 VITALS
DIASTOLIC BLOOD PRESSURE: 78 MMHG | RESPIRATION RATE: 20 BRPM | OXYGEN SATURATION: 97 % | TEMPERATURE: 98 F | SYSTOLIC BLOOD PRESSURE: 136 MMHG | HEART RATE: 84 BPM

## 2023-06-27 DIAGNOSIS — L50.9 HIVES: ICD-10-CM

## 2023-06-27 PROCEDURE — G0463 HOSPITAL OUTPT CLINIC VISIT: HCPCS | Performed by: PHYSICIAN ASSISTANT

## 2023-06-27 PROCEDURE — 99213 OFFICE O/P EST LOW 20 MIN: CPT | Performed by: PHYSICIAN ASSISTANT

## 2023-06-27 RX ORDER — PREDNISONE 20 MG/1
TABLET ORAL
Qty: 10 TABLET | Refills: 0 | Status: SHIPPED | OUTPATIENT
Start: 2023-06-27 | End: 2023-09-21

## 2023-06-27 ASSESSMENT — ENCOUNTER SYMPTOMS
CHEST TIGHTNESS: 0
FACIAL SWELLING: 0
SHORTNESS OF BREATH: 0
WHEEZING: 0

## 2023-06-28 NOTE — ED TRIAGE NOTES
Pt reports rash all over body onset this morning. Pt reports he is on his 9th day of Augmentin from is urgent care visit on 6/19/23 for neck pain and nasal congestion

## 2023-06-28 NOTE — ED PROVIDER NOTES
History     Chief Complaint   Patient presents with     Rash     HPI  Pawan Bullard is a 73 year old male who is on day 9 of a 10-day Augmentin course of antibiotics for a sinus infection and noticed a raised rash this morning which began on his abdomen which has since spread to his back, left forearm, and right thigh. Patient notes the rash is itching and warm to touch. Has not taken anything for the itching. Denies any fevers, difficulty breathing, facial swelling, or chest tightness.  No other known new exposures.    Allergies:  Allergies   Allergen Reactions     Acetaminophen-Codeine Nausea       Problem List:    Patient Active Problem List    Diagnosis Date Noted     History of colonic polyps 02/10/2022     Priority: Medium     22 colonoscopy with two polyps 2 and 4mm, tubular adenoma.  PLAN: 5 year [id number].       ROSADO (dyspnea on exertion) 2020     Priority: Medium     3/4/2020: Some dyspnea on exertion.  He had normal chest x-ray.  His pulmonary function tests were normal.  Nuclear medicine stress test was normal.  albuteral inhaler seems to help.        High risk medications (not anticoagulants) long-term use 2015     Priority: Medium     Advanced directives, counseling/discussion 2013     Priority: Medium     advanced care discussed form given to pt.           Osteoarthritis 2011     Priority: Medium     HYPERLIPIDEMIA LDL GOAL <130 10/31/2010     Priority: Medium     Chondrocalcinosis 02/10/2010     Priority: Medium     FAMILY HISTORY OF DIABETES MELLITUS 2006     Priority: Medium     mother       Impotence of organic origin 2006     Priority: Medium     FAMILY HISTORY OF GI NEOPLASM 2005     Priority: Medium     father - colon CA ,  at  67. Diagnosed at age 67  He has had colonoscopy in , ,  and .  No polyps.        Esophageal reflux 2005     Priority: Medium     GERD       Hypertension goal BP (blood pressure) < 140/90  07/20/2005     Priority: Medium     Psoriatic arthropathy (H) 06/14/2005     Priority: Medium        Past Medical History:    Past Medical History:   Diagnosis Date     Esophageal reflux      Hypertension      Psoriatic arthropathy (H)        Past Surgical History:    Past Surgical History:   Procedure Laterality Date     ARTHROSCOPY KNEE RT/LT  April 2009    left knee     ARTHROSCOPY KNEE WITH MEDIAL MENISCECTOMY  7/9/2013    Procedure: ARTHROSCOPY KNEE WITH MEDIAL MENISCECTOMY;  Left Knee Arthroscopic and Open Repair of Patellar Tendon with Platelet Rich Plasma;  Surgeon: Ley, Jeffrey Duane, MD;  Location: [facility]     COLONOSCOPY  Jan. 2006    diverticuli. Father had colon CA     COLONOSCOPY  2001     COLONOSCOPY  4/11/2011    Procedure:COLONOSCOPY; Surgeon:JENNIFER JEAN; Location:[facility]     COLONOSCOPY N/A 11/21/2016    Procedure: COLONOSCOPY;  Surgeon: Elias Santamaria MD;  Location: [facility]     COLONOSCOPY N/A 2/8/2022    Procedure: COLONOSCOPY;  Surgeon: Junior Brown MD;  Location: [facility]     REPAIR TENDON PATELLA  7/9/2013    Procedure: REPAIR TENDON PATELLA;;  Surgeon: Ley, Jeffrey Duane, MD;  Location: [facility]     SURGICAL HISTORY OF -   1999    (L) Herniorrhaphy     SURGICAL HISTORY OF -   2003    (R) Herniorrhaphy     SURGICAL HISTORY OF -   1981    Hemorrohid       Family History:    Family History   Problem Relation Age of Onset     Hypertension Mother      Diabetes Mother      Cerebrovascular Disease Mother         in her 70s     Arthritis Mother      Cancer - colorectal Father 67     Arthritis Paternal Grandmother      LUNG DISEASE Sister      Other - See Comments Sister      Prostate Cancer No family hx of      Coronary Artery Disease No family hx of        Social History:  Marital Status:   [2]  Social History     Tobacco Use     Smoking status: Never     Smokeless tobacco: Never   Vaping Use     Vaping Use: Never used   Substance Use Topics     Alcohol use: Yes     Comment:  0-2 beer's or  mixed drink's monthly     Drug use: No        Medications:    predniSONE (DELTASONE) 20 MG tablet  albuterol (PROAIR HFA/PROVENTIL HFA/VENTOLIN HFA) 108 (90 Base) MCG/ACT inhaler  cetirizine (ZYRTEC) 10 MG tablet  diclofenac (VOLTAREN) 1 % topical gel  folic acid (FOLVITE) 1 MG tablet  lisinopril (ZESTRIL) 5 MG tablet  methotrexate sodium 2.5 MG TABS  MULTI-VITAMIN OR TABS  Probiotic Product (PROBIOTIC DAILY PO)  sildenafil (REVATIO) 20 MG tablet  simvastatin (ZOCOR) 40 MG tablet  tamsulosin (FLOMAX) 0.4 MG capsule          Review of Systems   HENT: Negative for facial swelling.    Respiratory: Negative for chest tightness, shortness of breath and wheezing.    Cardiovascular: Negative for chest pain.   Skin: Positive for rash (abdomen, trunk, left forearm, right thigh).   All other systems reviewed and are negative.      Physical Exam   BP: 136/78  Pulse: 84  Temp: 98  F (36.7  C)  Resp: 20  SpO2: 97 %      Physical Exam  Constitutional:       General: He is not in acute distress.     Appearance: Normal appearance. He is not ill-appearing or toxic-appearing.   Cardiovascular:      Rate and Rhythm: Normal rate and regular rhythm.      Heart sounds: Normal heart sounds.   Pulmonary:      Breath sounds: Normal breath sounds.   Skin:     General: Skin is warm.      Findings: Rash present. No erythema or petechiae. Rash is macular and urticarial. Rash is not nodular, pustular or vesicular.      Comments: Raised erythematous hives to proximal extremities and trunk   Neurological:      Mental Status: He is alert.         ED Course             No results found for this or any previous visit (from the past 24 hour(s)).    Medications - No data to display    Assessments & Plan (with Medical Decision Making)     I have reviewed the nursing notes.    I have reviewed the findings, diagnosis, plan and need for follow up with the patient.    Pt is a a 73-year-old male presenting with approximately 12 hours of a pruritic,  urticarial rash on his abdomen, back, left forearm, and right groin after taking a dose of his Augmentin this morning. Patient denies any known allergy to this antibiotic, but denies any other irritants such as new soaps, detergents, shampoos, or lotions. It is likely an allergic reaction to the antibiotic. Advised the patient to stop his course of Augmentin since he was on day 9 of 10. Will send patient home with Prednisone to control his rash and itching. No evidence of anaphylaxis.  Advised patient to return to clinic or ED with worsening symptoms such as difficulty breathing or facial swelling. Patient understands and is in agreement with the plan.     I, Hannah Huitron, have reviewed and discussed with the advanced practice provider student, Liza KINNEY, their history, physical, and plan for Pawan Bullard. I participated in a shared visit by interviewing and examining the patient as well.  I have reviewed, added to, and edited the note as necessary.    Hannah Huitron PA-C    Date of Service (when I saw the patient): 06/27/23  I personally evaluated this patient today.      Discharge Medication List as of 6/27/2023  8:06 PM      START taking these medications    Details   predniSONE (DELTASONE) 20 MG tablet Take two tablets (= 40mg) each day for 5 (five) days, Disp-10 tablet, R-0, E-Prescribe             Final diagnoses:   Hives       6/27/2023   [facility] EMERGENCY DEPT     Hannah Huitron PA-C  06/27/23 2031       Hannah Huitron PA-C  06/27/23 2032

## 2023-06-29 ENCOUNTER — OFFICE VISIT (OUTPATIENT)
Dept: URGENT CARE | Facility: URGENT CARE | Age: 73
End: 2023-06-29
Payer: COMMERCIAL

## 2023-06-29 ENCOUNTER — NURSE TRIAGE (OUTPATIENT)
Dept: NURSING | Facility: CLINIC | Age: 73
End: 2023-06-29
Payer: COMMERCIAL

## 2023-06-29 VITALS
BODY MASS INDEX: 31.7 KG/M2 | HEART RATE: 54 BPM | SYSTOLIC BLOOD PRESSURE: 134 MMHG | DIASTOLIC BLOOD PRESSURE: 86 MMHG | OXYGEN SATURATION: 100 % | TEMPERATURE: 97.7 F | WEIGHT: 216 LBS

## 2023-06-29 DIAGNOSIS — L50.9 HIVES: Primary | ICD-10-CM

## 2023-06-29 PROCEDURE — 99213 OFFICE O/P EST LOW 20 MIN: CPT

## 2023-06-29 RX ORDER — TRIAMCINOLONE ACETONIDE 5 MG/G
1 OINTMENT TOPICAL 2 TIMES DAILY
Qty: 60 G | Refills: 0 | Status: SHIPPED | OUTPATIENT
Start: 2023-06-29

## 2023-06-29 NOTE — PROGRESS NOTES
URGENT CARE  Assessment & Plan   Assessment:   Pawan Bullard is a 73 year old male who's clinical presentation today is consistent with:   1. Hives  - triamcinolone (KENALOG) 0.5 % external ointment; Apply 1 g topically 2 times daily  Dispense: 60 g; Refill: 0    No alarm signs or symptoms present   Differential Diagnoses for this patient's CC include   Viral exanthem, strep pharyngitis, Viral URI, dermatitis allergic   Erythema infectiosum, Rubella, rubeola, Scarlet Fever,   Enteroviral infection, Drug reaction    Plan:  Will treat patient's hives/urticaria today with first generation antihistamines such as diphenhydramine and a second-generation agents such as: cetirizine.   Additionally will prescribe patient with an anti-pruritic / antinflammatory cream for skin irritation    Given patient was already placed on steroids and he has two days left recommend he continue with the prednisone.   Additionally we discussed if symptoms do not improve after starting today's treatment (or if symptoms worsen) to follow up in 7-10 days.    Patient is} agreeable to treatment plan and state they will follow-up if symptoms do not improve and/or if symptoms worsen (see patient's AVS 'monitor for' section for specific patient instructions given and discussed regarding what to watch for and when to follow up)    Medications ordered are listed above, please see AVS for patient's specific and personalized discharge instructions given     RK Aly [facility] [place]      ______________________________________________________________________        Subjective  Subjective     HPI: Pawan Bullard  is a 73 year old  male who presents today for evaluation the following concerns:   Patient reports a rash noted to entire body}, which started two days ago, on 6/27/23, patient states he was taking Augmentin for ABRS and then started having a full body hive reaction. Patient states he presented  to the ED at that time and was placed on prednisone. Patient presents today b/c he is still having a reaction and wants a cream for the itching.   Patient endorses the rash is severely} pruritic but not painful}  Patient denies that there is any mucus membrane involvement   Patient states the rash is: pink, warm, and raised.   Patient states they have never had a rash like this before   Patient denies any constitutional symptoms, respiratory difficulty, neither is rash associated w/ fever, arthralgias, URI symptoms, or other recent viral  syndromes. No change in speaking or breathing reported   Patient denies any:  throat swelling, difficulty breathing, shortness of breath, hoarse voice, voice changes.   Patient denies any whistling sounds when breathing, wheezing or a cough  Patient denies any nasal drainage, nasal congestion, or sneezing, denies mucus membrane involvement.   Denies any flushing, or generalized hives/ urticaria, patient denies any generalized itching  Patient denies feeling like his throat is tight, his throat is itchy or his ears are itchy    Patient denies any:  chills, fevers, nausea, vomiting, diarrhea or abdominal cramping.   Patient denies any signs of hypotension or elevated heart rate.   Patient denies any swelling of the lips, tongue, face, periorbital area or conjunctiva.      Allergies   Allergen Reactions     Acetaminophen-Codeine Nausea     Patient Active Problem List   Diagnosis     Psoriatic arthropathy (H)     Esophageal reflux     Hypertension goal BP (blood pressure) < 140/90     FAMILY HISTORY OF GI NEOPLASM     FAMILY HISTORY OF DIABETES MELLITUS     Impotence of organic origin     Chondrocalcinosis     HYPERLIPIDEMIA LDL GOAL <130     Osteoarthritis     Advanced directives, counseling/discussion     High risk medications (not anticoagulants) long-term use     ROSADO (dyspnea on exertion)     History of colonic polyps       Review of Systems:  Pertinent review of systems as  reflected in HPI, otherwise negative.     Objective  Objective    Physical Exam:  Vitals:    06/29/23 1157   BP: 134/86   Pulse: 54   Temp: 97.7  F (36.5  C)   TempSrc: Tympanic   SpO2: 100%   Weight: 98 kg (216 lb)      General:  alert and oriented, no acute distress, non-ill-appearing   Vital signs reviewed: afebrile and normotensive    Psy/mental status: cooperative and pleasant   SKIN:   BUE BLE, trunk and chest :    noted diffuse urticarial circumscribed edematous pink lesions/wheals  that are raised, flat topped, erythematous, with multiple shapes and sizes present  Negative for angioedema or distribution on the face or neck.    No signs of secondary infection at this time. Lesions, blanchable   There is no drainage, intense pruritis noted.     EYES: EOMs intact, PERRLA bilaterally   Conjunctiva: Clear bilaterally, no injection or erythema present  NOSE:  mucosa moist   MOUTH/THROAT: lips, tongue, & oral mucosa appear normal upon inspection   NECK: supple, has full range of motion with no meningeal signs              No lymphadenopathy present  LUNG: normal work of breathing, good respiratory effort without retractions, good air  movement, non labored, inspection reveals normal chest expansion w/  inspiration        I explained my diagnostic considerations and recommendations to the patient, who voiced understanding and agreement with the treatment plan.   All questions were answered.   We discussed potential side effects, risks and benefits of any prescribed or recommended therapies, as well as expectations for response to treatments.  Please see AVS for any patient instructions & handouts given.   Patient was advised to contact the Nurse Care Line, their Primary Care provider, Urgent Care, or the Emergency Department if there are new or worsening symptoms, or call 911 for emergencies.        ______________________________________________________________________          Patient Instructions   Diagnosis:  facial swelling, allergic reaction, hives, rash, angioedema    Plan:     Rest quietly today. Don't do vigorous physical activity.    Medicines: antihistamines, steroids} for itching, swelling, or pain.  o Diphenhydramine for next two days   o Certrizine for next 1-2 weeks     You may use acetaminophen or ibuprofen for pain, unless another pain medicine was prescribed.    Monitor for triggers that may have caused the reaction: medications, bugs, irritants, foods - if note what caused it then STOP      Follow up w/ your PCP vs an allergy specialist for further testing and prevention strategies     and for possible prescription of an epinephrine auto injector kit, keep it with you at all times incase of future reactions   Monitor for:   1. Trouble breathing or swallowing, or wheezing  2. Hoarse voice or trouble speaking, or drooling  3. Chest pain or tightness  4. Confusion, lightheadedness, or dizziness  5. Extreme drowsiness or trouble awakening  6. Fainting or loss of consciousness  7. Rapid heart rate  8. Vomiting blood, or large amounts of blood in stool  9. Seizure  10. Nausea, vomiting, diarrhea, abdominal pain, or stomach cramp    Symptoms don't go away    Symptoms come back    Symptoms get worse or new symptoms develop    Fever of 100.4 F       Hives   Hives are pink or red bumps on the skin.   These bumps are also known as wheals.   The bumps can itch, burn, or sting.   Hives can occur anywhere on the body. They vary in size and shape and can form in clusters. Individual hives can appear and go away quickly.   New hives may develop as old ones fade.   --Hives are common and usually harmless. They are not contagious.     Hives are often caused by an allergic reaction.   They may occur from:    Certain foods, such as shellfish, nuts, tomatoes, or berries    Contact with something in the environment, such as pollens, animals, or mold    Certain medicines    Sun or cold air    Viral infections, such as a cold,  the flu, or strep throat  ---  the cause may be very hard to figure out.  The hives will usually fade in a few days.   But they can last for weeks or months    Common causes:   food and drinks such as:    Tree nuts (almonds, walnuts, hazelnuts)    Peanuts    Eggs    Shellfish    Milk   can be caused by medicines such as:    Antibiotics, especially penicillin and sulfa-based medicines     Anticonvulsant or antiseizure medicines     Chemotherapy medicines   Other causes:     Infection or virus    Heat    Cold air or cold water    Exercise    Scratching or rubbing your skin, or wearing tight-fitting clothes that rub your skin    Being exposed to sunlight or light from a light bulb, in rare cases    Inhaled chemicals in the environment from foods and medicines, insects, plants, or other sources  In some cases, hives may occur again and again with no specific cause (idiopathic urticaria).}

## 2023-06-29 NOTE — TELEPHONE ENCOUNTER
8th day of med and broke out in rash. Burning and tingling in feet. Rash is all over. He is on prednisone for the third day. Bad sinus infection and was on antihistamine for that. He was put on prednisone on Augmentin (9th day he was on it broke out in hives). Seen the 27th and put on 5 days prednisone. Cortisone 10, cream isn't helping. I connected with scheduling for an appointment and advised urgent care if they can't get him in.  Cecilia Parada RN  [facility]        Reason for Disposition    Hives have become worse and taking oral steroids (e.g., prednisone) > 24 hours    Additional Information    Negative: Difficulty breathing or wheezing now    Negative: Rapid onset of swollen tongue    Negative: Rapid onset of hoarseness or cough    Negative: Very weak (can't stand)    Negative: Difficult to awaken or acting confused (e.g., disoriented, slurred speech)    Negative: Life-threatening reaction (anaphylaxis) in the past to similar substance (e.g., food, insect bite/sting, chemical, etc.) and < 2 hours since exposure    Negative: Sounds like a life-threatening emergency to the triager    Negative: Bee, wasp, or yellow jacket sting within last 24 hours    Negative: Taking a new medicine now or within last 3 days  (Exception: Antihistamine, decongestant or other OTC cough/cold medicines.)    Negative: Doesn't match the SYMPTOMS of hives    Negative: Swollen tongue    Negative: Widespread hives, itching, or facial swelling and onset < 2 hours of exposure to high-risk allergen (e.g., 1st dose of antibiotic, nuts, sting)    Negative: Patient sounds very sick or weak to the triager    Negative: MODERATE-SEVERE hives persist (i.e., hives interfere with normal activities or work) and taking antihistamine (e.g., Benadryl, Claritin) > 24 hours    Protocols used: HIVES-A-OH

## 2023-06-29 NOTE — PATIENT INSTRUCTIONS
Diagnosis: facial swelling, allergic reaction, hives, rash, angioedema    Plan:   Rest quietly today. Don't do vigorous physical activity.  Medicines: antihistamines, steroids} for itching, swelling, or pain.  Diphenhydramine for next two days   Certrizine for next 1-2 weeks   You may use acetaminophen or ibuprofen for pain, unless another pain medicine was prescribed.  Monitor for triggers that may have caused the reaction: medications, bugs, irritants, foods - if note what caused it then STOP    Follow up w/ your PCP vs an allergy specialist for further testing and prevention strategies   and for possible prescription of an epinephrine auto injector kit, keep it with you at all times incase of future reactions   Monitor for:   Trouble breathing or swallowing, or wheezing  Hoarse voice or trouble speaking, or drooling  Chest pain or tightness  Confusion, lightheadedness, or dizziness  Extreme drowsiness or trouble awakening  Fainting or loss of consciousness  Rapid heart rate  Vomiting blood, or large amounts of blood in stool  Seizure  Nausea, vomiting, diarrhea, abdominal pain, or stomach cramp  Symptoms don't go away  Symptoms come back  Symptoms get worse or new symptoms develop  Fever of 100.4 F       Hives   Hives are pink or red bumps on the skin.   These bumps are also known as wheals.   The bumps can itch, burn, or sting.   Hives can occur anywhere on the body. They vary in size and shape and can form in clusters. Individual hives can appear and go away quickly.   New hives may develop as old ones fade.   --Hives are common and usually harmless. They are not contagious.     Hives are often caused by an allergic reaction.   They may occur from:  Certain foods, such as shellfish, nuts, tomatoes, or berries  Contact with something in the environment, such as pollens, animals, or mold  Certain medicines  Sun or cold air  Viral infections, such as a cold, the flu, or strep throat  ---  the cause may be very  hard to figure out.  The hives will usually fade in a few days.   But they can last for weeks or months    Common causes:   food and drinks such as:  Tree nuts (almonds, walnuts, hazelnuts)  Peanuts  Eggs  Shellfish  Milk   can be caused by medicines such as:  Antibiotics, especially penicillin and sulfa-based medicines   Anticonvulsant or antiseizure medicines   Chemotherapy medicines   Other causes:   Infection or virus  Heat  Cold air or cold water  Exercise  Scratching or rubbing your skin, or wearing tight-fitting clothes that rub your skin  Being exposed to sunlight or light from a light bulb, in rare cases  Inhaled chemicals in the environment from foods and medicines, insects, plants, or other sources  In some cases, hives may occur again and again with no specific cause (idiopathic urticaria).}

## 2023-08-15 ENCOUNTER — LAB (OUTPATIENT)
Dept: LAB | Facility: CLINIC | Age: 73
End: 2023-08-15
Payer: COMMERCIAL

## 2023-08-15 DIAGNOSIS — Z79.899 HIGH RISK MEDICATIONS (NOT ANTICOAGULANTS) LONG-TERM USE: ICD-10-CM

## 2023-08-15 DIAGNOSIS — L40.50 PSORIATIC ARTHROPATHY (H): ICD-10-CM

## 2023-08-15 LAB
ALBUMIN SERPL BCG-MCNC: 4.4 G/DL (ref 3.5–5.2)
ALP SERPL-CCNC: 52 U/L (ref 40–129)
ALT SERPL W P-5'-P-CCNC: 45 U/L (ref 0–70)
AST SERPL W P-5'-P-CCNC: 39 U/L (ref 0–45)
BASOPHILS # BLD AUTO: 0 10E3/UL (ref 0–0.2)
BASOPHILS NFR BLD AUTO: 0 %
BILIRUB DIRECT SERPL-MCNC: 0.21 MG/DL (ref 0–0.3)
BILIRUB SERPL-MCNC: 0.9 MG/DL
CREAT SERPL-MCNC: 1.04 MG/DL (ref 0.67–1.17)
CRP SERPL-MCNC: <3 MG/L
EOSINOPHIL # BLD AUTO: 0.1 10E3/UL (ref 0–0.7)
EOSINOPHIL NFR BLD AUTO: 3 %
ERYTHROCYTE [DISTWIDTH] IN BLOOD BY AUTOMATED COUNT: 13.7 % (ref 10–15)
ERYTHROCYTE [SEDIMENTATION RATE] IN BLOOD BY WESTERGREN METHOD: 6 MM/HR (ref 0–20)
GFR SERPL CREATININE-BSD FRML MDRD: 77 ML/MIN/1.73M2
HCT VFR BLD AUTO: 42.6 % (ref 40–53)
HGB BLD-MCNC: 14.8 G/DL (ref 13.3–17.7)
IMM GRANULOCYTES # BLD: 0 10E3/UL
IMM GRANULOCYTES NFR BLD: 0 %
LYMPHOCYTES # BLD AUTO: 1.6 10E3/UL (ref 0.8–5.3)
LYMPHOCYTES NFR BLD AUTO: 35 %
MCH RBC QN AUTO: 33.1 PG (ref 26.5–33)
MCHC RBC AUTO-ENTMCNC: 34.7 G/DL (ref 31.5–36.5)
MCV RBC AUTO: 95 FL (ref 78–100)
MONOCYTES # BLD AUTO: 0.3 10E3/UL (ref 0–1.3)
MONOCYTES NFR BLD AUTO: 5 %
NEUTROPHILS # BLD AUTO: 2.6 10E3/UL (ref 1.6–8.3)
NEUTROPHILS NFR BLD AUTO: 57 %
PLATELET # BLD AUTO: 145 10E3/UL (ref 150–450)
PROT SERPL-MCNC: 6.5 G/DL (ref 6.4–8.3)
RBC # BLD AUTO: 4.47 10E6/UL (ref 4.4–5.9)
WBC # BLD AUTO: 4.6 10E3/UL (ref 4–11)

## 2023-08-15 PROCEDURE — 86140 C-REACTIVE PROTEIN: CPT

## 2023-08-15 PROCEDURE — 80076 HEPATIC FUNCTION PANEL: CPT

## 2023-08-15 PROCEDURE — 82565 ASSAY OF CREATININE: CPT

## 2023-08-15 PROCEDURE — 36415 COLL VENOUS BLD VENIPUNCTURE: CPT

## 2023-08-15 PROCEDURE — 85025 COMPLETE CBC W/AUTO DIFF WBC: CPT

## 2023-08-15 PROCEDURE — 85652 RBC SED RATE AUTOMATED: CPT

## 2023-08-17 ENCOUNTER — OFFICE VISIT (OUTPATIENT)
Dept: RHEUMATOLOGY | Facility: CLINIC | Age: 73
End: 2023-08-17
Payer: COMMERCIAL

## 2023-08-17 VITALS
SYSTOLIC BLOOD PRESSURE: 130 MMHG | RESPIRATION RATE: 20 BRPM | DIASTOLIC BLOOD PRESSURE: 80 MMHG | WEIGHT: 215.6 LBS | BODY MASS INDEX: 31.64 KG/M2 | HEART RATE: 78 BPM | OXYGEN SATURATION: 98 %

## 2023-08-17 DIAGNOSIS — Z79.899 HIGH RISK MEDICATIONS (NOT ANTICOAGULANTS) LONG-TERM USE: ICD-10-CM

## 2023-08-17 DIAGNOSIS — L40.50 PSORIATIC ARTHROPATHY (H): Primary | ICD-10-CM

## 2023-08-17 PROCEDURE — 99214 OFFICE O/P EST MOD 30 MIN: CPT | Performed by: INTERNAL MEDICINE

## 2023-08-17 RX ORDER — DOXYCYCLINE 100 MG/1
100 CAPSULE ORAL 2 TIMES DAILY
Qty: 14 CAPSULE | Refills: 0 | Status: CANCELLED | OUTPATIENT
Start: 2023-08-17 | End: 2023-08-24

## 2023-08-17 RX ORDER — FOLIC ACID 1 MG/1
1 TABLET ORAL DAILY
Qty: 90 TABLET | Refills: 2 | Status: SHIPPED | OUTPATIENT
Start: 2023-08-17 | End: 2024-02-22

## 2023-08-17 RX ORDER — METHOTREXATE 2.5 MG/1
15 TABLET ORAL WEEKLY
Qty: 78 TABLET | Refills: 2 | Status: SHIPPED | OUTPATIENT
Start: 2023-08-17 | End: 2024-02-22

## 2023-08-17 NOTE — PROGRESS NOTES
Rheumatology Clinic Visit      Pawan Bullard MRN# [id number]   YOB: 1950 Age: 73 year old      Date of visit: 8/17/23   PCP: Dr. Armando Finley    Chief Complaint   Patient presents with:  Psoriatic arthritis: Has had a sinus infection for awhile    Assessment and Plan     1.  Psoriatic arthritis:  Previously on SSZ (GI upset).  Currently on methotrexate 15 mg once weekly (GI upset with 25 mg once weekly; cytopenias).  Currently doing well.  No synovitis on exam today.  Chronic illness, stable.    - Continue methotrexate 15mg once every 7 days (split dose within a 24-hour period)  - Continue folic acid 1 mg daily  - Labs in 3 months: CBC, Creatinine, Hepatic Panel  - Labs in 6 months: CBC, Creatinine, Hepatic Panel, ESR, CRP                Rapid 3, cumulative scores                      02/16/2023:  7    (MTX 15mg wkly)                      10/14/2022:  7.5 (MTX 15mg wkly)                      6/18/2021:    4.3 (MTX 20mg wkly)                      02/17/2020:  4.7 (MTX 20mg wkly)                      11/18/2019:  3.3 (MTX 20mg wkly)                      11/26/2018:  5.2 (MTX 17.5mg wkly)    High risk medication requiring intensive toxicity monitoring at least quarterly    2. Hand OA: Affecting the PIPs and DIPs. Topical diclofenac PRN.     3.  Left knee osteoarthritis: History of meniscus surgery in 2009 and 2013.  Degenerative arthritis affecting the left knee.  Doing well with home exercises.    4.  Vaccinations: Vaccinations reviewed with Mr. Bullard.  Risks and benefits of vaccinations were discussed.    - Influenza: Encouraged yearly vaccination  - [id number]: up to date  - [id number]: up to date  - Shingrix: Up-to-date  - COVID-19: Advised keeping updated    Total minutes spent in evaluation with patient, documentation, , and review of pertinent studies and chart notes: 20      Mr. Bullard verbalized agreement with and understanding of the rational for the diagnosis and treatment  plan.  All questions were answered to best of my ability and the patient's satisfaction. Mr. Bullard was advised to contact the clinic with any questions that may arise after the clinic visit.      Thank you for involving me in the care of the patient    Return to clinic: 6 months      HPI   Pawan Bullard is a 73 year old male with a past medical history significant for hypertension, hyperlipidemia, chondrocalcinosis, GERD, osteoarthritis, and psoriatic arthritis who presents for follow-up of psoriatic arthritis.      Today: Psoriatic arthritis is well controlled.  No joint pain or swelling.  No morning stiffness or gelling phenomenon.  Arthritis does not limit his daily activities.  Recently he had a sinus infection treated with antibiotics, and now he has mild nasal congestion but no sinus pressure, fevers, chills, or cough. He will try using Flonase and follow-up with his PCP if the nasal congestion does not resolve.    Denies fevers, chills, nausea, vomiting, constipation, diarrhea. No abdominal pain. No chest pain/pressure, palpitations, or shortness of breath. No sicca symptoms.     Tobacco: None  EtOH: 0-2 beers or mixed drinks monthly  Drugs: None    ROS   12 point review of system was completed and negative except as noted in the HPI     Active Problem List     Patient Active Problem List   Diagnosis    Psoriatic arthropathy (H)    Esophageal reflux    Hypertension goal BP (blood pressure) < 140/90    FAMILY HISTORY OF GI NEOPLASM    FAMILY HISTORY OF DIABETES MELLITUS    Impotence of organic origin    Chondrocalcinosis    HYPERLIPIDEMIA LDL GOAL <130    Osteoarthritis    Advanced directives, counseling/discussion    High risk medications (not anticoagulants) long-term use    ROSADO (dyspnea on exertion)    History of colonic polyps     Past Medical History     Past Medical History:   Diagnosis Date    Esophageal reflux     GERD    Hypertension     Psoriatic arthropathy (H)     Psoriatic arthritis      Past Surgical History     Past Surgical History:   Procedure Laterality Date    ARTHROSCOPY KNEE RT/LT  April 2009    left knee    ARTHROSCOPY KNEE WITH MEDIAL MENISCECTOMY  7/9/2013    Procedure: ARTHROSCOPY KNEE WITH MEDIAL MENISCECTOMY;  Left Knee Arthroscopic and Open Repair of Patellar Tendon with Platelet Rich Plasma;  Surgeon: Ley, Jeffrey Duane, MD;  Location: [facility]    COLONOSCOPY  Jan. 2006    diverticuli. Father had colon CA    COLONOSCOPY  2001    COLONOSCOPY  4/11/2011    Procedure:COLONOSCOPY; Surgeon:JENNIFER JEAN; Location:[facility]    COLONOSCOPY N/A 11/21/2016    Procedure: COLONOSCOPY;  Surgeon: Elias Santamaria MD;  Location: [facility]    COLONOSCOPY N/A 2/8/2022    Procedure: COLONOSCOPY;  Surgeon: Junior Brown MD;  Location: [facility]    REPAIR TENDON PATELLA  7/9/2013    Procedure: REPAIR TENDON PATELLA;;  Surgeon: Ley, Jeffrey Duane, MD;  Location: [facility]    SURGICAL HISTORY OF -   1999    (L) Herniorrhaphy    SURGICAL HISTORY OF -   2003    (R) Herniorrhaphy    SURGICAL HISTORY OF -   1981    Hemorrohid     Allergy     Allergies   Allergen Reactions    Acetaminophen-Codeine Nausea     Current Medication List     Current Outpatient Medications   Medication Sig    albuterol (PROAIR HFA/PROVENTIL HFA/VENTOLIN HFA) 108 (90 Base) MCG/ACT inhaler Inhale 2 puffs into the lungs every 4 hours as needed for shortness of breath or wheezing    cetirizine (ZYRTEC) 10 MG tablet Take 1 tablet (10 mg) by mouth daily    diclofenac (VOLTAREN) 1 % topical gel Apply up to 2 grams of 1% gel to hands up to 4 times daily as needed for joint pain (maximum: 8 g per upper extremity per day)    folic acid (FOLVITE) 1 MG tablet Take 1 tablet (1 mg) by mouth daily    lisinopril (ZESTRIL) 5 MG tablet Take 1 tablet (5 mg) by mouth daily    methotrexate sodium 2.5 MG TABS Take 6 tablets (15 mg) by mouth once a week . Take 3 tablets at 9AM on Sunday and 3 tablets at 9AM on Monday.  Needs to be taken within the same 24 hour  "time period of each week.    MULTI-VITAMIN OR TABS Take 1 tablet by mouth daily     Probiotic Product (PROBIOTIC DAILY PO) Take 1 capsule by mouth daily     sildenafil (REVATIO) 20 MG tablet Take as many pills as needed up to maximum of 5 pills at a time prior to intercourse.    simvastatin (ZOCOR) 40 MG tablet Take 1 tablet (40 mg) by mouth At Bedtime    tamsulosin (FLOMAX) 0.4 MG capsule TAKE ONE CAPSULE BY MOUTH ONCE DAILY    triamcinolone (KENALOG) 0.5 % external ointment Apply 1 g topically 2 times daily    predniSONE (DELTASONE) 20 MG tablet Take two tablets (= 40mg) each day for 5 (five) days     No current facility-administered medications for this visit.         Social History   See HPI    Family History     Family History   Problem Relation Age of Onset    Hypertension Mother     Diabetes Mother     Cerebrovascular Disease Mother         in her 70s    Arthritis Mother     Cancer - colorectal Father 67    Arthritis Paternal Grandmother     LUNG DISEASE Sister     Other - See Comments Sister     Prostate Cancer No family hx of     Coronary Artery Disease No family hx of        Physical Exam     Temp Readings from Last 3 Encounters:   06/29/23 97.7  F (36.5  C) (Tympanic)   06/27/23 98  F (36.7  C) (Tympanic)   06/19/23 99.3  F (37.4  C) (Tympanic)     BP Readings from Last 5 Encounters:   08/17/23 130/80   06/29/23 134/86   06/27/23 136/78   06/19/23 136/85   06/12/23 (!) 140/86     Pulse Readings from Last 1 Encounters:   08/17/23 78     Resp Readings from Last 1 Encounters:   08/17/23 20     Estimated body mass index is 31.64 kg/m  as calculated from the following:    Height as of 12/15/22: 1.758 m (5' 9.21\").    Weight as of this encounter: 97.8 kg (215 lb 9.6 oz).      GEN: NAD. Healthy appearing adult.   HEENT:  Anicteric, noninjected sclera. No obvious external lesions of the ear and nose. Hearing intact.  CV: S1, S2. RRR. No m/r/g  PULM: No increased work of breathing. CTA bilaterally   MSK: MCPs, " PIPs, DIPs without swelling or tenderness to palpation.  Wrists without swelling or tenderness to palpation.  Elbows and shoulders without swelling or tenderness to palpation.   Knees, ankles, and MTPs without swelling or tenderness to palpation.    SKIN: No rash or jaundice seen  PSYCH: Alert. Appropriate.      Labs / Imaging (select studies)     CBC  Recent Labs   Lab Test 08/15/23  1016 06/12/23  1323 05/23/23  1339 09/17/21  1052 06/11/21  1014 02/26/21  1012 11/16/20  0903   WBC 4.6 4.9 5.4   < > 5.0 4.4 5.3   RBC 4.47 4.45 4.34*   < > 4.16* 4.36* 4.71   HGB 14.8 14.9 14.7   < > 14.1 14.6 15.5   HCT 42.6 42.4 41.8   < > 40.9 41.9 44.8   MCV 95 95 96   < > 98 96 95   RDW 13.7 13.5 13.4   < > 13.4 13.0 13.7   * 138* 145*   < > 164 154 146*   MCH 33.1* 33.5* 33.9*   < > 33.9* 33.5* 32.9   MCHC 34.7 35.1 35.2   < > 34.5 34.8 34.6   NEUTROPHIL 57 62 62   < > 64.5 64.1 73.8   LYMPH 35 28 30   < > 23.6 28.6 16.2   MONOCYTE 5 8 6   < > 9.3 4.6 7.9   EOSINOPHIL 3 2 2   < > 2.4 2.5 1.9   BASOPHIL 0 0 0   < > 0.2 0.2 0.2   ANEU  --   --   --   --  3.2 2.8 3.9   ALYM  --   --   --   --  1.2 1.3 0.9   ERENDIRA  --   --   --   --  0.5 0.2 0.4   AEOS  --   --   --   --  0.1 0.1 0.1   ABAS  --   --   --   --  0.0 0.0 0.0   ANEUTAUTO 2.6 3.0 3.3   < >  --   --   --    ALYMPAUTO 1.6 1.3 1.6   < >  --   --   --    AMONOAUTO 0.3 0.4 0.3   < >  --   --   --    AEOSAUTO 0.1 0.1 0.1   < >  --   --   --    ABSBASO 0.0 0.0 0.0   < >  --   --   --     < > = values in this interval not displayed.     CMP  Recent Labs   Lab Test 08/15/23  1016 05/23/23  1339 02/16/23  1141 12/15/22  0952 12/01/22  0807 12/10/21  1603 11/20/21  0901 09/17/21  1052 06/11/21  1014 02/26/21  1012 11/16/20  0903 02/10/20  1028 02/07/20  1428   NA  --   --   --   --   --   --  140  --   --   --  138  --  139   POTASSIUM  --   --   --   --  4.0  --  3.9  --   --   --  4.0  --  4.3   CHLORIDE  --   --   --   --   --   --  107  --   --   --  105  --  107   CO2   --   --   --   --   --   --  30  --   --   --  27  --  28   ANIONGAP  --   --   --   --   --   --  3  --   --   --  6  --  4   GLC  --   --   --  120*  --   --  110*  --   --   --  107*  --  111*   BUN  --   --   --   --   --   --  13  --   --   --  13  --  15   CR 1.04 1.09 1.01  --  1.07   < > 1.06   < > 1.07 0.96 1.06  1.05   < > 0.99   GFRESTIMATED 77 73 80  --  75   < > 71   < > 70 81 71  72   < > 78   GFRESTBLACK  --   --   --   --   --   --   --   --  81 >90 83  84   < > >90   JOHN  --   --   --   --   --   --  9.0  --   --   --  9.5  --  9.1   BILITOTAL 0.9 0.8 0.9  --  1.1   < >  --    < > 0.7 0.8 1.0   < >  --    ALBUMIN 4.4 4.6 3.9  --  4.3   < >  --    < > 3.6 3.8 4.0   < >  --    PROTTOTAL 6.5 6.3* 6.6*  --  6.4   < >  --    < > 6.4* 6.7* 6.7*   < >  --    ALKPHOS 52 53 48  --  50   < >  --    < > 51 58 58   < >  --    AST 39 49 36  --  32   < >  --    < > 26 39 32   < >  --    ALT 45 61* 65  --  41   < >  --    < > 41 58 42   < >  --     < > = values in this interval not displayed.     Calcium/VitaminD  Recent Labs   Lab Test 11/20/21  0901 11/16/20  0903 02/07/20  1428   JOHN 9.0 9.5 9.1     ESR/CRP  Recent Labs   Lab Test 08/15/23  1016 05/23/23  1339 02/16/23  1141 12/01/22  0807 09/09/22  1059 06/14/22  1012 12/10/21  1603   SED 6 6 5 6   < > 6 5   CRP  --   --  <2.9  --   --  <2.9 <2.9   CRPI <3.00 <3.00  --  <3.00   < >  --   --     < > = values in this interval not displayed.     Lipid Panel  Recent Labs   Lab Test 12/15/22  0952 11/20/21  0901 11/16/20  0903   CHOL 165 159 177   TRIG 113 150* 194*   HDL 61 60 62   LDL 81 69 76   NHDL 104 99 115     Hepatitis B  Recent Labs   Lab Test 05/16/18  1016   HBCAB Nonreactive   HEPBANG Nonreactive     Hepatitis C  Recent Labs   Lab Test 08/26/16  1213   HCVAB Nonreactive   Assay performance characteristics have not been established for newborns,   infants, and children       Immunization History     Immunization History   Administered Date(s)  Administered    COVID-19 Bivalent 18+ (Moderna) 11/18/2022    COVID-19 Monovalent 18+ (Moderna) 05/12/2022    COVID-19 Monovalent Booster 18+ (Moderna) 10/28/2021    COVID-19 Vaccine (Evan) 03/04/2021    Hepatitis A (ADULT 19+) 08/02/2011    Influenza (High Dose) 3 valent vaccine 10/18/2017, 09/21/2018, 11/13/2019, 10/09/2020, 11/16/2021    Influenza (IIV3) PF 11/17/2005, 12/04/2006, 11/22/2010, 09/26/2012, 10/01/2014    Influenza Vaccine 65+ (Fluzone HD) 10/09/2020, 09/17/2021, 09/09/2022    Influenza Vaccine >6 months (Alfuria,Fluzone) 10/15/2013, 12/17/2015, 08/26/2016    Pneumo Conj 13-V (2010&after) 10/18/2017    Pneumococcal 23 valent 01/22/2018    TDAP (Adacel,Boostrix) 01/09/2013    TDAP Vaccine (Adacel) 04/22/2009, 05/20/2018    Zoster vaccine, live 11/10/2014          Chart documentation done in part with Dragon Voice recognition Software. Although reviewed after completion, some word and grammatical error may remain.    Adria Lopez MD

## 2023-08-17 NOTE — PATIENT INSTRUCTIONS
RHEUMATOLOGY    [facility]  [address] [place] [address]  [place] [address]    Phone number: [phone]  Fax number: [phone]    If you need a medication refill, please contact us as you may need lab work and/or a follow up visit prior to your refill.      Thank you for choosing [facility]!    Francie Cuellar CMA Rheumatology

## 2023-08-17 NOTE — NURSING NOTE
RAPID3 (0-30) Cumulative Score  6.3          RAPID3 Weighted Score (divide #4 by 3 and that is the weighted score)  2.1

## 2023-08-30 ENCOUNTER — OFFICE VISIT (OUTPATIENT)
Dept: FAMILY MEDICINE | Facility: CLINIC | Age: 73
End: 2023-08-30
Payer: COMMERCIAL

## 2023-08-30 VITALS
RESPIRATION RATE: 18 BRPM | DIASTOLIC BLOOD PRESSURE: 78 MMHG | TEMPERATURE: 98.6 F | BODY MASS INDEX: 31.4 KG/M2 | WEIGHT: 212 LBS | HEIGHT: 69 IN | OXYGEN SATURATION: 97 % | SYSTOLIC BLOOD PRESSURE: 132 MMHG | HEART RATE: 80 BPM

## 2023-08-30 DIAGNOSIS — J32.9 SINUSITIS, UNSPECIFIED CHRONICITY, UNSPECIFIED LOCATION: Primary | ICD-10-CM

## 2023-08-30 PROCEDURE — 99213 OFFICE O/P EST LOW 20 MIN: CPT | Performed by: FAMILY MEDICINE

## 2023-08-30 ASSESSMENT — PAIN SCALES - GENERAL: PAINLEVEL: NO PAIN (0)

## 2023-08-30 NOTE — PROGRESS NOTES
"S: Pawan Bullard is a 73 year old male with sinus issues over a few weeks.  Abx didn't really help.  Steroids maybe did?  Still lingering on, L nostril worse than R.      Trying some nasal steroids.  Zyrtec too    Problem list, med list, additional histories reviewed and updated, as indicated.      O:/78   Pulse 80   Temp 98.6  F (37  C) (Tympanic)   Resp 18   Ht 1.758 m (5' 9.22\")   Wt 96.2 kg (212 lb)   SpO2 97%   BMI 31.11 kg/m    GEN: Alert and oriented, in no acute distress  No pain over frontal or maxillary sinuses  Nares normal using otoscope to look  Neck supple    A: sinusitis, persistent    P; long talk.  Afrin with restrictions on duration, sudafed, nasal irrigation, nasal steroids.  Continue all that.      Follow up if not improving with time.    "

## 2023-09-15 ENCOUNTER — TRANSFERRED RECORDS (OUTPATIENT)
Dept: HEALTH INFORMATION MANAGEMENT | Facility: CLINIC | Age: 73
End: 2023-09-15
Payer: COMMERCIAL

## 2023-09-20 ENCOUNTER — TELEPHONE (OUTPATIENT)
Dept: FAMILY MEDICINE | Facility: CLINIC | Age: 73
End: 2023-09-20
Payer: COMMERCIAL

## 2023-09-20 NOTE — TELEPHONE ENCOUNTER
Reason for Call:  Appointment Request    Patient requesting this type of appt: Pre-op    Requested provider:  any    Reason patient unable to be scheduled: Not within requested timeframe    When does patient want to be seen/preferred time:  before surgery on 9-25    Comments: patient wondering if he could be worked in with anyone    Could we send this information to you in [employer] or would you prefer to receive a phone call?:   Patient would prefer a phone call   Okay to leave a detailed message?: Yes [phone] at Home number on file [phone] (home)    Call taken on 9/20/2023 at 8:32 AM by Anaya Jean Baptiste

## 2023-09-20 NOTE — TELEPHONE ENCOUNTER
Set up an appt for patient for 9/21 at 10:40 in [place] with Jessica. Bailee Kahler on 9/20/2023 at 9:19 AM

## 2023-09-21 ENCOUNTER — OFFICE VISIT (OUTPATIENT)
Dept: FAMILY MEDICINE | Facility: CLINIC | Age: 73
End: 2023-09-21
Payer: COMMERCIAL

## 2023-09-21 VITALS
HEART RATE: 84 BPM | WEIGHT: 210 LBS | SYSTOLIC BLOOD PRESSURE: 138 MMHG | OXYGEN SATURATION: 98 % | DIASTOLIC BLOOD PRESSURE: 88 MMHG | BODY MASS INDEX: 31.1 KG/M2 | TEMPERATURE: 97.7 F | HEIGHT: 69 IN | RESPIRATION RATE: 20 BRPM

## 2023-09-21 DIAGNOSIS — J34.89 NASAL CAVITY MASS: ICD-10-CM

## 2023-09-21 DIAGNOSIS — Z01.818 PRE-OP EVALUATION: Primary | ICD-10-CM

## 2023-09-21 PROCEDURE — 99214 OFFICE O/P EST MOD 30 MIN: CPT | Performed by: NURSE PRACTITIONER

## 2023-09-21 ASSESSMENT — PAIN SCALES - GENERAL: PAINLEVEL: NO PAIN (0)

## 2023-09-21 NOTE — PROGRESS NOTES
[facility]  [address] [facility] [address]  Phone: [phone]  Primary Provider: No Ref-Primary, Physician  Pre-op Performing Provider: HERIBERTO MCGOVERN      PREOPERATIVE EVALUATION:  Today's date: 9/21/2023    Pawan is a 73 year old male who presents for a preoperative evaluation.      9/21/2023    10:36 AM   Additional Questions   Roomed by Oly     Declines vaccines today    Surgical Information:  Surgery/Procedure: Biopsy of mass in Nasal passage   Surgery Location: [facility]   Surgeon: Dr. Kevin Otto  Surgery Date: 9/25/2023  Time of Surgery: TBD  Where patient plans to recover: At home with family  Fax number for surgical facility: [phone]    Assessment & Plan     The proposed surgical procedure is considered INTERMEDIATE risk.    Pre-op evaluation  Nasal cavity mass       - No identified additional risk factors other than previously addressed    Antiplatelet or Anticoagulation Medication Instructions:   - Patient is on no antiplatelet or anticoagulation medications.    Additional Medication Instructions:  Patient is to take all scheduled medications on the day of surgery EXCEPT for modifications listed below:   - ACE/ARB: HOLD on day of surgery (minimum 11 hours for general anesthesia).   - Statins: Continue taking on the day of surgery.   -may continue the methotrexate      RECOMMENDATION:  APPROVAL GIVEN to proceed with proposed procedure, without further diagnostic evaluation.                  Subjective       HPI related to upcoming procedure:   Chronic nose/sinus issues - found to have nasal cavity mass        9/21/2023    10:38 AM   Preop Questions   1. Have you ever had a heart attack or stroke? No   2. Have you ever had surgery on your heart or blood vessels, such as a stent placement, a coronary artery bypass, or surgery on an artery in your head, neck, heart, or legs? No   3. Do you have chest pain with activity? No   4. Do you have a  history of  heart failure? No   5. Do you currently have a cold, bronchitis or symptoms of other infection? No   6. Do you have a cough, shortness of breath, or wheezing? No   7. Do you or anyone in your family have previous history of blood clots? No   8. Do you or does anyone in your family have a serious bleeding problem such as prolonged bleeding following surgeries or cuts? No   9. Have you ever had problems with anemia or been told to take iron pills? No   10. Have you had any abnormal blood loss such as black, tarry or bloody stools? No   11. Have you ever had a blood transfusion? No   12. Are you willing to have a blood transfusion if it is medically needed before, during, or after your surgery? Yes   13. Have you or any of your relatives ever had problems with anesthesia? No   14. Do you have sleep apnea, excessive snoring or daytime drowsiness? No   15. Do you have any artifical heart valves or other implanted medical devices like a pacemaker, defibrillator, or continuous glucose monitor? No   16. Do you have artificial joints? No   17. Are you allergic to latex? No       Health Care Directive:  Patient does not have a Health Care Directive or Living Will: Discussed advance care planning with patient; information given to patient to review.    Preoperative Review of :   reviewed - no record of controlled substances prescribed.      Status of Chronic Conditions:  See problem list for active medical problems.  Problems all longstanding and stable, except as noted/documented.  See ROS for pertinent symptoms related to these conditions.    Review of Systems  CONSTITUTIONAL: NEGATIVE for fever, chills, change in weight  INTEGUMENTARY/SKIN: NEGATIVE for worrisome rashes, moles or lesions  EYES: NEGATIVE for vision changes or irritation  ENT/MOUTH: NEGATIVE for ear, mouth and throat problems  RESP: NEGATIVE for significant cough or SOB  CV: NEGATIVE for chest pain, palpitations or peripheral edema  GI:  NEGATIVE for nausea, abdominal pain, heartburn, or change in bowel habits  : NEGATIVE for frequency, dysuria, or hematuria  MUSCULOSKELETAL: NEGATIVE for significant arthralgias or myalgia  NEURO: NEGATIVE for weakness, dizziness or paresthesias  ENDOCRINE: NEGATIVE for temperature intolerance, skin/hair changes  HEME: NEGATIVE for bleeding problems  PSYCHIATRIC: NEGATIVE for changes in mood or affect    Patient Active Problem List    Diagnosis Date Noted    History of colonic polyps 02/10/2022     Priority: Medium     22 colonoscopy with two polyps 2 and 4mm, tubular adenoma.  PLAN: 5 year [id number].      ROSADO (dyspnea on exertion) 2020     Priority: Medium     3/4/2020: Some dyspnea on exertion.  He had normal chest x-ray.  His pulmonary function tests were normal.  Nuclear medicine stress test was normal.  albuteral inhaler seems to help.       High risk medications (not anticoagulants) long-term use 2015     Priority: Medium    Advanced directives, counseling/discussion 2013     Priority: Medium     advanced care discussed form given to pt.          Osteoarthritis 2011     Priority: Medium    HYPERLIPIDEMIA LDL GOAL <130 10/31/2010     Priority: Medium    Chondrocalcinosis 02/10/2010     Priority: Medium    FAMILY HISTORY OF DIABETES MELLITUS 2006     Priority: Medium     mother      Impotence of organic origin 2006     Priority: Medium    FAMILY HISTORY OF GI NEOPLASM 2005     Priority: Medium     father - colon CA ,  at  67. Diagnosed at age 67  He has had colonoscopy in , ,  and .  No polyps.       Esophageal reflux 2005     Priority: Medium     GERD      Hypertension goal BP (blood pressure) < 140/90 2005     Priority: Medium    Psoriatic arthropathy (H) 2005     Priority: Medium      Past Medical History:   Diagnosis Date    Esophageal reflux     GERD    Hypertension     Psoriatic arthropathy (H)     Psoriatic  arthritis     Past Surgical History:   Procedure Laterality Date    ARTHROSCOPY KNEE RT/LT  April 2009    left knee    ARTHROSCOPY KNEE WITH MEDIAL MENISCECTOMY  7/9/2013    Procedure: ARTHROSCOPY KNEE WITH MEDIAL MENISCECTOMY;  Left Knee Arthroscopic and Open Repair of Patellar Tendon with Platelet Rich Plasma;  Surgeon: Ley, Jeffrey Duane, MD;  Location: [facility]    COLONOSCOPY  Jan. 2006    diverticuli. Father had colon CA    COLONOSCOPY  2001    COLONOSCOPY  4/11/2011    Procedure:COLONOSCOPY; Surgeon:JENNIFER JEAN; Location:[facility]    COLONOSCOPY N/A 11/21/2016    Procedure: COLONOSCOPY;  Surgeon: Elias Santamaria MD;  Location: [facility]    COLONOSCOPY N/A 2/8/2022    Procedure: COLONOSCOPY;  Surgeon: Junior Brown MD;  Location: [facility]    REPAIR TENDON PATELLA  7/9/2013    Procedure: REPAIR TENDON PATELLA;;  Surgeon: Ley, Jeffrey Duane, MD;  Location: [facility]    SURGICAL HISTORY OF -   1999    (L) Herniorrhaphy    SURGICAL HISTORY OF -   2003    (R) Herniorrhaphy    SURGICAL HISTORY OF -   1981    Hemorrohid     Current Outpatient Medications   Medication Sig Dispense Refill    albuterol (PROAIR HFA/PROVENTIL HFA/VENTOLIN HFA) 108 (90 Base) MCG/ACT inhaler Inhale 2 puffs into the lungs every 4 hours as needed for shortness of breath or wheezing 18 g 11    folic acid (FOLVITE) 1 MG tablet Take 1 tablet (1 mg) by mouth daily 90 tablet 2    lisinopril (ZESTRIL) 5 MG tablet Take 1 tablet (5 mg) by mouth daily 90 tablet 3    methotrexate sodium 2.5 MG TABS Take 6 tablets (15 mg) by mouth once a week . Take 3 tablets at 9AM on Sunday and 3 tablets at 9AM on Monday.  Needs to be taken within the same 24 hour time period of each week. 78 tablet 2    MULTI-VITAMIN OR TABS Take 1 tablet by mouth daily       Probiotic Product (PROBIOTIC DAILY PO) Take 1 capsule by mouth daily       sildenafil (REVATIO) 20 MG tablet Take as many pills as needed up to maximum of 5 pills at a time prior to intercourse. 50 tablet 11     "simvastatin (ZOCOR) 40 MG tablet Take 1 tablet (40 mg) by mouth At Bedtime 90 tablet 3    tamsulosin (FLOMAX) 0.4 MG capsule TAKE ONE CAPSULE BY MOUTH ONCE DAILY 90 capsule 3    triamcinolone (KENALOG) 0.5 % external ointment Apply 1 g topically 2 times daily 60 g 0    cetirizine (ZYRTEC) 10 MG tablet Take 1 tablet (10 mg) by mouth daily (Patient not taking: Reported on 8/30/2023) 30 tablet 0    diclofenac (VOLTAREN) 1 % topical gel Apply up to 2 grams of 1% gel to hands up to 4 times daily as needed for joint pain (maximum: 8 g per upper extremity per day) (Patient not taking: Reported on 9/21/2023) 400 g 1    predniSONE (DELTASONE) 20 MG tablet Take two tablets (= 40mg) each day for 5 (five) days (Patient not taking: Reported on 8/30/2023) 10 tablet 0       Allergies   Allergen Reactions    Acetaminophen-Codeine Nausea        Social History     Tobacco Use    Smoking status: Never    Smokeless tobacco: Never   Substance Use Topics    Alcohol use: Yes     Comment:  0-2 beer's or mixed drink's monthly       History   Drug Use No         Objective     /88   Pulse 84   Temp 97.7  F (36.5  C) (Tympanic)   Resp 20   Ht 1.753 m (5' 9\")   Wt 95.3 kg (210 lb)   SpO2 98%   BMI 31.01 kg/m      Physical Exam    GENERAL APPEARANCE: healthy, alert and no distress     EYES: EOMI,  PERRL     HENT: ear canals and TM's normal     NECK: no adenopathy, no asymmetry, masses, or scars and thyroid normal to palpation     RESP: lungs clear to auscultation - no rales, rhonchi or wheezes     CV: regular rates and rhythm, normal S1 S2, no S3 or S4 and no murmur, click or rub     MS: extremities normal- no gross deformities noted, no evidence of inflammation in joints, FROM in all extremities.     SKIN: no suspicious lesions or rashes     NEURO: Normal strength and tone, sensory exam grossly normal, mentation intact and speech normal     PSYCH: mentation appears normal. and affect normal/bright     LYMPHATICS: No cervical " adenopathy    Recent Labs   Lab Test 08/15/23  1016 06/12/23  1323 05/23/23  1339 02/16/23  1141 12/15/22  0952 12/01/22  0807 12/10/21  1603 11/20/21  0901   HGB 14.8 14.9 14.7   < >  --  14.7   < >  --    * 138* 145*   < >  --  143*   < >  --    NA  --   --   --   --   --   --   --  140   POTASSIUM  --   --   --   --   --  4.0  --  3.9   CR 1.04  --  1.09   < >  --  1.07   < > 1.06   A1C  --   --   --   --  5.7*  --   --   --     < > = values in this interval not displayed.        Diagnostics:  No labs were ordered during this visit.     No EKG required, no history of coronary heart disease, significant arrhythmia, peripheral arterial disease or other structural heart disease.    Revised Cardiac Risk Index (RCRI):  The patient has the following serious cardiovascular risks for perioperative complications:   - No serious cardiac risks = 0 points     RCRI Interpretation: 0 points: Class I (very low risk - 0.4% complication rate)         Signed Electronically by: RK Watt CNP  Copy of this evaluation report is provided to requesting physician.

## 2023-11-15 ENCOUNTER — LAB (OUTPATIENT)
Dept: LAB | Facility: CLINIC | Age: 73
End: 2023-11-15
Payer: COMMERCIAL

## 2023-11-15 ENCOUNTER — IMMUNIZATION (OUTPATIENT)
Dept: FAMILY MEDICINE | Facility: CLINIC | Age: 73
End: 2023-11-15
Payer: COMMERCIAL

## 2023-11-15 DIAGNOSIS — Z23 NEED FOR PROPHYLACTIC VACCINATION AND INOCULATION AGAINST INFLUENZA: Primary | ICD-10-CM

## 2023-11-15 DIAGNOSIS — L40.50 PSORIATIC ARTHROPATHY (H): ICD-10-CM

## 2023-11-15 DIAGNOSIS — Z79.899 HIGH RISK MEDICATIONS (NOT ANTICOAGULANTS) LONG-TERM USE: ICD-10-CM

## 2023-11-15 LAB
ALBUMIN SERPL BCG-MCNC: 4.6 G/DL (ref 3.5–5.2)
ALP SERPL-CCNC: 54 U/L (ref 40–150)
ALT SERPL W P-5'-P-CCNC: 42 U/L (ref 0–70)
AST SERPL W P-5'-P-CCNC: 32 U/L (ref 0–45)
BASOPHILS # BLD AUTO: 0 10E3/UL (ref 0–0.2)
BASOPHILS NFR BLD AUTO: 0 %
BILIRUB DIRECT SERPL-MCNC: 0.24 MG/DL (ref 0–0.3)
BILIRUB SERPL-MCNC: 1.1 MG/DL
CREAT SERPL-MCNC: 1.15 MG/DL (ref 0.67–1.17)
EGFRCR SERPLBLD CKD-EPI 2021: 68 ML/MIN/1.73M2
EOSINOPHIL # BLD AUTO: 0.1 10E3/UL (ref 0–0.7)
EOSINOPHIL NFR BLD AUTO: 3 %
ERYTHROCYTE [DISTWIDTH] IN BLOOD BY AUTOMATED COUNT: 13.3 % (ref 10–15)
HCT VFR BLD AUTO: 42.7 % (ref 40–53)
HGB BLD-MCNC: 14.8 G/DL (ref 13.3–17.7)
IMM GRANULOCYTES # BLD: 0 10E3/UL
IMM GRANULOCYTES NFR BLD: 0 %
LYMPHOCYTES # BLD AUTO: 1.4 10E3/UL (ref 0.8–5.3)
LYMPHOCYTES NFR BLD AUTO: 38 %
MCH RBC QN AUTO: 33.7 PG (ref 26.5–33)
MCHC RBC AUTO-ENTMCNC: 34.7 G/DL (ref 31.5–36.5)
MCV RBC AUTO: 97 FL (ref 78–100)
MONOCYTES # BLD AUTO: 0.1 10E3/UL (ref 0–1.3)
MONOCYTES NFR BLD AUTO: 4 %
NEUTROPHILS # BLD AUTO: 2 10E3/UL (ref 1.6–8.3)
NEUTROPHILS NFR BLD AUTO: 55 %
PLATELET # BLD AUTO: 136 10E3/UL (ref 150–450)
PROT SERPL-MCNC: 6.5 G/DL (ref 6.4–8.3)
RBC # BLD AUTO: 4.39 10E6/UL (ref 4.4–5.9)
WBC # BLD AUTO: 3.6 10E3/UL (ref 4–11)

## 2023-11-15 PROCEDURE — G0008 ADMIN INFLUENZA VIRUS VAC: HCPCS

## 2023-11-15 PROCEDURE — 80076 HEPATIC FUNCTION PANEL: CPT

## 2023-11-15 PROCEDURE — 85025 COMPLETE CBC W/AUTO DIFF WBC: CPT

## 2023-11-15 PROCEDURE — 82565 ASSAY OF CREATININE: CPT

## 2023-11-15 PROCEDURE — 36415 COLL VENOUS BLD VENIPUNCTURE: CPT

## 2023-11-15 PROCEDURE — 90662 IIV NO PRSV INCREASED AG IM: CPT

## 2023-12-05 ENCOUNTER — PATIENT OUTREACH (OUTPATIENT)
Dept: GASTROENTEROLOGY | Facility: CLINIC | Age: 73
End: 2023-12-05
Payer: COMMERCIAL

## 2023-12-21 ENCOUNTER — COMPREHENSIVE EXAM (OUTPATIENT)
Dept: URBAN - METROPOLITAN AREA CLINIC 49 | Facility: LOCATION | Age: 73
End: 2023-12-21

## 2023-12-21 ENCOUNTER — TELEPHONE (OUTPATIENT)
Dept: FAMILY MEDICINE | Facility: CLINIC | Age: 73
End: 2023-12-21
Payer: COMMERCIAL

## 2023-12-21 DIAGNOSIS — H16.212: ICD-10-CM

## 2023-12-21 DIAGNOSIS — H04.123: ICD-10-CM

## 2023-12-21 DIAGNOSIS — G51.0: ICD-10-CM

## 2023-12-21 DIAGNOSIS — H53.2: ICD-10-CM

## 2023-12-21 DIAGNOSIS — H26.493: ICD-10-CM

## 2023-12-21 DIAGNOSIS — H43.813: ICD-10-CM

## 2023-12-21 DIAGNOSIS — H35.373: ICD-10-CM

## 2023-12-21 DIAGNOSIS — H40.1132: ICD-10-CM

## 2023-12-21 PROCEDURE — 92134 CPTRZ OPH DX IMG PST SGM RTA: CPT

## 2023-12-21 PROCEDURE — 99214 OFFICE O/P EST MOD 30 MIN: CPT

## 2023-12-21 PROCEDURE — 92015 DETERMINE REFRACTIVE STATE: CPT

## 2023-12-21 RX ORDER — ERYTHROMYCIN 5 MG/G: OINTMENT OPHTHALMIC EVERY EVENING

## 2023-12-21 ASSESSMENT — VISUAL ACUITY
OS_GLARE: 20/30
OD_PH: 20/25
OS_CC: 20/30+2
OS_PH: 20/25
OD_GLARE: 20/40
OD_GLARE: 20/30
OD_CC: 20/30+2
OS_GLARE: 20/50
OU_CC: 20/30+2

## 2023-12-21 ASSESSMENT — TONOMETRY
OS_IOP_MMHG: 16
OS_IOP_MMHG: 14
OD_IOP_MMHG: 15
OD_IOP_MMHG: 12

## 2023-12-21 NOTE — TELEPHONE ENCOUNTER
Patient Quality Outreach    Patient is due for the following:   Physical Annual Wellness Visit    Next Steps:   Patient has upcoming appointment, these items will be addressed at that time.    Type of outreach:    Chart review performed, no outreach needed.      Questions for provider review:    None           Una Almanza CMA

## 2023-12-26 ASSESSMENT — ENCOUNTER SYMPTOMS
DIZZINESS: 0
CHILLS: 0
NERVOUS/ANXIOUS: 0
HEMATOCHEZIA: 0
NAUSEA: 0
HEMATURIA: 0
ABDOMINAL PAIN: 0
DYSURIA: 0
FREQUENCY: 0
DIARRHEA: 0
CONSTIPATION: 0
FEVER: 0
PALPITATIONS: 0
EYE PAIN: 1
SHORTNESS OF BREATH: 1
COUGH: 0
SORE THROAT: 0
HEADACHES: 0
WEAKNESS: 0
MYALGIAS: 0
PARESTHESIAS: 0
HEARTBURN: 0
JOINT SWELLING: 0
ARTHRALGIAS: 0

## 2023-12-26 ASSESSMENT — ACTIVITIES OF DAILY LIVING (ADL): CURRENT_FUNCTION: NO ASSISTANCE NEEDED

## 2023-12-27 ENCOUNTER — OFFICE VISIT (OUTPATIENT)
Dept: FAMILY MEDICINE | Facility: CLINIC | Age: 73
End: 2023-12-27
Payer: COMMERCIAL

## 2023-12-27 VITALS
OXYGEN SATURATION: 96 % | HEIGHT: 69 IN | BODY MASS INDEX: 32.17 KG/M2 | TEMPERATURE: 97 F | HEART RATE: 68 BPM | WEIGHT: 217.2 LBS | RESPIRATION RATE: 22 BRPM | DIASTOLIC BLOOD PRESSURE: 80 MMHG | SYSTOLIC BLOOD PRESSURE: 138 MMHG

## 2023-12-27 DIAGNOSIS — R73.03 PREDIABETES: ICD-10-CM

## 2023-12-27 DIAGNOSIS — Z12.5 SCREENING FOR PROSTATE CANCER: ICD-10-CM

## 2023-12-27 DIAGNOSIS — Z13.1 SCREENING FOR DIABETES MELLITUS: ICD-10-CM

## 2023-12-27 DIAGNOSIS — Z00.00 ROUTINE HISTORY AND PHYSICAL EXAMINATION OF ADULT: Primary | ICD-10-CM

## 2023-12-27 LAB
CHOLEST SERPL-MCNC: 171 MG/DL
FASTING STATUS PATIENT QL REPORTED: ABNORMAL
HBA1C MFR BLD: 5.8 % (ref 0–5.6)
HDLC SERPL-MCNC: 52 MG/DL
LDLC SERPL CALC-MCNC: 85 MG/DL
NONHDLC SERPL-MCNC: 119 MG/DL
PSA SERPL DL<=0.01 NG/ML-MCNC: 3.54 NG/ML (ref 0–6.5)
TRIGL SERPL-MCNC: 172 MG/DL

## 2023-12-27 PROCEDURE — G0103 PSA SCREENING: HCPCS | Performed by: FAMILY MEDICINE

## 2023-12-27 PROCEDURE — 83036 HEMOGLOBIN GLYCOSYLATED A1C: CPT | Performed by: FAMILY MEDICINE

## 2023-12-27 PROCEDURE — 36415 COLL VENOUS BLD VENIPUNCTURE: CPT | Performed by: FAMILY MEDICINE

## 2023-12-27 PROCEDURE — 80061 LIPID PANEL: CPT | Performed by: FAMILY MEDICINE

## 2023-12-27 PROCEDURE — 99397 PER PM REEVAL EST PAT 65+ YR: CPT | Performed by: FAMILY MEDICINE

## 2023-12-27 RX ORDER — RESPIRATORY SYNCYTIAL VIRUS VACCINE 120MCG/0.5
0.5 KIT INTRAMUSCULAR ONCE
Qty: 1 EACH | Refills: 0 | Status: CANCELLED | OUTPATIENT
Start: 2023-12-27 | End: 2023-12-27

## 2023-12-27 ASSESSMENT — ENCOUNTER SYMPTOMS
NAUSEA: 0
DYSURIA: 0
HEARTBURN: 0
JOINT SWELLING: 0
SORE THROAT: 0
PALPITATIONS: 0
NERVOUS/ANXIOUS: 0
COUGH: 0
PARESTHESIAS: 0
ABDOMINAL PAIN: 0
HEMATOCHEZIA: 0
WEAKNESS: 0
DIZZINESS: 0
ARTHRALGIAS: 0
MYALGIAS: 0
CHILLS: 0
HEADACHES: 0
CONSTIPATION: 0
HEMATURIA: 0
SHORTNESS OF BREATH: 0
FREQUENCY: 0
EYE PAIN: 0
DIARRHEA: 0
FEVER: 0

## 2023-12-27 ASSESSMENT — ACTIVITIES OF DAILY LIVING (ADL): CURRENT_FUNCTION: NO ASSISTANCE NEEDED

## 2023-12-27 ASSESSMENT — PAIN SCALES - GENERAL: PAINLEVEL: NO PAIN (0)

## 2023-12-27 NOTE — PROGRESS NOTES
"SUBJECTIVE:   Pawan is a 73 year old, presenting for the following:  [facility] Care and Annual Visit        12/27/2023     8:04 AM   Additional Questions   Roomed by Mary MARTINEZ CMA       Are you in the first 12 months of your Medicare coverage?  No    Healthy Habits:     In general, how would you rate your overall health?  Good    Frequency of exercise:  2-3 days/week    Duration of exercise:  15-30 minutes    Do you usually eat at least 4 servings of fruit and vegetables a day, include whole grains    & fiber and avoid regularly eating high fat or \"junk\" foods?  No    Taking medications regularly:  Yes    Medication side effects:  None    Ability to successfully perform activities of daily living:  No assistance needed    Home Safety:  No safety concerns identified    Hearing Impairment:  No hearing concerns    In the past 6 months, have you been bothered by leaking of urine?  No    In general, how would you rate your overall mental or emotional health?  Good    Additional concerns today:  No      Today's PHQ-2 Score:       12/26/2023     5:10 PM   PHQ-2 ( 1999 [employer])   Q1: Little interest or pleasure in doing things 0   Q2: Feeling down, depressed or hopeless 0   PHQ-2 Score 0   Q1: Little interest or pleasure in doing things Not at all   Q2: Feeling down, depressed or hopeless Not at all   PHQ-2 Score 0       Have you ever done Advance Care Planning? (For example, a Health Directive, POLST, or a discussion with a medical provider or your loved ones about your wishes): No, advance care planning information given to patient to review.  Patient declined advance care planning discussion at this time.       Fall risk  Fallen 2 or more times in the past year?: No  Any fall with injury in the past year?: No    Cognitive Screening   1) Repeat 3 items (Leader, Season, Table)    2) Clock draw: NORMAL  3) 3 item recall: Recalls 3 objects  Results: 3 items recalled: COGNITIVE IMPAIRMENT LESS LIKELY    Mini-CogTM Copyright S " Kylie. Licensed by the author for use in [facility]; reprinted with permission (santiago@[facility]). All rights reserved.      Do you have sleep apnea, excessive snoring or daytime drowsiness? : no    Reviewed and updated as needed this visit by clinical staff   Tobacco  Allergies  Meds  Problems  Med Hx  Surg Hx  Fam Hx          Reviewed and updated as needed this visit by Provider   Tobacco  Allergies  Meds  Problems  Med Hx  Surg Hx  Fam Hx         Social History     Tobacco Use    Smoking status: Never    Smokeless tobacco: Never   Substance Use Topics    Alcohol use: Yes     Comment:  0-2 beer's or mixed drink's monthly           12/26/2023     5:04 PM   Alcohol Use   Prescreen: >3 drinks/day or >7 drinks/week? No     Do you have a current opioid prescription? No  Do you use any other controlled substances or medications that are not prescribed by a provider? None      Current providers sharing in care for this patient include:   Patient Care Team:  Ivan Diaz MD as PCP - General (Family Medicine)  Adria Lopez MD as Assigned Rheumatology Provider  Radha Gomez APRN CNP as Assigned PCP    The following health maintenance items are reviewed in Epic and correct as of today:  Health Maintenance   Topic Date Due    RSV VACCINE (Pregnancy & 60+) (1 - 1-dose 60+ series) Never done    ZOSTER IMMUNIZATION (1 of 2) 01/05/2015    AORTIC ANEURYSM SCREENING (SYSTEM ASSIGNED)  Never done    COVID-19 Vaccine (4 - 2023-24 season) 09/01/2023    MEDICARE ANNUAL WELLNESS VISIT  12/15/2023    ANNUAL REVIEW OF HM ORDERS  09/21/2024    FALL RISK ASSESSMENT  12/27/2024    COLORECTAL CANCER SCREENING  02/08/2027    LIPID  12/15/2027    DTAP/TDAP/TD IMMUNIZATION (4 - Td or Tdap) 05/20/2028    ADVANCE CARE PLANNING  12/27/2028    HEPATITIS C SCREENING  Completed    PHQ-2 (once per calendar year)  Completed    INFLUENZA VACCINE  Completed    Pneumococcal Vaccine: 65+ Years  Completed    IPV  IMMUNIZATION  Aged Out    HPV IMMUNIZATION  Aged Out    MENINGITIS IMMUNIZATION  Aged Out    RSV MONOCLONAL ANTIBODY  Aged Out     Lab work is in process  Labs reviewed in EPIC  BP Readings from Last 3 Encounters:   12/27/23 138/80   09/21/23 138/88   08/30/23 132/78    Wt Readings from Last 3 Encounters:   12/27/23 98.5 kg (217 lb 3.2 oz)   09/21/23 95.3 kg (210 lb)   08/30/23 96.2 kg (212 lb)                  Patient Active Problem List   Diagnosis    Psoriatic arthropathy (H)    Esophageal reflux    Hypertension goal BP (blood pressure) < 140/90    FAMILY HISTORY OF GI NEOPLASM    FAMILY HISTORY OF DIABETES MELLITUS    Impotence of organic origin    Chondrocalcinosis    HYPERLIPIDEMIA LDL GOAL <130    Osteoarthritis    Advanced directives, counseling/discussion    High risk medications (not anticoagulants) long-term use    ROSADO (dyspnea on exertion)    History of colonic polyps     Past Surgical History:   Procedure Laterality Date    ARTHROSCOPY KNEE RT/LT  04/01/2009    left knee    ARTHROSCOPY KNEE WITH MEDIAL MENISCECTOMY  07/09/2013    Procedure: ARTHROSCOPY KNEE WITH MEDIAL MENISCECTOMY;  Left Knee Arthroscopic and Open Repair of Patellar Tendon with Platelet Rich Plasma;  Surgeon: Ley, Jeffrey Duane, MD;  Location: [facility]    BIOPSY  9/25/2023    COLONOSCOPY  01/01/2006    diverticuli. Father had colon CA    COLONOSCOPY  01/01/2001    COLONOSCOPY  04/11/2011    Procedure:COLONOSCOPY; Surgeon:JENNIFER JEAN; Location:[facility]    COLONOSCOPY N/A 11/21/2016    Procedure: COLONOSCOPY;  Surgeon: Elias Santamaria MD;  Location: [facility]    COLONOSCOPY N/A 02/08/2022    Procedure: COLONOSCOPY;  Surgeon: Junior Brown MD;  Location: [facility]    ENT SURGERY  9/25/2023    HERNIA REPAIR      REPAIR TENDON PATELLA  07/09/2013    Procedure: REPAIR TENDON PATELLA;;  Surgeon: Ley, Jeffrey Duane, MD;  Location: [facility]    SURGICAL HISTORY OF -   01/01/1999    (L) Herniorrhaphy    SURGICAL HISTORY OF -   01/01/2003    (R)  Herniorrhaphy    SURGICAL HISTORY OF -   01/01/1981    Hemorrohid       Social History     Tobacco Use    Smoking status: Never    Smokeless tobacco: Never   Substance Use Topics    Alcohol use: Yes     Comment:  0-2 beer's or mixed drink's monthly     Family History   Problem Relation Age of Onset    Hypertension Mother     Diabetes Mother     Cerebrovascular Disease Mother         in her 70s    Arthritis Mother     Cancer - colorectal Father 67    Colon Cancer Father     Arthritis Paternal Grandmother     LUNG DISEASE Sister     Other - See Comments Sister     Prostate Cancer No family hx of     Coronary Artery Disease No family hx of          Current Outpatient Medications   Medication Sig Dispense Refill    albuterol (PROAIR HFA/PROVENTIL HFA/VENTOLIN HFA) 108 (90 Base) MCG/ACT inhaler Inhale 2 puffs into the lungs every 4 hours as needed for shortness of breath or wheezing 18 g 11    folic acid (FOLVITE) 1 MG tablet Take 1 tablet (1 mg) by mouth daily 90 tablet 2    lisinopril (ZESTRIL) 5 MG tablet Take 1 tablet (5 mg) by mouth daily 90 tablet 3    methotrexate sodium 2.5 MG TABS Take 6 tablets (15 mg) by mouth once a week . Take 3 tablets at 9AM on Sunday and 3 tablets at 9AM on Monday.  Needs to be taken within the same 24 hour time period of each week. 78 tablet 2    MULTI-VITAMIN OR TABS Take 1 tablet by mouth daily       Probiotic Product (PROBIOTIC DAILY PO) Take 1 capsule by mouth daily       sildenafil (REVATIO) 20 MG tablet Take as many pills as needed up to maximum of 5 pills at a time prior to intercourse. 50 tablet 11    simvastatin (ZOCOR) 40 MG tablet Take 1 tablet (40 mg) by mouth At Bedtime 90 tablet 3    tamsulosin (FLOMAX) 0.4 MG capsule TAKE ONE CAPSULE BY MOUTH ONCE DAILY 90 capsule 3    triamcinolone (KENALOG) 0.5 % external ointment Apply 1 g topically 2 times daily 60 g 0     Allergies   Allergen Reactions    Acetaminophen-Codeine Nausea     Recent Labs   Lab Test 11/15/23  1027  "08/15/23  1016 05/23/23  1339 02/16/23  1141 12/15/22  0952 12/01/22  0807 12/10/21  1603 11/20/21  0901 09/17/21  1052 06/11/21  1014 02/26/21  1012 11/16/20  0903   A1C  --   --   --   --  5.7*  --   --   --   --   --   --   --    LDL  --   --   --   --  81  --   --  69  --   --   --  76   HDL  --   --   --   --  61  --   --  60  --   --   --  62   TRIG  --   --   --   --  113  --   --  150*  --   --   --  194*   ALT 42 45 61*   < >  --  41   < >  --    < > 41 58 42   CR 1.15 1.04 1.09   < >  --  1.07   < > 1.06   < > 1.07 0.96 1.06  1.05   GFRESTIMATED 68 77 73   < >  --  75   < > 71   < > 70 81 71  72   GFRESTBLACK  --   --   --   --   --   --   --   --   --  81 >90 83  84   POTASSIUM  --   --   --   --   --  4.0  --  3.9  --   --   --  4.0    < > = values in this interval not displayed.          Review of Systems   Constitutional:  Negative for chills and fever.   HENT:  Negative for congestion, ear pain, hearing loss and sore throat.    Eyes:  Negative for pain and visual disturbance.   Respiratory:  Negative for cough and shortness of breath.    Cardiovascular:  Negative for chest pain, palpitations and peripheral edema.   Gastrointestinal:  Negative for abdominal pain, constipation, diarrhea, heartburn, hematochezia and nausea.   Endocrine: Negative for cold intolerance and heat intolerance.   Genitourinary:  Negative for dysuria, frequency, genital sores, hematuria, impotence, penile discharge and urgency.   Musculoskeletal:  Negative for arthralgias, joint swelling and myalgias.   Skin:  Negative for rash.   Allergic/Immunologic: Negative for environmental allergies and food allergies.   Neurological:  Negative for dizziness, weakness, headaches and paresthesias.   Psychiatric/Behavioral:  Negative for mood changes. The patient is not nervous/anxious.          OBJECTIVE:   /80   Pulse 68   Temp 97  F (36.1  C) (Tympanic)   Resp 22   Ht 1.759 m (5' 9.25\")   Wt 98.5 kg (217 lb 3.2 oz)   SpO2 " "96%   BMI 31.84 kg/m   Estimated body mass index is 31.84 kg/m  as calculated from the following:    Height as of this encounter: 1.759 m (5' 9.25\").    Weight as of this encounter: 98.5 kg (217 lb 3.2 oz).  Physical Exam  GENERAL: alert and no distress  EYES: Eyes grossly normal to inspection, PERRL and conjunctivae and sclerae normal  HENT: normal cephalic/atraumatic, nose and mouth without ulcers or lesions, oropharynx clear, and oral mucous membranes moist  NECK: no adenopathy, no asymmetry, masses, or scars and thyroid normal to palpation  RESP: lungs clear to auscultation - no rales, rhonchi or wheezes  CV: regular rate and rhythm, normal S1 S2, no S3 or S4, no murmur, click or rub, no peripheral edema and peripheral pulses strong  ABDOMEN: soft, nontender, no hepatosplenomegaly, no masses and bowel sounds normal  MS: no gross musculoskeletal defects noted, no edema  SKIN: no suspicious lesions or rashes  NEURO: Normal strength and tone, mentation intact and speech normal  PSYCH: mentation appears normal, affect normal/bright      Wt Readings from Last 10 Encounters:   12/27/23 98.5 kg (217 lb 3.2 oz)   09/21/23 95.3 kg (210 lb)   08/30/23 96.2 kg (212 lb)   08/17/23 97.8 kg (215 lb 9.6 oz)   06/29/23 98 kg (216 lb)   06/12/23 98 kg (216 lb)   02/16/23 98.3 kg (216 lb 12.8 oz)   12/15/22 95.3 kg (210 lb)   10/14/22 96.5 kg (212 lb 12.8 oz)   06/17/22 95.4 kg (210 lb 6.4 oz)        ASSESSMENT / PLAN:   (Z00.00) Routine history and physical examination of adult  (primary encounter diagnosis)  Comment: Medically doing well.  Medications reviewed and no changes made.  Suggested regular exercise, healthy diet and weight loss.  Patient will schedule appointment for routine vaccines with her wife later  Plan: Lipid panel            (Z13.1) Screening for diabetes mellitus  Comment:   Plan: Hemoglobin A1c            (Z12.5) Screening for prostate cancer  Comment:   Plan: PSA, screen            (R73.03) " "Prediabetes  Comment:   Plan: Hemoglobin A1c          COUNSELING:  Reviewed preventive health counseling, as reflected in patient instructions      BMI:   Estimated body mass index is 31.84 kg/m  as calculated from the following:    Height as of this encounter: 1.759 m (5' 9.25\").    Weight as of this encounter: 98.5 kg (217 lb 3.2 oz).   Weight management plan: Discussed healthy diet and exercise guidelines      He reports that he has never smoked. He has never used smokeless tobacco.      Appropriate preventive services were discussed with this patient, including applicable screening as appropriate for fall prevention, nutrition, physical activity, Tobacco-use cessation, weight loss and cognition.  Checklist reviewing preventive services available has been given to the patient.    Reviewed patients plan of care and provided an AVS. The Basic Care Plan (routine screening as documented in Health Maintenance) for Pawan meets the Care Plan requirement. This Care Plan has been established and reviewed with the Patient.        Ivan Diaz MD  [facility]      "

## 2023-12-28 DIAGNOSIS — Z12.5 SCREENING FOR PROSTATE CANCER: Primary | ICD-10-CM

## 2024-01-02 DIAGNOSIS — E78.5 HYPERLIPIDEMIA LDL GOAL <130: ICD-10-CM

## 2024-01-02 NOTE — TELEPHONE ENCOUNTER
Patient calling. He is out of medication.    Preferred Pharmacy:  [facility], [place] - [address] [place] [id number]  Phone: [phone] Fax: [phone]        Could we send this information to you in [email or web address] or would you prefer to receive a phone call?:   Patient would prefer a phone call   Okay to leave a detailed message?: Yes at Home number on file [phone] (home)

## 2024-01-03 RX ORDER — SIMVASTATIN 40 MG
40 TABLET ORAL AT BEDTIME
Qty: 90 TABLET | Refills: 3 | Status: SHIPPED | OUTPATIENT
Start: 2024-01-03

## 2024-01-08 DIAGNOSIS — N40.1 BENIGN PROSTATIC HYPERPLASIA WITH NOCTURIA: ICD-10-CM

## 2024-01-08 DIAGNOSIS — R35.1 BENIGN PROSTATIC HYPERPLASIA WITH NOCTURIA: ICD-10-CM

## 2024-01-08 RX ORDER — TAMSULOSIN HYDROCHLORIDE 0.4 MG/1
CAPSULE ORAL
Qty: 90 CAPSULE | Refills: 3 | Status: SHIPPED | OUTPATIENT
Start: 2024-01-08

## 2024-02-14 ENCOUNTER — LAB (OUTPATIENT)
Dept: LAB | Facility: CLINIC | Age: 74
End: 2024-02-14
Payer: COMMERCIAL

## 2024-02-14 DIAGNOSIS — Z79.899 HIGH RISK MEDICATIONS (NOT ANTICOAGULANTS) LONG-TERM USE: ICD-10-CM

## 2024-02-14 DIAGNOSIS — L40.50 PSORIATIC ARTHROPATHY (H): ICD-10-CM

## 2024-02-14 LAB
ALBUMIN SERPL BCG-MCNC: 4.4 G/DL (ref 3.5–5.2)
ALP SERPL-CCNC: 58 U/L (ref 40–150)
ALT SERPL W P-5'-P-CCNC: 45 U/L (ref 0–70)
AST SERPL W P-5'-P-CCNC: 37 U/L (ref 0–45)
BASOPHILS # BLD AUTO: 0 10E3/UL (ref 0–0.2)
BASOPHILS NFR BLD AUTO: 0 %
BILIRUB DIRECT SERPL-MCNC: 0.23 MG/DL (ref 0–0.3)
BILIRUB SERPL-MCNC: 0.9 MG/DL
CREAT SERPL-MCNC: 1.12 MG/DL (ref 0.67–1.17)
CRP SERPL-MCNC: <3 MG/L
EGFRCR SERPLBLD CKD-EPI 2021: 70 ML/MIN/1.73M2
EOSINOPHIL # BLD AUTO: 0.1 10E3/UL (ref 0–0.7)
EOSINOPHIL NFR BLD AUTO: 3 %
ERYTHROCYTE [DISTWIDTH] IN BLOOD BY AUTOMATED COUNT: 13 % (ref 10–15)
ERYTHROCYTE [SEDIMENTATION RATE] IN BLOOD BY WESTERGREN METHOD: 5 MM/HR (ref 0–20)
HCT VFR BLD AUTO: 43.3 % (ref 40–53)
HGB BLD-MCNC: 15.2 G/DL (ref 13.3–17.7)
IMM GRANULOCYTES # BLD: 0 10E3/UL
IMM GRANULOCYTES NFR BLD: 0 %
LYMPHOCYTES # BLD AUTO: 1.4 10E3/UL (ref 0.8–5.3)
LYMPHOCYTES NFR BLD AUTO: 33 %
MCH RBC QN AUTO: 34.1 PG (ref 26.5–33)
MCHC RBC AUTO-ENTMCNC: 35.1 G/DL (ref 31.5–36.5)
MCV RBC AUTO: 97 FL (ref 78–100)
MONOCYTES # BLD AUTO: 0.2 10E3/UL (ref 0–1.3)
MONOCYTES NFR BLD AUTO: 5 %
NEUTROPHILS # BLD AUTO: 2.6 10E3/UL (ref 1.6–8.3)
NEUTROPHILS NFR BLD AUTO: 59 %
PLATELET # BLD AUTO: 144 10E3/UL (ref 150–450)
PROT SERPL-MCNC: 6.4 G/DL (ref 6.4–8.3)
RBC # BLD AUTO: 4.46 10E6/UL (ref 4.4–5.9)
WBC # BLD AUTO: 4.4 10E3/UL (ref 4–11)

## 2024-02-14 PROCEDURE — 80076 HEPATIC FUNCTION PANEL: CPT

## 2024-02-14 PROCEDURE — 86140 C-REACTIVE PROTEIN: CPT

## 2024-02-14 PROCEDURE — 82565 ASSAY OF CREATININE: CPT

## 2024-02-14 PROCEDURE — 85025 COMPLETE CBC W/AUTO DIFF WBC: CPT

## 2024-02-14 PROCEDURE — 36415 COLL VENOUS BLD VENIPUNCTURE: CPT

## 2024-02-14 PROCEDURE — 85652 RBC SED RATE AUTOMATED: CPT

## 2024-02-20 DIAGNOSIS — I10 ESSENTIAL HYPERTENSION WITH GOAL BLOOD PRESSURE LESS THAN 140/90: ICD-10-CM

## 2024-02-21 RX ORDER — LISINOPRIL 5 MG/1
5 TABLET ORAL DAILY
Qty: 90 TABLET | Refills: 3 | Status: SHIPPED | OUTPATIENT
Start: 2024-02-21

## 2024-02-21 NOTE — TELEPHONE ENCOUNTER
Requested Prescriptions   Pending Prescriptions Disp Refills    lisinopril (ZESTRIL) 5 MG tablet 90 tablet 3     Sig: Take 1 tablet (5 mg) by mouth daily       ACE Inhibitors (Including Combos) Protocol Failed - 2/20/2024  4:32 PM        Failed - Normal serum potassium on file in past 12 months     Recent Labs   Lab Test 12/01/22  0807   POTASSIUM 4.0             Passed - Blood pressure under 140/90 in past 12 months     BP Readings from Last 3 Encounters:   12/27/23 138/80   09/21/23 138/88   08/30/23 132/78                 Passed - Medication is active on med list        Passed - Medication indicated for associated diagnosis     Medication is associated with one or more of the following diagnoses:     Chronic Kidney Disease (CKD)   Coronary Artery Disease (CAD)   Diabetes   Heart Failure (HF)   Hypertension (HTN)   Nephropathy            Passed - Has GFR on file in past 12 months and most recent value is normal        Passed - Recent (12 mo) or future (90 days) visit within the authorizing provider's specialty     The patient must have completed an in-person or virtual visit within the past 12 months or has a future visit scheduled within the next 90 days with the authorizing provider s specialty.  Urgent care and e-visits do not quality as an office visit for this protocol.          Passed - Patient is age 18 or older        Passed - Normal serum creatinine on file in past 12 months     Recent Labs   Lab Test 02/14/24  1009   CR 1.12       Ok to refill medication if creatinine is low

## 2024-02-22 ENCOUNTER — OFFICE VISIT (OUTPATIENT)
Dept: RHEUMATOLOGY | Facility: CLINIC | Age: 74
End: 2024-02-22
Payer: COMMERCIAL

## 2024-02-22 VITALS
DIASTOLIC BLOOD PRESSURE: 93 MMHG | BODY MASS INDEX: 31.08 KG/M2 | OXYGEN SATURATION: 99 % | SYSTOLIC BLOOD PRESSURE: 164 MMHG | HEART RATE: 92 BPM | WEIGHT: 212 LBS

## 2024-02-22 DIAGNOSIS — L40.50 PSORIATIC ARTHROPATHY (H): Primary | ICD-10-CM

## 2024-02-22 DIAGNOSIS — G89.29 CHRONIC PAIN OF LEFT KNEE: ICD-10-CM

## 2024-02-22 DIAGNOSIS — M25.562 CHRONIC PAIN OF LEFT KNEE: ICD-10-CM

## 2024-02-22 DIAGNOSIS — Z79.899 HIGH RISK MEDICATIONS (NOT ANTICOAGULANTS) LONG-TERM USE: ICD-10-CM

## 2024-02-22 PROCEDURE — 90677 PCV20 VACCINE IM: CPT | Performed by: INTERNAL MEDICINE

## 2024-02-22 PROCEDURE — G0009 ADMIN PNEUMOCOCCAL VACCINE: HCPCS | Performed by: INTERNAL MEDICINE

## 2024-02-22 PROCEDURE — 99214 OFFICE O/P EST MOD 30 MIN: CPT | Mod: 25 | Performed by: INTERNAL MEDICINE

## 2024-02-22 RX ORDER — METHOTREXATE 2.5 MG/1
15 TABLET ORAL WEEKLY
Qty: 78 TABLET | Refills: 2 | Status: SHIPPED | OUTPATIENT
Start: 2024-02-22 | End: 2024-08-28

## 2024-02-22 RX ORDER — FOLIC ACID 1 MG/1
1 TABLET ORAL DAILY
Qty: 90 TABLET | Refills: 2 | Status: SHIPPED | OUTPATIENT
Start: 2024-02-22 | End: 2024-08-28

## 2024-02-22 NOTE — PROGRESS NOTES
Rheumatology Clinic Visit      Pawan Bullard MRN# [id number]   YOB: 1950 Age: 74 year old      Date of visit: 2/22/24   PCP: Dr. Armando Finley    Chief Complaint   Patient presents with:  RECHECK    Assessment and Plan     1.  Psoriatic arthritis:  Previously on SSZ (GI upset).  Currently on methotrexate 15 mg once weekly (GI upset with 25 mg once weekly; cytopenias).  Psoriatic arthritis controlled.  No synovitis on exam today.  Chronic illness, stable.    - Continue methotrexate 15mg once every 7 days (split dose within a 24-hour period)  - Continue folic acid 1 mg daily  - Labs in 3 months: CBC, Creatinine, Hepatic Panel  - Labs in 6 months: CBC, Creatinine, Hepatic Panel, ESR, CRP                Rapid 3, cumulative scores                      02/22/2024:  7.3                      02/16/2023:  7    (MTX 15mg wkly)                      10/14/2022:  7.5 (MTX 15mg wkly)                      6/18/2021:    4.3 (MTX 20mg wkly)                      02/17/2020:  4.7 (MTX 20mg wkly)                      11/18/2019:  3.3 (MTX 20mg wkly)                      11/26/2018:  5.2 (MTX 17.5mg wkly)    High risk medication requiring intensive toxicity monitoring at least quarterly    2. Hand OA: Affecting the PIPs and DIPs. Topical diclofenac PRN.     3.  Left knee osteoarthritis: History of meniscus surgery in 2009 and 2013.  Degenerative arthritis affecting the left knee.  Encouraged physical therapy exercises for the left knee and he says that he will start doing the exercises and that he has handouts for the exercises already at home.    4.  Vaccinations: Vaccinations reviewed with Mr. Bullard.  Risks and benefits of vaccinations were discussed.    - Influenza: Encouraged yearly vaccination  - [id number]: Discussed vaccination  - Shingrix: Up-to-date  - COVID-19: Advised keeping updated    Total minutes spent in evaluation with patient, documentation, , and review of pertinent studies and chart  notes: 18  The longitudinal plan of care for the rheumatology problem(s) were addressed during this visit.  Due to added complexity of care, we will continue to support the patient and the subsequent management of this condition with ongoing continuity of care.         Mr. Bullard verbalized agreement with and understanding of the rational for the diagnosis and treatment plan.  All questions were answered to best of my ability and the patient's satisfaction. Mr. Bullard was advised to contact the clinic with any questions that may arise after the clinic visit.      Thank you for involving me in the care of the patient    Return to clinic: 6 months      HPI   Pawan Bullard is a 74 year old male with a past medical history significant for hypertension, hyperlipidemia, chondrocalcinosis, GERD, osteoarthritis, and psoriatic arthritis who presents for follow-up of psoriatic arthritis.      Today, 2/22/2024: Psoriatic arthritis controlled.  Left knee still bothers him from time to time.  When his left knee bothers him enough he takes Tylenol arthritis, approximately 1-2 times per week.  No joint swelling.  Not doing physical therapy exercises for the left knee but states that he has the exercise paperwork at home and can start doing the exercises again.    Denies fevers, chills, nausea, vomiting, constipation, diarrhea. No abdominal pain. No chest pain/pressure, palpitations, or shortness of breath. No sicca symptoms.     Tobacco: None  EtOH: 0-2 beers or mixed drinks monthly  Drugs: None    ROS   12 point review of system was completed and negative except as noted in the HPI     Active Problem List     Patient Active Problem List   Diagnosis    Psoriatic arthropathy (H)    Esophageal reflux    Hypertension goal BP (blood pressure) < 140/90    FAMILY HISTORY OF GI NEOPLASM    FAMILY HISTORY OF DIABETES MELLITUS    Impotence of organic origin    Chondrocalcinosis    HYPERLIPIDEMIA LDL GOAL <130    Osteoarthritis     Advanced directives, counseling/discussion    High risk medications (not anticoagulants) long-term use    ROSADO (dyspnea on exertion)    History of colonic polyps     Past Medical History     Past Medical History:   Diagnosis Date    Esophageal reflux     GERD    Hypertension     Psoriatic arthropathy (H)     Psoriatic arthritis     Past Surgical History     Past Surgical History:   Procedure Laterality Date    ARTHROSCOPY KNEE RT/LT  04/01/2009    left knee    ARTHROSCOPY KNEE WITH MEDIAL MENISCECTOMY  07/09/2013    Procedure: ARTHROSCOPY KNEE WITH MEDIAL MENISCECTOMY;  Left Knee Arthroscopic and Open Repair of Patellar Tendon with Platelet Rich Plasma;  Surgeon: Ley, Jeffrey Duane, MD;  Location: [facility]    BIOPSY  9/25/2023    COLONOSCOPY  01/01/2006    diverticuli. Father had colon CA    COLONOSCOPY  01/01/2001    COLONOSCOPY  04/11/2011    Procedure:COLONOSCOPY; Surgeon:JENNIFER JEAN; Location:[facility]    COLONOSCOPY N/A 11/21/2016    Procedure: COLONOSCOPY;  Surgeon: Elias Santamaria MD;  Location: [facility]    COLONOSCOPY N/A 02/08/2022    Procedure: COLONOSCOPY;  Surgeon: Junior Brown MD;  Location: [facility]    ENT SURGERY  9/25/2023    HERNIA REPAIR      REPAIR TENDON PATELLA  07/09/2013    Procedure: REPAIR TENDON PATELLA;;  Surgeon: Ley, Jeffrey Duane, MD;  Location: [facility]    SURGICAL HISTORY OF -   01/01/1999    (L) Herniorrhaphy    SURGICAL HISTORY OF -   01/01/2003    (R) Herniorrhaphy    SURGICAL HISTORY OF -   01/01/1981    Hemorrohid     Allergy     Allergies   Allergen Reactions    Acetaminophen-Codeine Nausea     Current Medication List     Current Outpatient Medications   Medication Sig    albuterol (PROAIR HFA/PROVENTIL HFA/VENTOLIN HFA) 108 (90 Base) MCG/ACT inhaler Inhale 2 puffs into the lungs every 4 hours as needed for shortness of breath or wheezing    folic acid (FOLVITE) 1 MG tablet Take 1 tablet (1 mg) by mouth daily    lisinopril (ZESTRIL) 5 MG tablet Take 1 tablet (5 mg) by mouth daily     "methotrexate sodium 2.5 MG TABS Take 6 tablets (15 mg) by mouth once a week . Take 3 tablets at 9AM on Sunday and 3 tablets at 9AM on Monday.  Needs to be taken within the same 24 hour time period of each week.    MULTI-VITAMIN OR TABS Take 1 tablet by mouth daily     Probiotic Product (PROBIOTIC DAILY PO) Take 1 capsule by mouth daily     sildenafil (REVATIO) 20 MG tablet Take as many pills as needed up to maximum of 5 pills at a time prior to intercourse.    simvastatin (ZOCOR) 40 MG tablet Take 1 tablet (40 mg) by mouth at bedtime    tamsulosin (FLOMAX) 0.4 MG capsule TAKE ONE CAPSULE BY MOUTH ONCE DAILY    triamcinolone (KENALOG) 0.5 % external ointment Apply 1 g topically 2 times daily     No current facility-administered medications for this visit.         Social History   See HPI    Family History     Family History   Problem Relation Age of Onset    Hypertension Mother     Diabetes Mother     Cerebrovascular Disease Mother         in her 70s    Arthritis Mother     Cancer - colorectal Father 67    Colon Cancer Father     Arthritis Paternal Grandmother     LUNG DISEASE Sister     Other - See Comments Sister     Prostate Cancer No family hx of     Coronary Artery Disease No family hx of        Physical Exam     Temp Readings from Last 3 Encounters:   12/27/23 97  F (36.1  C) (Tympanic)   09/21/23 97.7  F (36.5  C) (Tympanic)   08/30/23 98.6  F (37  C) (Tympanic)     BP Readings from Last 5 Encounters:   02/22/24 (!) 147/89   12/27/23 138/80   09/21/23 138/88   08/30/23 132/78   08/17/23 130/80     Pulse Readings from Last 1 Encounters:   02/22/24 74     Resp Readings from Last 1 Encounters:   12/27/23 22     Estimated body mass index is 31.08 kg/m  as calculated from the following:    Height as of 12/27/23: 1.759 m (5' 9.25\").    Weight as of this encounter: 96.2 kg (212 lb).      GEN: NAD. Healthy appearing adult.   HEENT:  Anicteric, noninjected sclera. No obvious external lesions of the ear and nose. " Hearing intact.  CV: S1, S2. RRR. No m/r/g  PULM: No increased work of breathing. CTA bilaterally   MSK: MCPs, PIPs, DIPs without swelling or tenderness to palpation.  Wrists without swelling or tenderness to palpation.  Elbows and shoulders without swelling or tenderness to palpation.   Knees, ankles, and MTPs without swelling or tenderness to palpation.    SKIN: No rash or jaundice seen  PSYCH: Alert. Appropriate.      Labs / Imaging (select studies)     CBC  Recent Labs   Lab Test 02/14/24  1009 11/15/23  1027 08/15/23  1016 09/17/21  1052 06/11/21  1014 02/26/21  1012 11/16/20  0903   WBC 4.4 3.6* 4.6   < > 5.0 4.4 5.3   RBC 4.46 4.39* 4.47   < > 4.16* 4.36* 4.71   HGB 15.2 14.8 14.8   < > 14.1 14.6 15.5   HCT 43.3 42.7 42.6   < > 40.9 41.9 44.8   MCV 97 97 95   < > 98 96 95   RDW 13.0 13.3 13.7   < > 13.4 13.0 13.7   * 136* 145*   < > 164 154 146*   MCH 34.1* 33.7* 33.1*   < > 33.9* 33.5* 32.9   MCHC 35.1 34.7 34.7   < > 34.5 34.8 34.6   NEUTROPHIL 59 55 57   < > 64.5 64.1 73.8   LYMPH 33 38 35   < > 23.6 28.6 16.2   MONOCYTE 5 4 5   < > 9.3 4.6 7.9   EOSINOPHIL 3 3 3   < > 2.4 2.5 1.9   BASOPHIL 0 0 0   < > 0.2 0.2 0.2   ANEU  --   --   --   --  3.2 2.8 3.9   ALYM  --   --   --   --  1.2 1.3 0.9   ERENDIRA  --   --   --   --  0.5 0.2 0.4   AEOS  --   --   --   --  0.1 0.1 0.1   ABAS  --   --   --   --  0.0 0.0 0.0   ANEUTAUTO 2.6 2.0 2.6   < >  --   --   --    ALYMPAUTO 1.4 1.4 1.6   < >  --   --   --    AMONOAUTO 0.2 0.1 0.3   < >  --   --   --    AEOSAUTO 0.1 0.1 0.1   < >  --   --   --    ABSBASO 0.0 0.0 0.0   < >  --   --   --     < > = values in this interval not displayed.     CMP  Recent Labs   Lab Test 02/14/24  1009 11/15/23  1027 08/15/23  1016 02/16/23  1141 12/15/22  0952 12/01/22  0807 12/10/21  1603 11/20/21  0901 09/17/21  1052 06/11/21  1014 02/26/21  1012 11/16/20  0903 02/10/20  1028 02/07/20  1428   NA  --   --   --   --   --   --   --  140  --   --   --  138  --  139   POTASSIUM  --   --    --   --   --  4.0  --  3.9  --   --   --  4.0  --  4.3   CHLORIDE  --   --   --   --   --   --   --  107  --   --   --  105  --  107   CO2  --   --   --   --   --   --   --  30  --   --   --  27  --  28   ANIONGAP  --   --   --   --   --   --   --  3  --   --   --  6  --  4   GLC  --   --   --   --  120*  --   --  110*  --   --   --  107*  --  111*   BUN  --   --   --   --   --   --   --  13  --   --   --  13  --  15   CR 1.12 1.15 1.04   < >  --  1.07   < > 1.06   < > 1.07 0.96 1.06  1.05   < > 0.99   GFRESTIMATED 70 68 77   < >  --  75   < > 71   < > 70 81 71  72   < > 78   GFRESTBLACK  --   --   --   --   --   --   --   --   --  81 >90 83  84   < > >90   JOHN  --   --   --   --   --   --   --  9.0  --   --   --  9.5  --  9.1   BILITOTAL 0.9 1.1 0.9   < >  --  1.1   < >  --    < > 0.7 0.8 1.0   < >  --    ALBUMIN 4.4 4.6 4.4   < >  --  4.3   < >  --    < > 3.6 3.8 4.0   < >  --    PROTTOTAL 6.4 6.5 6.5   < >  --  6.4   < >  --    < > 6.4* 6.7* 6.7*   < >  --    ALKPHOS 58 54 52   < >  --  50   < >  --    < > 51 58 58   < >  --    AST 37 32 39   < >  --  32   < >  --    < > 26 39 32   < >  --    ALT 45 42 45   < >  --  41   < >  --    < > 41 58 42   < >  --     < > = values in this interval not displayed.     Calcium/VitaminD  Recent Labs   Lab Test 11/20/21  0901 11/16/20  0903 02/07/20  1428   JOHN 9.0 9.5 9.1     ESR/CRP  Recent Labs   Lab Test 02/14/24  1009 08/15/23  1016 05/23/23  1339 02/16/23  1141 09/09/22  1059 06/14/22  1012 12/10/21  1603   SED 5 6 6 5   < > 6 5   CRP  --   --   --  <2.9  --  <2.9 <2.9   CRPI <3.00 <3.00 <3.00  --    < >  --   --     < > = values in this interval not displayed.     Lipid Panel  Recent Labs   Lab Test 12/27/23  0825 12/15/22  0952 11/20/21  0901   CHOL 171 165 159   TRIG 172* 113 150*   HDL 52 61 60   LDL 85 81 69   NHDL 119 104 99     Hepatitis B  Recent Labs   Lab Test 05/16/18  1016   HBCAB Nonreactive   HEPBANG Nonreactive     Hepatitis C  Recent Labs   Lab Test  08/26/16  1213   HCVAB Nonreactive   Assay performance characteristics have not been established for newborns,   infants, and children       Immunization History     Immunization History   Administered Date(s) Administered    COVID-19 Bivalent 18+ (Moderna) 11/18/2022    COVID-19 Monovalent 18+ (Moderna) 05/12/2022    COVID-19 Monovalent Booster 18+ (Moderna) 10/28/2021    COVID-19 Vaccine (Evan) 03/04/2021    Hepatitis A (ADULT 19+) 08/02/2011    Influenza (High Dose) 3 valent vaccine 10/18/2017, 09/21/2018, 11/13/2019, 10/09/2020, 11/16/2021    Influenza (IIV3) PF 11/17/2005, 12/04/2006, 11/22/2010, 09/26/2012, 10/01/2014    Influenza Vaccine 65+ (Fluzone HD) 10/09/2020, 09/17/2021, 09/09/2022, 11/15/2023    Influenza Vaccine >6 months,quad, PF 10/15/2013, 12/17/2015, 08/26/2016    Pneumo Conj 13-V (2010&after) 10/18/2017    Pneumococcal 23 valent 01/22/2018    TDAP (Adacel,Boostrix) 01/09/2013    TDAP Vaccine (Adacel) 04/22/2009, 05/20/2018    Zoster vaccine, live 11/10/2014          Chart documentation done in part with Dragon Voice recognition Software. Although reviewed after completion, some word and grammatical error may remain.    Adria Lopez MD   None

## 2024-02-22 NOTE — NURSING NOTE
RAPID3 (0-30) Cumulative Score  7.3          RAPID3 Weighted Score (divide #4 by 3 and that is the weighted score)  2.43    Mel Lnadry Certified Medical Assistant

## 2024-05-22 ENCOUNTER — LAB (OUTPATIENT)
Dept: LAB | Facility: CLINIC | Age: 74
End: 2024-05-22
Payer: COMMERCIAL

## 2024-05-22 DIAGNOSIS — L40.50 PSORIATIC ARTHROPATHY (H): ICD-10-CM

## 2024-05-22 DIAGNOSIS — Z79.899 HIGH RISK MEDICATIONS (NOT ANTICOAGULANTS) LONG-TERM USE: ICD-10-CM

## 2024-05-22 LAB
ALBUMIN SERPL BCG-MCNC: 4.3 G/DL (ref 3.5–5.2)
ALP SERPL-CCNC: 55 U/L (ref 40–150)
ALT SERPL W P-5'-P-CCNC: 51 U/L (ref 0–70)
AST SERPL W P-5'-P-CCNC: 41 U/L (ref 0–45)
BASOPHILS # BLD AUTO: 0 10E3/UL (ref 0–0.2)
BASOPHILS NFR BLD AUTO: 0 %
BILIRUB DIRECT SERPL-MCNC: 0.22 MG/DL (ref 0–0.3)
BILIRUB SERPL-MCNC: 1 MG/DL
CREAT SERPL-MCNC: 1.09 MG/DL (ref 0.67–1.17)
EGFRCR SERPLBLD CKD-EPI 2021: 72 ML/MIN/1.73M2
EOSINOPHIL # BLD AUTO: 0.1 10E3/UL (ref 0–0.7)
EOSINOPHIL NFR BLD AUTO: 3 %
ERYTHROCYTE [DISTWIDTH] IN BLOOD BY AUTOMATED COUNT: 13.3 % (ref 10–15)
HCT VFR BLD AUTO: 42.5 % (ref 40–53)
HGB BLD-MCNC: 14.6 G/DL (ref 13.3–17.7)
IMM GRANULOCYTES # BLD: 0 10E3/UL
IMM GRANULOCYTES NFR BLD: 0 %
LYMPHOCYTES # BLD AUTO: 1.2 10E3/UL (ref 0.8–5.3)
LYMPHOCYTES NFR BLD AUTO: 28 %
MCH RBC QN AUTO: 33.6 PG (ref 26.5–33)
MCHC RBC AUTO-ENTMCNC: 34.4 G/DL (ref 31.5–36.5)
MCV RBC AUTO: 98 FL (ref 78–100)
MONOCYTES # BLD AUTO: 0.2 10E3/UL (ref 0–1.3)
MONOCYTES NFR BLD AUTO: 5 %
NEUTROPHILS # BLD AUTO: 2.7 10E3/UL (ref 1.6–8.3)
NEUTROPHILS NFR BLD AUTO: 64 %
PLATELET # BLD AUTO: 132 10E3/UL (ref 150–450)
PROT SERPL-MCNC: 6.1 G/DL (ref 6.4–8.3)
RBC # BLD AUTO: 4.34 10E6/UL (ref 4.4–5.9)
WBC # BLD AUTO: 4.2 10E3/UL (ref 4–11)

## 2024-05-22 PROCEDURE — 36415 COLL VENOUS BLD VENIPUNCTURE: CPT

## 2024-05-22 PROCEDURE — 85025 COMPLETE CBC W/AUTO DIFF WBC: CPT

## 2024-05-22 PROCEDURE — 82565 ASSAY OF CREATININE: CPT

## 2024-05-22 PROCEDURE — 80076 HEPATIC FUNCTION PANEL: CPT

## 2024-05-30 ENCOUNTER — TELEPHONE (OUTPATIENT)
Dept: FAMILY MEDICINE | Facility: CLINIC | Age: 74
End: 2024-05-30
Payer: COMMERCIAL

## 2024-05-30 NOTE — LETTER
May 30, 2024            Pawan Bullard  [address] [facility] [address]    Your team at [facility] cares about your health. We have reviewed your chart and based on our findings; we are making the following recommendations to better manage your health.     You are in particular need of attention regarding the following:     HYPERTENSION FOLLOW UP: Blood pressure check with nurse    If you have already completed these items, please contact the clinic via phone or   MyChart so your care team can review and update your records. Thank you for   choosing [facility] for your healthcare needs. For any questions,   concerns, or to schedule an appointment please contact our clinic.    Healthy Regards,      Your [facility] Care Team

## 2024-05-30 NOTE — TELEPHONE ENCOUNTER
Patient Quality Outreach    Patient is due for the following:   Hypertension -  BP check    Next Steps:   Schedule a nurse only visit for bp    Type of outreach:    Phone, left message for patient/parent to call back. and Sent letter.      Questions for provider review:    None           Una Almanza CMA

## 2024-07-02 ENCOUNTER — LAB (OUTPATIENT)
Dept: LAB | Facility: CLINIC | Age: 74
End: 2024-07-02
Payer: COMMERCIAL

## 2024-07-02 DIAGNOSIS — Z12.5 SCREENING FOR PROSTATE CANCER: ICD-10-CM

## 2024-07-02 LAB — PSA SERPL DL<=0.01 NG/ML-MCNC: 1.41 NG/ML (ref 0–6.5)

## 2024-07-02 PROCEDURE — G0103 PSA SCREENING: HCPCS

## 2024-07-02 PROCEDURE — 36415 COLL VENOUS BLD VENIPUNCTURE: CPT

## 2024-07-05 ENCOUNTER — TELEPHONE (OUTPATIENT)
Dept: FAMILY MEDICINE | Facility: CLINIC | Age: 74
End: 2024-07-05
Payer: COMMERCIAL

## 2024-07-05 NOTE — TELEPHONE ENCOUNTER
Test Results    Contacts       Contact Date/Time Type Contact Phone/Fax    07/05/2024 03:44 PM CDT Phone (Incoming) Pawan Bullard (Self) [phone] (H)            Who ordered the test:  Dr Diaz    Type of test: Lab    Date of test:  7/2/2024    Where was the test performed:  [facility]    What are your questions/concerns?:  Pt looking for lab results complete on 7/2/2024 at [facility]. Pt internet is down and wants someone to call him back regarding the results.    Could we send this information to you in [employer] or would you prefer to receive a phone call?:   Patient would prefer a phone call   Okay to leave a detailed message?: Yes at Cell number on file:    Telephone Information:   Mobile [phone]

## 2024-07-07 ENCOUNTER — OFFICE VISIT (OUTPATIENT)
Dept: URGENT CARE | Facility: URGENT CARE | Age: 74
End: 2024-07-07
Payer: COMMERCIAL

## 2024-07-07 ENCOUNTER — ANCILLARY PROCEDURE (OUTPATIENT)
Dept: GENERAL RADIOLOGY | Facility: CLINIC | Age: 74
End: 2024-07-07
Attending: PHYSICIAN ASSISTANT
Payer: COMMERCIAL

## 2024-07-07 VITALS
BODY MASS INDEX: 31.81 KG/M2 | DIASTOLIC BLOOD PRESSURE: 82 MMHG | WEIGHT: 217 LBS | SYSTOLIC BLOOD PRESSURE: 132 MMHG | HEART RATE: 71 BPM | TEMPERATURE: 98.3 F | OXYGEN SATURATION: 97 % | RESPIRATION RATE: 16 BRPM

## 2024-07-07 DIAGNOSIS — Z79.899 HIGH RISK MEDICATIONS (NOT ANTICOAGULANTS) LONG-TERM USE: ICD-10-CM

## 2024-07-07 DIAGNOSIS — R05.1 ACUTE COUGH: Primary | ICD-10-CM

## 2024-07-07 DIAGNOSIS — R06.2 WHEEZING: ICD-10-CM

## 2024-07-07 DIAGNOSIS — R05.1 ACUTE COUGH: ICD-10-CM

## 2024-07-07 PROCEDURE — 71046 X-RAY EXAM CHEST 2 VIEWS: CPT | Mod: TC | Performed by: RADIOLOGY

## 2024-07-07 PROCEDURE — 99214 OFFICE O/P EST MOD 30 MIN: CPT | Performed by: PHYSICIAN ASSISTANT

## 2024-07-07 RX ORDER — ALBUTEROL SULFATE 90 UG/1
2 AEROSOL, METERED RESPIRATORY (INHALATION) EVERY 4 HOURS PRN
Qty: 18 G | Refills: 0 | Status: SHIPPED | OUTPATIENT
Start: 2024-07-07

## 2024-07-07 RX ORDER — BENZONATATE 200 MG/1
200 CAPSULE ORAL 3 TIMES DAILY PRN
Qty: 30 CAPSULE | Refills: 0 | Status: SHIPPED | OUTPATIENT
Start: 2024-07-07 | End: 2024-07-29

## 2024-07-07 RX ORDER — PREDNISONE 20 MG/1
40 TABLET ORAL DAILY
Qty: 10 TABLET | Refills: 0 | Status: SHIPPED | OUTPATIENT
Start: 2024-07-07 | End: 2024-07-12

## 2024-07-07 ASSESSMENT — ENCOUNTER SYMPTOMS
CARDIOVASCULAR NEGATIVE: 1
FREQUENCY: 0
MUSCULOSKELETAL NEGATIVE: 1
SINUS PRESSURE: 0
SORE THROAT: 0
VOMITING: 0
FEVER: 0
PALPITATIONS: 0
CONSTITUTIONAL NEGATIVE: 1
NEUROLOGICAL NEGATIVE: 1
NAUSEA: 0
CHEST TIGHTNESS: 0
DYSURIA: 0
GASTROINTESTINAL NEGATIVE: 1
ALLERGIC/IMMUNOLOGIC NEGATIVE: 1
SINUS PAIN: 0
ABDOMINAL PAIN: 0
HEADACHES: 0
SHORTNESS OF BREATH: 0
WHEEZING: 1
MYALGIAS: 0
CHILLS: 0
COUGH: 1
DIARRHEA: 0
HEMATURIA: 0

## 2024-07-07 NOTE — PROGRESS NOTES
Chief Complaint:     Chief Complaint   Patient presents with    Cough     X 2.5 weeks, headache when coughing hard, throttling cough, can't sleep laying down due to cough       No results found for any visits on 07/07/24.    Medical Decision Making:    Vital signs reviewed by Blue Quezada PA-C  /82   Pulse 71   Temp 98.3  F (36.8  C) (Tympanic)   Resp 16   Wt 98.4 kg (217 lb)   SpO2 97%   BMI 31.81 kg/m      Differential Diagnosis:  URI Adult/Peds:  Bronchitis-viral, Pneumonia, and Viral upper respiratory illness        ASSESSMENT    1. Acute cough    2. Wheezing    3. High risk medications (not anticoagulants) long-term use        PLAN    Patient is in no acute distress.    Temp is 98.3 in clinic today, lung sounds were clear, and O2 sats at 97% on RA.    CXR was negative for any acute infiltrate or consolidation.  No acute changes when compared to 6/12/2023 per my read.    Rx for Albuterol inhaler sent in.  Rx for Prednisone for wheezing.  Rest, Push fluids, vaporizer, elevation of head of bed.  Ibuprofen and or Tylenol for any fever or body aches.  Rx for Tessalon cough suppressant- PRN- as discussed.   If symptoms worsen, recheck immediately otherwise follow up with your PCP in 1 week if symptoms are not improving as Lisinopril use can not be ruled out.  Worrisome symptoms discussed with instructions to go to the ED.  Patient verbalized understanding and agreed with this plan.    Labs:    No results found for any visits on 07/07/24.     Vital signs reviewed by Blue Quezada PA-C  /82   Pulse 71   Temp 98.3  F (36.8  C) (Tympanic)   Resp 16   Wt 98.4 kg (217 lb)   SpO2 97%   BMI 31.81 kg/m      Current Meds      Current Outpatient Medications:     albuterol (PROAIR HFA/PROVENTIL HFA/VENTOLIN HFA) 108 (90 Base) MCG/ACT inhaler, Inhale 2 puffs into the lungs every 4 hours as needed for shortness of breath, wheezing or cough, Disp: 18 g, Rfl: 0    albuterol (PROAIR HFA/PROVENTIL  HFA/VENTOLIN HFA) 108 (90 Base) MCG/ACT inhaler, Inhale 2 puffs into the lungs every 4 hours as needed for shortness of breath or wheezing, Disp: 18 g, Rfl: 11    benzonatate (TESSALON) 200 MG capsule, Take 1 capsule (200 mg) by mouth 3 times daily as needed for cough, Disp: 30 capsule, Rfl: 0    folic acid (FOLVITE) 1 MG tablet, Take 1 tablet (1 mg) by mouth daily, Disp: 90 tablet, Rfl: 2    lisinopril (ZESTRIL) 5 MG tablet, Take 1 tablet (5 mg) by mouth daily, Disp: 90 tablet, Rfl: 3    methotrexate 2.5 MG tablet, Take 6 tablets (15 mg) by mouth once a week . This is taken as 3 tablets at 9AM on Sunday and 3 tablets at 9AM on Monday.  Needs to be taken within the same 24 hour time period of each week., Disp: 78 tablet, Rfl: 2    MULTI-VITAMIN OR TABS, Take 1 tablet by mouth daily , Disp: , Rfl:     predniSONE (DELTASONE) 20 MG tablet, Take 2 tablets (40 mg) by mouth daily for 5 days, Disp: 10 tablet, Rfl: 0    Probiotic Product (PROBIOTIC DAILY PO), Take 1 capsule by mouth daily , Disp: , Rfl:     sildenafil (REVATIO) 20 MG tablet, Take as many pills as needed up to maximum of 5 pills at a time prior to intercourse., Disp: 50 tablet, Rfl: 11    simvastatin (ZOCOR) 40 MG tablet, Take 1 tablet (40 mg) by mouth at bedtime, Disp: 90 tablet, Rfl: 3    tamsulosin (FLOMAX) 0.4 MG capsule, TAKE ONE CAPSULE BY MOUTH ONCE DAILY, Disp: 90 capsule, Rfl: 3    triamcinolone (KENALOG) 0.5 % external ointment, Apply 1 g topically 2 times daily, Disp: 60 g, Rfl: 0      Respiratory History    occasional episodes of bronchitis      SUBJECTIVE    HPI: Pawan Bullard is an 74 year old male who presents with chest congestion and cough nonproductive, occasional, and some wheezing.  Patient has a Hx of Psoriatic arthritis with Methotrexate use. Symptoms began 3  weeks ago and has unchanged.  There is no shortness of breath, or chest pain.  Patient is eating and drinking well.  No fever, nausea, vomiting, or diarrhea.    Patient  denies any recent travel or exposure to known COVID positive tested individual.      ROS:     Review of Systems   Constitutional: Negative.  Negative for chills and fever.   HENT:  Positive for congestion. Negative for ear discharge, ear pain, sinus pressure, sinus pain and sore throat.    Respiratory:  Positive for cough and wheezing. Negative for chest tightness and shortness of breath.    Cardiovascular: Negative.  Negative for chest pain and palpitations.   Gastrointestinal: Negative.  Negative for abdominal pain, diarrhea, nausea and vomiting.   Genitourinary:  Negative for dysuria, frequency, hematuria and urgency.   Musculoskeletal: Negative.  Negative for myalgias.   Skin:  Negative for rash.   Allergic/Immunologic: Negative.  Negative for immunocompromised state.   Neurological: Negative.  Negative for headaches.         Family History   Family History   Problem Relation Age of Onset    Hypertension Mother     Diabetes Mother     Cerebrovascular Disease Mother         in her 70s    Arthritis Mother     Cancer - colorectal Father 67    Colon Cancer Father     Arthritis Paternal Grandmother     LUNG DISEASE Sister     Other - See Comments Sister     Prostate Cancer No family hx of     Coronary Artery Disease No family hx of         Problem history  Patient Active Problem List   Diagnosis    Psoriatic arthropathy (H)    Esophageal reflux    Hypertension goal BP (blood pressure) < 140/90    FAMILY HISTORY OF GI NEOPLASM    FAMILY HISTORY OF DIABETES MELLITUS    Impotence of organic origin    Chondrocalcinosis    HYPERLIPIDEMIA LDL GOAL <130    Osteoarthritis    High risk medications (not anticoagulants) long-term use    ROSADO (dyspnea on exertion)    History of colonic polyps        Allergies  Allergies   Allergen Reactions    Acetaminophen-Codeine Nausea        Social History  Social History     Socioeconomic History    Marital status:      Spouse name: Parris    Number of children: 4    Years of  education: 12    Highest education level: Not on file   Occupational History    Occupation:    Tobacco Use    Smoking status: Never    Smokeless tobacco: Never   Vaping Use    Vaping status: Never Used   Substance and Sexual Activity    Alcohol use: Yes     Comment:  0-2 beer's or mixed drink's monthly    Drug use: No    Sexual activity: Yes     Partners: Female     Birth control/protection: None   Other Topics Concern    Parent/sibling w/ CABG, MI or angioplasty before 65F 55M? No     Service No    Blood Transfusions No    Caffeine Concern No    Occupational Exposure No    Hobby Hazards No    Sleep Concern No    Stress Concern No    Weight Concern No    Special Diet No    Back Care No    Exercise Yes     Comment: work    Bike Helmet Not Asked    Seat Belt Yes    Self-Exams Yes   Social History Narrative    Not on file     Social Determinants of Health     Financial Resource Strain: Low Risk  (12/26/2023)    Financial Resource Strain     Within the past 12 months, have you or your family members you live with been unable to get utilities (heat, electricity) when it was really needed?: No   Food Insecurity: Low Risk  (12/26/2023)    Food Insecurity     Within the past 12 months, did you worry that your food would run out before you got money to buy more?: No     Within the past 12 months, did the food you bought just not last and you didn t have money to get more?: No   Transportation Needs: Low Risk  (12/26/2023)    Transportation Needs     Within the past 12 months, has lack of transportation kept you from medical appointments, getting your medicines, non-medical meetings or appointments, work, or from getting things that you need?: No   Physical Activity: Not on file   Stress: Not on file   Social Connections: Not on file   Interpersonal Safety: Low Risk  (12/27/2023)    Interpersonal Safety     Do you feel physically and emotionally safe where you currently live?: Yes     Within the past  12 months, have you been hit, slapped, kicked or otherwise physically hurt by someone?: No     Within the past 12 months, have you been humiliated or emotionally abused in other ways by your partner or ex-partner?: No   Housing Stability: Low Risk  (12/26/2023)    Housing Stability     Do you have housing? : Yes     Are you worried about losing your housing?: No        OBJECTIVE     Vital signs reviewed by Blue Quezada PA-C  /82   Pulse 71   Temp 98.3  F (36.8  C) (Tympanic)   Resp 16   Wt 98.4 kg (217 lb)   SpO2 97%   BMI 31.81 kg/m       Physical Exam  Vitals reviewed.   Constitutional:       General: He is not in acute distress.     Appearance: He is well-developed. He is not ill-appearing, toxic-appearing or diaphoretic.   HENT:      Head: Normocephalic and atraumatic.      Right Ear: Hearing, tympanic membrane, ear canal and external ear normal. No drainage, swelling or tenderness. Tympanic membrane is not perforated, erythematous, retracted or bulging.      Left Ear: Hearing, tympanic membrane, ear canal and external ear normal. No drainage, swelling or tenderness. Tympanic membrane is not perforated, erythematous, retracted or bulging.      Nose: Congestion and rhinorrhea present. No nasal tenderness or mucosal edema.      Right Turbinates: Not enlarged or swollen.      Left Turbinates: Not enlarged or swollen.      Right Sinus: No maxillary sinus tenderness or frontal sinus tenderness.      Left Sinus: No maxillary sinus tenderness or frontal sinus tenderness.      Mouth/Throat:      Pharynx: No pharyngeal swelling, oropharyngeal exudate, posterior oropharyngeal erythema or uvula swelling.      Tonsils: No tonsillar exudate. 0 on the right. 0 on the left.   Eyes:      General: Lids are normal.         Right eye: No discharge.         Left eye: No discharge.      Conjunctiva/sclera: Conjunctivae normal.      Right eye: Right conjunctiva is not injected. No exudate.     Left eye: Left  conjunctiva is not injected. No exudate.     Pupils: Pupils are equal, round, and reactive to light.   Cardiovascular:      Rate and Rhythm: Normal rate and regular rhythm.      Heart sounds: Normal heart sounds. No murmur heard.     No friction rub. No gallop.   Pulmonary:      Effort: Pulmonary effort is normal. No accessory muscle usage, respiratory distress or retractions.      Breath sounds: Normal breath sounds and air entry. No stridor, decreased air movement or transmitted upper airway sounds. No decreased breath sounds, wheezing, rhonchi or rales.   Chest:      Chest wall: No tenderness.   Abdominal:      General: Bowel sounds are normal. There is no distension.      Palpations: Abdomen is soft. Abdomen is not rigid. There is no mass.      Tenderness: There is no abdominal tenderness. There is no guarding or rebound.   Musculoskeletal:         General: Normal range of motion.      Cervical back: Normal range of motion and neck supple.   Lymphadenopathy:      Head:      Right side of head: No submental, submandibular, tonsillar, preauricular or posterior auricular adenopathy.      Left side of head: No submental, submandibular, tonsillar, preauricular or posterior auricular adenopathy.      Cervical:      Right cervical: No superficial or posterior cervical adenopathy.     Left cervical: No superficial or posterior cervical adenopathy.   Skin:     General: Skin is warm.      Capillary Refill: Capillary refill takes less than 2 seconds.   Neurological:      Mental Status: He is alert and oriented to person, place, and time.      Cranial Nerves: No cranial nerve deficit.      Sensory: No sensory deficit.      Motor: No abnormal muscle tone.      Coordination: Coordination normal.      Deep Tendon Reflexes: Reflexes normal.   Psychiatric:         Behavior: Behavior normal. Behavior is cooperative.         Thought Content: Thought content normal.         Judgment: Judgment normal.           Blue Quezada PA-C   7/7/2024, 1:09 PM

## 2024-07-15 ENCOUNTER — OFFICE VISIT (OUTPATIENT)
Dept: FAMILY MEDICINE | Facility: CLINIC | Age: 74
End: 2024-07-15
Payer: COMMERCIAL

## 2024-07-15 VITALS
DIASTOLIC BLOOD PRESSURE: 80 MMHG | TEMPERATURE: 97.7 F | HEIGHT: 69 IN | SYSTOLIC BLOOD PRESSURE: 130 MMHG | HEART RATE: 78 BPM | WEIGHT: 216 LBS | BODY MASS INDEX: 31.99 KG/M2 | OXYGEN SATURATION: 98 % | RESPIRATION RATE: 20 BRPM

## 2024-07-15 DIAGNOSIS — R05.2 SUBACUTE COUGH: Primary | ICD-10-CM

## 2024-07-15 DIAGNOSIS — L40.50 PSORIATIC ARTHROPATHY (H): ICD-10-CM

## 2024-07-15 DIAGNOSIS — Z79.899 HIGH RISK MEDICATIONS (NOT ANTICOAGULANTS) LONG-TERM USE: ICD-10-CM

## 2024-07-15 PROCEDURE — 99214 OFFICE O/P EST MOD 30 MIN: CPT | Performed by: FAMILY MEDICINE

## 2024-07-15 RX ORDER — PANTOPRAZOLE SODIUM 40 MG/1
40 TABLET, DELAYED RELEASE ORAL DAILY
Qty: 14 TABLET | Refills: 0 | Status: SHIPPED | OUTPATIENT
Start: 2024-07-15 | End: 2024-07-29

## 2024-07-15 RX ORDER — AZITHROMYCIN 250 MG/1
TABLET, FILM COATED ORAL
Qty: 6 TABLET | Refills: 0 | Status: SHIPPED | OUTPATIENT
Start: 2024-07-15 | End: 2024-07-20

## 2024-07-15 ASSESSMENT — PAIN SCALES - GENERAL: PAINLEVEL: NO PAIN (0)

## 2024-07-15 NOTE — PROGRESS NOTES
"  Assessment & Plan     Subacute cough  Psoriatic arthropathy  High-risk medications long-term use  74-year-old male presented with lingering cough which she has been experiencing for last 3 weeks.  No other psoriatic arthropathy, on methotrexate.  No fever, chills, chest pain, shortness of breath or other relevant systemic symptoms.  Patient was treated with prednisone and Tessalon in urgent care recently, chest x-ray was unremarkable.  Patient did stop methotrexate for last 2 weeks as well.  Physical examination overall unremarkable.  Differentials discussed in detail including but not limited to infectious etiology and GERD.  Azithromycin and pantoprazole prescribed.  Suggested well hydration, warm fluids and to follow-up in 2 weeks or earlier if needed, will consider specialty consult and switching lisinopril if symptoms persist.  Patient understood and in agreement with the plan.  All questions answered.  - azithromycin (ZITHROMAX) 250 MG tablet; Take 2 tablets (500 mg) by mouth daily for 1 day, THEN 1 tablet (250 mg) daily for 4 days.  - pantoprazole (PROTONIX) 40 MG EC tablet; Take 1 tablet (40 mg) by mouth daily      Alina Villalta is a 74 year old, presenting for the following health issues:  UC Follow-Up    History of Present Illness         ED/UC Followup:  Facility:  [facility]   Date of visit: 7/7/24  Reason for visit: Cough   Current Status: was thinking he was getting better, but now coughing again. Coughing up phlegm.  Finished prednisone. When he coughs he gets a pain in his forehead       Review of Systems  Constitutional, neuro, ENT, endocrine, pulmonary, cardiac, gastrointestinal, genitourinary, musculoskeletal, integument and psychiatric systems are negative, except as otherwise noted.      Objective    /80 (Cuff Size: Adult Large)   Pulse 78   Temp 97.7  F (36.5  C) (Tympanic)   Resp 20   Ht 1.759 m (5' 9.25\")   Wt 98 kg (216 lb)   SpO2 98%   BMI 31.67 kg/m    Body mass " index is 31.67 kg/m .  Physical Exam   GENERAL: alert and no distress  EYES: Eyes grossly normal to inspection, PERRL and conjunctivae and sclerae normal  HENT: normal cephalic/atraumatic, nose and mouth without ulcers or lesions, oropharynx clear, and oral mucous membranes moist  NECK: no adenopathy, no asymmetry, masses, or scars  RESP: lungs clear to auscultation - no rales, rhonchi or wheezes  CV: regular rate and rhythm, normal S1 S2, no S3 or S4, no murmur, click or rub, no peripheral edema  ABDOMEN: soft, nontender and no organomegaly or masses  MS: no gross musculoskeletal defects noted, no edema  NEURO: Normal strength and tone, mentation intact and speech normal        Signed Electronically by: Ivan Diaz MD

## 2024-07-15 NOTE — NURSING NOTE
"Chief Complaint   Patient presents with    UC Follow-Up     /80 (Cuff Size: Adult Large)   Pulse 78   Temp 97.7  F (36.5  C) (Tympanic)   Resp 20   Ht 1.759 m (5' 9.25\")   Wt 98 kg (216 lb)   SpO2 98%   BMI 31.67 kg/m   Estimated body mass index is 31.67 kg/m  as calculated from the following:    Height as of this encounter: 1.759 m (5' 9.25\").    Weight as of this encounter: 98 kg (216 lb).  Patient presents to the clinic using No DME      Health Maintenance that is potentially due pending provider review:    Health Maintenance Due   Topic Date Due    RSV VACCINE (Pregnancy & 60+) (1 - 1-dose 60+ series) Never done    ZOSTER IMMUNIZATION (1 of 2) 01/05/2015    COVID-19 Vaccine (5 - 2023-24 season) 03/01/2024                  "

## 2024-07-18 ENCOUNTER — FOLLOW UP (OUTPATIENT)
Dept: URBAN - METROPOLITAN AREA CLINIC 49 | Facility: LOCATION | Age: 74
End: 2024-07-18

## 2024-07-18 DIAGNOSIS — H40.1132: ICD-10-CM

## 2024-07-18 DIAGNOSIS — H04.123: ICD-10-CM

## 2024-07-18 PROCEDURE — 92133 CPTRZD OPH DX IMG PST SGM ON: CPT

## 2024-07-18 PROCEDURE — 92083 EXTENDED VISUAL FIELD XM: CPT

## 2024-07-18 PROCEDURE — 99213 OFFICE O/P EST LOW 20 MIN: CPT

## 2024-07-18 ASSESSMENT — VISUAL ACUITY
OS_PH: 20/25
OS_CC: 20/30+2
OD_CC: 20/30+2
OD_PH: 20/25

## 2024-07-18 ASSESSMENT — TONOMETRY
OD_IOP_MMHG: 11
OD_IOP_MMHG: 14
OS_IOP_MMHG: 11
OS_IOP_MMHG: 13

## 2024-07-29 ENCOUNTER — OFFICE VISIT (OUTPATIENT)
Dept: FAMILY MEDICINE | Facility: CLINIC | Age: 74
End: 2024-07-29
Payer: COMMERCIAL

## 2024-07-29 VITALS
OXYGEN SATURATION: 96 % | SYSTOLIC BLOOD PRESSURE: 120 MMHG | TEMPERATURE: 97.3 F | BODY MASS INDEX: 32.14 KG/M2 | HEIGHT: 69 IN | HEART RATE: 70 BPM | RESPIRATION RATE: 18 BRPM | DIASTOLIC BLOOD PRESSURE: 78 MMHG | WEIGHT: 217 LBS

## 2024-07-29 DIAGNOSIS — K21.9 GASTROESOPHAGEAL REFLUX DISEASE, UNSPECIFIED WHETHER ESOPHAGITIS PRESENT: Primary | ICD-10-CM

## 2024-07-29 DIAGNOSIS — R05.2 SUBACUTE COUGH: ICD-10-CM

## 2024-07-29 PROCEDURE — 99213 OFFICE O/P EST LOW 20 MIN: CPT | Performed by: FAMILY MEDICINE

## 2024-07-29 RX ORDER — PANTOPRAZOLE SODIUM 40 MG/1
40 TABLET, DELAYED RELEASE ORAL DAILY
Qty: 90 TABLET | Refills: 1 | Status: SHIPPED | OUTPATIENT
Start: 2024-07-29

## 2024-07-29 ASSESSMENT — PAIN SCALES - GENERAL: PAINLEVEL: NO PAIN (0)

## 2024-07-29 NOTE — NURSING NOTE
"Chief Complaint   Patient presents with    Cough     /78 (Cuff Size: Adult Regular)   Pulse 70   Temp 97.3  F (36.3  C) (Tympanic)   Resp 18   Ht 1.759 m (5' 9.25\")   Wt 98.4 kg (217 lb)   SpO2 96%   BMI 31.81 kg/m   Estimated body mass index is 31.81 kg/m  as calculated from the following:    Height as of this encounter: 1.759 m (5' 9.25\").    Weight as of this encounter: 98.4 kg (217 lb).  Patient presents to the clinic using No DME      Health Maintenance that is potentially due pending provider review:    Health Maintenance Due   Topic Date Due    RSV VACCINE (Pregnancy & 60+) (1 - 1-dose 60+ series) Never done    ZOSTER IMMUNIZATION (1 of 2) 01/05/2015    COVID-19 Vaccine (5 - 2023-24 season) 03/01/2024                  "

## 2024-07-29 NOTE — PROGRESS NOTES
"  Assessment & Plan     Subacute cough  Gastroesophageal reflux disease, unspecified whether esophagitis present  Differential discussed in detail and suspect symptoms secondary to gastroesophageal reflux disease, shared decision made to continue Protonix for now.  Dietary counseling provided and return criteria explained.  All questions answered.  - pantoprazole (PROTONIX) 40 MG EC tablet; Take 1 tablet (40 mg) by mouth daily        Alina Villalta is a 74 year old, presenting for the following health issues:  Cough    History of Present Illness       Reason for visit:  Cough Follow up  Symptom onset:  3-4 weeks ago  Symptoms include:  Much better now, still coughing up a little in the mornings, but evenings are way better  Symptom intensity:  Mild  Symptom progression:  Improving  Prior treatment description:  Azithromycin   No fever, chills, hemoptysis, night sweats, weight loss or other relevant systemic symptoms    Review of Systems  Constitutional, neuro, ENT, endocrine, pulmonary, cardiac, gastrointestinal, genitourinary, musculoskeletal, integument and psychiatric systems are negative, except as otherwise noted.      Objective    /78 (Cuff Size: Adult Regular)   Pulse 70   Temp 97.3  F (36.3  C) (Tympanic)   Resp 18   Ht 1.759 m (5' 9.25\")   Wt 98.4 kg (217 lb)   SpO2 96%   BMI 31.81 kg/m    Body mass index is 31.81 kg/m .  Physical Exam   GENERAL: alert and no distress  EYES: Eyes grossly normal to inspection, PERRL and conjunctivae and sclerae normal  HENT: normal cephalic/atraumatic, nose and mouth without ulcers or lesions, oropharynx clear, and oral mucous membranes moist  NECK: no adenopathy, no asymmetry, masses, or scars  RESP: lungs clear to auscultation - no rales, rhonchi or wheezes  CV: regular rates and rhythm, normal S1 S2, no S3 or S4, and no murmur, click or rub  ABDOMEN: soft, nontender, without hepatosplenomegaly or masses and no organomegaly or masses  MS: no gross " musculoskeletal defects noted, no edema  NEURO: Normal strength and tone, mentation intact and speech normal    Wt Readings from Last 10 Encounters:   07/29/24 98.4 kg (217 lb)   07/15/24 98 kg (216 lb)   07/07/24 98.4 kg (217 lb)   02/22/24 96.2 kg (212 lb)   12/27/23 98.5 kg (217 lb 3.2 oz)   09/21/23 95.3 kg (210 lb)   08/30/23 96.2 kg (212 lb)   08/17/23 97.8 kg (215 lb 9.6 oz)   06/29/23 98 kg (216 lb)   06/12/23 98 kg (216 lb)        Signed Electronically by: Ivan Diaz MD

## 2024-08-22 ENCOUNTER — LAB (OUTPATIENT)
Dept: LAB | Facility: CLINIC | Age: 74
End: 2024-08-22
Payer: COMMERCIAL

## 2024-08-22 DIAGNOSIS — L40.50 PSORIATIC ARTHROPATHY (H): ICD-10-CM

## 2024-08-22 DIAGNOSIS — Z79.899 HIGH RISK MEDICATIONS (NOT ANTICOAGULANTS) LONG-TERM USE: ICD-10-CM

## 2024-08-22 LAB
ALBUMIN SERPL BCG-MCNC: 4.3 G/DL (ref 3.5–5.2)
ALP SERPL-CCNC: 60 U/L (ref 40–150)
ALT SERPL W P-5'-P-CCNC: 36 U/L (ref 0–70)
AST SERPL W P-5'-P-CCNC: 32 U/L (ref 0–45)
BASOPHILS # BLD AUTO: 0 10E3/UL (ref 0–0.2)
BASOPHILS NFR BLD AUTO: 0 %
BILIRUB DIRECT SERPL-MCNC: 0.23 MG/DL (ref 0–0.3)
BILIRUB SERPL-MCNC: 1 MG/DL
CREAT SERPL-MCNC: 1.09 MG/DL (ref 0.67–1.17)
CRP SERPL-MCNC: <3 MG/L
EGFRCR SERPLBLD CKD-EPI 2021: 72 ML/MIN/1.73M2
EOSINOPHIL # BLD AUTO: 0.1 10E3/UL (ref 0–0.7)
EOSINOPHIL NFR BLD AUTO: 2 %
ERYTHROCYTE [DISTWIDTH] IN BLOOD BY AUTOMATED COUNT: 13.1 % (ref 10–15)
ERYTHROCYTE [SEDIMENTATION RATE] IN BLOOD BY WESTERGREN METHOD: 6 MM/HR (ref 0–20)
HCT VFR BLD AUTO: 43.2 % (ref 40–53)
HGB BLD-MCNC: 14.7 G/DL (ref 13.3–17.7)
IMM GRANULOCYTES # BLD: 0 10E3/UL
IMM GRANULOCYTES NFR BLD: 0 %
LYMPHOCYTES # BLD AUTO: 1.5 10E3/UL (ref 0.8–5.3)
LYMPHOCYTES NFR BLD AUTO: 28 %
MCH RBC QN AUTO: 33 PG (ref 26.5–33)
MCHC RBC AUTO-ENTMCNC: 34 G/DL (ref 31.5–36.5)
MCV RBC AUTO: 97 FL (ref 78–100)
MONOCYTES # BLD AUTO: 0.3 10E3/UL (ref 0–1.3)
MONOCYTES NFR BLD AUTO: 5 %
NEUTROPHILS # BLD AUTO: 3.3 10E3/UL (ref 1.6–8.3)
NEUTROPHILS NFR BLD AUTO: 65 %
PLATELET # BLD AUTO: 133 10E3/UL (ref 150–450)
PROT SERPL-MCNC: 6.5 G/DL (ref 6.4–8.3)
RBC # BLD AUTO: 4.46 10E6/UL (ref 4.4–5.9)
WBC # BLD AUTO: 5.2 10E3/UL (ref 4–11)

## 2024-08-22 PROCEDURE — 86140 C-REACTIVE PROTEIN: CPT

## 2024-08-22 PROCEDURE — 85025 COMPLETE CBC W/AUTO DIFF WBC: CPT

## 2024-08-22 PROCEDURE — 85652 RBC SED RATE AUTOMATED: CPT

## 2024-08-22 PROCEDURE — 36415 COLL VENOUS BLD VENIPUNCTURE: CPT

## 2024-08-22 PROCEDURE — 80076 HEPATIC FUNCTION PANEL: CPT

## 2024-08-22 PROCEDURE — 82565 ASSAY OF CREATININE: CPT

## 2024-08-28 ENCOUNTER — OFFICE VISIT (OUTPATIENT)
Dept: RHEUMATOLOGY | Facility: CLINIC | Age: 74
End: 2024-08-28
Payer: COMMERCIAL

## 2024-08-28 VITALS
SYSTOLIC BLOOD PRESSURE: 148 MMHG | DIASTOLIC BLOOD PRESSURE: 82 MMHG | WEIGHT: 217.4 LBS | HEART RATE: 72 BPM | OXYGEN SATURATION: 97 % | BODY MASS INDEX: 31.87 KG/M2

## 2024-08-28 DIAGNOSIS — M17.12 PRIMARY OSTEOARTHRITIS OF LEFT KNEE: ICD-10-CM

## 2024-08-28 DIAGNOSIS — Z79.899 HIGH RISK MEDICATIONS (NOT ANTICOAGULANTS) LONG-TERM USE: ICD-10-CM

## 2024-08-28 DIAGNOSIS — L40.50 PSORIATIC ARTHROPATHY (H): Primary | ICD-10-CM

## 2024-08-28 PROCEDURE — 99214 OFFICE O/P EST MOD 30 MIN: CPT | Performed by: INTERNAL MEDICINE

## 2024-08-28 PROCEDURE — G2211 COMPLEX E/M VISIT ADD ON: HCPCS | Performed by: INTERNAL MEDICINE

## 2024-08-28 RX ORDER — FOLIC ACID 1 MG/1
1 TABLET ORAL DAILY
Qty: 90 TABLET | Refills: 2 | Status: SHIPPED | OUTPATIENT
Start: 2024-08-28

## 2024-08-28 RX ORDER — METHOTREXATE 2.5 MG/1
15 TABLET ORAL WEEKLY
Qty: 78 TABLET | Refills: 2 | Status: SHIPPED | OUTPATIENT
Start: 2024-08-28

## 2024-08-28 NOTE — PROGRESS NOTES
Rheumatology Clinic Visit      Pawan Bullard MRN# [id number]   YOB: 1950 Age: 74 year old      Date of visit: 8/28/24   PCP: Dr. Ivan Diaz    Chief Complaint   No chief complaint on file.    Assessment and Plan     1.  Psoriatic arthritis:  Previously on SSZ (GI upset).  Currently on methotrexate 15 mg once weekly (GI upset with 25 mg once weekly; cytopenias).  Psoriatic arthritis controlled.  No synovitis on exam today.  Chronic illness, stable.    - Continue methotrexate 15mg once every 7 days (split dose within a 24-hour period)  - Continue folic acid 1 mg daily  - Labs in 3 months: CBC, Creatinine, Hepatic Panel  - Labs in 6 months: CBC, Creatinine, Hepatic Panel, ESR, CRP                Rapid 3, cumulative scores                      08/28/2024:  5.2 (MTX 15mg wkly)                       02/22/2024:  7.3 (MTX 15mg wkly                      02/16/2023:  7    (MTX 15mg wkly)                      10/14/2022:  7.5 (MTX 15mg wkly)                      6/18/2021:    4.3 (MTX 20mg wkly)                      02/17/2020:  4.7 (MTX 20mg wkly)                      11/18/2019:  3.3 (MTX 20mg wkly)                      11/26/2018:  5.2 (MTX 17.5mg wkly)    High risk medication requiring intensive toxicity monitoring at least quarterly    2. Hand OA: Affecting the PIPs and DIPs. Topical diclofenac PRN.     3.  Left knee osteoarthritis: History of meniscus surgery in 2009 and 2013.  Degenerative arthritis affecting the left knee.  Encouraged physical therapy exercises for the left knee and he says that he is doing these exercises at home and has been doing well.     4.  Vaccinations: Vaccinations reviewed with Mr. Bullard.  Risks and benefits of vaccinations were discussed.    - Influenza: Encouraged yearly vaccination  - [id number]: Discussed vaccination  - Shingrix: Up-to-date  - COVID-19: Advised keeping updated    5. Elevated blood pressure:  Pawan to follow up with Primary Care provider  regarding elevated blood pressure.     6. GERD: presented as cough when laying down; resolved with pantoprazole from his PCP and he will continue following with his PCP for this issue    Total minutes spent in evaluation with patient, documentation, , and review of pertinent studies and chart notes: 14  The longitudinal plan of care for the rheumatology problem(s) were addressed during this visit.  Due to added complexity of care, we will continue to support the patient and the subsequent management of this condition with ongoing continuity of care.         Mr. Bullard verbalized agreement with and understanding of the rational for the diagnosis and treatment plan.  All questions were answered to best of my ability and the patient's satisfaction. Mr. Bullard was advised to contact the clinic with any questions that may arise after the clinic visit.      Thank you for involving me in the care of the patient    Return to clinic: 6 months      HPI   Pawan Bullard is a 74 year old male with a past medical history significant for hypertension, hyperlipidemia, chondrocalcinosis, GERD, osteoarthritis, and psoriatic arthritis who presents for follow-up of psoriatic arthritis.      2/22/2024: Psoriatic arthritis controlled.  Left knee still bothers him from time to time.  When his left knee bothers him enough he takes Tylenol arthritis, approximately 1-2 times per week.  No joint swelling.  Not doing physical therapy exercises for the left knee but states that he has the exercise paperwork at home and can start doing the exercises again.    Today, 8/28/2024: psoriatic arthritis controlled. No joint pain/swelling. No morning stiffness or gelling. At one point had a cough that was identified by his PCP to be GERD related and pantoprazole resolved this; he will continue following with Dr. Diaz (PCP) for this issue. Exercises for his knee have been very helpful.     Denies fevers, chills, nausea, vomiting,  constipation, diarrhea. No abdominal pain. No chest pain/pressure, palpitations, or shortness of breath. No sicca symptoms.     Tobacco: None  EtOH: 0-2 beers or mixed drinks monthly  Drugs: None    ROS   12 point review of system was completed and negative except as noted in the HPI     Active Problem List     Patient Active Problem List   Diagnosis    Psoriatic arthropathy (H)    Esophageal reflux    Hypertension goal BP (blood pressure) < 140/90    FAMILY HISTORY OF GI NEOPLASM    FAMILY HISTORY OF DIABETES MELLITUS    Impotence of organic origin    Chondrocalcinosis    HYPERLIPIDEMIA LDL GOAL <130    Osteoarthritis    High risk medications (not anticoagulants) long-term use    ROSADO (dyspnea on exertion)    History of colonic polyps     Past Medical History     Past Medical History:   Diagnosis Date    Esophageal reflux     GERD    Hypertension     Psoriatic arthropathy (H)     Psoriatic arthritis     Past Surgical History     Past Surgical History:   Procedure Laterality Date    ARTHROSCOPY KNEE RT/LT  04/01/2009    left knee    ARTHROSCOPY KNEE WITH MEDIAL MENISCECTOMY  07/09/2013    Procedure: ARTHROSCOPY KNEE WITH MEDIAL MENISCECTOMY;  Left Knee Arthroscopic and Open Repair of Patellar Tendon with Platelet Rich Plasma;  Surgeon: Ley, Jeffrey Duane, MD;  Location: [facility]    BIOPSY  9/25/2023    COLONOSCOPY  01/01/2006    diverticuli. Father had colon CA    COLONOSCOPY  01/01/2001    COLONOSCOPY  04/11/2011    Procedure:COLONOSCOPY; Surgeon:JENNIFER JEAN; Location:[facility]    COLONOSCOPY N/A 11/21/2016    Procedure: COLONOSCOPY;  Surgeon: Elias Santamaria MD;  Location: [facility]    COLONOSCOPY N/A 02/08/2022    Procedure: COLONOSCOPY;  Surgeon: Junior Brown MD;  Location: [facility]    ENT SURGERY  9/25/2023    HERNIA REPAIR      REPAIR TENDON PATELLA  07/09/2013    Procedure: REPAIR TENDON PATELLA;;  Surgeon: Ley, Jeffrey Duane, MD;  Location: [facility]    SURGICAL HISTORY OF -   01/01/1999    (L) Herniorrhaphy     SURGICAL HISTORY OF -   01/01/2003    (R) Herniorrhaphy    SURGICAL HISTORY OF -   01/01/1981    Hemorrohid     Allergy     Allergies   Allergen Reactions    Acetaminophen-Codeine Nausea     Current Medication List     Current Outpatient Medications   Medication Sig Dispense Refill    albuterol (PROAIR HFA/PROVENTIL HFA/VENTOLIN HFA) 108 (90 Base) MCG/ACT inhaler Inhale 2 puffs into the lungs every 4 hours as needed for shortness of breath, wheezing or cough 18 g 0    albuterol (PROAIR HFA/PROVENTIL HFA/VENTOLIN HFA) 108 (90 Base) MCG/ACT inhaler Inhale 2 puffs into the lungs every 4 hours as needed for shortness of breath or wheezing 18 g 11    folic acid (FOLVITE) 1 MG tablet Take 1 tablet (1 mg) by mouth daily 90 tablet 2    lisinopril (ZESTRIL) 5 MG tablet Take 1 tablet (5 mg) by mouth daily 90 tablet 3    methotrexate 2.5 MG tablet Take 6 tablets (15 mg) by mouth once a week . This is taken as 3 tablets at 9AM on Sunday and 3 tablets at 9AM on Monday.  Needs to be taken within the same 24 hour time period of each week. 78 tablet 2    MULTI-VITAMIN OR TABS Take 1 tablet by mouth daily       pantoprazole (PROTONIX) 40 MG EC tablet Take 1 tablet (40 mg) by mouth daily 90 tablet 1    Probiotic Product (PROBIOTIC DAILY PO) Take 1 capsule by mouth daily       sildenafil (REVATIO) 20 MG tablet Take as many pills as needed up to maximum of 5 pills at a time prior to intercourse. 50 tablet 11    simvastatin (ZOCOR) 40 MG tablet Take 1 tablet (40 mg) by mouth at bedtime 90 tablet 3    tamsulosin (FLOMAX) 0.4 MG capsule TAKE ONE CAPSULE BY MOUTH ONCE DAILY 90 capsule 3    triamcinolone (KENALOG) 0.5 % external ointment Apply 1 g topically 2 times daily 60 g 0     No current facility-administered medications for this visit.         Social History   See HPI    Family History     Family History   Problem Relation Age of Onset    Hypertension Mother     Diabetes Mother     Cerebrovascular Disease Mother         in her 70s     "Arthritis Mother     Cancer - colorectal Father 67    Colon Cancer Father     Arthritis Paternal Grandmother     LUNG DISEASE Sister     Other - See Comments Sister     Prostate Cancer No family hx of     Coronary Artery Disease No family hx of        Physical Exam     Temp Readings from Last 3 Encounters:   07/29/24 97.3  F (36.3  C) (Tympanic)   07/15/24 97.7  F (36.5  C) (Tympanic)   07/07/24 98.3  F (36.8  C) (Tympanic)     BP Readings from Last 5 Encounters:   07/29/24 120/78   07/15/24 130/80   07/07/24 132/82   02/22/24 (!) 164/93   12/27/23 138/80     Pulse Readings from Last 1 Encounters:   07/29/24 70     Resp Readings from Last 1 Encounters:   07/29/24 18     Estimated body mass index is 31.81 kg/m  as calculated from the following:    Height as of 7/29/24: 1.759 m (5' 9.25\").    Weight as of 7/29/24: 98.4 kg (217 lb).    GEN: NAD. Healthy appearing adult.   HEENT:  Anicteric, noninjected sclera. No obvious external lesions of the ear and nose. Hearing intact.  PULM: No increased work of breathing.   MSK: MCPs, PIPs, DIPs without swelling or tenderness to palpation.  Wrists without swelling or tenderness to palpation.  Elbows and shoulders without swelling or tenderness to palpation.   Knees, ankles, and MTPs without swelling or tenderness to palpation.    SKIN: No rash or jaundice seen  PSYCH: Alert. Appropriate.      Labs / Imaging (select studies)     CBC  Recent Labs   Lab Test 08/22/24  1031 05/22/24  1037 02/14/24  1009 09/17/21  1052 06/11/21  1014 02/26/21  1012 11/16/20  0903   WBC 5.2 4.2 4.4   < > 5.0 4.4 5.3   RBC 4.46 4.34* 4.46   < > 4.16* 4.36* 4.71   HGB 14.7 14.6 15.2   < > 14.1 14.6 15.5   HCT 43.2 42.5 43.3   < > 40.9 41.9 44.8   MCV 97 98 97   < > 98 96 95   RDW 13.1 13.3 13.0   < > 13.4 13.0 13.7   * 132* 144*   < > 164 154 146*   MCH 33.0 33.6* 34.1*   < > 33.9* 33.5* 32.9   MCHC 34.0 34.4 35.1   < > 34.5 34.8 34.6   NEUTROPHIL 65 64 59   < > 64.5 64.1 73.8   LYMPH 28 28 33   " < > 23.6 28.6 16.2   MONOCYTE 5 5 5   < > 9.3 4.6 7.9   EOSINOPHIL 2 3 3   < > 2.4 2.5 1.9   BASOPHIL 0 0 0   < > 0.2 0.2 0.2   ANEU  --   --   --   --  3.2 2.8 3.9   ALYM  --   --   --   --  1.2 1.3 0.9   ERENDIRA  --   --   --   --  0.5 0.2 0.4   AEOS  --   --   --   --  0.1 0.1 0.1   ABAS  --   --   --   --  0.0 0.0 0.0   ANEUTAUTO 3.3 2.7 2.6   < >  --   --   --    ALYMPAUTO 1.5 1.2 1.4   < >  --   --   --    AMONOAUTO 0.3 0.2 0.2   < >  --   --   --    AEOSAUTO 0.1 0.1 0.1   < >  --   --   --    ABSBASO 0.0 0.0 0.0   < >  --   --   --     < > = values in this interval not displayed.     CMP  Recent Labs   Lab Test 08/22/24  1031 05/22/24  1037 02/14/24  1009 02/16/23  1141 12/15/22  0952 12/01/22  0807 12/10/21  1603 11/20/21  0901 09/17/21  1052 06/11/21  1014 02/26/21  1012 11/16/20  0903 02/10/20  1028 02/07/20  1428   NA  --   --   --   --   --   --   --  140  --   --   --  138  --  139   POTASSIUM  --   --   --   --   --  4.0  --  3.9  --   --   --  4.0  --  4.3   CHLORIDE  --   --   --   --   --   --   --  107  --   --   --  105  --  107   CO2  --   --   --   --   --   --   --  30  --   --   --  27  --  28   ANIONGAP  --   --   --   --   --   --   --  3  --   --   --  6  --  4   GLC  --   --   --   --  120*  --   --  110*  --   --   --  107*  --  111*   BUN  --   --   --   --   --   --   --  13  --   --   --  13  --  15   CR 1.09 1.09 1.12   < >  --  1.07   < > 1.06   < > 1.07 0.96 1.06  1.05   < > 0.99   GFRESTIMATED 72 72 70   < >  --  75   < > 71   < > 70 81 71  72   < > 78   GFRESTBLACK  --   --   --   --   --   --   --   --   --  81 >90 83  84   < > >90   JOHN  --   --   --   --   --   --   --  9.0  --   --   --  9.5  --  9.1   BILITOTAL 1.0 1.0 0.9   < >  --  1.1   < >  --    < > 0.7 0.8 1.0   < >  --    ALBUMIN 4.3 4.3 4.4   < >  --  4.3   < >  --    < > 3.6 3.8 4.0   < >  --    PROTTOTAL 6.5 6.1* 6.4   < >  --  6.4   < >  --    < > 6.4* 6.7* 6.7*   < >  --    ALKPHOS 60 55 58   < >  --  50   < >  --     < > 51 58 58   < >  --    AST 32 41 37   < >  --  32   < >  --    < > 26 39 32   < >  --    ALT 36 51 45   < >  --  41   < >  --    < > 41 58 42   < >  --     < > = values in this interval not displayed.     Calcium/VitaminD  Recent Labs   Lab Test 11/20/21  0901 11/16/20  0903 02/07/20  1428   JOHN 9.0 9.5 9.1     ESR/CRP  Recent Labs   Lab Test 08/22/24  1031 02/14/24  1009 08/15/23  1016 05/23/23  1339 02/16/23  1141 09/09/22  1059 06/14/22  1012 12/10/21  1603   SED 6 5 6   < > 5   < > 6 5   CRP  --   --   --   --  <2.9  --  <2.9 <2.9   CRPI <3.00 <3.00 <3.00   < >  --    < >  --   --     < > = values in this interval not displayed.     Hepatitis B  Recent Labs   Lab Test 05/16/18  1016   HBCAB Nonreactive   HEPBANG Nonreactive     Hepatitis C  Recent Labs   Lab Test 08/26/16  1213   HCVAB Nonreactive   Assay performance characteristics have not been established for newborns,   infants, and children       Immunization History     Immunization History   Administered Date(s) Administered    COVID-19 12+ (2023-24) (MODERNA) 01/05/2024    COVID-19 Bivalent 18+ (Moderna) 11/18/2022    COVID-19 Monovalent 18+ (Moderna) 05/12/2022    COVID-19 Monovalent Booster 18+ (Moderna) 10/28/2021    COVID-19 Vaccine (Evan) 03/04/2021    Hepatitis A (ADULT 19+) 08/02/2011    Influenza (High Dose) 3 valent vaccine 10/18/2017, 09/21/2018, 11/13/2019, 10/09/2020, 11/16/2021, 11/15/2023    Influenza (IIV3) PF 11/17/2005, 12/04/2006, 11/22/2010, 09/26/2012, 10/01/2014    Influenza Vaccine 65+ (Fluzone HD) 10/09/2020, 09/17/2021, 09/09/2022, 11/15/2023    Influenza Vaccine >6 months,quad, PF 10/15/2013, 12/17/2015, 08/26/2016    Pneumo Conj 13-V (2010&after) 10/18/2017    Pneumococcal 20 valent Conjugate (Prevnar 20) 02/22/2024    Pneumococcal 23 valent 01/22/2018    TDAP (Adacel,Boostrix) 01/09/2013    TDAP Vaccine (Adacel) 04/22/2009, 05/20/2018    Zoster vaccine, live 11/10/2014          Chart documentation done in part with  [employer] Voice recognition Software. Although reviewed after completion, some word and grammatical error may remain.    Adria Lopez MD

## 2024-08-28 NOTE — NURSING NOTE
RAPID3 (0-30) Cumulative Score  5.2          RAPID3 Weighted Score (divide #4 by 3 and that is the weighted score)  1.7

## 2024-09-13 ENCOUNTER — LAB REQUISITION (OUTPATIENT)
Dept: LAB | Facility: HOSPITAL | Age: 74
End: 2024-09-13
Payer: COMMERCIAL

## 2024-09-13 DIAGNOSIS — M25.569 PAIN IN UNSPECIFIED KNEE: ICD-10-CM

## 2024-09-13 LAB
% LINING CELLS, BODY FLUID: 10 %
APPEARANCE FLD: ABNORMAL
CELL COUNT BODY FLUID SOURCE: ABNORMAL
COLOR FLD: ABNORMAL
CRYSTALS SNV MICRO: NORMAL
LYMPHOCYTES NFR FLD MANUAL: 31 %
MONOS+MACROS NFR FLD MANUAL: 38 %
NEUTS BAND NFR FLD MANUAL: 21 %
WBC # FLD AUTO: 800 /UL

## 2024-09-13 PROCEDURE — 87070 CULTURE OTHR SPECIMN AEROBIC: CPT | Mod: ORL | Performed by: ORTHOPAEDIC SURGERY

## 2024-09-13 PROCEDURE — 89051 BODY FLUID CELL COUNT: CPT | Mod: ORL | Performed by: ORTHOPAEDIC SURGERY

## 2024-09-13 PROCEDURE — 89060 EXAM SYNOVIAL FLUID CRYSTALS: CPT | Mod: ORL | Performed by: ORTHOPAEDIC SURGERY

## 2024-09-13 PROCEDURE — 87075 CULTR BACTERIA EXCEPT BLOOD: CPT | Mod: ORL | Performed by: ORTHOPAEDIC SURGERY

## 2024-09-18 LAB — BACTERIA SNV CULT: NO GROWTH

## 2024-09-27 LAB — BACTERIA SNV CULT: NORMAL

## 2024-11-20 ENCOUNTER — LAB (OUTPATIENT)
Dept: LAB | Facility: CLINIC | Age: 74
End: 2024-11-20
Payer: COMMERCIAL

## 2024-11-20 DIAGNOSIS — L40.50 PSORIATIC ARTHROPATHY (H): ICD-10-CM

## 2024-11-20 DIAGNOSIS — Z79.899 HIGH RISK MEDICATIONS (NOT ANTICOAGULANTS) LONG-TERM USE: ICD-10-CM

## 2024-11-20 LAB
ALBUMIN SERPL BCG-MCNC: 4.2 G/DL (ref 3.5–5.2)
ALP SERPL-CCNC: 57 U/L (ref 40–150)
ALT SERPL W P-5'-P-CCNC: 37 U/L (ref 0–70)
AST SERPL W P-5'-P-CCNC: 33 U/L (ref 0–45)
BASOPHILS # BLD AUTO: 0 10E3/UL (ref 0–0.2)
BASOPHILS NFR BLD AUTO: 0 %
BILIRUB DIRECT SERPL-MCNC: 0.21 MG/DL (ref 0–0.3)
BILIRUB SERPL-MCNC: 0.9 MG/DL
CREAT SERPL-MCNC: 1.11 MG/DL (ref 0.67–1.17)
EGFRCR SERPLBLD CKD-EPI 2021: 71 ML/MIN/1.73M2
EOSINOPHIL # BLD AUTO: 0.1 10E3/UL (ref 0–0.7)
EOSINOPHIL NFR BLD AUTO: 3 %
ERYTHROCYTE [DISTWIDTH] IN BLOOD BY AUTOMATED COUNT: 13.4 % (ref 10–15)
HCT VFR BLD AUTO: 43.5 % (ref 40–53)
HGB BLD-MCNC: 15.1 G/DL (ref 13.3–17.7)
IMM GRANULOCYTES # BLD: 0 10E3/UL
IMM GRANULOCYTES NFR BLD: 0 %
LYMPHOCYTES # BLD AUTO: 1.5 10E3/UL (ref 0.8–5.3)
LYMPHOCYTES NFR BLD AUTO: 33 %
MCH RBC QN AUTO: 33.6 PG (ref 26.5–33)
MCHC RBC AUTO-ENTMCNC: 34.7 G/DL (ref 31.5–36.5)
MCV RBC AUTO: 97 FL (ref 78–100)
MONOCYTES # BLD AUTO: 0.2 10E3/UL (ref 0–1.3)
MONOCYTES NFR BLD AUTO: 4 %
NEUTROPHILS # BLD AUTO: 2.7 10E3/UL (ref 1.6–8.3)
NEUTROPHILS NFR BLD AUTO: 60 %
PLATELET # BLD AUTO: 148 10E3/UL (ref 150–450)
PROT SERPL-MCNC: 6.4 G/DL (ref 6.4–8.3)
RBC # BLD AUTO: 4.5 10E6/UL (ref 4.4–5.9)
WBC # BLD AUTO: 4.4 10E3/UL (ref 4–11)

## 2024-11-20 PROCEDURE — 82565 ASSAY OF CREATININE: CPT

## 2024-11-20 PROCEDURE — 36415 COLL VENOUS BLD VENIPUNCTURE: CPT

## 2024-11-20 PROCEDURE — 85025 COMPLETE CBC W/AUTO DIFF WBC: CPT

## 2024-11-20 PROCEDURE — 80076 HEPATIC FUNCTION PANEL: CPT

## 2024-12-30 DIAGNOSIS — E78.5 HYPERLIPIDEMIA LDL GOAL <130: ICD-10-CM

## 2024-12-30 RX ORDER — SIMVASTATIN 40 MG
40 TABLET ORAL AT BEDTIME
Qty: 90 TABLET | Refills: 0 | Status: SHIPPED | OUTPATIENT
Start: 2024-12-30 | End: 2025-01-02

## 2024-12-31 DIAGNOSIS — N40.1 BENIGN PROSTATIC HYPERPLASIA WITH NOCTURIA: ICD-10-CM

## 2024-12-31 DIAGNOSIS — R35.1 BENIGN PROSTATIC HYPERPLASIA WITH NOCTURIA: ICD-10-CM

## 2024-12-31 RX ORDER — TAMSULOSIN HYDROCHLORIDE 0.4 MG/1
CAPSULE ORAL
Qty: 90 CAPSULE | Refills: 3 | OUTPATIENT
Start: 2024-12-31

## 2024-12-31 SDOH — HEALTH STABILITY: PHYSICAL HEALTH: ON AVERAGE, HOW MANY MINUTES DO YOU ENGAGE IN EXERCISE AT THIS LEVEL?: 30 MIN

## 2024-12-31 SDOH — HEALTH STABILITY: PHYSICAL HEALTH: ON AVERAGE, HOW MANY DAYS PER WEEK DO YOU ENGAGE IN MODERATE TO STRENUOUS EXERCISE (LIKE A BRISK WALK)?: 3 DAYS

## 2024-12-31 ASSESSMENT — SOCIAL DETERMINANTS OF HEALTH (SDOH): HOW OFTEN DO YOU GET TOGETHER WITH FRIENDS OR RELATIVES?: ONCE A WEEK

## 2025-01-02 ENCOUNTER — OFFICE VISIT (OUTPATIENT)
Dept: FAMILY MEDICINE | Facility: CLINIC | Age: 75
End: 2025-01-02
Payer: COMMERCIAL

## 2025-01-02 VITALS
TEMPERATURE: 97.1 F | WEIGHT: 220.6 LBS | HEIGHT: 69 IN | RESPIRATION RATE: 20 BRPM | HEART RATE: 69 BPM | SYSTOLIC BLOOD PRESSURE: 135 MMHG | OXYGEN SATURATION: 100 % | BODY MASS INDEX: 32.67 KG/M2 | DIASTOLIC BLOOD PRESSURE: 82 MMHG

## 2025-01-02 DIAGNOSIS — K21.9 GASTROESOPHAGEAL REFLUX DISEASE, UNSPECIFIED WHETHER ESOPHAGITIS PRESENT: ICD-10-CM

## 2025-01-02 DIAGNOSIS — N40.1 BENIGN PROSTATIC HYPERPLASIA WITH NOCTURIA: ICD-10-CM

## 2025-01-02 DIAGNOSIS — E78.5 HYPERLIPIDEMIA LDL GOAL <130: ICD-10-CM

## 2025-01-02 DIAGNOSIS — Z00.00 ENCOUNTER FOR MEDICARE ANNUAL WELLNESS EXAM: Primary | ICD-10-CM

## 2025-01-02 DIAGNOSIS — I10 HYPERTENSION GOAL BP (BLOOD PRESSURE) < 140/90: ICD-10-CM

## 2025-01-02 DIAGNOSIS — R35.1 BENIGN PROSTATIC HYPERPLASIA WITH NOCTURIA: ICD-10-CM

## 2025-01-02 DIAGNOSIS — L40.50 PSORIATIC ARTHROPATHY (H): ICD-10-CM

## 2025-01-02 LAB
ANION GAP SERPL CALCULATED.3IONS-SCNC: 10 MMOL/L (ref 7–15)
BUN SERPL-MCNC: 13.6 MG/DL (ref 8–23)
CALCIUM SERPL-MCNC: 9.5 MG/DL (ref 8.8–10.4)
CHLORIDE SERPL-SCNC: 104 MMOL/L (ref 98–107)
CHOLEST SERPL-MCNC: 197 MG/DL
CREAT SERPL-MCNC: 1 MG/DL (ref 0.67–1.17)
EGFRCR SERPLBLD CKD-EPI 2021: 80 ML/MIN/1.73M2
FASTING STATUS PATIENT QL REPORTED: YES
FASTING STATUS PATIENT QL REPORTED: YES
GLUCOSE SERPL-MCNC: 124 MG/DL (ref 70–99)
HCO3 SERPL-SCNC: 25 MMOL/L (ref 22–29)
HDLC SERPL-MCNC: 57 MG/DL
LDLC SERPL CALC-MCNC: 104 MG/DL
NONHDLC SERPL-MCNC: 140 MG/DL
POTASSIUM SERPL-SCNC: 4.2 MMOL/L (ref 3.4–5.3)
SODIUM SERPL-SCNC: 139 MMOL/L (ref 135–145)
TRIGL SERPL-MCNC: 182 MG/DL

## 2025-01-02 RX ORDER — FOLIC ACID 1 MG/1
1 TABLET ORAL DAILY
Qty: 90 TABLET | Refills: 3 | Status: SHIPPED | OUTPATIENT
Start: 2025-01-02

## 2025-01-02 RX ORDER — SIMVASTATIN 40 MG
40 TABLET ORAL AT BEDTIME
Qty: 90 TABLET | Refills: 3 | Status: SHIPPED | OUTPATIENT
Start: 2025-01-02

## 2025-01-02 RX ORDER — PANTOPRAZOLE SODIUM 40 MG/1
40 TABLET, DELAYED RELEASE ORAL DAILY
Qty: 90 TABLET | Refills: 1 | Status: SHIPPED | OUTPATIENT
Start: 2025-01-02

## 2025-01-02 RX ORDER — TAMSULOSIN HYDROCHLORIDE 0.4 MG/1
0.4 CAPSULE ORAL DAILY
Qty: 90 CAPSULE | Refills: 3 | Status: SHIPPED | OUTPATIENT
Start: 2025-01-02 | End: 2025-01-02

## 2025-01-02 RX ORDER — FOLIC ACID 1 MG/1
1 TABLET ORAL DAILY
Qty: 90 TABLET | Refills: 2 | Status: SHIPPED | OUTPATIENT
Start: 2025-01-02 | End: 2025-01-02

## 2025-01-02 RX ORDER — SIMVASTATIN 40 MG
40 TABLET ORAL AT BEDTIME
Qty: 90 TABLET | Refills: 3 | Status: SHIPPED | OUTPATIENT
Start: 2025-01-02 | End: 2025-01-02

## 2025-01-02 RX ORDER — PANTOPRAZOLE SODIUM 40 MG/1
40 TABLET, DELAYED RELEASE ORAL DAILY
Qty: 90 TABLET | Refills: 1 | Status: SHIPPED | OUTPATIENT
Start: 2025-01-02 | End: 2025-01-02

## 2025-01-02 RX ORDER — TAMSULOSIN HYDROCHLORIDE 0.4 MG/1
0.4 CAPSULE ORAL DAILY
Qty: 90 CAPSULE | Refills: 3 | Status: SHIPPED | OUTPATIENT
Start: 2025-01-02

## 2025-01-02 ASSESSMENT — PAIN SCALES - GENERAL: PAINLEVEL_OUTOF10: NO PAIN (0)

## 2025-01-02 NOTE — PROGRESS NOTES
"Preventive Care Visit  [facility]  Ivan Diaz MD, Family Medicine  Jan 2, 2025      Assessment & Plan     Encounter for Medicare annual wellness exam  Medically doing well.  Medications reviewed and no changes made.  Recommended regular exercise, healthy diet and weight loss    Hypertension goal BP (blood pressure) < 140/90  Blood pressure within target goal of less than 140/90.  Lisinopril refilled  - BASIC METABOLIC PANEL; Future    Hyperlipidemia LDL goal <130  Simvastatin refilled  - Lipid panel reflex to direct LDL Non-fasting; Future  - simvastatin (ZOCOR) 40 MG tablet; Take 1 tablet (40 mg) by mouth at bedtime.    Benign prostatic hyperplasia with nocturia  Suggested to continue Flomax  - tamsulosin (FLOMAX) 0.4 MG capsule; Take 1 capsule (0.4 mg) by mouth daily.    Psoriatic arthropathy (H)  Following rheumatology  - folic acid (FOLVITE) 1 MG tablet; Take 1 tablet (1 mg) by mouth daily.    GERD  Recommended healthy diet and to limit caffeine.  Protonix refilled  - pantoprazole (PROTONIX) 40 MG EC tablet; Take 1 tablet (40 mg) by mouth daily.        BMI  Estimated body mass index is 32.34 kg/m  as calculated from the following:    Height as of this encounter: 1.759 m (5' 9.25\").    Weight as of this encounter: 100.1 kg (220 lb 9.6 oz).   Weight management plan: Discussed healthy diet and exercise guidelines    Counseling  Appropriate preventive services were addressed with this patient via screening, questionnaire, or discussion as appropriate for fall prevention, nutrition, physical activity, Tobacco-use cessation, social engagement, weight loss and cognition.  Checklist reviewing preventive services available has been given to the patient.  Reviewed patient's diet, addressing concerns and/or questions.   He is at risk for lack of exercise and has been provided with information to increase physical activity for the benefit of his well-being.       Alina Villalta is a 74 " year old, presenting for the following:  Physical        1/2/2025    10:26 AM   Additional Questions   Roomed by Radha JETT LPN   Accompanied by Self           HPI    Health Care Directive  Patient does not have a Health Care Directive: Discussed advance care planning with patient; information given to patient to review.      12/31/2024   General Health   How would you rate your overall physical health? Good   Feel stress (tense, anxious, or unable to sleep) Only a little   (!) STRESS CONCERN      12/31/2024   Nutrition   Diet: Regular (no restrictions)         12/31/2024   Exercise   Days per week of moderate/strenous exercise 3 days   Average minutes spent exercising at this level 30 min         12/31/2024   Social Factors   Frequency of gathering with friends or relatives Once a week   Worry food won't last until get money to buy more No   Food not last or not have enough money for food? No   Do you have housing? (Housing is defined as stable permanent housing and does not include staying ouside in a car, in a tent, in an abandoned building, in an overnight shelter, or couch-surfing.) Yes   Are you worried about losing your housing? No   Lack of transportation? No   Unable to get utilities (heat,electricity)? No         12/31/2024   Fall Risk   Fallen 2 or more times in the past year? No    No   Trouble with walking or balance? No    No       Multiple values from one day are sorted in reverse-chronological order          12/31/2024   Activities of Daily Living- Home Safety   Needs help with the following daily activites None of the above   Safety concerns in the home None of the above         12/31/2024   Dental   Dentist two times every year? Yes         12/31/2024   Hearing Screening   Hearing concerns? None of the above         12/31/2024   Driving Risk Screening   Patient/family members have concerns about driving No         12/31/2024   General Alertness/Fatigue Screening   Have you been more tired than  usual lately? No         12/31/2024   Urinary Incontinence Screening   Bothered by leaking urine in past 6 months No         12/31/2024   TB Screening   Were you born outside of the [place]? No           Today's PHQ-2 Score:       1/2/2025    10:25 AM   PHQ-2 ( 1999 [employer])   Q1: Little interest or pleasure in doing things 0   Q2: Feeling down, depressed or hopeless 0   PHQ-2 Score 0         12/31/2024   Substance Use   Alcohol more than 3/day or more than 7/wk No   Do you have a current opioid prescription? No   How severe/bad is pain from 1 to 10? 2/10   Do you use any other substances recreationally? No     Social History     Tobacco Use    Smoking status: Never     Passive exposure: Never    Smokeless tobacco: Never   Vaping Use    Vaping status: Never Used   Substance Use Topics    Alcohol use: Yes     Comment:  0-2 beer's or mixed drink's monthly    Drug use: No           12/31/2024   AAA Screening   Family history of Abdominal Aortic Aneurysm (AAA)? No   ASCVD Risk   The 10-year ASCVD risk score (Ramez JOHNSON, et al., 2019) is: 23.8%    Values used to calculate the score:      Age: 74 years      Sex: Male      Is Non- : No      Diabetic: No      Tobacco smoker: No      Systolic Blood Pressure: 135 mmHg      Is BP treated: Yes      HDL Cholesterol: 52 mg/dL      Total Cholesterol: 171 mg/dL          Reviewed and updated as needed this visit by Provider                    Past Medical History:   Diagnosis Date    Esophageal reflux     GERD    Hypertension     Psoriatic arthropathy (H)     Psoriatic arthritis     Past Surgical History:   Procedure Laterality Date    ARTHROSCOPY KNEE RT/LT  04/01/2009    left knee    ARTHROSCOPY KNEE WITH MEDIAL MENISCECTOMY  07/09/2013    Procedure: ARTHROSCOPY KNEE WITH MEDIAL MENISCECTOMY;  Left Knee Arthroscopic and Open Repair of Patellar Tendon with Platelet Rich Plasma;  Surgeon: Ley, Jeffrey Duane, MD;  Location: [facility]    BIOPSY  9/25/2023     COLONOSCOPY  01/01/2006    diverticuli. Father had colon CA    COLONOSCOPY  01/01/2001    COLONOSCOPY  04/11/2011    Procedure:COLONOSCOPY; Surgeon:JENNIFER JEAN; Location:[facility]    COLONOSCOPY N/A 11/21/2016    Procedure: COLONOSCOPY;  Surgeon: Elias Santamaria MD;  Location: [facility]    COLONOSCOPY N/A 02/08/2022    Procedure: COLONOSCOPY;  Surgeon: Junior Brown MD;  Location: [facility]    ENT SURGERY  9/25/2023    HERNIA REPAIR      REPAIR TENDON PATELLA  07/09/2013    Procedure: REPAIR TENDON PATELLA;;  Surgeon: Ley, Jeffrey Duane, MD;  Location: [facility]    SURGICAL HISTORY OF -   01/01/1999    (L) Herniorrhaphy    SURGICAL HISTORY OF -   01/01/2003    (R) Herniorrhaphy    SURGICAL HISTORY OF -   01/01/1981    Hemorrohid     OB History   No obstetric history on file.     Lab work is in process  Labs reviewed in EPIC  BP Readings from Last 3 Encounters:   01/02/25 135/82   08/28/24 (!) 148/82   07/29/24 120/78    Wt Readings from Last 3 Encounters:   01/02/25 100.1 kg (220 lb 9.6 oz)   08/28/24 98.6 kg (217 lb 6.4 oz)   07/29/24 98.4 kg (217 lb)                  Patient Active Problem List   Diagnosis    Psoriatic arthropathy (H)    Esophageal reflux    Hypertension goal BP (blood pressure) < 140/90    FAMILY HISTORY OF GI NEOPLASM    FAMILY HISTORY OF DIABETES MELLITUS    Impotence of organic origin    Chondrocalcinosis    HYPERLIPIDEMIA LDL GOAL <130    Osteoarthritis    High risk medications (not anticoagulants) long-term use    ROSADO (dyspnea on exertion)    History of colonic polyps     Past Surgical History:   Procedure Laterality Date    ARTHROSCOPY KNEE RT/LT  04/01/2009    left knee    ARTHROSCOPY KNEE WITH MEDIAL MENISCECTOMY  07/09/2013    Procedure: ARTHROSCOPY KNEE WITH MEDIAL MENISCECTOMY;  Left Knee Arthroscopic and Open Repair of Patellar Tendon with Platelet Rich Plasma;  Surgeon: Ley, Jeffrey Duane, MD;  Location: [facility]    BIOPSY  9/25/2023    COLONOSCOPY  01/01/2006    diverticuli. Father had  colon CA    COLONOSCOPY  01/01/2001    COLONOSCOPY  04/11/2011    Procedure:COLONOSCOPY; Surgeon:JENNIFER JEAN; Location:[facility]    COLONOSCOPY N/A 11/21/2016    Procedure: COLONOSCOPY;  Surgeon: Elias Santamaria MD;  Location: [facility]    COLONOSCOPY N/A 02/08/2022    Procedure: COLONOSCOPY;  Surgeon: Junior Brown MD;  Location: [facility]    ENT SURGERY  9/25/2023    HERNIA REPAIR      REPAIR TENDON PATELLA  07/09/2013    Procedure: REPAIR TENDON PATELLA;;  Surgeon: Ley, Jeffrey Duane, MD;  Location: [facility]    SURGICAL HISTORY OF -   01/01/1999    (L) Herniorrhaphy    SURGICAL HISTORY OF -   01/01/2003    (R) Herniorrhaphy    SURGICAL HISTORY OF -   01/01/1981    Hemorrohid       Social History     Tobacco Use    Smoking status: Never     Passive exposure: Never    Smokeless tobacco: Never   Substance Use Topics    Alcohol use: Yes     Comment:  0-2 beer's or mixed drink's monthly     Family History   Problem Relation Age of Onset    Hypertension Mother     Diabetes Mother     Cerebrovascular Disease Mother         in her 70s    Arthritis Mother     Cancer - colorectal Father 67    Colon Cancer Father     Arthritis Paternal Grandmother     LUNG DISEASE Sister     Other - See Comments Sister     Prostate Cancer No family hx of     Coronary Artery Disease No family hx of          Current Outpatient Medications   Medication Sig Dispense Refill    albuterol (PROAIR HFA/PROVENTIL HFA/VENTOLIN HFA) 108 (90 Base) MCG/ACT inhaler Inhale 2 puffs into the lungs every 4 hours as needed for shortness of breath, wheezing or cough 18 g 0    albuterol (PROAIR HFA/PROVENTIL HFA/VENTOLIN HFA) 108 (90 Base) MCG/ACT inhaler Inhale 2 puffs into the lungs every 4 hours as needed for shortness of breath or wheezing 18 g 11    folic acid (FOLVITE) 1 MG tablet Take 1 tablet (1 mg) by mouth daily. 90 tablet 2    lisinopril (ZESTRIL) 5 MG tablet Take 1 tablet (5 mg) by mouth daily 90 tablet 3    methotrexate 2.5 MG tablet Take 6 tablets (15  mg) by mouth once a week. . This is taken as 3 tablets at 9AM on Sunday and 3 tablets at 9AM on Monday.  Needs to be taken within the same 24 hour time period of each week. 78 tablet 2    MULTI-VITAMIN OR TABS Take 1 tablet by mouth daily       pantoprazole (PROTONIX) 40 MG EC tablet Take 1 tablet (40 mg) by mouth daily. 90 tablet 1    Probiotic Product (PROBIOTIC DAILY PO) Take 1 capsule by mouth daily       sildenafil (REVATIO) 20 MG tablet Take as many pills as needed up to maximum of 5 pills at a time prior to intercourse. 50 tablet 11    simvastatin (ZOCOR) 40 MG tablet Take 1 tablet (40 mg) by mouth at bedtime. 90 tablet 3    tamsulosin (FLOMAX) 0.4 MG capsule Take 1 capsule (0.4 mg) by mouth daily. 90 capsule 3    triamcinolone (KENALOG) 0.5 % external ointment Apply 1 g topically 2 times daily 60 g 0     Allergies   Allergen Reactions    Acetaminophen-Codeine Nausea     Recent Labs   Lab Test 11/20/24  1017 08/22/24  1031 05/22/24  1037 02/14/24  1009 12/27/23  0825 02/16/23  1141 12/15/22  0952 12/01/22  0807 12/10/21  1603 11/20/21  0901 09/17/21  1052 06/11/21  1014 02/26/21  1012   A1C  --   --   --   --  5.8*  --  5.7*  --   --   --   --   --   --    LDL  --   --   --   --  85  --  81  --   --  69  --   --   --    HDL  --   --   --   --  52  --  61  --   --  60  --   --   --    TRIG  --   --   --   --  172*  --  113  --   --  150*  --   --   --    ALT 37 36 51   < >  --    < >  --  41   < >  --    < > 41 58   CR 1.11 1.09 1.09   < >  --    < >  --  1.07   < > 1.06   < > 1.07 0.96   GFRESTIMATED 71 72 72   < >  --    < >  --  75   < > 71   < > 70 81   GFRESTBLACK  --   --   --   --   --   --   --   --   --   --   --  81 >90   POTASSIUM  --   --   --   --   --   --   --  4.0  --  3.9  --   --   --     < > = values in this interval not displayed.      Current providers sharing in care for this patient include:  Patient Care Team:  Ivan Diaz MD as PCP - General (Family Medicine)  Adria Lopez MD  "as Assigned Rheumatology Provider  Ivan Diaz MD as Assigned PCP    The following health maintenance items are reviewed in Epic and correct as of today:  Health Maintenance   Topic Date Due    RSV VACCINE (1 - Risk 60-74 years 1-dose series) Never done    ZOSTER IMMUNIZATION (1 of 2) 01/05/2015    BMP  11/20/2022    ANNUAL REVIEW OF HM ORDERS  09/21/2024    LIPID  12/27/2024    COVID-19 Vaccine (6 - 2024-25 season) 01/10/2025    GLUCOSE  12/15/2025    MEDICARE ANNUAL WELLNESS VISIT  01/02/2026    FALL RISK ASSESSMENT  01/02/2026    COLORECTAL CANCER SCREENING  02/08/2027    DTAP/TDAP/TD IMMUNIZATION (4 - Td or Tdap) 05/20/2028    ADVANCE CARE PLANNING  12/28/2028    HEPATITIS C SCREENING  Completed    PHQ-2 (once per calendar year)  Completed    INFLUENZA VACCINE  Completed    Pneumococcal Vaccine: 50+ Years  Completed    HPV IMMUNIZATION  Aged Out    MENINGITIS IMMUNIZATION  Aged Out    RSV MONOCLONAL ANTIBODY  Aged Out         Review of Systems  Constitutional, neuro, ENT, endocrine, pulmonary, cardiac, gastrointestinal, genitourinary, musculoskeletal, integument and psychiatric systems are negative, except as otherwise noted.     Objective    Exam  /82   Pulse 69   Temp 97.1  F (36.2  C) (Tympanic)   Resp 20   Ht 1.759 m (5' 9.25\")   Wt 100.1 kg (220 lb 9.6 oz)   SpO2 100%   BMI 32.34 kg/m     Estimated body mass index is 32.34 kg/m  as calculated from the following:    Height as of this encounter: 1.759 m (5' 9.25\").    Weight as of this encounter: 100.1 kg (220 lb 9.6 oz).    Physical Exam  GENERAL: alert and no distress  EYES: Eyes grossly normal to inspection, PERRL and conjunctivae and sclerae normal  HENT: normal cephalic/atraumatic, ear canals and TM's normal, nose and mouth without ulcers or lesions, oropharynx clear, and oral mucous membranes moist  NECK: no adenopathy, no asymmetry, masses, or scars  RESP: lungs clear to auscultation - no rales, rhonchi or wheezes  CV: regular rates " and rhythm, normal S1 S2, no S3 or S4, and no murmur, click or rub  ABDOMEN: soft, nontender  MS: no gross musculoskeletal defects noted, no edema  SKIN: no suspicious lesions or rashes  NEURO: Normal strength and tone, mentation intact and speech normal  PSYCH: mentation appears normal, affect normal/bright         1/2/2025   Mini Cog   Clock Draw Score 2 Normal   3 Item Recall 3 objects recalled   Mini Cog Total Score 5              Signed Electronically by: Ivan Diaz MD

## 2025-01-02 NOTE — PATIENT INSTRUCTIONS
Patient Education   Preventive Care Advice   This is general advice given by our system to help you stay healthy. However, your care team may have specific advice just for you. Please talk to your care team about your preventive care needs.  Nutrition  Eat 5 or more servings of fruits and vegetables each day.  Try wheat bread, brown rice and whole grain pasta (instead of white bread, rice, and pasta).  Get enough calcium and vitamin D. Check the label on foods and aim for 100% of the RDA (recommended daily allowance).  Lifestyle  Exercise at least 150 minutes each week  (30 minutes a day, 5 days a week).  Do muscle strengthening activities 2 days a week. These help control your weight and prevent disease.  No smoking.  Wear sunscreen to prevent skin cancer.  Have a dental exam and cleaning every 6 months.  Yearly exams  See your health care team every year to talk about:  Any changes in your health.  Any medicines your care team has prescribed.  Preventive care, family planning, and ways to prevent chronic diseases.  Shots (vaccines)   HPV shots (up to age 26), if you've never had them before.  Hepatitis B shots (up to age 59), if you've never had them before.  COVID-19 shot: Get this shot when it's due.  Flu shot: Get a flu shot every year.  Tetanus shot: Get a tetanus shot every 10 years.  Pneumococcal, hepatitis A, and RSV shots: Ask your care team if you need these based on your risk.  Shingles shot (for age 50 and up)  General health tests  Diabetes screening:  Starting at age 35, Get screened for diabetes at least every 3 years.  If you are younger than age 35, ask your care team if you should be screened for diabetes.  Cholesterol test: At age 39, start having a cholesterol test every 5 years, or more often if advised.  Bone density scan (DEXA): At age 50, ask your care team if you should have this scan for osteoporosis (brittle bones).  Hepatitis C: Get tested at least once in your life.  STIs (sexually  transmitted infections)  Before age 24: Ask your care team if you should be screened for STIs.  After age 24: Get screened for STIs if you're at risk. You are at risk for STIs (including HIV) if:  You are sexually active with more than one person.  You don't use condoms every time.  You or a partner was diagnosed with a sexually transmitted infection.  If you are at risk for HIV, ask about PrEP medicine to prevent HIV.  Get tested for HIV at least once in your life, whether you are at risk for HIV or not.  Cancer screening tests  Cervical cancer screening: If you have a cervix, begin getting regular cervical cancer screening tests starting at age 21.  Breast cancer scan (mammogram): If you've ever had breasts, begin having regular mammograms starting at age 40. This is a scan to check for breast cancer.  Colon cancer screening: It is important to start screening for colon cancer at age 45.  Have a colonoscopy test every 10 years (or more often if you're at risk) Or, ask your provider about stool tests like a FIT test every year or Cologuard test every 3 years.  To learn more about your testing options, visit:   .  For help making a decision, visit:   https://bit.ly/fn32131.  Prostate cancer screening test: If you have a prostate, ask your care team if a prostate cancer screening test (PSA) at age 55 is right for you.  Lung cancer screening: If you are a current or former smoker ages 50 to 80, ask your care team if ongoing lung cancer screenings are right for you.  For informational purposes only. Not to replace the advice of your health care provider. Copyright   2023 [employer]. All rights reserved. Clinically reviewed by the [facility] Transitions Program. [employer] 458111 - REV 01/24.

## 2025-01-16 ENCOUNTER — COMPREHENSIVE EXAM (OUTPATIENT)
Age: 75
End: 2025-01-16

## 2025-01-16 DIAGNOSIS — H40.1132: ICD-10-CM

## 2025-01-16 DIAGNOSIS — H16.212: ICD-10-CM

## 2025-01-16 DIAGNOSIS — H35.373: ICD-10-CM

## 2025-01-16 DIAGNOSIS — H53.2: ICD-10-CM

## 2025-01-16 DIAGNOSIS — H26.493: ICD-10-CM

## 2025-01-16 DIAGNOSIS — G51.0: ICD-10-CM

## 2025-01-16 DIAGNOSIS — H43.813: ICD-10-CM

## 2025-01-16 DIAGNOSIS — H04.123: ICD-10-CM

## 2025-01-16 PROCEDURE — 99214 OFFICE O/P EST MOD 30 MIN: CPT

## 2025-01-16 PROCEDURE — 92015 DETERMINE REFRACTIVE STATE: CPT

## 2025-01-16 PROCEDURE — 92134 CPTRZ OPH DX IMG PST SGM RTA: CPT

## 2025-02-09 DIAGNOSIS — I10 ESSENTIAL HYPERTENSION WITH GOAL BLOOD PRESSURE LESS THAN 140/90: ICD-10-CM

## 2025-02-10 RX ORDER — LISINOPRIL 5 MG/1
5 TABLET ORAL DAILY
Qty: 90 TABLET | Refills: 2 | Status: SHIPPED | OUTPATIENT
Start: 2025-02-10

## 2025-02-19 ENCOUNTER — LAB (OUTPATIENT)
Dept: LAB | Facility: CLINIC | Age: 75
End: 2025-02-19
Payer: COMMERCIAL

## 2025-02-19 DIAGNOSIS — Z79.899 HIGH RISK MEDICATIONS (NOT ANTICOAGULANTS) LONG-TERM USE: ICD-10-CM

## 2025-02-19 DIAGNOSIS — L40.50 PSORIATIC ARTHROPATHY (H): ICD-10-CM

## 2025-02-19 LAB
ALBUMIN SERPL BCG-MCNC: 4.4 G/DL (ref 3.5–5.2)
ALP SERPL-CCNC: 66 U/L (ref 40–150)
ALT SERPL W P-5'-P-CCNC: 49 U/L (ref 0–70)
AST SERPL W P-5'-P-CCNC: 42 U/L (ref 0–45)
BASOPHILS # BLD AUTO: 0 10E3/UL (ref 0–0.2)
BASOPHILS NFR BLD AUTO: 0 %
BILIRUB DIRECT SERPL-MCNC: 0.32 MG/DL (ref 0–0.3)
BILIRUB SERPL-MCNC: 1.1 MG/DL
CREAT SERPL-MCNC: 1.13 MG/DL (ref 0.67–1.17)
EGFRCR SERPLBLD CKD-EPI 2021: 69 ML/MIN/1.73M2
EOSINOPHIL # BLD AUTO: 0.1 10E3/UL (ref 0–0.7)
EOSINOPHIL NFR BLD AUTO: 3 %
ERYTHROCYTE [DISTWIDTH] IN BLOOD BY AUTOMATED COUNT: 13.5 % (ref 10–15)
HCT VFR BLD AUTO: 43.9 % (ref 40–53)
HGB BLD-MCNC: 15.3 G/DL (ref 13.3–17.7)
IMM GRANULOCYTES # BLD: 0 10E3/UL
IMM GRANULOCYTES NFR BLD: 0 %
LYMPHOCYTES # BLD AUTO: 1.1 10E3/UL (ref 0.8–5.3)
LYMPHOCYTES NFR BLD AUTO: 30 %
MCH RBC QN AUTO: 33.5 PG (ref 26.5–33)
MCHC RBC AUTO-ENTMCNC: 34.9 G/DL (ref 31.5–36.5)
MCV RBC AUTO: 96 FL (ref 78–100)
MONOCYTES # BLD AUTO: 0.2 10E3/UL (ref 0–1.3)
MONOCYTES NFR BLD AUTO: 5 %
NEUTROPHILS # BLD AUTO: 2.3 10E3/UL (ref 1.6–8.3)
NEUTROPHILS NFR BLD AUTO: 62 %
PLATELET # BLD AUTO: 146 10E3/UL (ref 150–450)
PROT SERPL-MCNC: 6.4 G/DL (ref 6.4–8.3)
RBC # BLD AUTO: 4.57 10E6/UL (ref 4.4–5.9)
WBC # BLD AUTO: 3.7 10E3/UL (ref 4–11)

## 2025-02-19 PROCEDURE — 82565 ASSAY OF CREATININE: CPT

## 2025-02-19 PROCEDURE — 80076 HEPATIC FUNCTION PANEL: CPT

## 2025-02-19 PROCEDURE — 36415 COLL VENOUS BLD VENIPUNCTURE: CPT

## 2025-02-19 PROCEDURE — 85025 COMPLETE CBC W/AUTO DIFF WBC: CPT

## 2025-02-24 ENCOUNTER — OFFICE VISIT (OUTPATIENT)
Dept: RHEUMATOLOGY | Facility: CLINIC | Age: 75
End: 2025-02-24
Payer: COMMERCIAL

## 2025-02-24 VITALS
HEART RATE: 85 BPM | RESPIRATION RATE: 20 BRPM | BODY MASS INDEX: 31.73 KG/M2 | SYSTOLIC BLOOD PRESSURE: 110 MMHG | OXYGEN SATURATION: 95 % | WEIGHT: 216.4 LBS | DIASTOLIC BLOOD PRESSURE: 73 MMHG

## 2025-02-24 DIAGNOSIS — Z79.899 HIGH RISK MEDICATIONS (NOT ANTICOAGULANTS) LONG-TERM USE: ICD-10-CM

## 2025-02-24 DIAGNOSIS — L40.50 PSORIATIC ARTHROPATHY (H): Primary | ICD-10-CM

## 2025-02-24 RX ORDER — METHOTREXATE 2.5 MG/1
17.5 TABLET ORAL WEEKLY
Qty: 91 TABLET | Refills: 0 | Status: SHIPPED | OUTPATIENT
Start: 2025-02-24

## 2025-02-24 NOTE — PROGRESS NOTES
Rheumatology Clinic Visit      Pawan Bullard MRN# [id number]   YOB: 1950 Age: 75 year old      Date of visit: 2/24/25   PCP: Dr. Ivan Diaz    Chief Complaint   Patient presents with:  Psoriatic arthropathy : Left knee is bothering him and fingers hurt    Assessment and Plan     1.  Psoriatic arthritis:  Previously on SSZ (GI upset).  Currently on methotrexate 15 mg once weekly (GI upset with 25 mg once weekly; cytopenias). Mild synovitis on exam today.  We discussed increasing methotrexate but cautioned history of cytopenias and he already has mildly reduced platelets and WBC; versus changing to leflunomide that can also cause cytopenias; versus adding Otezla or a biologic DMARD.  I recommended Otezla or biologic DMARD considering the cytopenias but he would first like to try a slightly higher dose of methotrexate to see if it is sufficient.  His choices based on a concern about the potential cost for the Otezla or biologic DMARD; we did discuss potential for financial assistance program; he was also concerned that his insurance would not approve Otezla.  Therefore, after thorough discussion, the plan is to increase methotrexate.  Chronic illness, progressive.    - Increase methotrexate from 15mg once every 7 days (split dose within a 24-hour period), to 17.5 mg once every 7 days (split dose within a 24-hour period)  - Continue folic acid 1 mg daily  - Labs monthly [id number]: CBC, Cr, Hepatic Panel  - Labs in 3 months: CBC, Creatinine, Hepatic Panel, ESR, CRP               Rapid 3, cumulative scores                      08/28/2024:  5.2 (MTX 15mg wkly)                       02/22/2024:  7.3 (MTX 15mg wkly                      02/16/2023:  7    (MTX 15mg wkly)                      10/14/2022:  7.5 (MTX 15mg wkly)                      6/18/2021:    4.3 (MTX 20mg wkly)                      02/17/2020:  4.7 (MTX 20mg wkly)                      11/18/2019:  3.3 (MTX 20mg wkly)                       11/26/2018:  5.2 (MTX 17.5mg wkly)    High risk medication requiring intensive toxicity monitoring at least quarterly    # Apremilast (Otezla) Risks and Benefits: The risks and benefits of apremilast were discussed in detail and the patient verbalized understanding.  The risks discussed include, but are not limited to, the risk for hypersensitivity, anaphylaxis, anaphylactoid reactions, depression, weight decrease, diarrhea, nausea, headache, and infections such as upper respiratory tract infection.  If severe renal impairment, the dose may be reduced.      # Adalimumab (Humira) Risks and Benefits: The risks and benefits of adalimumab were discussed in detail and the patient verbalized understanding.  The risks discussed include, but are not limited to, the risk for hypersensitivity, anaphylaxis, anaphylactoid reactions, an increased risk for serious infections leading to hospitalization or death, a possible increased risk for lymphoma and other malignancies, a possible worsening of demyelinating diseases, a possible worsening of heart failure, risk for cytopenias, risk for drug induced lupus, possible reactivation of hepatitis B, and possible reactivation of latent tuberculosis.  Subcutaneous injections may result in injection site reactions and/or pain at the site of injection.  The most common adverse reactions are infections, injection site reactions, headache, and rash.  It was discussed that the medication would need to be discontinued if a serious infection develops.  It was discussed that live vaccinations should not be received while using adalimumab or within 30 days prior to starting adalimumab.     2. Hand OA: Affecting the PIPs and DIPs. Topical diclofenac PRN.     3.  Left knee osteoarthritis: History of meniscus surgery in 2009 and 2013.  Degenerative arthritis affecting the left knee.  Continue home physical therapy exercises.  Overall knee osteoarthritis symptoms have been stable.    4.   Vaccinations: Vaccinations reviewed with Mr. Bullard.  Risks and benefits of vaccinations were discussed.    - Influenza: Encouraged yearly vaccination  - [id number]: Vaccination advised  - Shingrix: Up-to-date  - COVID-19: Advised keeping updated    Total minutes spent in evaluation with patient, documentation, , and review of pertinent studies and chart notes: 18  The longitudinal plan of care for the rheumatology problem(s) were addressed during this visit.  Due to added complexity of care, we will continue to support the patient and the subsequent management of this condition with ongoing continuity of care.       Mr. Bullard verbalized agreement with and understanding of the rational for the diagnosis and treatment plan.  All questions were answered to best of my ability and the patient's satisfaction. Mr. Bullard was advised to contact the clinic with any questions that may arise after the clinic visit.      Thank you for involving me in the care of the patient    Return to clinic: 3 months    HPI   Pawan Bullard is a 75 year old male with a past medical history significant for hypertension, hyperlipidemia, chondrocalcinosis, GERD, osteoarthritis, and psoriatic arthritis who presents for follow-up of psoriatic arthritis.      2/22/2024: Psoriatic arthritis controlled.  Left knee still bothers him from time to time.  When his left knee bothers him enough he takes Tylenol arthritis, approximately 1-2 times per week.  No joint swelling.  Not doing physical therapy exercises for the left knee but states that he has the exercise paperwork at home and can start doing the exercises again.    8/28/2024: psoriatic arthritis controlled. No joint pain/swelling. No morning stiffness or gelling. At one point had a cough that was identified by his PCP to be GERD related and pantoprazole resolved this; he will continue following with Dr. Diaz (PCP) for this issue. Exercises for his knee have been very  helpful.     Today, 2/24/2025: Worsening pain and stiffness at the MCPs and PIPs that is worse in the morning and improves with time and activity.  Left knee is still bothering him as it has in the past, sometimes giving out and sometimes locking; he does physical therapy exercises for the knees 3 times per week; no swelling of the left knee; overall he feels like the left knee symptoms are stable.    Denies fevers, chills, nausea, vomiting, constipation, diarrhea. No abdominal pain. No chest pain/pressure, palpitations, or shortness of breath. No sicca symptoms.     Tobacco: None  EtOH: 0-2 beers or mixed drinks monthly  Drugs: None    ROS   12 point review of system was completed and negative except as noted in the HPI     Active Problem List     Patient Active Problem List   Diagnosis    Psoriatic arthropathy (H)    Esophageal reflux    Hypertension goal BP (blood pressure) < 140/90    FAMILY HISTORY OF GI NEOPLASM    FAMILY HISTORY OF DIABETES MELLITUS    Impotence of organic origin    Chondrocalcinosis    HYPERLIPIDEMIA LDL GOAL <130    Osteoarthritis    High risk medications (not anticoagulants) long-term use    ROSADO (dyspnea on exertion)    History of colonic polyps     Past Medical History     Past Medical History:   Diagnosis Date    Esophageal reflux     GERD    Hypertension     Psoriatic arthropathy (H)     Psoriatic arthritis     Past Surgical History     Past Surgical History:   Procedure Laterality Date    ARTHROSCOPY KNEE RT/LT  04/01/2009    left knee    ARTHROSCOPY KNEE WITH MEDIAL MENISCECTOMY  07/09/2013    Procedure: ARTHROSCOPY KNEE WITH MEDIAL MENISCECTOMY;  Left Knee Arthroscopic and Open Repair of Patellar Tendon with Platelet Rich Plasma;  Surgeon: Ley, Jeffrey Duane, MD;  Location: [facility]    BIOPSY  9/25/2023    COLONOSCOPY  01/01/2006    diverticuli. Father had colon CA    COLONOSCOPY  01/01/2001    COLONOSCOPY  04/11/2011    Procedure:COLONOSCOPY; Surgeon:JENNIFER JEAN; Location:[facility]  GI    COLONOSCOPY N/A 11/21/2016    Procedure: COLONOSCOPY;  Surgeon: Elias Santamaria MD;  Location: [facility]    COLONOSCOPY N/A 02/08/2022    Procedure: COLONOSCOPY;  Surgeon: Junior Brown MD;  Location: [facility]    ENT SURGERY  9/25/2023    HERNIA REPAIR      REPAIR TENDON PATELLA  07/09/2013    Procedure: REPAIR TENDON PATELLA;;  Surgeon: Ley, Jeffrey Duane, MD;  Location: [facility]    SURGICAL HISTORY OF -   01/01/1999    (L) Herniorrhaphy    SURGICAL HISTORY OF -   01/01/2003    (R) Herniorrhaphy    SURGICAL HISTORY OF -   01/01/1981    Hemorrohid     Allergy     Allergies   Allergen Reactions    Acetaminophen-Codeine Nausea     Current Medication List     Current Outpatient Medications   Medication Sig Dispense Refill    albuterol (PROAIR HFA/PROVENTIL HFA/VENTOLIN HFA) 108 (90 Base) MCG/ACT inhaler Inhale 2 puffs into the lungs every 4 hours as needed for shortness of breath or wheezing 18 g 11    folic acid (FOLVITE) 1 MG tablet Take 1 tablet (1 mg) by mouth daily. 90 tablet 3    lisinopril (ZESTRIL) 5 MG tablet TAKE 1 TABLET (5 MG) BY MOUTH DAILY 90 tablet 2    methotrexate 2.5 MG tablet Take 6 tablets (15 mg) by mouth once a week. . This is taken as 3 tablets at 9AM on Sunday and 3 tablets at 9AM on Monday.  Needs to be taken within the same 24 hour time period of each week. 78 tablet 2    MULTI-VITAMIN OR TABS Take 1 tablet by mouth daily       pantoprazole (PROTONIX) 40 MG EC tablet Take 1 tablet (40 mg) by mouth daily. 90 tablet 1    Probiotic Product (PROBIOTIC DAILY PO) Take 1 capsule by mouth daily       simvastatin (ZOCOR) 40 MG tablet Take 1 tablet (40 mg) by mouth at bedtime. 90 tablet 3    tamsulosin (FLOMAX) 0.4 MG capsule Take 1 capsule (0.4 mg) by mouth daily. 90 capsule 3    triamcinolone (KENALOG) 0.5 % external ointment Apply 1 g topically 2 times daily 60 g 0    albuterol (PROAIR HFA/PROVENTIL HFA/VENTOLIN HFA) 108 (90 Base) MCG/ACT inhaler Inhale 2 puffs into the lungs every 4 hours as needed for  "shortness of breath, wheezing or cough (Patient not taking: Reported on 2/24/2025) 18 g 0    sildenafil (REVATIO) 20 MG tablet Take as many pills as needed up to maximum of 5 pills at a time prior to intercourse. (Patient not taking: Reported on 2/24/2025) 50 tablet 11     No current facility-administered medications for this visit.         Social History   See HPI    Family History     Family History   Problem Relation Age of Onset    Hypertension Mother     Diabetes Mother     Cerebrovascular Disease Mother         in her 70s    Arthritis Mother     Cancer - colorectal Father 67    Colon Cancer Father     Arthritis Paternal Grandmother     LUNG DISEASE Sister     Other - See Comments Sister     Prostate Cancer No family hx of     Coronary Artery Disease No family hx of        Physical Exam     Temp Readings from Last 3 Encounters:   01/02/25 97.1  F (36.2  C) (Tympanic)   07/29/24 97.3  F (36.3  C) (Tympanic)   07/15/24 97.7  F (36.5  C) (Tympanic)     BP Readings from Last 5 Encounters:   02/24/25 110/73   01/02/25 135/82   08/28/24 (!) 148/82   07/29/24 120/78   07/15/24 130/80     Pulse Readings from Last 1 Encounters:   02/24/25 85     Resp Readings from Last 1 Encounters:   02/24/25 20     Estimated body mass index is 31.73 kg/m  as calculated from the following:    Height as of 1/2/25: 1.759 m (5' 9.25\").    Weight as of this encounter: 98.2 kg (216 lb 6.4 oz).    GEN: NAD. Healthy appearing adult.   HEENT:  Anicteric, noninjected sclera. No obvious external lesions of the ear and nose. Hearing intact.  CV: S1, S2.  RRR.  No murmurs or rubs  PULM: No increased work of breathing.  CTA bilaterally.  MSK: Mild synovial swelling and tenderness to palpation of the right 2nd-4th MCPs, left second MCP, right 2nd-4th PIPs, left 2nd-3rd PIPs.  Heberden's and Portia's nodes.   Wrists without swelling or tenderness to palpation.  Elbows and shoulders without swelling or tenderness to palpation.   Knees, ankles, and " MTPs without swelling or tenderness to palpation.    SKIN: No rash or jaundice seen  PSYCH: Alert. Appropriate.      Labs / Imaging (select studies)       CBC  Recent Labs   Lab Test 02/19/25  1018 11/20/24  1017 08/22/24  1031 09/17/21  1052 06/11/21  1014 02/26/21  1012 11/16/20  0903   WBC 3.7* 4.4 5.2   < > 5.0 4.4 5.3   RBC 4.57 4.50 4.46   < > 4.16* 4.36* 4.71   HGB 15.3 15.1 14.7   < > 14.1 14.6 15.5   HCT 43.9 43.5 43.2   < > 40.9 41.9 44.8   MCV 96 97 97   < > 98 96 95   RDW 13.5 13.4 13.1   < > 13.4 13.0 13.7   * 148* 133*   < > 164 154 146*   MCH 33.5* 33.6* 33.0   < > 33.9* 33.5* 32.9   MCHC 34.9 34.7 34.0   < > 34.5 34.8 34.6   NEUTROPHIL 62 60 65   < > 64.5 64.1 73.8   LYMPH 30 33 28   < > 23.6 28.6 16.2   MONOCYTE 5 4 5   < > 9.3 4.6 7.9   EOSINOPHIL 3 3 2   < > 2.4 2.5 1.9   BASOPHIL 0 0 0   < > 0.2 0.2 0.2   ANEU  --   --   --   --  3.2 2.8 3.9   ALYM  --   --   --   --  1.2 1.3 0.9   ERENDIRA  --   --   --   --  0.5 0.2 0.4   AEOS  --   --   --   --  0.1 0.1 0.1   ABAS  --   --   --   --  0.0 0.0 0.0   ANEUTAUTO 2.3 2.7 3.3   < >  --   --   --    ALYMPAUTO 1.1 1.5 1.5   < >  --   --   --    AMONOAUTO 0.2 0.2 0.3   < >  --   --   --    AEOSAUTO 0.1 0.1 0.1   < >  --   --   --    ABSBASO 0.0 0.0 0.0   < >  --   --   --     < > = values in this interval not displayed.     CMP  Recent Labs   Lab Test 02/19/25  1018 01/02/25  1059 11/20/24  1017 08/22/24  1031 02/16/23  1141 12/15/22  0952 12/01/22  0807 12/10/21  1603 11/20/21  0901 09/17/21  1052 06/11/21  1014 02/26/21  1012 11/16/20  0903   NA  --  139  --   --   --   --   --   --  140  --   --   --  138   POTASSIUM  --  4.2  --   --   --   --  4.0  --  3.9  --   --   --  4.0   CHLORIDE  --  104  --   --   --   --   --   --  107  --   --   --  105   CO2  --  25  --   --   --   --   --   --  30  --   --   --  27   ANIONGAP  --  10  --   --   --   --   --   --  3  --   --   --  6   GLC  --  124*  --   --   --  120*  --   --  110*  --   --   --   107*   BUN  --  13.6  --   --   --   --   --   --  13  --   --   --  13   CR 1.13 1.00 1.11 1.09   < >  --  1.07   < > 1.06   < > 1.07 0.96 1.06  1.05   GFRESTIMATED 69 80 71 72   < >  --  75   < > 71   < > 70 81 71  72   GFRESTBLACK  --   --   --   --   --   --   --   --   --   --  81 >90 83  84   JOHN  --  9.5  --   --   --   --   --   --  9.0  --   --   --  9.5   BILITOTAL 1.1  --  0.9 1.0   < >  --  1.1   < >  --    < > 0.7 0.8 1.0   ALBUMIN 4.4  --  4.2 4.3   < >  --  4.3   < >  --    < > 3.6 3.8 4.0   PROTTOTAL 6.4  --  6.4 6.5   < >  --  6.4   < >  --    < > 6.4* 6.7* 6.7*   ALKPHOS 66  --  57 60   < >  --  50   < >  --    < > 51 58 58   AST 42  --  33 32   < >  --  32   < >  --    < > 26 39 32   ALT 49  --  37 36   < >  --  41   < >  --    < > 41 58 42    < > = values in this interval not displayed.     Calcium/VitaminD  Recent Labs   Lab Test 01/02/25  1059 11/20/21  0901 11/16/20  0903   JOHN 9.5 9.0 9.5     ESR/CRP  Recent Labs   Lab Test 08/22/24  1031 02/14/24  1009 08/15/23  1016 05/23/23  1339 02/16/23  1141 09/09/22  1059 06/14/22  1012 12/10/21  1603   SED 6 5 6   < > 5   < > 6 5   CRP  --   --   --   --  <2.9  --  <2.9 <2.9   CRPI <3.00 <3.00 <3.00   < >  --    < >  --   --     < > = values in this interval not displayed.     Lipid Panel  Recent Labs   Lab Test 01/02/25  1059 12/27/23  0825 12/15/22  0952   CHOL 197 171 165   TRIG 182* 172* 113   HDL 57 52 61   * 85 81   NHDL 140* 119 104     Hepatitis B  Recent Labs   Lab Test 05/16/18  1016   HBCAB Nonreactive   HEPBANG Nonreactive       Immunization History     Immunization History   Administered Date(s) Administered    COVID-19 12+ (MODERNA) 01/05/2024    COVID-19 Bivalent 18+ (Moderna) 11/18/2022    COVID-19 Monovalent 18+ (Moderna) 05/12/2022    COVID-19 Monovalent Booster 18+ (Moderna) 10/28/2021    COVID-19 Vaccine (Evan) 03/04/2021    Hepatitis A (ADULT 19+) 08/02/2011    Influenza (High Dose) Trivalent,PF (Fluzone) 10/18/2017,  09/21/2018, 11/13/2019, 10/09/2020, 11/16/2021, 11/15/2023    Influenza (IIV3) PF 11/17/2005, 12/04/2006, 11/22/2010, 09/26/2012, 10/01/2014    Influenza Vaccine 65+ (Fluzone HD) 10/09/2020, 09/17/2021, 09/09/2022, 11/15/2023    Influenza Vaccine >6 months,quad, PF 10/15/2013, 12/17/2015, 08/26/2016    Pneumo Conj 13-V (2010&after) 10/18/2017    Pneumococcal 20 valent Conjugate (Prevnar 20) 02/22/2024    Pneumococcal 23 valent 01/22/2018    TDAP (Adacel,Boostrix) 01/09/2013    TDAP Vaccine (Adacel) 04/22/2009, 05/20/2018    Zoster vaccine, live 11/10/2014          Chart documentation done in part with Dragon Voice recognition Software. Although reviewed after completion, some word and grammatical error may remain.    Adria Lopez MD

## 2025-02-24 NOTE — PATIENT INSTRUCTIONS
RHEUMATOLOGY    [facility]  [address] [place] [address]  [place] [address]    Phone number: [phone]  Fax number: [phone]    If you need a medication refill, please contact us as you may need lab work and/or a follow up visit prior to your refill.      Thank you for choosing [facility]!    Francie Cuellar CMA Rheumatology

## 2025-02-25 ENCOUNTER — TELEPHONE (OUTPATIENT)
Dept: RHEUMATOLOGY | Facility: CLINIC | Age: 75
End: 2025-02-25
Payer: COMMERCIAL

## 2025-02-25 NOTE — TELEPHONE ENCOUNTER
[facility] Call Center    Phone Message    May a detailed message be left on voicemail: yes     Reason for Call: Other: Rebecca with Medicare called to check on prior auth for Otezla for patient. They are re faxing form and can be reached back at [phone] Option 3 (Reference # [id number]).     Action Taken: Message routed to:  Other: RHEUM    Travel Screening: Not Applicable     Date of Service: 2/25/25

## 2025-02-25 NOTE — TELEPHONE ENCOUNTER
This patient is not currently on this medication. Will fax this back to insurance.     Navya MARTIN RN, [facility] 02/25/25 12:50 PM

## 2025-03-26 ENCOUNTER — LAB (OUTPATIENT)
Dept: LAB | Facility: CLINIC | Age: 75
End: 2025-03-26
Payer: COMMERCIAL

## 2025-03-26 DIAGNOSIS — Z79.899 HIGH RISK MEDICATIONS (NOT ANTICOAGULANTS) LONG-TERM USE: ICD-10-CM

## 2025-03-26 DIAGNOSIS — L40.50 PSORIATIC ARTHROPATHY (H): ICD-10-CM

## 2025-03-26 LAB
ALBUMIN SERPL BCG-MCNC: 4.4 G/DL (ref 3.5–5.2)
ALP SERPL-CCNC: 63 U/L (ref 40–150)
ALT SERPL W P-5'-P-CCNC: 55 U/L (ref 0–70)
AST SERPL W P-5'-P-CCNC: 41 U/L (ref 0–45)
BASOPHILS # BLD AUTO: 0 10E3/UL (ref 0–0.2)
BASOPHILS NFR BLD AUTO: 0 %
BILIRUB DIRECT SERPL-MCNC: 0.29 MG/DL (ref 0–0.3)
BILIRUB SERPL-MCNC: 1 MG/DL
CREAT SERPL-MCNC: 1.05 MG/DL (ref 0.67–1.17)
EGFRCR SERPLBLD CKD-EPI 2021: 75 ML/MIN/1.73M2
EOSINOPHIL # BLD AUTO: 0.2 10E3/UL (ref 0–0.7)
EOSINOPHIL NFR BLD AUTO: 3 %
ERYTHROCYTE [DISTWIDTH] IN BLOOD BY AUTOMATED COUNT: 13.3 % (ref 10–15)
HCT VFR BLD AUTO: 43.7 % (ref 40–53)
HGB BLD-MCNC: 15.1 G/DL (ref 13.3–17.7)
IMM GRANULOCYTES # BLD: 0 10E3/UL
IMM GRANULOCYTES NFR BLD: 0 %
LYMPHOCYTES # BLD AUTO: 1.4 10E3/UL (ref 0.8–5.3)
LYMPHOCYTES NFR BLD AUTO: 28 %
MCH RBC QN AUTO: 33.6 PG (ref 26.5–33)
MCHC RBC AUTO-ENTMCNC: 34.6 G/DL (ref 31.5–36.5)
MCV RBC AUTO: 97 FL (ref 78–100)
MONOCYTES # BLD AUTO: 0.2 10E3/UL (ref 0–1.3)
MONOCYTES NFR BLD AUTO: 4 %
NEUTROPHILS # BLD AUTO: 3.2 10E3/UL (ref 1.6–8.3)
NEUTROPHILS NFR BLD AUTO: 65 %
PLATELET # BLD AUTO: 135 10E3/UL (ref 150–450)
PROT SERPL-MCNC: 6.4 G/DL (ref 6.4–8.3)
RBC # BLD AUTO: 4.5 10E6/UL (ref 4.4–5.9)
WBC # BLD AUTO: 4.9 10E3/UL (ref 4–11)

## 2025-03-26 PROCEDURE — 85025 COMPLETE CBC W/AUTO DIFF WBC: CPT

## 2025-03-26 PROCEDURE — 82565 ASSAY OF CREATININE: CPT

## 2025-03-26 PROCEDURE — 80076 HEPATIC FUNCTION PANEL: CPT

## 2025-03-26 PROCEDURE — 36415 COLL VENOUS BLD VENIPUNCTURE: CPT

## 2025-03-27 ENCOUNTER — OFFICE VISIT (OUTPATIENT)
Dept: FAMILY MEDICINE | Facility: CLINIC | Age: 75
End: 2025-03-27
Payer: COMMERCIAL

## 2025-03-27 ENCOUNTER — TELEPHONE (OUTPATIENT)
Dept: FAMILY MEDICINE | Facility: CLINIC | Age: 75
End: 2025-03-27

## 2025-03-27 ENCOUNTER — HOSPITAL ENCOUNTER (OUTPATIENT)
Dept: ULTRASOUND IMAGING | Facility: CLINIC | Age: 75
Discharge: HOME | End: 2025-03-27
Attending: FAMILY MEDICINE
Payer: COMMERCIAL

## 2025-03-27 VITALS
BODY MASS INDEX: 31.07 KG/M2 | WEIGHT: 217 LBS | HEIGHT: 70 IN | RESPIRATION RATE: 18 BRPM | TEMPERATURE: 97.7 F | SYSTOLIC BLOOD PRESSURE: 122 MMHG | DIASTOLIC BLOOD PRESSURE: 86 MMHG | HEART RATE: 79 BPM | OXYGEN SATURATION: 97 %

## 2025-03-27 DIAGNOSIS — M79.662 PAIN OF LEFT LOWER LEG: ICD-10-CM

## 2025-03-27 DIAGNOSIS — I82.812 SUPERFICIAL THROMBOSIS OF LEG, LEFT: ICD-10-CM

## 2025-03-27 DIAGNOSIS — M79.662 PAIN OF LEFT LOWER LEG: Primary | ICD-10-CM

## 2025-03-27 PROCEDURE — 93971 EXTREMITY STUDY: CPT | Mod: LT

## 2025-03-27 ASSESSMENT — ENCOUNTER SYMPTOMS
HEMATOLOGIC/LYMPHATIC NEGATIVE: 1
ENDOCRINE NEGATIVE: 1
ALLERGIC/IMMUNOLOGIC NEGATIVE: 1
GASTROINTESTINAL NEGATIVE: 1
RESPIRATORY NEGATIVE: 1
CONSTITUTIONAL NEGATIVE: 1
EYES NEGATIVE: 1
CARDIOVASCULAR NEGATIVE: 1
PSYCHIATRIC NEGATIVE: 1
ARTHRALGIAS: 1

## 2025-03-27 ASSESSMENT — PAIN SCALES - GENERAL: PAINLEVEL_OUTOF10: MILD PAIN (2)

## 2025-03-27 NOTE — TELEPHONE ENCOUNTER
Patient calls back to check status of his request for alternative medication.   He saw provider today and was prescribed rivaroxaban (Xarelto) for superficial thrombosis of L leg.   Patient states this medication is cost prohibitive, would be over $400 for 45 pills.  Pharmacy did send a coupon for a 30 day free trial, but it excludes the 10 mg dose. We checked [facility] online as well, but it doesn't appear they carry this medication.  RN advised patient to call his insurance to check coverage on a similar alternative like Eliquis, then also gave him the number to the [employer] Prescription Assistance Program.  Also discussed potential for an alternative like warfarin, which would require INR monitoring.     Forwarding to provider for review and further recommendations please.    Thanks,  Cheyenne Bunch RN  [facility]

## 2025-03-27 NOTE — TELEPHONE ENCOUNTER
New Medication Request        What medication are you requesting?: A different Blood Thinner    Reason for medication request: The other one prescribed today is too expensive.     Have you taken this medication before?: No    Controlled Substance Agreement on file:   CSA -- Patient Level:    CSA: None found at the patient level.         Patient offered an appointment? No Pt was in today with Dr. Anderson    Preferred Pharmacy:       [address] [address]  Phone: [phone] Fax: [phone]        Could we send this information to you in [employer] or would you prefer to receive a phone call?:   Patient would prefer a phone call   Okay to leave a detailed message?: Yes at Cell number on file:    Telephone Information:   Mobile [phone]     Maritza Almanza on 3/27/2025 at 12:50 PM

## 2025-03-27 NOTE — PROGRESS NOTES
Assessment & Plan   Problem List Items Addressed This Visit    None  Visit Diagnoses       Pain of left lower leg    -  Primary    Relevant Orders    US Lower Extremity Venous Duplex Left (Completed)    Superficial thrombosis of leg, left        Relevant Medications    rivaroxaban ANTICOAGULANT (XARELTO) 10 MG TABS tablet           Discussed US findings with patient. Due to the location of the thrombus , he will need anticoagulation for 45 days. Medication faxed, discussed common side effects with the patient.       FUTURE APPOINTMENTS:       - Follow-up visit as needed      Subjective   Pawan is a 75 year old, presenting for the following health issues:  Leg Pain        3/27/2025    10:23 AM   Additional Questions   Roomed by Amie   Accompanied by Self     HPI    Patient is a 75 yr old male here for pain in his left inner thigh. He first noticed this yesterday and it has progressively increased in intensity. There is redness and a bit of warmth. The pain is mild to moderate. Walking seems to aggravate the pain. No known relieving factor.  Patient states that he has not been on a recent travel and has not had any recent surgery. No injury to the thigh      Concern - left thigh redness, puffiness and tenderness  Onset: started yesterday morning  Description: slightly red patch of skin, he states it was puffy and painful touch  Intensity: mild  Progression of Symptoms:  worsening slightly  Accompanying Signs & Symptoms: none  Previous history of similar problem: none  Precipitating factors:        Worsened by: walking and applying pressure   Alleviating factors:        Improved by: none  Therapies tried and outcome: tylenol with some results      Review of Systems   Constitutional: Negative.    HENT: Negative.     Eyes: Negative.    Respiratory: Negative.     Cardiovascular: Negative.    Gastrointestinal: Negative.    Endocrine: Negative.    Genitourinary: Negative.    Musculoskeletal:  Positive for arthralgias  "and gait problem.   Allergic/Immunologic: Negative.    Hematological: Negative.    Psychiatric/Behavioral: Negative.            Objective    /86   Pulse 79   Temp 97.7  F (36.5  C) (Tympanic)   Resp 18   Ht 1.778 m (5' 10\")   Wt 98.4 kg (217 lb)   SpO2 97%   BMI 31.14 kg/m    Body mass index is 31.14 kg/m .  Physical Exam  Constitutional:       Appearance: Normal appearance.   HENT:      Head: Normocephalic and atraumatic.   Eyes:      Pupils: Pupils are equal, round, and reactive to light.   Cardiovascular:      Pulses: Normal pulses.      Heart sounds: Normal heart sounds.   Pulmonary:      Effort: Pulmonary effort is normal.      Breath sounds: Normal breath sounds.   Musculoskeletal:         General: Swelling and tenderness present. Normal range of motion.      Left upper leg: Swelling and tenderness present.        Legs:       Comments: Area demarcated - tenderness and redness   Neurological:      General: No focal deficit present.      Mental Status: He is alert and oriented to person, place, and time.   Psychiatric:         Mood and Affect: Mood normal.         Behavior: Behavior normal.      IMPRESSION:  1.  No deep venous thrombosis in the left lower extremity.  2.  Positive for greater saphenous vein thrombus from the proximal thigh to the distal thigh (greater than 5 cm in length).              Signed Electronically by: Owen Anderson MD    "

## 2025-04-23 ENCOUNTER — LAB (OUTPATIENT)
Dept: LAB | Facility: CLINIC | Age: 75
End: 2025-04-23
Payer: COMMERCIAL

## 2025-04-23 DIAGNOSIS — L40.50 PSORIATIC ARTHROPATHY (H): ICD-10-CM

## 2025-04-23 DIAGNOSIS — Z79.899 HIGH RISK MEDICATIONS (NOT ANTICOAGULANTS) LONG-TERM USE: ICD-10-CM

## 2025-04-23 LAB
ALBUMIN SERPL BCG-MCNC: 4.5 G/DL (ref 3.5–5.2)
ALP SERPL-CCNC: 60 U/L (ref 40–150)
ALT SERPL W P-5'-P-CCNC: 65 U/L (ref 0–70)
AST SERPL W P-5'-P-CCNC: 50 U/L (ref 0–45)
BASOPHILS # BLD AUTO: 0 10E3/UL (ref 0–0.2)
BASOPHILS NFR BLD AUTO: 0 %
BILIRUB DIRECT SERPL-MCNC: 0.3 MG/DL (ref 0–0.3)
BILIRUB SERPL-MCNC: 1 MG/DL
CREAT SERPL-MCNC: 1.13 MG/DL (ref 0.67–1.17)
EGFRCR SERPLBLD CKD-EPI 2021: 69 ML/MIN/1.73M2
EOSINOPHIL # BLD AUTO: 0.1 10E3/UL (ref 0–0.7)
EOSINOPHIL NFR BLD AUTO: 3 %
ERYTHROCYTE [DISTWIDTH] IN BLOOD BY AUTOMATED COUNT: 13.6 % (ref 10–15)
HCT VFR BLD AUTO: 43.7 % (ref 40–53)
HGB BLD-MCNC: 15 G/DL (ref 13.3–17.7)
IMM GRANULOCYTES # BLD: 0 10E3/UL
IMM GRANULOCYTES NFR BLD: 0 %
LYMPHOCYTES # BLD AUTO: 1.2 10E3/UL (ref 0.8–5.3)
LYMPHOCYTES NFR BLD AUTO: 29 %
MCH RBC QN AUTO: 33.1 PG (ref 26.5–33)
MCHC RBC AUTO-ENTMCNC: 34.3 G/DL (ref 31.5–36.5)
MCV RBC AUTO: 97 FL (ref 78–100)
MONOCYTES # BLD AUTO: 0.2 10E3/UL (ref 0–1.3)
MONOCYTES NFR BLD AUTO: 6 %
NEUTROPHILS # BLD AUTO: 2.5 10E3/UL (ref 1.6–8.3)
NEUTROPHILS NFR BLD AUTO: 63 %
PLATELET # BLD AUTO: 151 10E3/UL (ref 150–450)
PROT SERPL-MCNC: 6.6 G/DL (ref 6.4–8.3)
RBC # BLD AUTO: 4.53 10E6/UL (ref 4.4–5.9)
WBC # BLD AUTO: 4 10E3/UL (ref 4–11)

## 2025-04-23 PROCEDURE — 85025 COMPLETE CBC W/AUTO DIFF WBC: CPT

## 2025-04-23 PROCEDURE — 82565 ASSAY OF CREATININE: CPT

## 2025-04-23 PROCEDURE — 36415 COLL VENOUS BLD VENIPUNCTURE: CPT

## 2025-04-23 PROCEDURE — 80076 HEPATIC FUNCTION PANEL: CPT

## 2025-05-10 ENCOUNTER — HEALTH MAINTENANCE LETTER (OUTPATIENT)
Age: 75
End: 2025-05-10

## 2025-05-21 ENCOUNTER — LAB (OUTPATIENT)
Dept: LAB | Facility: CLINIC | Age: 75
End: 2025-05-21
Payer: COMMERCIAL

## 2025-05-21 DIAGNOSIS — L40.50 PSORIATIC ARTHROPATHY (H): ICD-10-CM

## 2025-05-21 DIAGNOSIS — Z79.899 HIGH RISK MEDICATIONS (NOT ANTICOAGULANTS) LONG-TERM USE: ICD-10-CM

## 2025-05-21 LAB
ALBUMIN SERPL BCG-MCNC: 4.3 G/DL (ref 3.5–5.2)
ALP SERPL-CCNC: 60 U/L (ref 40–150)
ALT SERPL W P-5'-P-CCNC: 45 U/L (ref 0–70)
AST SERPL W P-5'-P-CCNC: 36 U/L (ref 0–45)
BASOPHILS # BLD AUTO: 0 10E3/UL (ref 0–0.2)
BASOPHILS NFR BLD AUTO: 0 %
BILIRUB DIRECT SERPL-MCNC: 0.29 MG/DL (ref 0–0.3)
BILIRUB SERPL-MCNC: 0.9 MG/DL
CREAT SERPL-MCNC: 1.05 MG/DL (ref 0.67–1.17)
CRP SERPL-MCNC: <3 MG/L
EGFRCR SERPLBLD CKD-EPI 2021: 75 ML/MIN/1.73M2
EOSINOPHIL # BLD AUTO: 0.1 10E3/UL (ref 0–0.7)
EOSINOPHIL NFR BLD AUTO: 3 %
ERYTHROCYTE [DISTWIDTH] IN BLOOD BY AUTOMATED COUNT: 13.4 % (ref 10–15)
ERYTHROCYTE [SEDIMENTATION RATE] IN BLOOD BY WESTERGREN METHOD: 7 MM/HR (ref 0–20)
HCT VFR BLD AUTO: 42.5 % (ref 40–53)
HGB BLD-MCNC: 14.7 G/DL (ref 13.3–17.7)
IMM GRANULOCYTES # BLD: 0 10E3/UL
IMM GRANULOCYTES NFR BLD: 0 %
LYMPHOCYTES # BLD AUTO: 1.1 10E3/UL (ref 0.8–5.3)
LYMPHOCYTES NFR BLD AUTO: 24 %
MCH RBC QN AUTO: 33.3 PG (ref 26.5–33)
MCHC RBC AUTO-ENTMCNC: 34.6 G/DL (ref 31.5–36.5)
MCV RBC AUTO: 96 FL (ref 78–100)
MONOCYTES # BLD AUTO: 0.2 10E3/UL (ref 0–1.3)
MONOCYTES NFR BLD AUTO: 4 %
NEUTROPHILS # BLD AUTO: 3 10E3/UL (ref 1.6–8.3)
NEUTROPHILS NFR BLD AUTO: 68 %
PLATELET # BLD AUTO: 145 10E3/UL (ref 150–450)
PROT SERPL-MCNC: 6.1 G/DL (ref 6.4–8.3)
RBC # BLD AUTO: 4.41 10E6/UL (ref 4.4–5.9)
WBC # BLD AUTO: 4.4 10E3/UL (ref 4–11)

## 2025-05-21 PROCEDURE — 82565 ASSAY OF CREATININE: CPT

## 2025-05-21 PROCEDURE — 80076 HEPATIC FUNCTION PANEL: CPT

## 2025-05-21 PROCEDURE — 86140 C-REACTIVE PROTEIN: CPT

## 2025-05-21 PROCEDURE — 85652 RBC SED RATE AUTOMATED: CPT

## 2025-05-21 PROCEDURE — 85025 COMPLETE CBC W/AUTO DIFF WBC: CPT

## 2025-05-21 PROCEDURE — 36415 COLL VENOUS BLD VENIPUNCTURE: CPT

## 2025-05-29 ENCOUNTER — OFFICE VISIT (OUTPATIENT)
Dept: RHEUMATOLOGY | Facility: CLINIC | Age: 75
End: 2025-05-29
Payer: COMMERCIAL

## 2025-05-29 VITALS
BODY MASS INDEX: 31.02 KG/M2 | OXYGEN SATURATION: 98 % | WEIGHT: 216.2 LBS | RESPIRATION RATE: 20 BRPM | HEART RATE: 75 BPM | SYSTOLIC BLOOD PRESSURE: 138 MMHG | DIASTOLIC BLOOD PRESSURE: 83 MMHG

## 2025-05-29 DIAGNOSIS — Z79.899 HIGH RISK MEDICATIONS (NOT ANTICOAGULANTS) LONG-TERM USE: ICD-10-CM

## 2025-05-29 DIAGNOSIS — Z23 NEED FOR VACCINATION: ICD-10-CM

## 2025-05-29 DIAGNOSIS — L40.50 PSORIATIC ARTHROPATHY (H): Primary | ICD-10-CM

## 2025-05-29 RX ORDER — METHOTREXATE 2.5 MG/1
17.5 TABLET ORAL WEEKLY
Qty: 91 TABLET | Refills: 1 | Status: SHIPPED | OUTPATIENT
Start: 2025-05-29

## 2025-05-29 RX ORDER — ASPIRIN 81 MG/1
81 TABLET ORAL DAILY
COMMUNITY

## 2025-05-29 NOTE — PATIENT INSTRUCTIONS
RHEUMATOLOGY    [facility]  [address] [place] [address]  [place] [address]    Phone number: [phone]  Fax number: [phone]    If you need a medication refill, please contact us as you may need lab work and/or a follow up visit prior to your refill.      Thank you for choosing [facility]!    Francie Cuellar CMA Rheumatology

## 2025-05-29 NOTE — PROGRESS NOTES
Rheumatology Clinic Visit      Pawan Bullard MRN# [id number]   YOB: 1950 Age: 75 year old      Date of visit: 5/29/25   PCP: Dr. Ivan Diaz    Chief Complaint   Patient presents with:  Psoriatic arthropathy : Please go through labs    Assessment and Plan     1.  Psoriatic arthritis:  Previously on SSZ (GI upset).  Currently on methotrexate 17.5 mg once weekly (GI upset with 25 mg once weekly; cytopenias).  Synovitis resolved with methotrexate dose increase.  Doing well at this time.  If needed in the future could consider adding Otezla or biologic DMARD; in the past Otezla was denied by his insurance and he did not wish to use a self-injection medication.    Chronic illness, progressive.    - Continue methotrexate 17.5 mg once every 7 days (split dose within a 24-hour period)  - Continue folic acid 1 mg daily  - Labs in 3 months: CBC, Creatinine, Hepatic Panel, ESR, CRP               Rapid 3, cumulative scores                      05/29/2025:  5    (MTX 15mg wkly)                       08/28/2024:  5.2 (MTX 15mg wkly)                       02/22/2024:  7.3 (MTX 15mg wkly                      02/16/2023:  7    (MTX 15mg wkly)                      10/14/2022:  7.5 (MTX 15mg wkly)                      6/18/2021:    4.3 (MTX 20mg wkly)                      02/17/2020:  4.7 (MTX 20mg wkly)                      11/18/2019:  3.3 (MTX 20mg wkly)                      11/26/2018:  5.2 (MTX 17.5mg wkly)    High risk medication requiring intensive toxicity monitoring at least quarterly    2. Hand OA: Affecting the PIPs and DIPs. Topical diclofenac PRN.     3.  Left knee osteoarthritis: History of meniscus surgery in 2009 and 2013.  Degenerative arthritis affecting the left knee.  Continue home physical therapy exercises.  Overall knee osteoarthritis symptoms have been stable.    4.  Vaccinations: Vaccinations reviewed with Mr. Bullard.  Risks and benefits of vaccinations were discussed.    - Influenza:  Encouraged yearly vaccination  - [id number]: Vaccination advised  - Shingrix: Up-to-date  - COVID-19: Advised keeping updated    Total minutes spent in evaluation with patient, documentation, , and review of pertinent studies and chart notes: 18  The longitudinal plan of care for the rheumatology problem(s) were addressed during this visit.  Due to added complexity of care, we will continue to support the patient and the subsequent management of this condition with ongoing continuity of care.       Mr. Bullard verbalized agreement with and understanding of the rational for the diagnosis and treatment plan.  All questions were answered to best of my ability and the patient's satisfaction. Mr. Bullard was advised to contact the clinic with any questions that may arise after the clinic visit.      Thank you for involving me in the care of the patient    Return to clinic: 3 months    HPI   Pawan Bullard is a 75 year old male with a past medical history significant for hypertension, hyperlipidemia, chondrocalcinosis, GERD, osteoarthritis, and psoriatic arthritis who presents for follow-up of psoriatic arthritis.      2/22/2024: Psoriatic arthritis controlled.  Left knee still bothers him from time to time.  When his left knee bothers him enough he takes Tylenol arthritis, approximately 1-2 times per week.  No joint swelling.  Not doing physical therapy exercises for the left knee but states that he has the exercise paperwork at home and can start doing the exercises again.    8/28/2024: psoriatic arthritis controlled. No joint pain/swelling. No morning stiffness or gelling. At one point had a cough that was identified by his PCP to be GERD related and pantoprazole resolved this; he will continue following with Dr. Diaz (PCP) for this issue. Exercises for his knee have been very helpful.     2/24/2025: Worsening pain and stiffness at the MCPs and PIPs that is worse in the morning and improves with time  and activity.  Left knee is still bothering him as it has in the past, sometimes giving out and sometimes locking; he does physical therapy exercises for the knees 3 times per week; no swelling of the left knee; overall he feels like the left knee symptoms are stable.    Today, 5/29/2025: Joint pain improved with the higher methotrexate dose.  Shortly after his last visit he had swelling of one toe that has since resolved.  More recently has been doing well with no joint pain or swelling.  Morning stiffness for no more than 30 minutes.    Denies fevers, chills, nausea, vomiting, constipation, diarrhea. No abdominal pain. No chest pain/pressure, palpitations, or shortness of breath. No sicca symptoms.     Tobacco: None  EtOH: 0-2 beers or mixed drinks monthly  Drugs: None    ROS   12 point review of system was completed and negative except as noted in the HPI     Active Problem List     Patient Active Problem List   Diagnosis    Psoriatic arthropathy (H)    Esophageal reflux    Hypertension goal BP (blood pressure) < 140/90    FAMILY HISTORY OF GI NEOPLASM    FAMILY HISTORY OF DIABETES MELLITUS    Impotence of organic origin    Chondrocalcinosis    HYPERLIPIDEMIA LDL GOAL <130    Osteoarthritis    High risk medications (not anticoagulants) long-term use    ROSADO (dyspnea on exertion)    History of colonic polyps     Past Medical History     Past Medical History:   Diagnosis Date    Esophageal reflux     GERD    Hypertension     Psoriatic arthropathy (H)     Psoriatic arthritis     Past Surgical History     Past Surgical History:   Procedure Laterality Date    ARTHROSCOPY KNEE RT/LT  04/01/2009    left knee    ARTHROSCOPY KNEE WITH MEDIAL MENISCECTOMY  07/09/2013    Procedure: ARTHROSCOPY KNEE WITH MEDIAL MENISCECTOMY;  Left Knee Arthroscopic and Open Repair of Patellar Tendon with Platelet Rich Plasma;  Surgeon: Ley, Jeffrey Duane, MD;  Location: [facility]    BIOPSY  9/25/2023    COLONOSCOPY  01/01/2006    diverticuli.  Father had colon CA    COLONOSCOPY  01/01/2001    COLONOSCOPY  04/11/2011    Procedure:COLONOSCOPY; Surgeon:JENNIFER JEAN; Location:[facility]    COLONOSCOPY N/A 11/21/2016    Procedure: COLONOSCOPY;  Surgeon: Elias Santamaria MD;  Location: [facility]    COLONOSCOPY N/A 02/08/2022    Procedure: COLONOSCOPY;  Surgeon: Junior Brown MD;  Location: [facility]    ENT SURGERY  9/25/2023    HERNIA REPAIR      REPAIR TENDON PATELLA  07/09/2013    Procedure: REPAIR TENDON PATELLA;;  Surgeon: Ley, Jeffrey Duane, MD;  Location: [facility]    SURGICAL HISTORY OF -   01/01/1999    (L) Herniorrhaphy    SURGICAL HISTORY OF -   01/01/2003    (R) Herniorrhaphy    SURGICAL HISTORY OF -   01/01/1981    Hemorrohid     Allergy     Allergies   Allergen Reactions    Acetaminophen-Codeine Nausea     Current Medication List     Current Outpatient Medications   Medication Sig Dispense Refill    albuterol (PROAIR HFA/PROVENTIL HFA/VENTOLIN HFA) 108 (90 Base) MCG/ACT inhaler Inhale 2 puffs into the lungs every 4 hours as needed for shortness of breath, wheezing or cough 18 g 0    albuterol (PROAIR HFA/PROVENTIL HFA/VENTOLIN HFA) 108 (90 Base) MCG/ACT inhaler Inhale 2 puffs into the lungs every 4 hours as needed for shortness of breath or wheezing 18 g 11    aspirin 81 MG EC tablet Take 81 mg by mouth daily.      folic acid (FOLVITE) 1 MG tablet Take 1 tablet (1 mg) by mouth daily. 90 tablet 3    lisinopril (ZESTRIL) 5 MG tablet TAKE 1 TABLET (5 MG) BY MOUTH DAILY 90 tablet 2    methotrexate 2.5 MG tablet Take 7 tablets (17.5 mg) by mouth once a week. . This is taken as 4 tablets at 9AM on Sunday and 3 tablets at 9AM on Monday.  Needs to be taken within the same 24 hour time period of each week. 91 tablet 0    MULTI-VITAMIN OR TABS Take 1 tablet by mouth daily       pantoprazole (PROTONIX) 40 MG EC tablet Take 1 tablet (40 mg) by mouth daily. 90 tablet 1    Probiotic Product (PROBIOTIC DAILY PO) Take 1 capsule by mouth daily       simvastatin (ZOCOR) 40  "MG tablet Take 1 tablet (40 mg) by mouth at bedtime. 90 tablet 3    tamsulosin (FLOMAX) 0.4 MG capsule Take 1 capsule (0.4 mg) by mouth daily. 90 capsule 3     No current facility-administered medications for this visit.         Social History   See HPI    Family History     Family History   Problem Relation Age of Onset    Hypertension Mother     Diabetes Mother     Cerebrovascular Disease Mother         in her 70s    Arthritis Mother     Cancer - colorectal Father 67    Colon Cancer Father     Arthritis Paternal Grandmother     LUNG DISEASE Sister     Other - See Comments Sister     Prostate Cancer No family hx of     Coronary Artery Disease No family hx of        Physical Exam     Temp Readings from Last 3 Encounters:   04/25/25 97.5  F (36.4  C) (Tympanic)   03/27/25 97.7  F (36.5  C) (Tympanic)   01/02/25 97.1  F (36.2  C) (Tympanic)     BP Readings from Last 5 Encounters:   05/29/25 138/83   04/25/25 132/72   03/27/25 122/86   02/24/25 110/73   01/02/25 135/82     Pulse Readings from Last 1 Encounters:   05/29/25 75     Resp Readings from Last 1 Encounters:   05/29/25 20     Estimated body mass index is 31.02 kg/m  as calculated from the following:    Height as of 4/25/25: 1.778 m (5' 10\").    Weight as of this encounter: 98.1 kg (216 lb 3.2 oz).    GEN: NAD. Healthy appearing adult.   HEENT:  Anicteric, noninjected sclera. No obvious external lesions of the ear and nose. Hearing intact.  CV: S1, S2. RRR. No m/r/g  PULM: No increased work of breathing. CTA bilaterally   MSK: MCPs, PIPs, DIPs without swelling or tenderness to palpation.  Wrists without swelling or tenderness to palpation.  Elbows and shoulders without swelling or tenderness to palpation.   Knees, ankles, and MTPs without swelling or tenderness to palpation.    SKIN: No rash or jaundice seen  PSYCH: Alert. Appropriate.      Labs / Imaging (select studies)       CBC  Recent Labs   Lab Test 05/21/25  1100 04/23/25  1045 03/26/25  1020   WBC 4.4 " 4.0 4.9   RBC 4.41 4.53 4.50   HGB 14.7 15.0 15.1   HCT 42.5 43.7 43.7   MCV 96 97 97   RDW 13.4 13.6 13.3   * 151 135*   MCH 33.3* 33.1* 33.6*   MCHC 34.6 34.3 34.6   NEUTROPHIL 68 63 65   LYMPH 24 29 28   MONOCYTE 4 6 4   EOSINOPHIL 3 3 3   BASOPHIL 0 0 0   ANEU 3.0 2.5 3.2   ALYM 1.1 1.2 1.4   ERENDIRA 0.2 0.2 0.2   AEOS 0.1 0.1 0.2   ABAS 0.0 0.0 0.0     CMP  Recent Labs   Lab Test 05/21/25  1100 04/23/25  1045 03/26/25  1020 02/19/25  1018 01/02/25  1059 02/16/23  1141 12/15/22  0952 12/01/22  0807 12/10/21  1603 11/20/21  0901 09/17/21  1052 06/11/21  1014 02/26/21  1012 11/16/20  0903   NA  --   --   --   --  139  --   --   --   --  140  --   --   --  138   POTASSIUM  --   --   --   --  4.2  --   --  4.0  --  3.9  --   --   --  4.0   CHLORIDE  --   --   --   --  104  --   --   --   --  107  --   --   --  105   CO2  --   --   --   --  25  --   --   --   --  30  --   --   --  27   ANIONGAP  --   --   --   --  10  --   --   --   --  3  --   --   --  6   GLC  --   --   --   --  124*  --  120*  --   --  110*  --   --   --  107*   BUN  --   --   --   --  13.6  --   --   --   --  13  --   --   --  13   CR 1.05 1.13 1.05   < > 1.00   < >  --  1.07   < > 1.06   < > 1.07 0.96 1.06  1.05   GFRESTIMATED 75 69 75   < > 80   < >  --  75   < > 71   < > 70 81 71  72   GFRESTBLACK  --   --   --   --   --   --   --   --   --   --   --  81 >90 83  84   JOHN  --   --   --   --  9.5  --   --   --   --  9.0  --   --   --  9.5   BILITOTAL 0.9 1.0 1.0   < >  --    < >  --  1.1   < >  --    < > 0.7 0.8 1.0   ALBUMIN 4.3 4.5 4.4   < >  --    < >  --  4.3   < >  --    < > 3.6 3.8 4.0   PROTTOTAL 6.1* 6.6 6.4   < >  --    < >  --  6.4   < >  --    < > 6.4* 6.7* 6.7*   ALKPHOS 60 60 63   < >  --    < >  --  50   < >  --    < > 51 58 58   AST 36 50* 41   < >  --    < >  --  32   < >  --    < > 26 39 32   ALT 45 65 55   < >  --    < >  --  41   < >  --    < > 41 58 42    < > = values in this interval not displayed.      Calcium/VitaminD  Recent Labs   Lab Test 01/02/25  1059 11/20/21  0901 11/16/20  0903   JOHN 9.5 9.0 9.5     ESR/CRP  Recent Labs   Lab Test 05/21/25  1100 08/22/24  1031 02/14/24  1009 05/23/23  1339 02/16/23  1141 09/09/22  1059 06/14/22  1012 12/10/21  1603   SED 7 6 5   < > 5   < > 6 5   CRP  --   --   --   --  <2.9  --  <2.9 <2.9   CRPI <3.00 <3.00 <3.00   < >  --    < >  --   --     < > = values in this interval not displayed.       Hepatitis B  Recent Labs   Lab Test 05/16/18  1016   HBCAB Nonreactive   HEPBANG Nonreactive       Immunization History     Immunization History   Administered Date(s) Administered    COVID-19 12+ (MODERNA) 01/05/2024, 11/15/2024    COVID-19 Bivalent 18+ (Moderna) 11/18/2022    COVID-19 Monovalent 18+ (Moderna) 05/12/2022    COVID-19 Monovalent Booster 18+ (Moderna) 10/28/2021    COVID-19 Vaccine (Evan) 03/04/2021    Hepatitis A (VAQTA)(ADULT 19+) 08/02/2011    Influenza (High Dose) Trivalent,PF (Fluzone) 10/18/2017, 09/21/2018, 11/13/2019, 10/09/2020, 11/16/2021, 11/15/2023    Influenza (IIV3) PF 11/17/2005, 12/04/2006, 11/22/2010, 09/26/2012, 10/01/2014    Influenza Vaccine 65+ (Fluzone HD) 10/09/2020, 09/17/2021, 09/09/2022, 11/15/2023    Influenza Vaccine >6 months,quad, PF 10/15/2013, 12/17/2015, 08/26/2016    Pneumo Conj 13-V (2010&after) 10/18/2017    Pneumococcal 20 valent Conjugate (Prevnar 20) 02/22/2024    Pneumococcal 23 valent 01/22/2018    TDAP (Adacel,Boostrix) 01/09/2013    TDAP Vaccine (Adacel) 04/22/2009, 05/20/2018    Zoster vaccine, live 11/10/2014          Chart documentation done in part with Dragon Voice recognition Software. Although reviewed after completion, some word and grammatical error may remain.    Adria Lopez MD

## 2025-06-03 ENCOUNTER — OFFICE VISIT (OUTPATIENT)
Dept: PEDIATRICS | Facility: CLINIC | Age: 75
End: 2025-06-03
Payer: COMMERCIAL

## 2025-06-03 ENCOUNTER — NURSE TRIAGE (OUTPATIENT)
Dept: FAMILY MEDICINE | Facility: CLINIC | Age: 75
End: 2025-06-03
Payer: COMMERCIAL

## 2025-06-03 ENCOUNTER — HOSPITAL ENCOUNTER (OUTPATIENT)
Dept: ULTRASOUND IMAGING | Facility: CLINIC | Age: 75
Discharge: HOME | End: 2025-06-03
Payer: COMMERCIAL

## 2025-06-03 VITALS
RESPIRATION RATE: 20 BRPM | HEART RATE: 78 BPM | WEIGHT: 213 LBS | BODY MASS INDEX: 30.56 KG/M2 | DIASTOLIC BLOOD PRESSURE: 83 MMHG | SYSTOLIC BLOOD PRESSURE: 135 MMHG | OXYGEN SATURATION: 98 % | TEMPERATURE: 97.6 F

## 2025-06-03 DIAGNOSIS — I82.890 SUPERFICIAL VEIN THROMBOSIS: Primary | ICD-10-CM

## 2025-06-03 DIAGNOSIS — I80.02 PHLEBITIS AND THROMBOPHLEBITIS OF SUPERFICIAL VESSELS OF LEFT LOWER EXTREMITY: ICD-10-CM

## 2025-06-03 DIAGNOSIS — I82.890 SUPERFICIAL VEIN THROMBOSIS: ICD-10-CM

## 2025-06-03 PROCEDURE — 1125F AMNT PAIN NOTED PAIN PRSNT: CPT

## 2025-06-03 PROCEDURE — 93971 EXTREMITY STUDY: CPT | Mod: LT

## 2025-06-03 PROCEDURE — 99214 OFFICE O/P EST MOD 30 MIN: CPT

## 2025-06-03 PROCEDURE — 3075F SYST BP GE 130 - 139MM HG: CPT

## 2025-06-03 PROCEDURE — 3079F DIAST BP 80-89 MM HG: CPT

## 2025-06-03 ASSESSMENT — PAIN SCALES - GENERAL: PAINLEVEL_OUTOF10: MODERATE PAIN (5)

## 2025-06-03 NOTE — TELEPHONE ENCOUNTER
Referral to Acute and Diagnostic Services    [phone] ([place]) [place] - [address] [place] [phone]    Transition to Acute & Diagnostic Services Clinic has been discussed with patient, and he agrees with next level of care.   Patient understands that evaluation/treatment at [facility] typically takes significantly longer than in clinic/urgent care (>2 hours).  The [facility] Acute and Diagnostics Services Clinic has been contacted by provider/staff to confirm patient acceptance.         Special issues:      None                     The following provider has assessed this patient for intervention at [facility], and directed the patient for referral: Suzanne Browning RN

## 2025-06-03 NOTE — PATIENT INSTRUCTIONS
Although the ultrasound today shows that you continue to have clot within the veins of your left leg, the clot is no bigger than it was on 3/27/2025, you have drilled a hole through at least part of the clot since 3/27/2025, and there is no evidence of a clot in any of your deep veins.  Therefore, although we could make a case for resuming your anticoagulant, apixaban (Eliquis), there is no absolute reason that you need to resume apixaban today.  Potential benefits from resuming apixaban would be to reduce the amount of clot that you have in that superficial vein, which may reduce the focal discomfort that you have particular around the knee and in the upper calf.    I suggest that you make an appointment to see Dr. Diaz in a couple of weeks, which gives you additional time to think about whether you wish to resume anticoagulants or not.  I will make sure that I write Dr. Diaz a note summarizing today's issue, and letting him know that you will probably want to discuss things further.  If, in the meantime, before you are able to see Dr. Diaz, the leg becomes much more swollen, red, or painful, please contact Dr. Diaz for earlier follow-up, or, if symptoms are more severe, find an emergency department for evaluation.    It was a pleasure meeting you today.

## 2025-06-03 NOTE — PROGRESS NOTES
Acute and Diagnostic Services Clinic Visit    Assessment & Plan     (I80.02) Phlebitis and thrombophlebitis of superficial vessels of left lower extremity  Comment: Pawan was found to have superficial venous thrombosis of the left greater saphenous vein from proximal thigh to distal thigh on ultrasound 3/27/2025.  He was treated with 45 days of apixaban, completed approximately 5/11/2025, and since then has been on aspirin 81 mg daily.  He presents with a new, palpable venous cord of the left medial leg distal to the knee, and a persistent palpable venous cord of the left medial thigh from knee to proximal thigh.  I am concerned that there has been propagation of his known superficial clot since completion of apixaban which may require resumption of systemic anticoagulation.  I would like to start with repeat ultrasound today, to compare to the 3/27/2025 study, to see if there has been either proximal or distal extension.  Plan:   I discussed the rationale for repeating US Lower Extremity Venous Duplex Left with Pawan today.  He agrees.    DISCUSSION/MEDICAL DECISION MAKING:  Today's left lower extremity ultrasound shows persistent occlusive thrombus in the greater saphenous vein, extending from the mid thigh to proximal calf.  The radiologist goes on to say that this clot extent is the same as it was on 3/27/2025.  There is also a note made that there is improved patency of the proximal thigh greater saphenous vein when compared to 3/27/2025.  There is no evidence of DVT.    Pawan and I discussed these results, and discussed benefit versus risk of resumption of systemic anticoagulation.  As I told Pawan, there is no absolute indication that he should be back on apixaban for a few reasons:  There is no DVT.  There has been no proximal propagation of the clot on ultrasound when compared to 3/27/2025.  There has been some improved patency of the greater saphenous vein in the region of the proximal thigh when  compared to 3/27/2025.    However, I do believe we could justify resumption of apixaban.  He continues to have clot within the greater saphenous vein, including a segment of occlusive clot in the thigh.  He is also symptomatic, with some discomfort over the distribution of the GSV, particularly in the proximal calf and medial knee, which could respond to improved patency.    Pawan gave this a lot of thought; I let him know that this is a complex discussion and decision.  At the present time, his decision is not to resume apixaban, but to continue aspirin 81 mg daily.  I think that is a reasonable decision.  However, I asked him to schedule follow-up with Dr. Diaz in a couple of weeks, in hopes that the 2 of them can review this topic, and review benefit versus risk of resumption of apixaban.  Pawan agrees to this plan.    37 minutes were spent doing chart review, history and exam, documentation and further activities per the note.     Subjective   Pawan is a 75 year old, presenting for the following health issues:  Edema    HPI      Edema/Swelling  Onset/Duration: few days ago   Description:   Location: left lower leg into the inside crease of knee  Associated redness: YES- minor  Associated skin changes:  No  Associated pain:  YES  Progression of Symptoms:  same  Accompanying Signs & Symptoms:  Chest pain: No  Shortness of breath: No           Nausea or vomiting: No  Lightheadedness: No  Palpitations: No  Fever/Chills: No  Cough: No  History:   History of heart disease or heart failure: No  History of sleep apnea: No  Tobacco use: No           Previous similar symptoms: YES- past DVT, same leg  Precipitating factors: none  Alleviating factors: none  Therapies tried: elevating helps    Pawan Bullard is a 75-year-old man seen today in the acute diagnostic services (ADS) clinic at [facility] regarding an area of redness and tenderness of the left medial leg.    Pawan presented to his primary care provider  on 3/27/2025 with left medial thigh pain.  He was found to have superficial venous thrombosis of the left greater saphenous vein from proximal thigh to distal thigh on ultrasound 3/27/2025.  He was treated with 45 days of apixaban, completed approximately 5/11/2025, and since then has been on aspirin 81 mg daily.      Pawan presents today with a new, palpable venous cord of the left medial leg distal to the knee, present for the last few days.  It is tender to palpation, though not warm.  He also describes a persistent palpable venous cord of the left medial thigh from knee to proximal thigh, not subjectively different than his symptoms in 3/2025.    Pawan has no dyspnea, cough, hemoptysis, or pleuritic chest pain.      Review of Systems  As per the history of present illness.  No additional positives or negatives are obtained.      Objective    /83 (BP Location: Left arm, Patient Position: Chair, Cuff Size: Adult Regular)   Pulse 78   Temp 97.6  F (36.4  C) (Oral)   Resp 20   Wt 96.6 kg (213 lb)   SpO2 98%   BMI 30.56 kg/m    Body mass index is 30.56 kg/m .  Physical Exam   GENERAL: Very pleasant man, who appears well.  EXTREMITIES: There is an area of linear erythema on the left medial leg, beginning from the proximal calf, and extending to the distal thigh.  On palpation, there is a linear density suggestive of venous cord.  That probable venous cord is actually palpable toward the proximal medial thigh, though there is no overlying erythema more proximally.  There is no significant left lower extremity edema.  NEURO: Alert, oriented, conversant.  Cranial nerves III - XII grossly intact.  No gross motor or sensory deficits.     EXAM: US LOWER EXTREMITY VENOUS DUPLEX LEFT  LOCATION: [facility]  DATE: 6/3/2025     INDICATION: Found to have superficial vein thrombosis left greater saphenous vein. Is now off of anticoagulants.  Symptoms suggest propagation.  Requesting  reevaluation of clot location and extent.  COMPARISON: 3/27/2025  TECHNIQUE: Venous Duplex ultrasound of the left lower extremity with and without compression, augmentation and duplex. Color flow and spectral Doppler with waveform analysis performed.     FINDINGS: Exam includes the common femoral, femoral, popliteal, and contralateral common femoral veins as well as segmentally visualized deep calf veins and greater saphenous vein.      LEFT: No deep vein thrombosis. Occlusive thrombus in the greater saphenous vein extending from the mid thigh to proximal calf, as before. The proximal thigh is not patent. No popliteal cyst.                                                                      IMPRESSION:  1.  No deep venous thrombosis in the left lower extremity.  2.  Occlusive thrombus in the greater saphenous vein extending from the mid thigh to proximal calf, as before. Improved patency of the proximal thigh GSV compared to 3/27/2025.          In 1212  Out 1236  In 1410  Out 1423    Signed Electronically by: Manuel Bullard MD

## 2025-06-03 NOTE — TELEPHONE ENCOUNTER
"Blood clot concern left leg inner leg below the knee. Leg is sore to the touch.    Patient is willing to go to [facility] [place] and it take him about 20 minutes to get there for travel time.       Reason for Disposition   History of prior 'blood clot' in leg or lungs (i.e., deep vein thrombosis, pulmonary embolism)    Additional Information   Negative: Looks like a broken bone or dislocated joint (e.g., crooked or deformed)   Negative: Sounds like a life-threatening emergency to the triager   Negative: Followed a hip injury   Negative: Followed a knee injury   Negative: Followed an ankle injury   Negative: Back pain radiating (shooting) into leg(s)   Negative: Foot pain is main symptom   Negative: Ankle pain is main symptom   Negative: Knee pain is main symptom   Negative: Leg swelling is main symptom   Negative: Chest pain   Negative: Difficulty breathing   Negative: Entire foot is cool or blue in comparison to other side   Negative: Unable to walk    Answer Assessment - Initial Assessment Questions  1. ONSET: \"When did the pain start?\"       A few days ago     2. LOCATION: \"Where is the pain located?\"       Blood clot concern left leg inner leg below the knee and there is a darkened vein and the area feels warmer than the rest of his leg.    3. PAIN: \"How bad is the pain?\"    (Scale 1-10; or mild, moderate, severe)      3 out of 10 when he touches the leg     4. WORK OR EXERCISE: \"Has there been any recent work or exercise that involved this part of the body?\"       No known cause.    5. CAUSE: \"What do you think is causing the leg pain?\"      Unsure but patient is concerned for a blood clot due to history of having a DVT.    6. OTHER SYMPTOMS: \"Do you have any other symptoms?\" (e.g., chest pain, back pain, breathing difficulty, swelling, rash, fever, numbness, weakness)      Denies other symptoms.    Protocols used: Leg Pain-A-[place]    "

## 2025-06-03 NOTE — TELEPHONE ENCOUNTER
[facility] able to see patient today and will call to schedule patient.     Suzanne Browning RN on 6/3/2025 at 10:32 AM

## 2025-06-03 NOTE — Clinical Note
I had the pleasure of seeing Pawan today in the [facility] regarding symptoms suggesting ongoing clot within the greater saphenous vein of his left leg.  Recall that he completed a 45-day course of apixaban, ending about 5/11/2025, and is now on aspirin 81 mg daily.  Pawan still has clot within the greater saphenous, though the distribution of clot is the same as it was on 3/27/2025; there has been no propagation.  He has also managed to recanalize some of the clot within the mid thigh since 3/27/2025.  We talked about risk versus benefit of resuming apixaban.  Pawan decided not to resume apixaban, a decision I support.  However, a case could be made for resumption of apixaban, which I discussed in detail in today's note.  He is going to schedule a follow-up visit with you in a couple of weeks, which gives him time to think about things more, to again discuss anticoagulation versus aspirin.  Thank you.

## 2025-06-17 ENCOUNTER — OFFICE VISIT (OUTPATIENT)
Dept: FAMILY MEDICINE | Facility: CLINIC | Age: 75
End: 2025-06-17
Payer: COMMERCIAL

## 2025-06-17 VITALS
WEIGHT: 214 LBS | BODY MASS INDEX: 30.64 KG/M2 | OXYGEN SATURATION: 96 % | HEIGHT: 70 IN | DIASTOLIC BLOOD PRESSURE: 60 MMHG | TEMPERATURE: 98 F | HEART RATE: 82 BPM | RESPIRATION RATE: 20 BRPM | SYSTOLIC BLOOD PRESSURE: 126 MMHG

## 2025-06-17 DIAGNOSIS — Z86.718 HISTORY OF THROMBOSIS OF LEG: Primary | ICD-10-CM

## 2025-06-17 PROCEDURE — 99213 OFFICE O/P EST LOW 20 MIN: CPT | Performed by: FAMILY MEDICINE

## 2025-06-17 PROCEDURE — 3074F SYST BP LT 130 MM HG: CPT | Performed by: FAMILY MEDICINE

## 2025-06-17 PROCEDURE — 3078F DIAST BP <80 MM HG: CPT | Performed by: FAMILY MEDICINE

## 2025-06-17 NOTE — PROGRESS NOTES
"  Assessment & Plan     History of thrombosis of leg  Patient was diagnosed with left lower leg superficial thrombosis in March.  Was seen in [facility] recently.  Repeat imaging result reviewed.  Shared decision made to continue aspirin.  Stressed on regular walks, healthy diet and weight loss.  All questions answered.      Alina Villalta is a 75 year old, presenting for the following health issues:  Results (Patient had Ultrasound on lower left extremity on 6/3/2025. Here today to discuss results. )        6/17/2025     9:57 AM   Additional Questions   Roomed by Elizabeth MENDEZ CMA     History of Present Illness       Vascular Disease:  He presents for follow up of vascular disease.     He never takes nitroglycerin. He takes daily aspirin.    He eats 2-3 servings of fruits and vegetables daily.He consumes 2 sweetened beverage(s) daily.He exercises with enough effort to increase his heart rate 10 to 19 minutes per day.  He exercises with enough effort to increase his heart rate 4 days per week.   He is taking medications regularly.      Chief Complaint   Patient presents with    Results     Patient had Ultrasound on lower left extremity on 6/3/2025. Here today to discuss results.        Review of Systems  Constitutional, HEENT, cardiovascular, pulmonary, gi and gu systems are negative, except as otherwise noted.      Objective    /60   Pulse 82   Temp 98  F (36.7  C) (Tympanic)   Resp 20   Ht 1.778 m (5' 10\")   Wt 97.1 kg (214 lb)   SpO2 96%   BMI 30.71 kg/m    Body mass index is 30.71 kg/m .  Physical Exam   GENERAL: alert and no distress  NECK: no adenopathy, no asymmetry, masses, or scars  RESP: lungs clear to auscultation - no rales, rhonchi or wheezes  CV: regular rate and rhythm, normal S1 S2, no S3 or S4, no murmur, click or rub, no peripheral edema  ABDOMEN: soft, nontender, no hepatosplenomegaly, no masses  MS: no gross musculoskeletal defects noted, no edema  SKIN: no suspicious lesions or " michael  NEURO: Normal strength and tone, mentation intact and speech normal  PSYCH: mentation appears normal, affect normal/bright          Signed Electronically by: Ivan Diaz MD

## 2025-07-24 ENCOUNTER — FOLLOW UP (OUTPATIENT)
Age: 75
End: 2025-07-24

## 2025-07-24 DIAGNOSIS — G51.0: ICD-10-CM

## 2025-07-24 DIAGNOSIS — H40.1132: ICD-10-CM

## 2025-07-24 DIAGNOSIS — H04.123: ICD-10-CM

## 2025-07-24 PROCEDURE — 92083 EXTENDED VISUAL FIELD XM: CPT

## 2025-07-24 PROCEDURE — 92133 CPTRZD OPH DX IMG PST SGM ON: CPT

## 2025-07-24 PROCEDURE — 99213 OFFICE O/P EST LOW 20 MIN: CPT

## 2025-07-24 RX ORDER — POVIDONE 2.5 MG/.5G
SOLUTION, GEL FORMING / DROPS OPHTHALMIC
Start: 2025-07-24

## 2025-08-25 DIAGNOSIS — R05.1 ACUTE COUGH: ICD-10-CM

## 2025-08-25 DIAGNOSIS — K21.9 GASTROESOPHAGEAL REFLUX DISEASE, UNSPECIFIED WHETHER ESOPHAGITIS PRESENT: ICD-10-CM

## 2025-08-25 RX ORDER — PANTOPRAZOLE SODIUM 40 MG/1
40 TABLET, DELAYED RELEASE ORAL DAILY
Qty: 90 TABLET | Refills: 1 | Status: SHIPPED | OUTPATIENT
Start: 2025-08-25

## 2025-08-28 ENCOUNTER — LAB (OUTPATIENT)
Dept: LAB | Facility: CLINIC | Age: 75
End: 2025-08-28
Payer: COMMERCIAL

## 2025-08-28 DIAGNOSIS — Z79.899 HIGH RISK MEDICATIONS (NOT ANTICOAGULANTS) LONG-TERM USE: ICD-10-CM

## 2025-08-28 DIAGNOSIS — L40.50 PSORIATIC ARTHROPATHY (H): ICD-10-CM

## 2025-08-28 LAB
ALBUMIN SERPL BCG-MCNC: 4.4 G/DL (ref 3.5–5.2)
ALP SERPL-CCNC: 66 U/L (ref 40–150)
ALT SERPL W P-5'-P-CCNC: 38 U/L (ref 0–70)
AST SERPL W P-5'-P-CCNC: 37 U/L (ref 0–45)
BASOPHILS # BLD AUTO: <0.04 10E3/UL (ref 0–0.2)
BASOPHILS NFR BLD AUTO: 0.2 %
BILIRUB DIRECT SERPL-MCNC: 0.27 MG/DL (ref 0–0.3)
BILIRUB SERPL-MCNC: 1 MG/DL
CREAT SERPL-MCNC: 1.05 MG/DL (ref 0.67–1.17)
CRP SERPL-MCNC: 12.65 MG/L
EGFRCR SERPLBLD CKD-EPI 2021: 75 ML/MIN/1.73M2
EOSINOPHIL # BLD AUTO: 0.19 10E3/UL (ref 0–0.7)
EOSINOPHIL NFR BLD AUTO: 4 %
ERYTHROCYTE [DISTWIDTH] IN BLOOD BY AUTOMATED COUNT: 13.7 % (ref 10–15)
ERYTHROCYTE [SEDIMENTATION RATE] IN BLOOD BY WESTERGREN METHOD: 10 MM/HR (ref 0–20)
HCT VFR BLD AUTO: 42.9 % (ref 40–53)
HGB BLD-MCNC: 14.6 G/DL (ref 13.3–17.7)
IMM GRANULOCYTES # BLD: <0.04 10E3/UL
IMM GRANULOCYTES NFR BLD: 0.2 %
LYMPHOCYTES # BLD AUTO: 1.15 10E3/UL (ref 0.8–5.3)
LYMPHOCYTES NFR BLD AUTO: 24.4 %
MCH RBC QN AUTO: 33.1 PG (ref 26.5–33)
MCHC RBC AUTO-ENTMCNC: 34 G/DL (ref 31.5–36.5)
MCV RBC AUTO: 97.3 FL (ref 78–100)
MONOCYTES # BLD AUTO: 0.22 10E3/UL (ref 0–1.3)
MONOCYTES NFR BLD AUTO: 4.7 %
NEUTROPHILS # BLD AUTO: 3.13 10E3/UL (ref 1.6–8.3)
NEUTROPHILS NFR BLD AUTO: 66.5 %
PLATELET # BLD AUTO: 140 10E3/UL (ref 150–450)
PROT SERPL-MCNC: 6.7 G/DL (ref 6.4–8.3)
RBC # BLD AUTO: 4.41 10E6/UL (ref 4.4–5.9)
WBC # BLD AUTO: 4.71 10E3/UL (ref 4–11)

## 2025-08-28 RX ORDER — ALBUTEROL SULFATE 90 UG/1
INHALANT RESPIRATORY (INHALATION)
Qty: 8.5 G | Refills: 0 | Status: SHIPPED | OUTPATIENT
Start: 2025-08-28

## 2025-09-04 ENCOUNTER — OFFICE VISIT (OUTPATIENT)
Dept: DERMATOLOGY | Facility: CLINIC | Age: 75
End: 2025-09-04
Payer: COMMERCIAL

## 2025-09-04 ENCOUNTER — OFFICE VISIT (OUTPATIENT)
Dept: RHEUMATOLOGY | Facility: CLINIC | Age: 75
End: 2025-09-04
Payer: COMMERCIAL

## 2025-09-04 VITALS
HEART RATE: 65 BPM | WEIGHT: 216.4 LBS | OXYGEN SATURATION: 98 % | RESPIRATION RATE: 20 BRPM | DIASTOLIC BLOOD PRESSURE: 81 MMHG | BODY MASS INDEX: 31.05 KG/M2 | SYSTOLIC BLOOD PRESSURE: 138 MMHG

## 2025-09-04 DIAGNOSIS — L57.0 AK (ACTINIC KERATOSIS): ICD-10-CM

## 2025-09-04 DIAGNOSIS — Z11.59 ENCOUNTER FOR SCREENING FOR OTHER VIRAL DISEASES: ICD-10-CM

## 2025-09-04 DIAGNOSIS — L98.9 SKIN LESION: ICD-10-CM

## 2025-09-04 DIAGNOSIS — Z79.899 HIGH RISK MEDICATIONS (NOT ANTICOAGULANTS) LONG-TERM USE: ICD-10-CM

## 2025-09-04 DIAGNOSIS — L82.0 INFLAMED SEBORRHEIC KERATOSIS: Primary | ICD-10-CM

## 2025-09-04 DIAGNOSIS — L40.50 PSORIATIC ARTHROPATHY (H): Primary | ICD-10-CM

## 2025-09-04 DIAGNOSIS — M17.12 PRIMARY OSTEOARTHRITIS OF LEFT KNEE: ICD-10-CM

## 2025-09-04 RX ORDER — TRIAMCINOLONE ACETONIDE 40 MG/ML
40 INJECTION, SUSPENSION INTRA-ARTICULAR; INTRAMUSCULAR ONCE
Status: COMPLETED | OUTPATIENT
Start: 2025-09-04 | End: 2025-09-04

## 2025-09-04 RX ORDER — FOLIC ACID 1 MG/1
1 TABLET ORAL DAILY
Qty: 90 TABLET | Refills: 3 | Status: SHIPPED | OUTPATIENT
Start: 2025-09-04

## 2025-09-04 RX ORDER — METHOTREXATE 2.5 MG/1
17.5 TABLET ORAL WEEKLY
Qty: 91 TABLET | Refills: 2 | Status: SHIPPED | OUTPATIENT
Start: 2025-09-04

## 2025-09-04 RX ADMIN — TRIAMCINOLONE ACETONIDE 40 MG: 40 INJECTION, SUSPENSION INTRA-ARTICULAR; INTRAMUSCULAR at 13:58

## (undated) DEVICE — ENDO FORCEP ENDOJAW BIOPSY 2.8MMX230CM FB-220U

## (undated) RX ORDER — GLYCOPYRROLATE 0.2 MG/ML
INJECTION, SOLUTION INTRAMUSCULAR; INTRAVENOUS
Status: DISPENSED
Start: 2022-02-08

## (undated) RX ORDER — PROPOFOL 10 MG/ML
INJECTION, EMULSION INTRAVENOUS
Status: DISPENSED
Start: 2022-02-08